# Patient Record
Sex: FEMALE | Race: WHITE | NOT HISPANIC OR LATINO | Employment: FULL TIME | URBAN - METROPOLITAN AREA
[De-identification: names, ages, dates, MRNs, and addresses within clinical notes are randomized per-mention and may not be internally consistent; named-entity substitution may affect disease eponyms.]

---

## 2018-04-13 ENCOUNTER — TRANSCRIBE ORDERS (OUTPATIENT)
Dept: ADMINISTRATIVE | Facility: HOSPITAL | Age: 43
End: 2018-04-13

## 2018-04-13 ENCOUNTER — HOSPITAL ENCOUNTER (OUTPATIENT)
Dept: GENERAL RADIOLOGY | Facility: HOSPITAL | Age: 43
Discharge: HOME | End: 2018-04-13
Attending: INTERNAL MEDICINE | Admitting: INTERNAL MEDICINE

## 2018-04-13 DIAGNOSIS — R07.89 MUSCULOSKELETAL CHEST PAIN: Primary | ICD-10-CM

## 2018-04-13 DIAGNOSIS — R07.89 MUSCULOSKELETAL CHEST PAIN: ICD-10-CM

## 2018-04-13 PROCEDURE — 71046 X-RAY EXAM CHEST 2 VIEWS: CPT

## 2018-08-23 ENCOUNTER — TRANSCRIBE ORDERS (OUTPATIENT)
Dept: ADMINISTRATIVE | Facility: HOSPITAL | Age: 43
End: 2018-08-23

## 2018-08-23 DIAGNOSIS — Z12.31 VISIT FOR SCREENING MAMMOGRAM: Primary | ICD-10-CM

## 2018-09-12 ENCOUNTER — HOSPITAL ENCOUNTER (OUTPATIENT)
Dept: MAMMOGRAPHY | Facility: HOSPITAL | Age: 43
Discharge: HOME | End: 2018-09-12
Attending: INTERNAL MEDICINE | Admitting: INTERNAL MEDICINE

## 2018-09-12 DIAGNOSIS — Z12.31 VISIT FOR SCREENING MAMMOGRAM: ICD-10-CM

## 2018-09-12 PROCEDURE — 77063 BREAST TOMOSYNTHESIS BI: CPT | Performed by: RADIOLOGY

## 2018-09-12 PROCEDURE — 77067 SCR MAMMO BI INCL CAD: CPT | Performed by: RADIOLOGY

## 2018-09-12 PROCEDURE — 77067 SCR MAMMO BI INCL CAD: CPT

## 2018-09-12 PROCEDURE — 77063 BREAST TOMOSYNTHESIS BI: CPT

## 2018-09-24 ENCOUNTER — HOSPITAL ENCOUNTER (OUTPATIENT)
Dept: MAMMOGRAPHY | Facility: HOSPITAL | Age: 43
Discharge: HOME | End: 2018-09-24
Admitting: RADIOLOGY

## 2018-09-24 ENCOUNTER — TRANSCRIBE ORDERS (OUTPATIENT)
Dept: MAMMOGRAPHY | Facility: HOSPITAL | Age: 43
End: 2018-09-24

## 2018-09-24 ENCOUNTER — HOSPITAL ENCOUNTER (OUTPATIENT)
Dept: ULTRASOUND IMAGING | Facility: HOSPITAL | Age: 43
Discharge: HOME | End: 2018-09-24

## 2018-09-24 DIAGNOSIS — R92.8 ABNORMAL MAMMOGRAM: Primary | ICD-10-CM

## 2018-09-24 DIAGNOSIS — R92.8 ABNORMAL MAMMOGRAM: ICD-10-CM

## 2018-09-24 PROCEDURE — 77066 DX MAMMO INCL CAD BI: CPT | Performed by: RADIOLOGY

## 2018-09-24 PROCEDURE — 76642 ULTRASOUND BREAST LIMITED: CPT

## 2018-09-24 PROCEDURE — 77066 DX MAMMO INCL CAD BI: CPT

## 2018-09-24 PROCEDURE — 76642 ULTRASOUND BREAST LIMITED: CPT | Performed by: RADIOLOGY

## 2018-09-24 PROCEDURE — G0279 TOMOSYNTHESIS, MAMMO: HCPCS

## 2018-09-24 PROCEDURE — 77062 BREAST TOMOSYNTHESIS BI: CPT | Performed by: RADIOLOGY

## 2019-03-25 ENCOUNTER — TRANSCRIBE ORDERS (OUTPATIENT)
Dept: MAMMOGRAPHY | Facility: HOSPITAL | Age: 44
End: 2019-03-25

## 2019-03-25 ENCOUNTER — HOSPITAL ENCOUNTER (OUTPATIENT)
Dept: MAMMOGRAPHY | Facility: HOSPITAL | Age: 44
Discharge: HOME | End: 2019-03-25
Attending: RADIOLOGY | Admitting: RADIOLOGY

## 2019-03-25 ENCOUNTER — HOSPITAL ENCOUNTER (OUTPATIENT)
Dept: ULTRASOUND IMAGING | Facility: HOSPITAL | Age: 44
Discharge: HOME | End: 2019-03-25

## 2019-03-25 DIAGNOSIS — R92.8 ABNORMAL MAMMOGRAM: ICD-10-CM

## 2019-03-25 DIAGNOSIS — R92.8 ABNORMAL MAMMOGRAM: Primary | ICD-10-CM

## 2019-03-25 PROCEDURE — 77066 DX MAMMO INCL CAD BI: CPT | Performed by: RADIOLOGY

## 2019-03-25 PROCEDURE — 76642 ULTRASOUND BREAST LIMITED: CPT | Performed by: RADIOLOGY

## 2019-03-25 PROCEDURE — 77066 DX MAMMO INCL CAD BI: CPT

## 2019-03-25 PROCEDURE — 76642 ULTRASOUND BREAST LIMITED: CPT

## 2019-03-29 ENCOUNTER — HOSPITAL ENCOUNTER (OUTPATIENT)
Dept: ULTRASOUND IMAGING | Facility: HOSPITAL | Age: 44
Discharge: HOME | End: 2019-03-29
Admitting: RADIOLOGY

## 2019-03-29 ENCOUNTER — HOSPITAL ENCOUNTER (OUTPATIENT)
Dept: MAMMOGRAPHY | Facility: HOSPITAL | Age: 44
Discharge: HOME | End: 2019-03-29

## 2019-03-29 DIAGNOSIS — R92.8 ABNORMAL MAMMOGRAM: ICD-10-CM

## 2019-03-29 PROCEDURE — 88307 TISSUE EXAM BY PATHOLOGIST: CPT | Performed by: INTERNAL MEDICINE

## 2019-03-29 PROCEDURE — 19083 BX BREAST 1ST LESION US IMAG: CPT | Performed by: RADIOLOGY

## 2019-03-29 PROCEDURE — A4648 IMPLANTABLE TISSUE MARKER: HCPCS

## 2019-03-29 PROCEDURE — 77065 DX MAMMO INCL CAD UNI: CPT | Performed by: RADIOLOGY

## 2019-03-29 RX ORDER — LIDOCAINE HYDROCHLORIDE AND EPINEPHRINE 10; 10 MG/ML; UG/ML
10 INJECTION, SOLUTION INFILTRATION; PERINEURAL ONCE
Status: COMPLETED | OUTPATIENT
Start: 2019-03-29 | End: 2019-03-29

## 2019-03-29 RX ORDER — LIDOCAINE HYDROCHLORIDE 10 MG/ML
5 INJECTION, SOLUTION INFILTRATION; PERINEURAL ONCE
Status: COMPLETED | OUTPATIENT
Start: 2019-03-29 | End: 2019-03-29

## 2019-03-29 RX ADMIN — LIDOCAINE HYDROCHLORIDE 5 ML: 10 INJECTION, SOLUTION INFILTRATION; PERINEURAL at 10:47

## 2019-03-29 RX ADMIN — LIDOCAINE HYDROCHLORIDE,EPINEPHRINE BITARTRATE 3 ML: 10; .01 INJECTION, SOLUTION INFILTRATION; PERINEURAL at 10:47

## 2019-04-01 LAB
CYTO UR: NORMAL
LAB AP CASE REPORT: NORMAL
LAB AP CLINICAL INFORMATION: NORMAL
LAB AP FLOW CYTOMETRY SUMMARY: NORMAL
PATH REPORT.FINAL DX SPEC: NORMAL
PATH REPORT.GROSS SPEC: NORMAL

## 2019-04-03 ENCOUNTER — TELEPHONE (OUTPATIENT)
Dept: MAMMOGRAPHY | Facility: HOSPITAL | Age: 44
End: 2019-04-03

## 2019-04-03 NOTE — TELEPHONE ENCOUNTER
Pt notified of pathology & flow cytometry results and recommendations. Verbalizes understanding. Denies discomfort. Denies signs and symptoms of infection. Questions answered, verbalized understanding.

## 2019-12-09 ENCOUNTER — TRANSCRIBE ORDERS (OUTPATIENT)
Dept: ADMINISTRATIVE | Facility: HOSPITAL | Age: 44
End: 2019-12-09

## 2019-12-09 DIAGNOSIS — N63.0 LUMP OR MASS IN BREAST: Primary | ICD-10-CM

## 2020-01-07 ENCOUNTER — HOSPITAL ENCOUNTER (OUTPATIENT)
Dept: ULTRASOUND IMAGING | Facility: HOSPITAL | Age: 45
Discharge: HOME | End: 2020-01-07

## 2020-01-07 ENCOUNTER — HOSPITAL ENCOUNTER (OUTPATIENT)
Dept: MAMMOGRAPHY | Facility: HOSPITAL | Age: 45
Discharge: HOME | End: 2020-01-07
Admitting: INTERNAL MEDICINE

## 2020-01-07 DIAGNOSIS — N63.0 LUMP OR MASS IN BREAST: ICD-10-CM

## 2020-01-07 PROCEDURE — 77066 DX MAMMO INCL CAD BI: CPT | Performed by: RADIOLOGY

## 2020-01-07 PROCEDURE — 76642 ULTRASOUND BREAST LIMITED: CPT

## 2020-01-07 PROCEDURE — 77062 BREAST TOMOSYNTHESIS BI: CPT | Performed by: RADIOLOGY

## 2020-01-07 PROCEDURE — 76642 ULTRASOUND BREAST LIMITED: CPT | Performed by: RADIOLOGY

## 2020-01-07 PROCEDURE — 77066 DX MAMMO INCL CAD BI: CPT

## 2020-01-07 PROCEDURE — G0279 TOMOSYNTHESIS, MAMMO: HCPCS

## 2020-01-15 ENCOUNTER — HOSPITAL ENCOUNTER (OUTPATIENT)
Dept: MAMMOGRAPHY | Facility: HOSPITAL | Age: 45
Discharge: HOME | End: 2020-01-15

## 2020-01-15 ENCOUNTER — HOSPITAL ENCOUNTER (OUTPATIENT)
Dept: ULTRASOUND IMAGING | Facility: HOSPITAL | Age: 45
Discharge: HOME | End: 2020-01-15

## 2020-01-15 ENCOUNTER — HOSPITAL ENCOUNTER (OUTPATIENT)
Dept: ULTRASOUND IMAGING | Facility: HOSPITAL | Age: 45
Discharge: HOME | End: 2020-01-15
Admitting: RADIOLOGY

## 2020-01-15 DIAGNOSIS — N63.0 LUMP OR MASS IN BREAST: ICD-10-CM

## 2020-01-15 PROCEDURE — 88342 IMHCHEM/IMCYTCHM 1ST ANTB: CPT | Performed by: INTERNAL MEDICINE

## 2020-01-15 PROCEDURE — 77065 DX MAMMO INCL CAD UNI: CPT | Performed by: RADIOLOGY

## 2020-01-15 PROCEDURE — 10005 FNA BX W/US GDN 1ST LES: CPT | Performed by: RADIOLOGY

## 2020-01-15 PROCEDURE — 25010000003 LIDOCAINE 1 % SOLUTION: Performed by: RADIOLOGY

## 2020-01-15 PROCEDURE — 88305 TISSUE EXAM BY PATHOLOGIST: CPT | Performed by: INTERNAL MEDICINE

## 2020-01-15 PROCEDURE — 19083 BX BREAST 1ST LESION US IMAG: CPT | Performed by: RADIOLOGY

## 2020-01-15 PROCEDURE — 88173 CYTOPATH EVAL FNA REPORT: CPT | Performed by: INTERNAL MEDICINE

## 2020-01-15 PROCEDURE — A4648 IMPLANTABLE TISSUE MARKER: HCPCS

## 2020-01-15 PROCEDURE — 88360 TUMOR IMMUNOHISTOCHEM/MANUAL: CPT | Performed by: INTERNAL MEDICINE

## 2020-01-15 RX ORDER — LIDOCAINE HYDROCHLORIDE AND EPINEPHRINE 10; 10 MG/ML; UG/ML
10 INJECTION, SOLUTION INFILTRATION; PERINEURAL ONCE
Status: COMPLETED | OUTPATIENT
Start: 2020-01-15 | End: 2020-01-15

## 2020-01-15 RX ORDER — LIDOCAINE HYDROCHLORIDE 10 MG/ML
5 INJECTION, SOLUTION INFILTRATION; PERINEURAL ONCE
Status: COMPLETED | OUTPATIENT
Start: 2020-01-15 | End: 2020-01-15

## 2020-01-15 RX ORDER — LIDOCAINE HYDROCHLORIDE 10 MG/ML
10 INJECTION, SOLUTION INFILTRATION; PERINEURAL ONCE
Status: COMPLETED | OUTPATIENT
Start: 2020-01-15 | End: 2020-01-15

## 2020-01-15 RX ADMIN — LIDOCAINE HYDROCHLORIDE 5 ML: 10 INJECTION, SOLUTION INFILTRATION; PERINEURAL at 14:44

## 2020-01-15 RX ADMIN — LIDOCAINE HYDROCHLORIDE 2 ML: 10 INJECTION, SOLUTION INFILTRATION; PERINEURAL at 14:29

## 2020-01-15 RX ADMIN — LIDOCAINE HYDROCHLORIDE,EPINEPHRINE BITARTRATE 4 ML: 10; .01 INJECTION, SOLUTION INFILTRATION; PERINEURAL at 14:45

## 2020-01-17 LAB
LAB AP CASE REPORT: NORMAL
PATH REPORT.FINAL DX SPEC: NORMAL

## 2020-01-18 LAB
CYTO UR: NORMAL
LAB AP CASE REPORT: NORMAL
LAB AP CLINICAL INFORMATION: NORMAL
LAB AP DIAGNOSIS COMMENT: NORMAL
PATH REPORT.FINAL DX SPEC: NORMAL
PATH REPORT.GROSS SPEC: NORMAL

## 2020-01-20 ENCOUNTER — NURSE NAVIGATOR (OUTPATIENT)
Dept: ONCOLOGY | Facility: CLINIC | Age: 45
End: 2020-01-20
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
## 2020-01-21 ENCOUNTER — TELEPHONE (OUTPATIENT)
Dept: MAMMOGRAPHY | Facility: HOSPITAL | Age: 45
End: 2020-01-21

## 2020-01-21 PROBLEM — C50.811 MALIGNANT NEOPLASM OF OVERLAPPING SITES OF RIGHT BREAST IN FEMALE, ESTROGEN RECEPTOR POSITIVE: Status: ACTIVE | Noted: 2020-01-21

## 2020-01-21 PROBLEM — Z17.0 MALIGNANT NEOPLASM OF OVERLAPPING SITES OF RIGHT BREAST IN FEMALE, ESTROGEN RECEPTOR POSITIVE: Status: ACTIVE | Noted: 2020-01-21

## 2020-01-21 NOTE — TELEPHONE ENCOUNTER
Referring provider's office notified pathology returned as cancer & patient has already been notified. Patient notified of pathology results by Dr. LONG at an appointment yesterday and recommendations by [facility] today. Verbalizes understanding. Denies discomfort. Denies signs and symptoms of infection.     Patient stated Dr. LONG informed her that she has an appointment with Dr. Connell for tomorrow, and Dr. LONG will follow up with her. Patient encouraged to call back or contact VÍCTOR Jansen RN, OCN, Breast Navigator, with any questions or concerns.  Pt verbalized understanding. Pt information sent to Samantha Jansen RN, OCN, Breast Navigator for evaluation & referral for genetic counseling. Patient was provided the breast cancer information packet from Samantha Jansen RN, OCN.

## 2020-01-22 ENCOUNTER — LAB (OUTPATIENT)
Dept: LAB | Facility: HOSPITAL | Age: 45
End: 2020-01-22

## 2020-01-22 ENCOUNTER — CONSULT (OUTPATIENT)
Dept: ONCOLOGY | Facility: CLINIC | Age: 45
End: 2020-01-22

## 2020-01-22 VITALS
SYSTOLIC BLOOD PRESSURE: 159 MMHG | TEMPERATURE: 98.2 F | HEART RATE: 89 BPM | BODY MASS INDEX: 30.99 KG/M2 | HEIGHT: 65 IN | RESPIRATION RATE: 18 BRPM | WEIGHT: 186 LBS | OXYGEN SATURATION: 98 % | DIASTOLIC BLOOD PRESSURE: 96 MMHG

## 2020-01-22 DIAGNOSIS — C50.811 MALIGNANT NEOPLASM OF OVERLAPPING SITES OF RIGHT BREAST IN FEMALE, ESTROGEN RECEPTOR POSITIVE (HCC): ICD-10-CM

## 2020-01-22 DIAGNOSIS — Z17.0 MALIGNANT NEOPLASM OF OVERLAPPING SITES OF RIGHT BREAST IN FEMALE, ESTROGEN RECEPTOR POSITIVE (HCC): Primary | ICD-10-CM

## 2020-01-22 DIAGNOSIS — C50.811 MALIGNANT NEOPLASM OF OVERLAPPING SITES OF RIGHT BREAST IN FEMALE, ESTROGEN RECEPTOR POSITIVE (HCC): Primary | ICD-10-CM

## 2020-01-22 DIAGNOSIS — Z17.0 MALIGNANT NEOPLASM OF OVERLAPPING SITES OF RIGHT BREAST IN FEMALE, ESTROGEN RECEPTOR POSITIVE (HCC): ICD-10-CM

## 2020-01-22 LAB
ALBUMIN SERPL-MCNC: 4.1 G/DL (ref 3.5–5.2)
ALBUMIN/GLOB SERPL: 1.1 G/DL
ALP SERPL-CCNC: 102 U/L (ref 39–117)
ALT SERPL W P-5'-P-CCNC: 25 U/L (ref 1–33)
ANION GAP SERPL CALCULATED.3IONS-SCNC: 9 MMOL/L (ref 5–15)
AST SERPL-CCNC: 18 U/L (ref 1–32)
BILIRUB SERPL-MCNC: 0.3 MG/DL (ref 0.2–1.2)
BUN BLD-MCNC: 11 MG/DL (ref 6–20)
BUN/CREAT SERPL: 19.3 (ref 7–25)
CALCIUM SPEC-SCNC: 9.8 MG/DL (ref 8.6–10.5)
CHLORIDE SERPL-SCNC: 99 MMOL/L (ref 98–107)
CO2 SERPL-SCNC: 29 MMOL/L (ref 22–29)
CREAT BLD-MCNC: 0.57 MG/DL (ref 0.57–1)
ERYTHROCYTE [DISTWIDTH] IN BLOOD BY AUTOMATED COUNT: 13.9 % (ref 12.3–15.4)
GFR SERPL CREATININE-BSD FRML MDRD: 116 ML/MIN/1.73
GLOBULIN UR ELPH-MCNC: 3.8 GM/DL
GLUCOSE BLD-MCNC: 168 MG/DL (ref 65–99)
HCT VFR BLD AUTO: 45.1 % (ref 34–46.6)
HGB BLD-MCNC: 15 G/DL (ref 12–15.9)
LYMPHOCYTES # BLD AUTO: 3.1 10*3/MM3 (ref 0.7–3.1)
LYMPHOCYTES NFR BLD AUTO: 20.2 % (ref 19.6–45.3)
MCH RBC QN AUTO: 31.5 PG (ref 26.6–33)
MCHC RBC AUTO-ENTMCNC: 33.2 G/DL (ref 31.5–35.7)
MCV RBC AUTO: 94.9 FL (ref 79–97)
MONOCYTES # BLD AUTO: 0.4 10*3/MM3 (ref 0.1–0.9)
MONOCYTES NFR BLD AUTO: 2.8 % (ref 5–12)
NEUTROPHILS # BLD AUTO: 11.8 10*3/MM3 (ref 1.7–7)
NEUTROPHILS NFR BLD AUTO: 77 % (ref 42.7–76)
PLATELET # BLD AUTO: 324 10*3/MM3 (ref 140–450)
PMV BLD AUTO: 7.9 FL (ref 6–12)
POTASSIUM BLD-SCNC: 4.1 MMOL/L (ref 3.5–5.2)
PROT SERPL-MCNC: 7.9 G/DL (ref 6–8.5)
RBC # BLD AUTO: 4.75 10*6/MM3 (ref 3.77–5.28)
SODIUM BLD-SCNC: 137 MMOL/L (ref 136–145)
WBC NRBC COR # BLD: 15.3 10*3/MM3 (ref 3.4–10.8)

## 2020-01-22 PROCEDURE — 85025 COMPLETE CBC W/AUTO DIFF WBC: CPT

## 2020-01-22 PROCEDURE — 36415 COLL VENOUS BLD VENIPUNCTURE: CPT

## 2020-01-22 PROCEDURE — 99205 OFFICE O/P NEW HI 60 MIN: CPT | Performed by: INTERNAL MEDICINE

## 2020-01-22 PROCEDURE — 80053 COMPREHEN METABOLIC PANEL: CPT

## 2020-01-22 RX ORDER — FLUTICASONE PROPIONATE 50 MCG
1 SPRAY, SUSPENSION (ML) NASAL EVERY 12 HOURS SCHEDULED
COMMUNITY

## 2020-01-22 RX ORDER — ONDANSETRON HYDROCHLORIDE 8 MG/1
8 TABLET, FILM COATED ORAL 3 TIMES DAILY PRN
Qty: 30 TABLET | Refills: 5 | Status: SHIPPED | OUTPATIENT
Start: 2020-01-22 | End: 2020-01-22 | Stop reason: SDUPTHER

## 2020-01-22 RX ORDER — LISINOPRIL 10 MG/1
10 TABLET ORAL
COMMUNITY
End: 2020-10-14 | Stop reason: ALTCHOICE

## 2020-01-22 RX ORDER — LORAZEPAM 1 MG/1
1 TABLET ORAL AS NEEDED
COMMUNITY

## 2020-01-22 RX ORDER — MONTELUKAST SODIUM 10 MG/1
10 TABLET ORAL NIGHTLY
COMMUNITY

## 2020-01-22 RX ORDER — DEXAMETHASONE 4 MG/1
TABLET ORAL
Qty: 6 TABLET | Refills: 5 | Status: SHIPPED | OUTPATIENT
Start: 2020-02-05 | End: 2020-06-09

## 2020-01-22 RX ORDER — DEXAMETHASONE 4 MG/1
TABLET ORAL
Qty: 6 TABLET | Refills: 3 | Status: SHIPPED | OUTPATIENT
Start: 2020-01-22 | End: 2020-01-22 | Stop reason: SDUPTHER

## 2020-01-22 RX ORDER — PANTOPRAZOLE SODIUM 40 MG/1
40 TABLET, DELAYED RELEASE ORAL DAILY
COMMUNITY
Start: 2019-10-23

## 2020-01-22 RX ORDER — LIDOCAINE AND PRILOCAINE 25; 25 MG/G; MG/G
CREAM TOPICAL AS NEEDED
Qty: 30 G | Refills: 3 | Status: SHIPPED | OUTPATIENT
Start: 2020-02-05 | End: 2020-06-10

## 2020-01-22 RX ORDER — CHLORAL HYDRATE 500 MG
CAPSULE ORAL
COMMUNITY
End: 2020-06-21

## 2020-01-22 RX ORDER — ONDANSETRON HYDROCHLORIDE 8 MG/1
8 TABLET, FILM COATED ORAL 3 TIMES DAILY PRN
Qty: 30 TABLET | Refills: 5 | Status: SHIPPED | OUTPATIENT
Start: 2020-02-05 | End: 2020-06-09

## 2020-01-23 ENCOUNTER — TELEPHONE (OUTPATIENT)
Dept: ONCOLOGY | Facility: CLINIC | Age: 45
End: 2020-01-23

## 2020-01-23 NOTE — TELEPHONE ENCOUNTER
Patient requests her visit summary from her visit 1/22 be faxed to her work for [employer]. She needs the summary of her visit diagnosis, reason and procedure.      Work Fax: [phone]  Phone: [phone]

## 2020-01-24 ENCOUNTER — OFFICE VISIT (OUTPATIENT)
Dept: ONCOLOGY | Facility: CLINIC | Age: 45
End: 2020-01-24

## 2020-01-24 ENCOUNTER — HOSPITAL ENCOUNTER (OUTPATIENT)
Dept: MRI IMAGING | Facility: HOSPITAL | Age: 45
Discharge: HOME | End: 2020-01-24
Admitting: SURGERY

## 2020-01-24 VITALS
RESPIRATION RATE: 16 BRPM | OXYGEN SATURATION: 94 % | WEIGHT: 189 LBS | DIASTOLIC BLOOD PRESSURE: 103 MMHG | TEMPERATURE: 98.6 F | BODY MASS INDEX: 31.49 KG/M2 | HEIGHT: 65 IN | SYSTOLIC BLOOD PRESSURE: 173 MMHG | HEART RATE: 94 BPM

## 2020-01-24 DIAGNOSIS — C50.811 MALIGNANT NEOPLASM OF OVERLAPPING SITES OF RIGHT BREAST IN FEMALE, ESTROGEN RECEPTOR POSITIVE (HCC): Primary | ICD-10-CM

## 2020-01-24 DIAGNOSIS — Z17.0 MALIGNANT NEOPLASM OF OVERLAPPING SITES OF RIGHT BREAST IN FEMALE, ESTROGEN RECEPTOR POSITIVE (HCC): Primary | ICD-10-CM

## 2020-01-24 DIAGNOSIS — C50.811 MALIGNANT NEOPLASM OF OVERLAPPING SITES OF RIGHT FEMALE BREAST (HCC): ICD-10-CM

## 2020-01-24 LAB — CREAT BLDA-MCNC: 0.5 MG/DL (ref 0.6–1.3)

## 2020-01-24 PROCEDURE — 99215 OFFICE O/P EST HI 40 MIN: CPT | Performed by: NURSE PRACTITIONER

## 2020-01-24 PROCEDURE — 0 GADOBENATE DIMEGLUMINE 529 MG/ML SOLUTION: Performed by: SURGERY

## 2020-01-24 PROCEDURE — A9577 INJ MULTIHANCE: HCPCS | Performed by: SURGERY

## 2020-01-24 PROCEDURE — 77049 MRI BREAST C-+ W/CAD BI: CPT

## 2020-01-24 PROCEDURE — 77049 MRI BREAST C-+ W/CAD BI: CPT | Performed by: RADIOLOGY

## 2020-01-24 PROCEDURE — 82565 ASSAY OF CREATININE: CPT

## 2020-01-24 RX ADMIN — GADOBENATE DIMEGLUMINE 15 ML: 529 INJECTION, SOLUTION INTRAVENOUS at 08:51

## 2020-01-24 NOTE — PROGRESS NOTES
CHEMOTHERAPY PREPARATION    Josefina Rodriguez  [id number]  1975    Chief Complaint: chemo education     History of present illness:  Josefina Rodriguez is a 44 y.o. year old female who is here today for chemotherapy preparation and needs assessment. The patient has been diagnosed with breast cancer    and is scheduled to begin treatment with DOCEtaxel / CARBOplatin / Trastuzumab-anns / Pertuzumab/ Lupron/ Neulasta.     Oncology History:       Malignant neoplasm of overlapping sites of right breast in female, estrogen receptor positive (CMS/HCC)    1/21/2020 Initial Diagnosis     Malignant neoplasm of overlapping sites of right breast in female, estrogen receptor positive (CMS/HCC)      1/21/2020 Cancer Staged     Staging form: Breast, AJCC 8th Edition  - Clinical: Stage IB (cT2, cN0, cM0, G2, ER+, WA+, HER2+) - Signed by Jolene Connell MD on 1/21/2020 2/5/2020 -  Chemotherapy     OP BREAST TCH-P DOCEtaxel / CARBOplatin AUC=6 / Trastuzumab-anns / Pertuzumab      2/5/2020 -  Chemotherapy     OP SUPPORTIVE LEUPROLIDE 11.25 MG Q3M         Past Medical History:   Diagnosis Date   • Anxiety    • Depression    • Diabetes mellitus (CMS/HCC)    • Hiatal hernia    • Hypertension    • Malignant neoplasm of overlapping sites of right breast in female, estrogen receptor positive (CMS/HCC) 1/21/2020   • PCOS (polycystic ovarian syndrome)        Past Surgical History:   Procedure Laterality Date   • BREAST BIOPSY Right 03/2019    axillary lymph node bx/fna   • CHOLECYSTECTOMY  03/2010   • COLONOSCOPY  2016   • US GUIDED FINE NEEDLE ASPIRATION  1/15/2020       MEDICATIONS: The current medication list was reviewed and reconciled.     Allergies:  is allergic to cashew nut; naproxen; penicillins; and pistachio nut.    Family History   Problem Relation Age of Onset   • Breast cancer Mother 64   • Heart disease Mother    • Hypertension Father    • Lung cancer Father    • Ovarian cancer Neg Hx          Review of  "Systems   Constitutional: Negative.    HENT: Negative.    Eyes: Negative.    Respiratory: Negative.    Cardiovascular: Negative.    Gastrointestinal: Negative.    Endocrine: Negative.    Genitourinary: Negative.    Musculoskeletal: Negative.    Skin: Negative.    Allergic/Immunologic: Negative.    Neurological: Negative.    Hematological: Negative.    Psychiatric/Behavioral: Positive for sleep disturbance. The patient is nervous/anxious.        Physical Exam  Vital Signs: BP (!) 173/103   Pulse 94   Temp 98.6 °F (37 °C) (Temporal)   Resp 16   Ht 163.8 cm (64.5\")   Wt 85.7 kg (189 lb)   LMP 01/14/2020   SpO2 94%   BMI 31.94 kg/m²    General Appearance:  alert, cooperative, no apparent distress and appears stated age   Neurologic/Psychiatric: A&O x 3, gait steady, appropriate affect   HEENT:  Normocephalic, without obvious abnormality, mucous membranes moist   Lungs:   Clear to auscultation bilaterally; respirations regular, even, and unlabored bilaterally   Heart:  Regular rate and rhythm, no murmurs appreciated   Extremities: Normal, atraumatic; no clubbing, cyanosis, or edema    Skin: No rashes, lesions, or abnormal coloration noted     ECOG Performance Status: (0) Fully active, able to carry on all predisease performance without restriction          NEEDS ASSESSMENTS    Genetics  The patient's new diagnosis and family history have been reviewed for genetic counseling needs. A genetic referral is recommended. Order already placed by .     Psychosocial  The patient has completed a PHQ-9 Depression Screening and the Distress Thermometer (DT) today.   PHQ-9 results show 5-9 (Mild Depression). The patient scored their distress today as 4 on a scale of 0-10 with 0 being no distress and 10 being extreme distress.   Problems marked by the patient as being an issue for them within the last week include practical problems, emotional problems and physical problems.   Results were reviewed along with " "psychosocial resources offered by our cancer center. Our oncology social worker will be flagged for a DT score of 4 or above, and a same day call will be made for a score of 9 or 10. A mental health referral is recommended at this time. The patient is not accepting of a referral to [facility]. She already has a psychiatrist that she sees routinely.   Copies of patient's questionnaires will be scanned into EMR for details and further reference.    Barriers to care  A barriers form was also completed by the patient today. We discussed services offered by our facility to help her have adequate access to care. The patient was given the name and card for our Oncology Social Worker, Patricia Dennis. Based upon barriers assessment today, the patient will require a follow-up call from the  to further discuss needs.   A copy of the barriers form will also be scanned into EMR for details and further reference.     VAD Assessment  The patient and I discussed planned intervenous chemotherapy as well as other IV treatments that are often needed throughout the course of treatment. These may include, but are not limited to blood transfusions, antibiotics, and IV hydration. The vasculature does not appear to be adequate for multiple peripheral IVs throughout their treatment course. Discussed risks and benefits of VADs. The patient would like to pursue Port-A-Cath insertion prior to initiation of treatment.  Order already placed.     Advance Care Planning   The patient and I discussed advanced care planning, \"Conversations that Matter\".   This service was offered, free of charge, for development of advance directives with a certified ACP facilitator.  The patient does not have an up-to-date advanced directive. The patient is not interested in an appointment with one of our facilitators to create or update their advanced directives.         Palliative Care  The patient and I discussed palliative care services. " Palliative care is not the same as Hospice care. This is specialized medical care for people living with serious illness with the goal of improving quality of life for the patient and their family. [facility] has partnered with [facility] to offer our patients outpatient palliative care early along with their treatment to assist in coordination of care, symptom management, pain management, and medical decision making.  Oncology criteria for palliative care referral is not met at this time. The patient is not interested in a palliative care consultation.     Additional Referral needs  none      CHEMOTHERAPY EDUCATION    Booklets Given: Chemotherapy and You [x]  Eating Hints [x]    Sexuality/Fertility Books [x]      Chemotherapy/Biotherapy Education Sheets: (list all that apply)  nausea management, acid reflux management, diarrhea management, Cancer resourse contacts information, skin and mouth care and vaccination information                                                                                                                                                                 Chemotherapy Regimen:   Treatment Plans     Name Type Plan dates Plan Provider         Active    OP SUPPORTIVE LEUPROLIDE 11.25 MG Q3M ONCOLOGY SUPPORTIVE CARE 1  2/5/2020 - Present Jolene Connell MD     OP BREAST TCH-P DOCEtaxel / CARBOplatin AUC=6 / Trastuzumab-anns / Pertuzumab ONCOLOGY TREATMENT  2/2/2020 - Present Jolene Connell MD                    TOPICS EDUCATION PROVIDED COMMENTS   ANEMIA:  role of RBC, cause, s/s, ways to manage, role of transfusion [x]    THROMBOCYTOPENIA:  role of platelet, cause, s/s, ways to prevent bleeding, things to avoid, when to seek help [x]    NEUTROPENIA:  role of WBC, cause, infection precautions, s/s of infection, when to call MD [x]    NUTRITION & APPETITE CHANGES:  importance of maintaining healthy diet & weight, ways to manage to improve intake, dietary consult, exercise regimen [x]     DIARRHEA:  causes, s/s of dehydration, ways to manage, dietary changes, when to call MD [x]    CONSTIPATION:  causes, ways to manage, dietary changes, when to call MD [x]    NAUSEA & VOMITING:  cause, use of antiemetics, dietary changes, when to call MD [x]    MOUTH SORES:  causes, oral care, ways to manage [x]    ALOPECIA:  cause, ways to manage, resources [x]    INFERTILITY & SEXUALITY:  causes, fertility preservation options, sexuality changes, ways to manage, importance of birth control [x]    NERVOUS SYSTEM CHANGES:  causes, s/s, neuropathies, cognitive changes, ways to manage [x]    PAIN:  causes, ways to manage [x] ????   SKIN & NAIL CHANGES:  cause, s/s, ways to manage [x]    ORGAN TOXICITIES:  cause, s/s, need for diagnostic tests, labs, when to notify MD [x]    SURVIVORSHIP:  distress, distress assessment, secondary malignancies, early/late effects, follow-up, social issues, social support [x]    HOME CARE:  use of spill kits, storing of PO chemo, how to manage bodily fluids [x]    MISCELLANEOUS:  drug interactions, administration, vesicant, et [x]        Assessment and Plan:    Diagnoses and all orders for this visit:    Malignant neoplasm of overlapping sites of right breast in female, estrogen receptor positive (CMS/HCC)        This was a 45 minute face-to-face visit with 40 minutes spent in  counseling and coordination of care as documented above.   The patient and I have reviewed their new cancer diagnosis and scheduled treatment plan. Needs assessment was completed including genetics, psychosocial needs, barriers to care, VAD evaluation, advanced care planning, and palliative care services. Referrals have been ordered as appropriate based upon our evaluation and patient desires.     Chemotherapy teaching was also completed today as documented above. Adequate time was given to answer all questions to her satisfaction. Patient and family are aware of their care team members and contact information if  they have questions or problems throughout the treatment course. Needs assessments and education has been completed. The patient is adequately prepared to begin treatment as scheduled.     Reviewed with patient education regarding dexamethasone, EMLA cream and Zofran prescription sent to pharmacy.      I advised the patient that she can take Tylenol or Ibuprofen as needed for aches/pains related to cancer/treatment. I also advised patient she could use Senakot or Miralax as needed for constipation or Imodium as needed for diarrhea.       I reviewed with the patient the care team members. I also reviewed the option of the urgent care clinic through our oncology office for evaluation and management of symptoms related to treatment.        Aimee Johnson, APRN   01/24/2020

## 2020-01-27 ENCOUNTER — TELEPHONE (OUTPATIENT)
Dept: MRI IMAGING | Facility: HOSPITAL | Age: 45
End: 2020-01-27

## 2020-01-27 NOTE — TELEPHONE ENCOUNTER
Pt called with MRI Breast results. Recommended surgical follow up. Pt was then transferred to the HELP line after questions were asked and answered. An email was sent to the Nurse Navigator. Pt expressed understanding and was encouraged to call with any further questions or concerns.

## 2020-01-28 ENCOUNTER — NURSE NAVIGATOR (OUTPATIENT)
Dept: ONCOLOGY | Facility: CLINIC | Age: 45
End: 2020-01-28

## 2020-01-28 NOTE — PROGRESS NOTES
Patient called to ask about her MRI - we reviewed her result and explained that it was a baseline - she verbalized understanding.

## 2020-01-30 ENCOUNTER — HOSPITAL ENCOUNTER (OUTPATIENT)
Dept: NUCLEAR MEDICINE | Facility: HOSPITAL | Age: 45
Discharge: HOME | End: 2020-01-30

## 2020-01-30 ENCOUNTER — HOSPITAL ENCOUNTER (OUTPATIENT)
Dept: CT IMAGING | Facility: HOSPITAL | Age: 45
Discharge: HOME | End: 2020-01-30
Admitting: INTERNAL MEDICINE

## 2020-01-30 ENCOUNTER — HOSPITAL ENCOUNTER (OUTPATIENT)
Dept: CARDIOLOGY | Facility: HOSPITAL | Age: 45
Discharge: HOME | End: 2020-01-30

## 2020-01-30 ENCOUNTER — TRANSCRIBE ORDERS (OUTPATIENT)
Dept: GENERAL RADIOLOGY | Facility: HOSPITAL | Age: 45
End: 2020-01-30

## 2020-01-30 VITALS
WEIGHT: 180 LBS | DIASTOLIC BLOOD PRESSURE: 90 MMHG | SYSTOLIC BLOOD PRESSURE: 132 MMHG | HEIGHT: 66 IN | BODY MASS INDEX: 28.93 KG/M2

## 2020-01-30 DIAGNOSIS — Z17.0 MALIGNANT NEOPLASM OF OVERLAPPING SITES OF RIGHT BREAST IN FEMALE, ESTROGEN RECEPTOR POSITIVE (HCC): ICD-10-CM

## 2020-01-30 DIAGNOSIS — C50.811 MALIGNANT NEOPLASM OF OVERLAPPING SITES OF RIGHT FEMALE BREAST, UNSPECIFIED ESTROGEN RECEPTOR STATUS (HCC): Primary | ICD-10-CM

## 2020-01-30 DIAGNOSIS — C50.811 MALIGNANT NEOPLASM OF OVERLAPPING SITES OF RIGHT BREAST IN FEMALE, ESTROGEN RECEPTOR POSITIVE (HCC): ICD-10-CM

## 2020-01-30 LAB
BH CV ECHO MEAS - AO MAX PG (FULL): 5.1 MMHG
BH CV ECHO MEAS - AO MAX PG: 10 MMHG
BH CV ECHO MEAS - AO MEAN PG (FULL): 2 MMHG
BH CV ECHO MEAS - AO MEAN PG: 5 MMHG
BH CV ECHO MEAS - AO ROOT AREA (BSA CORRECTED): 1.5
BH CV ECHO MEAS - AO ROOT AREA: 6.6 CM^2
BH CV ECHO MEAS - AO ROOT DIAM: 2.9 CM
BH CV ECHO MEAS - AO V2 MAX: 158 CM/SEC
BH CV ECHO MEAS - AO V2 MEAN: 94.9 CM/SEC
BH CV ECHO MEAS - AO V2 VTI: 34 CM
BH CV ECHO MEAS - ASC AORTA: 3.1 CM
BH CV ECHO MEAS - AVA(I,A): 2.6 CM^2
BH CV ECHO MEAS - AVA(I,D): 2.6 CM^2
BH CV ECHO MEAS - AVA(V,A): 2.4 CM^2
BH CV ECHO MEAS - AVA(V,D): 2.4 CM^2
BH CV ECHO MEAS - BSA(HAYCOCK): 2 M^2
BH CV ECHO MEAS - BSA: 1.9 M^2
BH CV ECHO MEAS - BZI_BMI: 29.1 KILOGRAMS/M^2
BH CV ECHO MEAS - BZI_METRIC_HEIGHT: 167.6 CM
BH CV ECHO MEAS - BZI_METRIC_WEIGHT: 81.6 KG
BH CV ECHO MEAS - EDV(CUBED): 80.6 ML
BH CV ECHO MEAS - EDV(TEICH): 84 ML
BH CV ECHO MEAS - EF(CUBED): 76.9 %
BH CV ECHO MEAS - EF(TEICH): 69.3 %
BH CV ECHO MEAS - ESV(CUBED): 18.6 ML
BH CV ECHO MEAS - ESV(TEICH): 25.8 ML
BH CV ECHO MEAS - FS: 38.7 %
BH CV ECHO MEAS - IVS/LVPW: 1.2
BH CV ECHO MEAS - IVSD: 1.1 CM
BH CV ECHO MEAS - LA DIMENSION: 3.7 CM
BH CV ECHO MEAS - LA/AO: 1.3
BH CV ECHO MEAS - LAD MAJOR: 4.8 CM
BH CV ECHO MEAS - LAT PEAK E' VEL: 8.1 CM/SEC
BH CV ECHO MEAS - LATERAL E/E' RATIO: 10.9
BH CV ECHO MEAS - LV MASS(C)D: 145.5 GRAMS
BH CV ECHO MEAS - LV MASS(C)DI: 76 GRAMS/M^2
BH CV ECHO MEAS - LV MAX PG: 4.8 MMHG
BH CV ECHO MEAS - LV MEAN PG: 3 MMHG
BH CV ECHO MEAS - LV V1 MAX: 110 CM/SEC
BH CV ECHO MEAS - LV V1 MEAN: 73.6 CM/SEC
BH CV ECHO MEAS - LV V1 VTI: 26 CM
BH CV ECHO MEAS - LVIDD: 4.3 CM
BH CV ECHO MEAS - LVIDS: 2.7 CM
BH CV ECHO MEAS - LVOT AREA (M): 3.5 CM^2
BH CV ECHO MEAS - LVOT AREA: 3.5 CM^2
BH CV ECHO MEAS - LVOT DIAM: 2.1 CM
BH CV ECHO MEAS - LVPWD: 0.91 CM
BH CV ECHO MEAS - MED PEAK E' VEL: 8.7 CM/SEC
BH CV ECHO MEAS - MEDIAL E/E' RATIO: 10.3
BH CV ECHO MEAS - MV A MAX VEL: 79 CM/SEC
BH CV ECHO MEAS - MV DEC TIME: 0.18 SEC
BH CV ECHO MEAS - MV E MAX VEL: 88.8 CM/SEC
BH CV ECHO MEAS - MV E/A: 1.1
BH CV ECHO MEAS - PA ACC SLOPE: 749.7 CM/SEC^2
BH CV ECHO MEAS - PA ACC TIME: 0.14 SEC
BH CV ECHO MEAS - PA MAX PG: 5.2 MMHG
BH CV ECHO MEAS - PA PR(ACCEL): 17.4 MMHG
BH CV ECHO MEAS - PA V2 MAX: 114.5 CM/SEC
BH CV ECHO MEAS - RVDD: 2.2 CM
BH CV ECHO MEAS - SI(AO): 117.4 ML/M^2
BH CV ECHO MEAS - SI(CUBED): 32.4 ML/M^2
BH CV ECHO MEAS - SI(LVOT): 47.1 ML/M^2
BH CV ECHO MEAS - SI(TEICH): 30.4 ML/M^2
BH CV ECHO MEAS - SV(AO): 224.6 ML
BH CV ECHO MEAS - SV(CUBED): 62 ML
BH CV ECHO MEAS - SV(LVOT): 90.1 ML
BH CV ECHO MEAS - SV(TEICH): 58.2 ML
BH CV ECHO MEAS - TAPSE (>1.6): 2.7 CM2
BH CV ECHO MEASUREMENTS AVERAGE E/E' RATIO: 10.57
BH CV VAS BP RIGHT ARM: NORMAL MMHG
BH CV XLRA - RV BASE: 3.1 CM
BH CV XLRA - RV LENGTH: 6.3 CM
BH CV XLRA - RV MID: 3.1 CM
BH CV XLRA - TDI S': 14.8 CM/SEC
MAXIMAL PREDICTED HEART RATE: 178 BPM
STRESS TARGET HR: 151 BPM

## 2020-01-30 PROCEDURE — 25010000002 IOPAMIDOL 61 % SOLUTION: Performed by: INTERNAL MEDICINE

## 2020-01-30 PROCEDURE — 78306 BONE IMAGING WHOLE BODY: CPT

## 2020-01-30 PROCEDURE — 71260 CT THORAX DX C+: CPT

## 2020-01-30 PROCEDURE — 93306 TTE W/DOPPLER COMPLETE: CPT | Performed by: INTERNAL MEDICINE

## 2020-01-30 PROCEDURE — 0 TECHNETIUM MEDRONATE KIT: Performed by: INTERNAL MEDICINE

## 2020-01-30 PROCEDURE — A9503 TC99M MEDRONATE: HCPCS | Performed by: INTERNAL MEDICINE

## 2020-01-30 PROCEDURE — 93306 TTE W/DOPPLER COMPLETE: CPT

## 2020-01-30 PROCEDURE — 74177 CT ABD & PELVIS W/CONTRAST: CPT

## 2020-01-30 RX ORDER — TC 99M MEDRONATE 20 MG/10ML
26.5 INJECTION, POWDER, LYOPHILIZED, FOR SOLUTION INTRAVENOUS
Status: COMPLETED | OUTPATIENT
Start: 2020-01-30 | End: 2020-01-30

## 2020-01-30 RX ADMIN — IOPAMIDOL 100 ML: 612 INJECTION, SOLUTION INTRAVENOUS at 09:13

## 2020-01-30 RX ADMIN — Medication 26.5 MILLICURIE: at 09:00

## 2020-01-31 ENCOUNTER — TELEPHONE (OUTPATIENT)
Dept: SOCIAL WORK | Facility: HOSPITAL | Age: 45
End: 2020-01-31

## 2020-01-31 ENCOUNTER — TELEPHONE (OUTPATIENT)
Dept: ONCOLOGY | Facility: CLINIC | Age: 45
End: 2020-01-31

## 2020-01-31 ENCOUNTER — HOSPITAL ENCOUNTER (OUTPATIENT)
Dept: GENERAL RADIOLOGY | Facility: HOSPITAL | Age: 45
Discharge: HOME | End: 2020-01-31

## 2020-01-31 DIAGNOSIS — Z17.0 MALIGNANT NEOPLASM OF OVERLAPPING SITES OF RIGHT BREAST IN FEMALE, ESTROGEN RECEPTOR POSITIVE (HCC): Primary | ICD-10-CM

## 2020-01-31 DIAGNOSIS — C50.811 MALIGNANT NEOPLASM OF OVERLAPPING SITES OF RIGHT BREAST IN FEMALE, ESTROGEN RECEPTOR POSITIVE (HCC): Primary | ICD-10-CM

## 2020-01-31 DIAGNOSIS — C50.811 MALIGNANT NEOPLASM OF OVERLAPPING SITES OF RIGHT FEMALE BREAST, UNSPECIFIED ESTROGEN RECEPTOR STATUS (HCC): ICD-10-CM

## 2020-01-31 PROCEDURE — 71045 X-RAY EXAM CHEST 1 VIEW: CPT

## 2020-01-31 NOTE — TELEPHONE ENCOUNTER
SW called pt to address her recent distress screening score of 4.  Pt states that she is doing well at this time and is anxious to start her chemotherapy treatments next week.  SW informed her of the various support services that are available for her at [facility].  Pt is scheduled to start her chemo next Wednesday.  LORRAINE will plan to meet with pt on that day to further discuss resources.  SW available for ongoing support and resource needs.

## 2020-01-31 NOTE — TELEPHONE ENCOUNTER
Per Dr. Connell, called patient to let her know that her ct scans did not show any metastatic disease but there was an abnormality in right ovary and Dr. Connell wants to get pelvic ultrasound.  This will not delay chemo.  Patient reported she had polycystic ovaries so she wasn't surprised.  I told her scheduling would call with the ultrasound appointment.

## 2020-02-04 ENCOUNTER — TELEPHONE (OUTPATIENT)
Dept: ONCOLOGY | Facility: CLINIC | Age: 45
End: 2020-02-04

## 2020-02-04 ENCOUNTER — OFFICE VISIT (OUTPATIENT)
Dept: ONCOLOGY | Facility: CLINIC | Age: 45
End: 2020-02-04

## 2020-02-04 VITALS
RESPIRATION RATE: 24 BRPM | DIASTOLIC BLOOD PRESSURE: 93 MMHG | TEMPERATURE: 98.1 F | WEIGHT: 191 LBS | SYSTOLIC BLOOD PRESSURE: 148 MMHG | OXYGEN SATURATION: 97 % | HEIGHT: 66 IN | HEART RATE: 96 BPM | BODY MASS INDEX: 30.7 KG/M2

## 2020-02-04 DIAGNOSIS — J02.9 SORE THROAT: Primary | ICD-10-CM

## 2020-02-04 PROCEDURE — 99213 OFFICE O/P EST LOW 20 MIN: CPT | Performed by: NURSE PRACTITIONER

## 2020-02-04 NOTE — TELEPHONE ENCOUNTER
Patient called triage line stating the last few morning she has woke up with sore throat and it feels as though it's going to her ears and down into her chest. Wanting to know what she should do. She called her PCP and they informed her to call Dr. Connell.

## 2020-02-04 NOTE — TELEPHONE ENCOUNTER
I spoke to Dr. Connell and she asked if it would be possible for Nisreen GONZALEZ to see patient and evaluate for chemo.

## 2020-02-05 ENCOUNTER — HOSPITAL ENCOUNTER (OUTPATIENT)
Dept: ONCOLOGY | Facility: HOSPITAL | Age: 45
Setting detail: INFUSION SERIES
Discharge: HOME | End: 2020-02-05

## 2020-02-05 ENCOUNTER — EDUCATION (OUTPATIENT)
Dept: ONCOLOGY | Facility: HOSPITAL | Age: 45
End: 2020-02-05

## 2020-02-05 ENCOUNTER — DOCUMENTATION (OUTPATIENT)
Dept: NUTRITION | Facility: HOSPITAL | Age: 45
End: 2020-02-05

## 2020-02-05 VITALS
DIASTOLIC BLOOD PRESSURE: 88 MMHG | HEIGHT: 66 IN | WEIGHT: 192 LBS | RESPIRATION RATE: 20 BRPM | HEART RATE: 89 BPM | SYSTOLIC BLOOD PRESSURE: 167 MMHG | TEMPERATURE: 98.1 F | BODY MASS INDEX: 30.86 KG/M2

## 2020-02-05 DIAGNOSIS — C50.811 MALIGNANT NEOPLASM OF OVERLAPPING SITES OF RIGHT BREAST IN FEMALE, ESTROGEN RECEPTOR POSITIVE (HCC): Primary | ICD-10-CM

## 2020-02-05 DIAGNOSIS — Z17.0 MALIGNANT NEOPLASM OF OVERLAPPING SITES OF RIGHT BREAST IN FEMALE, ESTROGEN RECEPTOR POSITIVE (HCC): ICD-10-CM

## 2020-02-05 DIAGNOSIS — Z17.0 MALIGNANT NEOPLASM OF OVERLAPPING SITES OF RIGHT BREAST IN FEMALE, ESTROGEN RECEPTOR POSITIVE (HCC): Primary | ICD-10-CM

## 2020-02-05 DIAGNOSIS — C50.811 MALIGNANT NEOPLASM OF OVERLAPPING SITES OF RIGHT BREAST IN FEMALE, ESTROGEN RECEPTOR POSITIVE (HCC): ICD-10-CM

## 2020-02-05 DIAGNOSIS — L65.9 ALOPECIA: Primary | ICD-10-CM

## 2020-02-05 LAB
ALBUMIN SERPL-MCNC: 3.8 G/DL (ref 3.5–5.2)
ALBUMIN/GLOB SERPL: 1.1 G/DL
ALP SERPL-CCNC: 90 U/L (ref 39–117)
ALT SERPL W P-5'-P-CCNC: 23 U/L (ref 1–33)
ANION GAP SERPL CALCULATED.3IONS-SCNC: 10 MMOL/L (ref 5–15)
AST SERPL-CCNC: 15 U/L (ref 1–32)
BILIRUB SERPL-MCNC: 0.4 MG/DL (ref 0.2–1.2)
BUN BLD-MCNC: 11 MG/DL (ref 6–20)
BUN/CREAT SERPL: 21.2 (ref 7–25)
CALCIUM SPEC-SCNC: 9.2 MG/DL (ref 8.6–10.5)
CHLORIDE SERPL-SCNC: 102 MMOL/L (ref 98–107)
CO2 SERPL-SCNC: 25 MMOL/L (ref 22–29)
CREAT BLD-MCNC: 0.52 MG/DL (ref 0.57–1)
CREAT BLDA-MCNC: 0.4 MG/DL
ERYTHROCYTE [DISTWIDTH] IN BLOOD BY AUTOMATED COUNT: 13.1 % (ref 12.3–15.4)
GFR SERPL CREATININE-BSD FRML MDRD: 129 ML/MIN/1.73
GLOBULIN UR ELPH-MCNC: 3.4 GM/DL
GLUCOSE BLD-MCNC: 260 MG/DL (ref 65–99)
HCT VFR BLD AUTO: 39.2 % (ref 34–46.6)
HGB BLD-MCNC: 13.5 G/DL (ref 12–15.9)
LYMPHOCYTES # BLD AUTO: 2.3 10*3/MM3 (ref 0.7–3.1)
LYMPHOCYTES NFR BLD AUTO: 19.1 % (ref 19.6–45.3)
MCH RBC QN AUTO: 31.7 PG (ref 26.6–33)
MCHC RBC AUTO-ENTMCNC: 34.4 G/DL (ref 31.5–35.7)
MCV RBC AUTO: 92.2 FL (ref 79–97)
MONOCYTES # BLD AUTO: 0.6 10*3/MM3 (ref 0.1–0.9)
MONOCYTES NFR BLD AUTO: 5.2 % (ref 5–12)
NEUTROPHILS # BLD AUTO: 9 10*3/MM3 (ref 1.7–7)
NEUTROPHILS NFR BLD AUTO: 75.7 % (ref 42.7–76)
PLATELET # BLD AUTO: 215 10*3/MM3 (ref 140–450)
PMV BLD AUTO: 7.9 FL (ref 6–12)
POTASSIUM BLD-SCNC: 3.7 MMOL/L (ref 3.5–5.2)
PROT SERPL-MCNC: 7.2 G/DL (ref 6–8.5)
RBC # BLD AUTO: 4.26 10*6/MM3 (ref 3.77–5.28)
SODIUM BLD-SCNC: 137 MMOL/L (ref 136–145)
WBC NRBC COR # BLD: 11.9 10*3/MM3 (ref 3.4–10.8)

## 2020-02-05 PROCEDURE — 25010000003 HEPARIN LOCK FLUCH PER 10 UNITS: Performed by: INTERNAL MEDICINE

## 2020-02-05 PROCEDURE — 96374 THER/PROPH/DIAG INJ IV PUSH: CPT

## 2020-02-05 PROCEDURE — 96413 CHEMO IV INFUSION 1 HR: CPT

## 2020-02-05 PROCEDURE — 96367 TX/PROPH/DG ADDL SEQ IV INF: CPT

## 2020-02-05 PROCEDURE — 96375 TX/PRO/DX INJ NEW DRUG ADDON: CPT

## 2020-02-05 PROCEDURE — 25010000002 TRASTUZUMAB-ANNS 420 MG RECONSTITUTED SOLUTION 1 EACH VIAL: Performed by: INTERNAL MEDICINE

## 2020-02-05 PROCEDURE — 80053 COMPREHEN METABOLIC PANEL: CPT | Performed by: INTERNAL MEDICINE

## 2020-02-05 PROCEDURE — 25010000002 PEGFILGRASTIM 6 MG/0.6ML PREFILLED SYRINGE KIT: Performed by: INTERNAL MEDICINE

## 2020-02-05 PROCEDURE — 82565 ASSAY OF CREATININE: CPT

## 2020-02-05 PROCEDURE — 96377 APPLICATON ON-BODY INJECTOR: CPT

## 2020-02-05 PROCEDURE — 25010000002 PALONOSETRON 0.25 MG/5ML SOLUTION PREFILLED SYRINGE: Performed by: INTERNAL MEDICINE

## 2020-02-05 PROCEDURE — 25010000002 LEUPROLIDE ACETATE (3 MONTH) PER 3.75 MG: Performed by: INTERNAL MEDICINE

## 2020-02-05 PROCEDURE — 96417 CHEMO IV INFUS EACH ADDL SEQ: CPT

## 2020-02-05 PROCEDURE — 25010000002 DOCETAXEL 20 MG/ML SOLUTION 8 ML VIAL: Performed by: INTERNAL MEDICINE

## 2020-02-05 PROCEDURE — 25010000002 CARBOPLATIN PER 50 MG: Performed by: INTERNAL MEDICINE

## 2020-02-05 PROCEDURE — 25010000002 DEXAMETHASONE PER 1 MG: Performed by: INTERNAL MEDICINE

## 2020-02-05 PROCEDURE — 25010000002 PERTUZUMAB 420 MG/14ML SOLUTION 420 MG VIAL: Performed by: INTERNAL MEDICINE

## 2020-02-05 PROCEDURE — 96402 CHEMO HORMON ANTINEOPL SQ/IM: CPT

## 2020-02-05 PROCEDURE — 85025 COMPLETE CBC W/AUTO DIFF WBC: CPT | Performed by: INTERNAL MEDICINE

## 2020-02-05 PROCEDURE — 25010000002 FOSAPREPITANT PER 1 MG: Performed by: INTERNAL MEDICINE

## 2020-02-05 RX ORDER — PALONOSETRON 0.05 MG/ML
0.25 INJECTION, SOLUTION INTRAVENOUS ONCE
Status: CANCELLED | OUTPATIENT
Start: 2020-02-05

## 2020-02-05 RX ORDER — SODIUM CHLORIDE 9 MG/ML
250 INJECTION, SOLUTION INTRAVENOUS ONCE
Status: DISCONTINUED | OUTPATIENT
Start: 2020-02-05 | End: 2020-02-06 | Stop reason: HOSPADM

## 2020-02-05 RX ORDER — HEPARIN SODIUM (PORCINE) LOCK FLUSH IV SOLN 100 UNIT/ML 100 UNIT/ML
500 SOLUTION INTRAVENOUS AS NEEDED
Status: DISCONTINUED | OUTPATIENT
Start: 2020-02-05 | End: 2020-02-06 | Stop reason: HOSPADM

## 2020-02-05 RX ORDER — DIPHENHYDRAMINE HYDROCHLORIDE 50 MG/ML
50 INJECTION INTRAMUSCULAR; INTRAVENOUS AS NEEDED
Status: CANCELLED | OUTPATIENT
Start: 2020-02-05

## 2020-02-05 RX ORDER — FAMOTIDINE 10 MG/ML
20 INJECTION, SOLUTION INTRAVENOUS AS NEEDED
Status: CANCELLED | OUTPATIENT
Start: 2020-02-05

## 2020-02-05 RX ORDER — HEPARIN SODIUM (PORCINE) LOCK FLUSH IV SOLN 100 UNIT/ML 100 UNIT/ML
500 SOLUTION INTRAVENOUS AS NEEDED
Status: CANCELLED | OUTPATIENT
Start: 2020-02-05

## 2020-02-05 RX ORDER — PALONOSETRON 0.05 MG/ML
0.25 INJECTION, SOLUTION INTRAVENOUS ONCE
Status: COMPLETED | OUTPATIENT
Start: 2020-02-05 | End: 2020-02-05

## 2020-02-05 RX ORDER — SODIUM CHLORIDE 9 MG/ML
250 INJECTION, SOLUTION INTRAVENOUS ONCE
Status: CANCELLED | OUTPATIENT
Start: 2020-02-05

## 2020-02-05 RX ADMIN — DOCETAXEL 145 MG: 20 INJECTION, SOLUTION, CONCENTRATE INTRAVENOUS at 12:51

## 2020-02-05 RX ADMIN — SODIUM CHLORIDE 150 MG: 9 INJECTION, SOLUTION INTRAVENOUS at 08:42

## 2020-02-05 RX ADMIN — PALONOSETRON 0.25 MG: 0.25 INJECTION, SOLUTION INTRAVENOUS at 08:42

## 2020-02-05 RX ADMIN — HEPARIN 500 UNITS: 100 SYRINGE at 14:40

## 2020-02-05 RX ADMIN — LEUPROLIDE ACETATE 11.25 MG: KIT at 09:09

## 2020-02-05 RX ADMIN — PEGFILGRASTIM 6 MG: KIT SUBCUTANEOUS at 14:30

## 2020-02-05 RX ADMIN — PERTUZUMAB 840 MG: 30 INJECTION, SOLUTION, CONCENTRATE INTRAVENOUS at 09:14

## 2020-02-05 RX ADMIN — CARBOPLATIN 900 MG: 10 INJECTION, SOLUTION INTRAVENOUS at 13:57

## 2020-02-05 RX ADMIN — DEXAMETHASONE SODIUM PHOSPHATE 12 MG: 4 INJECTION, SOLUTION INTRAMUSCULAR; INTRAVENOUS at 08:42

## 2020-02-05 RX ADMIN — TRASTUZUMAB-ANNS 700 MG: 420 INJECTION, POWDER, LYOPHILIZED, FOR SOLUTION INTRAVENOUS at 11:14

## 2020-02-05 NOTE — PLAN OF CARE
Outpatient Infusion • [address] • [address] • [place] [id number] • [phone]      CHEMOTHERAPY EDUCATION SHEET    NAME:  Josefina Rodriugez      : 1975           DATE: 20    Booklets Given: Chemotherapy and You []  Eating Hints []    Sexuality/Fertility Books []     Chemotherapy/Biotherapy Education Sheets: (list all that apply)  [facility] education sheets for all medications                                                                                                                                                                Chemotherapy Regimen:  Docetaxel/Carboplatin AUC=6/ Trastuzumab-anns/Pertuzumab    TOPICS EDUCATION PROVIDED EDUCATION REINFORCED COMMENTS   ANEMIA:  role of RBC, cause, s/s, ways to manage, role of transfusion [x] [] Discussed role of RBC and s/sx of low RBC as well as how we will be checking labs to monitor this.    THROMBOCYTOPENIA:  role of platelet, cause, s/s, ways to prevent bleeding, things to avoid, when to seek help [x] [] Discussed role of platelets and that she may bruise easier. Advised her to apply pressure to any cuts and seek medical attention if she can't get the bleeding to stop.   NEUTROPENIA:  role of WBC, cause, infection precautions, s/s of infection, when to call MD [x] [] Discussed the role of WBC and that patient will be more susceptible to infections. Discussed ways to prevent infection, s and sx of infection, and when to call the doctor.   NUTRITION & APPETITE CHANGES:  importance of maintaining healthy diet & weight, ways to manage to improve intake, dietary consult, exercise regimen [x] [] Discussed appetite may change and that dietician is available for patient.    DIARRHEA:  causes, s/s of dehydration, ways to manage, dietary changes, when to call MD [x] [] Discussed this potential side effect with patient and how to manage symptoms.    CONSTIPATION:  causes, ways to manage, dietary changes, when to call MD [x] [] Discussed this  potential side effect with patient and how to manage symptoms.    NAUSEA & VOMITING:  cause, use of antiemetics, dietary changes, when to call MD [x] [] Discussed this potential side effect with patient and how to manage symptoms.    MOUTH SORES:  causes, oral care, ways to manage [x] [] Discussed this potential side effect with patient and ways to prevent with a home made remedy as well as good oral hygiene.     ALOPECIA:  cause, ways to manage, resources [x] [] Discussed this potential side effect with patient as well as resources available.    INFERTILITY & SEXUALITY:  causes, fertility preservation options, sexuality changes, ways to manage, importance of birth control [x] [] Discussed this potential side effect with patient as well as importance of protection.    NERVOUS SYSTEM CHANGES:  causes, s/s, neuropathies, cognitive changes, ways to manage [x] []    PAIN:  causes, ways to manage [x] [] ????   SKIN & NAIL CHANGES:  cause, s/s, ways to manage [x] [] Discussed potential for a rash and ways patient can manage this.    ORGAN TOXICITIES:  cause, s/s, need for diagnostic tests, labs, when to notify MD [x] [] Discussed the potential for cardiotoxicity with the patient as explained this is why we will be monitoring her LVEF.    SURVIVORSHIP:  distress, distress assessment, secondary malignancies, early/late effects, follow-up, social issues, social support [] []    HOME CARE:  use of spill kits, storing of PO chemo, how to manage bodily fluids [x] [] Discussed how to manage bodily fluids at home.    MISCELLANEOUS:  drug interactions, administration, vesicant, et [x] [] Looked over drug interactions and told patient  there were no significant interactions, and to contact doctor if she starts any new medications. Discussed potential for edema and rash with her specific chemotherapy regimen as well as how to manage these.        Notes:   Discussed with patient the potential for hair loss and that the hospital can  write her a prescription for a wig. The patient was eager to learn about this and and wanted to get a prescription as well as a list of laces where she could get it filled. I discussed with the nurse so that she could coordinate with the doctor to get this done for the patient.   Patient is also receiveing Neulasta, advised her to taker Claritin for 1 week after administration to help with bone pain.

## 2020-02-05 NOTE — PROGRESS NOTES
ONCOLOGY URGENT CARE CLINIC VISIT 2/4/2020    Josefina Rodriguez  [id number]  1975    Chief Complaint:  Sore throat    History of present illness:  Josefina Rodriguez is a 44 y.o. year old female who has breast cancer and is scheduled to start chemotherapy with TCH-P tomorrow.  She called triage line with complaint of sore throat.  She was given an appointment in the oncology urgent care clinic for further evaluation and management.       Patient with complaints of sore throat that began yesterday along with postnasal drainage.  She states that she began having pressure in her ears and wanted to be evaluated because she is due to start her chemotherapy tomorrow.  She has been taking her allergy medicine, Flonase, and Mucinex.  She states that today her sore throat is a little improved.  She denies any fever, chills, cough, nasal drainage, chest pain, shortness of breath, or any other acute symptoms.  She did receive a flu shot.  She has had no known sick contacts other than her brother who had a sinus infection.      Oncology History:       Malignant neoplasm of overlapping sites of right breast in female, estrogen receptor positive (CMS/HCC)    1/21/2020 Initial Diagnosis     Malignant neoplasm of overlapping sites of right breast in female, estrogen receptor positive (CMS/HCC)      1/21/2020 Cancer Staged     Staging form: Breast, AJCC 8th Edition  - Clinical: Stage IB (cT2, cN0, cM0, G2, ER+, SC+, HER2+) - Signed by Jolene Connell MD on 1/21/2020 2/5/2020 -  Chemotherapy     OP SUPPORTIVE LEUPROLIDE 11.25 MG Q3M      2/5/2020 -  Chemotherapy     OP BREAST TCH-P DOCEtaxel / CARBOplatin AUC=6 / Trastuzumab-anns / Pertuzumab         Past Medical History:   Diagnosis Date   • Anxiety    • Depression    • Diabetes mellitus (CMS/HCC)    • Hiatal hernia    • Hypertension    • Malignant neoplasm of overlapping sites of right breast in female, estrogen receptor positive (CMS/HCC) 1/21/2020   • PCOS  "(polycystic ovarian syndrome)        Past Surgical History:   Procedure Laterality Date   • BREAST BIOPSY Right 03/2019    axillary lymph node bx/fna   • CHOLECYSTECTOMY  03/2010   • COLONOSCOPY  2016   • US GUIDED FINE NEEDLE ASPIRATION  1/15/2020   • VENOUS ACCESS DEVICE (PORT) INSERTION Left 01/31/2020       Family History   Problem Relation Age of Onset   • Breast cancer Mother 64   • Heart disease Mother    • Hypertension Father    • Lung cancer Father    • Ovarian cancer Neg Hx        Past medical history, social history and family history was reviewed.    Medications: The current medication list was reviewed and reconciled.     Allergies:  is allergic to cashew nut; naproxen; penicillins; and pistachio nut.    Review of Systems:    Review of Systems   Constitutional: Negative for appetite change, fatigue, fever and unexpected weight change.   HENT: Positive for ear pain (pressure), postnasal drip and sore throat. Negative for ear discharge, mouth sores, sinus pressure, sinus pain and trouble swallowing.    Respiratory: Negative for cough, shortness of breath and wheezing.    Cardiovascular: Negative for chest pain, palpitations and leg swelling.   Gastrointestinal: Negative for abdominal distention, abdominal pain, constipation, diarrhea, nausea and vomiting.   Genitourinary: Negative for difficulty urinating, dysuria and frequency.   Musculoskeletal: Negative for arthralgias.   Skin: Negative for pallor, rash and wound.   Neurological: Negative for dizziness and weakness.   Hematological: Does not bruise/bleed easily.   Psychiatric/Behavioral: Negative for confusion and sleep disturbance. The patient is not nervous/anxious.        PHYSICAL EXAM:    Vitals:    02/04/20 1514   BP: 148/93  Comment: LUE   Pulse: 96   Resp: 24   Temp: 98.1 °F (36.7 °C)   TempSrc: Temporal   SpO2: 97%  Comment: RA   Weight: 86.6 kg (191 lb)   Height: 167.6 cm (66\")   PainSc: 0-No pain       ECOG: (0) Fully Active - Able to Carry " On All Pre-disease Performance Without Restriction  General: well appearing female in no acute distress  HEENT: sclerae anicteric, posterior pharynx with mild erythema no exudate, tympanic membrane without erythema or bulging bilaterally  Lymphatics: no cervical, supraclavicular, inguinal, or axillary adenopathy  Neck: Supple. No thyromegaly.  Cardiovascular: regular rate and rhythm, no murmurs  Lungs: clear to auscultation bilaterally. No respiratory distress  Abdomen: soft, nontender, nondistended.  No palpable organomegaly  Extremities: no lower extremity edema, cyanosis, or clubbing  Skin: no rashes, lesions, bruising, or petechiae  Neuro: Alert and oriented x3. Moves all extremities.    Assessment and Plan:    1.  Breast cancer  2.  Sore throat    Discussion: Patient with complaints of sore throat x 1 day that is improving.  She is scheduled to start chemotherapy tomorrow.  Exam unremarkable and symptoms likely related to allergies and post nasal drainage.  No signs or symptoms of infectious process noted.  Encouraged patient to continue supportive care with rest, increased fluids, allergy medication, and mouth rinses with baking soda and salt.  She was instructed to notify us with any new or worsening symptoms.  Otherwise, she is scheduled to for her first cycle of chemotherapy tomorrow.        Nisreen Conte, APRN    2/4/2020

## 2020-02-06 ENCOUNTER — DOCUMENTATION (OUTPATIENT)
Dept: SOCIAL WORK | Facility: HOSPITAL | Age: 45
End: 2020-02-06

## 2020-02-06 NOTE — PROGRESS NOTES
LORRAINE and MSW student met with pt and her boyfriend during her first infusion to provide support and resources and also to address her recent distress screening.  Pt states that she has been through a lot over the past three years, including the death of her .  Pt is currently on short-term disability from her work.  Pt is concerned about financial stressors including her medical bills and co-pays.  A [facility] financial assistance packet was a given to pt to start filling out.  LORRAINE provided a variety of resources to pt including [facility] for financial assistance, wigs and head covering resources, as well as a gas card to help with travel expenses.  LORRAINE provided support to pt and encouraged her to call with future needs or concerns.  SW available for ongoing support and resource needs.

## 2020-02-06 NOTE — PROGRESS NOTES
Oncology Nutrition Screening    Patient Name:  Josefina Rodriguez  YOB: 1975  MRN: [id number]  Date:  02/06/20  Physician:  Dr. Connell    Type of Cancer Treatment:   Chemotherapy:  TCH-P - every 21 days x 6 cycles    Patient Active Problem List   Diagnosis   • Malignant neoplasm of overlapping sites of right breast in female, estrogen receptor positive (CMS/HCC)       Current Outpatient Medications   Medication Sig Dispense Refill   • B Complex Vitamins (VITAMIN B COMPLEX PO) Take  by mouth.     • dexamethasone (DECADRON) 4 MG tablet Take 2 tablets in the morning daily on Days 2, 3 & 4.  Take with food. 6 tablet 5   • fluticasone (FLONASE) 50 MCG/ACT nasal spray Every 12 (Twelve) Hours.     • lidocaine-prilocaine (EMLA) 2.5-2.5 % cream Apply  topically to the appropriate area as directed As Needed (45-60 minutes prior to port access.  Cover with saran/plastic wrap.). 30 g 3   • lisinopril (PRINIVIL,ZESTRIL) 10 MG tablet lisinopril 10 mg tablet   Take 1 tablet every day by oral route.     • LORazepam (ATIVAN) 1 MG tablet Take 1 mg by mouth As Needed for Anxiety.     • metFORMIN (GLUCOPHAGE) 500 MG tablet Every 12 (Twelve) Hours.     • Misc Natural Products (GINSENG-COMPLEX PO) Take  by mouth.     • montelukast (SINGULAIR) 10 MG tablet montelukast 10 mg tablet   Take 1 tablet every day by oral route for 30 days.     • Omega-3 Fatty Acids (FISH OIL) 1000 MG capsule capsule Take  by mouth Daily With Breakfast.     • ondansetron (ZOFRAN) 8 MG tablet Take 1 tablet by mouth 3 (Three) Times a Day As Needed for Nausea or Vomiting. 30 tablet 5   • pantoprazole (PROTONIX) 40 MG EC tablet      • SITagliptin (JANUVIA) 100 MG tablet Januvia 100 mg tablet   Take 1 tablet every day by oral route.       No current facility-administered medications for this visit.        Glycemic Risk:   NIDDM, Steriods    Weight:   Height: 66 inches  Weight: 192 lbs.  Usual Body Weight: ~180 lbs.   BMI: 31  Obese  Weight has  "increased ~10 pounds over last several weeks    Oral Food Intake:  Regular Diet - No Restrictions  Food Allergies:  cashew, pistachio  Compared to normal intake, current food intake is more than normal    Hydration Status:   How many 8 ounce glass of water of fluid do you drink per day?  Patient reports drinking coffee, soda, and water throughout the day.    Enteral Feeding:   n/a    Nutrition Symptoms:   No Problems with Eating    Activity:   Normal with no limitations     reports that she has been smoking cigarettes. She has a 20.00 pack-year smoking history. She has never used smokeless tobacco. She reports that she drinks alcohol. She reports that she does not use drugs.    Evaluation of Nutritional Risk:   Patient has been identified at mild nutritional risk due to diagnosis, treatment plan, glycemic risk, and nutrition education.  Met with patient and her boyfriend during her initial chemotherapy infusion appointment.  She states her appetite has been very good recently with greater than normal oral intake which has resulted in weight gain as above.      Discussed her history of diabetes.  She states she checks her blood sugars ~1 per week and reports her last HgA1c was 6.9.  Provided and reviewed written diet material \"Blood Glucose Management\"; discussed diabetes diet basics; the effects of steroids on glucose; and advised her to check her blood sugars regularly.      Also discussed the importance of good nutrition during her treatment course focusing on adequate calorie, protein, nutrient and fluid intake.  Advised her to be consuming smaller more frequent meals/snacks throughout the day to aid with potential nausea management.  Emphasized the importance of protein and its role in the diet; reviewed high protein foods; and recommended she have a protein source at each meal/snack.  Also emphasized the importance of hydration; reviewed good hydrating fluid options; and recommended she drink at least 64 ounces " "daily.  Provided and reviewed written diet material \"Tips for Control of Diarrhea\" to aid with potential nutritional impact symptom associated with her chemo regimen.  Also provided \"Nutritional Considerations in Breast Cancer\" for her reference.    Answered their questions and both voiced understanding of information discussed.  RD's contact information provided and encouraged to call with questions.  Will follow up as indicated.    Electronically signed by:  Irish Mak RD  12:48 PM  "

## 2020-02-09 DIAGNOSIS — C50.811 MALIGNANT NEOPLASM OF OVERLAPPING SITES OF RIGHT BREAST IN FEMALE, ESTROGEN RECEPTOR POSITIVE (HCC): Primary | ICD-10-CM

## 2020-02-09 DIAGNOSIS — Z17.0 MALIGNANT NEOPLASM OF OVERLAPPING SITES OF RIGHT BREAST IN FEMALE, ESTROGEN RECEPTOR POSITIVE (HCC): Primary | ICD-10-CM

## 2020-02-09 RX ORDER — DIPHENOXYLATE HYDROCHLORIDE AND ATROPINE SULFATE 2.5; .025 MG/1; MG/1
2 TABLET ORAL 4 TIMES DAILY PRN
Qty: 30 TABLET | Refills: 5 | Status: SHIPPED | OUTPATIENT
Start: 2020-02-09 | End: 2020-06-10

## 2020-02-10 ENCOUNTER — HOSPITAL ENCOUNTER (OUTPATIENT)
Dept: ULTRASOUND IMAGING | Facility: HOSPITAL | Age: 45
End: 2020-02-10

## 2020-02-10 DIAGNOSIS — Z17.0 MALIGNANT NEOPLASM OF OVERLAPPING SITES OF RIGHT BREAST IN FEMALE, ESTROGEN RECEPTOR POSITIVE (HCC): Primary | ICD-10-CM

## 2020-02-10 DIAGNOSIS — C50.811 MALIGNANT NEOPLASM OF OVERLAPPING SITES OF RIGHT BREAST IN FEMALE, ESTROGEN RECEPTOR POSITIVE (HCC): Primary | ICD-10-CM

## 2020-02-11 ENCOUNTER — HOSPITAL ENCOUNTER (OUTPATIENT)
Dept: ONCOLOGY | Facility: HOSPITAL | Age: 45
Setting detail: INFUSION SERIES
Discharge: HOME | End: 2020-02-11

## 2020-02-11 ENCOUNTER — OFFICE VISIT (OUTPATIENT)
Dept: ONCOLOGY | Facility: CLINIC | Age: 45
End: 2020-02-11

## 2020-02-11 ENCOUNTER — TELEPHONE (OUTPATIENT)
Dept: ONCOLOGY | Facility: CLINIC | Age: 45
End: 2020-02-11

## 2020-02-11 VITALS
TEMPERATURE: 97.7 F | DIASTOLIC BLOOD PRESSURE: 93 MMHG | HEART RATE: 104 BPM | HEIGHT: 66 IN | WEIGHT: 183 LBS | BODY MASS INDEX: 29.41 KG/M2 | RESPIRATION RATE: 20 BRPM | SYSTOLIC BLOOD PRESSURE: 139 MMHG

## 2020-02-11 DIAGNOSIS — Z17.0 MALIGNANT NEOPLASM OF OVERLAPPING SITES OF RIGHT BREAST IN FEMALE, ESTROGEN RECEPTOR POSITIVE (HCC): ICD-10-CM

## 2020-02-11 DIAGNOSIS — C50.811 MALIGNANT NEOPLASM OF OVERLAPPING SITES OF RIGHT BREAST IN FEMALE, ESTROGEN RECEPTOR POSITIVE (HCC): ICD-10-CM

## 2020-02-11 DIAGNOSIS — C50.811 MALIGNANT NEOPLASM OF OVERLAPPING SITES OF RIGHT BREAST IN FEMALE, ESTROGEN RECEPTOR POSITIVE (HCC): Primary | ICD-10-CM

## 2020-02-11 DIAGNOSIS — K52.1 CHEMOTHERAPY INDUCED DIARRHEA: ICD-10-CM

## 2020-02-11 DIAGNOSIS — T45.1X5A CHEMOTHERAPY INDUCED DIARRHEA: ICD-10-CM

## 2020-02-11 DIAGNOSIS — Z17.0 MALIGNANT NEOPLASM OF OVERLAPPING SITES OF RIGHT BREAST IN FEMALE, ESTROGEN RECEPTOR POSITIVE (HCC): Primary | ICD-10-CM

## 2020-02-11 LAB
ERYTHROCYTE [DISTWIDTH] IN BLOOD BY AUTOMATED COUNT: 12.8 % (ref 12.3–15.4)
HCT VFR BLD AUTO: 41.7 % (ref 34–46.6)
HGB BLD-MCNC: 13.6 G/DL (ref 12–15.9)
LYMPHOCYTES # BLD AUTO: 2.8 10*3/MM3 (ref 0.7–3.1)
LYMPHOCYTES NFR BLD AUTO: 22.4 % (ref 19.6–45.3)
MCH RBC QN AUTO: 29.9 PG (ref 26.6–33)
MCHC RBC AUTO-ENTMCNC: 32.5 G/DL (ref 31.5–35.7)
MCV RBC AUTO: 91.8 FL (ref 79–97)
MONOCYTES # BLD AUTO: 0.4 10*3/MM3 (ref 0.1–0.9)
MONOCYTES NFR BLD AUTO: 3.3 % (ref 5–12)
NEUTROPHILS # BLD AUTO: 9.1 10*3/MM3 (ref 1.7–7)
NEUTROPHILS NFR BLD AUTO: 74.3 % (ref 42.7–76)
PLATELET # BLD AUTO: 205 10*3/MM3 (ref 140–450)
PMV BLD AUTO: 8 FL (ref 6–12)
RBC # BLD AUTO: 4.54 10*6/MM3 (ref 3.77–5.28)
WBC NRBC COR # BLD: 12.3 10*3/MM3 (ref 3.4–10.8)

## 2020-02-11 PROCEDURE — 99214 OFFICE O/P EST MOD 30 MIN: CPT | Performed by: NURSE PRACTITIONER

## 2020-02-11 PROCEDURE — 85025 COMPLETE CBC W/AUTO DIFF WBC: CPT | Performed by: NURSE PRACTITIONER

## 2020-02-11 PROCEDURE — 25010000003 HEPARIN LOCK FLUCH PER 10 UNITS: Performed by: INTERNAL MEDICINE

## 2020-02-11 PROCEDURE — 96361 HYDRATE IV INFUSION ADD-ON: CPT

## 2020-02-11 PROCEDURE — 96360 HYDRATION IV INFUSION INIT: CPT

## 2020-02-11 RX ORDER — SODIUM CHLORIDE 9 MG/ML
1000 INJECTION, SOLUTION INTRAVENOUS ONCE
Status: CANCELLED
Start: 2020-02-11

## 2020-02-11 RX ORDER — HEPARIN SODIUM (PORCINE) LOCK FLUSH IV SOLN 100 UNIT/ML 100 UNIT/ML
500 SOLUTION INTRAVENOUS AS NEEDED
Status: CANCELLED | OUTPATIENT
Start: 2020-02-11

## 2020-02-11 RX ORDER — SODIUM CHLORIDE 9 MG/ML
1000 INJECTION, SOLUTION INTRAVENOUS ONCE
Status: COMPLETED | OUTPATIENT
Start: 2020-02-11 | End: 2020-02-11

## 2020-02-11 RX ORDER — HEPARIN SODIUM (PORCINE) LOCK FLUSH IV SOLN 100 UNIT/ML 100 UNIT/ML
500 SOLUTION INTRAVENOUS AS NEEDED
Status: DISCONTINUED | OUTPATIENT
Start: 2020-02-11 | End: 2020-02-12 | Stop reason: HOSPADM

## 2020-02-11 RX ADMIN — HEPARIN 500 UNITS: 100 SYRINGE at 16:38

## 2020-02-11 RX ADMIN — SODIUM CHLORIDE 1000 ML: 9 INJECTION, SOLUTION INTRAVENOUS at 15:07

## 2020-02-11 NOTE — PROGRESS NOTES
ONCOLOGY URGENT CARE CLINIC VISIT    Josefina Rodriguez  [id number]  1975    Chief Complaint:  Weakness and diarrhea    History of present illness:  Josefina Rodriguez is a 44 y.o. year old female who is currently undergoing treatment for breast cancer with TCH-P.  She received her first dose on 2/5/202.  She called triage line today with complaint of weakness, diarrhea and low grade fever.  She was concerned that she was dehydrated.  She was given an appointment in oncology urgent care clinic for further evaluation along with IV fluids.      Patient reports that she has had intermittent nausea since her chemotherapy but relieved with as needed Zofran.  She states she has dizziness when standing.  She is eating and drinking but not sure if enough.  She is having diarrhea and reports about 10 episodes a day.  She describes it as watery and small to moderate amount.  She is taking Imodium.  She reports a fever of 100.3 today.  She denies dysuria, shortness of breath, chest pain, abdominal pain, or any other acute symptoms.       Oncology History:       Malignant neoplasm of overlapping sites of right breast in female, estrogen receptor positive (CMS/HCC)    1/21/2020 Initial Diagnosis     Malignant neoplasm of overlapping sites of right breast in female, estrogen receptor positive (CMS/HCC)      1/21/2020 Cancer Staged     Staging form: Breast, AJCC 8th Edition  - Clinical: Stage IB (cT2, cN0, cM0, G2, ER+, RI+, HER2+) - Signed by Jolene Connell MD on 1/21/2020 2/5/2020 -  Chemotherapy     OP SUPPORTIVE LEUPROLIDE 11.25 MG Q3M      2/5/2020 -  Chemotherapy     OP BREAST TCH-P DOCEtaxel / CARBOplatin AUC=6 / Trastuzumab-anns / Pertuzumab      2/5/2020 -  Chemotherapy     OP CENTRAL VENOUS ACCESS DEVICE ACCESS, CARE, AND MAINTENANCE (CVAD)      2/11/2020 -  Chemotherapy     OP SUPPORTIVE HYDRATION + ANTIEMETICS         Past Medical History:   Diagnosis Date   • Anxiety    • Depression    •  "Diabetes mellitus (CMS/HCC)    • Hiatal hernia    • Hypertension    • Malignant neoplasm of overlapping sites of right breast in female, estrogen receptor positive (CMS/HCC) 1/21/2020   • PCOS (polycystic ovarian syndrome)        Past Surgical History:   Procedure Laterality Date   • BREAST BIOPSY Right 03/2019    axillary lymph node bx/fna   • CHOLECYSTECTOMY  03/2010   • COLONOSCOPY  2016   • US GUIDED FINE NEEDLE ASPIRATION  1/15/2020   • VENOUS ACCESS DEVICE (PORT) INSERTION Left 01/31/2020       Family History   Problem Relation Age of Onset   • Breast cancer Mother 64   • Heart disease Mother    • Hypertension Father    • Lung cancer Father    • Ovarian cancer Neg Hx        Past medical history, social history and family history was reviewed.    Medications: The current medication list was reviewed and reconciled.     Allergies:  is allergic to cashew nut; naproxen; penicillins; and pistachio nut.    Review of Systems:    Review of Systems   Constitutional: Positive for fatigue and fever (low grade). Negative for appetite change and unexpected weight change.   HENT: Negative for mouth sores, sore throat and trouble swallowing.    Respiratory: Negative for cough, shortness of breath and wheezing.    Cardiovascular: Negative for chest pain, palpitations and leg swelling.   Gastrointestinal: Positive for diarrhea and nausea. Negative for abdominal distention, abdominal pain, constipation and vomiting.   Genitourinary: Negative for difficulty urinating, dysuria and frequency.   Musculoskeletal: Negative for arthralgias.   Skin: Negative for pallor, rash and wound.   Neurological: Positive for dizziness and weakness.   Hematological: Does not bruise/bleed easily.   Psychiatric/Behavioral: Negative for confusion and sleep disturbance. The patient is not nervous/anxious.        PHYSICAL EXAM:      2/11/2020   Temp 97.7 °F (36.5 °C)   Pulse 104   Resp 20   /93   Height 167.6 cm (65.98\")   Weight 83 kg (183 lb) "   BSA (Calculated - sq m) 1.93 sq meters   BMI (Calculated) 29.6       ECOG: (1) Restricted in Physically Strenuous Activity, Ambulatory & Able to Do Work of Light Nature  General: well appearing female in no acute distress  HEENT: sclerae anicteric, oral mucosa dry, no lesions  Lymphatics: no cervical, supraclavicular, inguinal, or axillary adenopathy  Neck: Supple. No thyromegaly.  Cardiovascular: regular rate and rhythm, no murmurs  Lungs: clear to auscultation bilaterally. No respiratory distress  Abdomen: soft, nontender, nondistended.  No palpable organomegaly  Extremities: no lower extremity edema, cyanosis, or clubbing  Skin: no rashes, lesions, bruising, or petechiae  Neuro: Alert and oriented x3. Moves all extremities.    Lab Results   Component Value Date    HGB 13.6 02/11/2020    HCT 41.7 02/11/2020    MCV 91.8 02/11/2020     02/11/2020    WBC 12.30 (H) 02/11/2020    NEUTROABS 9.10 (H) 02/11/2020    LYMPHSABS 2.80 02/11/2020    MONOSABS 0.40 02/11/2020       Assessment and Plan:    1.  Breast cancer  2.  Chemotherapy induced nausea  3.  Chemotherapy induced diarrhea    Discussion: Patient received first cycle of TCH-P on 2/5/2020.  She called today with concerns of dehydration due to nausea and diarrhea.  She is receiving 1 liter of NS.  Labs obtained and reviewed.  CBC showed WBC 12.30, hemoglobin 13.6, hematocrit 41.7, platelets 205, and ANC 9100.  She did receive Neulasta Onpro with her treatment.  Encouraged to increase po intake as tolerated.  She will continue with Zofran as needed.  She will alternate Imodium and Lomotil for her diarrhea.  She will notify us with temperature of 100.4 or higher.  Otherwise she will contact us with any new or worsening symptoms.  She is scheduled for cycle #2 on 2/26/2020.        Nisreen Conte, LISA    2/11/2020

## 2020-02-11 NOTE — TELEPHONE ENCOUNTER
Patient's  called triage line stating that patient is running a temp of 100.3 and wanted to know if he should treat now or what to do.    Returned call to patient and at this time she reports that she had diarrhea with frequency up to 8 times a day. Patient reports she has been doing her Lomotil and Imudiom and it's not helping discussing with patient if she is taking two of the Lomotil patient reports she has only been doing one. Patient stating it's not helping. Asking her if she can eat and drink. Patient stating yes but she feels as though it goes straight through her. Asking patient if she feels like she needs to be seen in triage clinic. Patient stating she does.

## 2020-02-12 PROBLEM — K52.1 CHEMOTHERAPY INDUCED DIARRHEA: Status: ACTIVE | Noted: 2020-02-12

## 2020-02-12 PROBLEM — R11.0 CHEMOTHERAPY-INDUCED NAUSEA: Status: ACTIVE | Noted: 2020-02-12

## 2020-02-12 PROBLEM — T45.1X5A CHEMOTHERAPY-INDUCED NAUSEA: Status: ACTIVE | Noted: 2020-02-12

## 2020-02-12 PROBLEM — T45.1X5A CHEMOTHERAPY INDUCED DIARRHEA: Status: ACTIVE | Noted: 2020-02-12

## 2020-02-12 LAB — CREAT BLDA-MCNC: 0.4 MG/DL (ref 0.6–1.3)

## 2020-02-17 ENCOUNTER — HOSPITAL ENCOUNTER (OUTPATIENT)
Dept: ULTRASOUND IMAGING | Facility: HOSPITAL | Age: 45
Discharge: HOME | End: 2020-02-17
Admitting: NURSE PRACTITIONER

## 2020-02-17 ENCOUNTER — OFFICE VISIT (OUTPATIENT)
Dept: ONCOLOGY | Facility: CLINIC | Age: 45
End: 2020-02-17

## 2020-02-17 ENCOUNTER — TELEPHONE (OUTPATIENT)
Dept: ONCOLOGY | Facility: CLINIC | Age: 45
End: 2020-02-17

## 2020-02-17 ENCOUNTER — LAB (OUTPATIENT)
Dept: LAB | Facility: HOSPITAL | Age: 45
End: 2020-02-17

## 2020-02-17 VITALS
OXYGEN SATURATION: 97 % | BODY MASS INDEX: 30.05 KG/M2 | DIASTOLIC BLOOD PRESSURE: 96 MMHG | TEMPERATURE: 98.7 F | SYSTOLIC BLOOD PRESSURE: 149 MMHG | HEIGHT: 66 IN | WEIGHT: 187 LBS | HEART RATE: 103 BPM | RESPIRATION RATE: 20 BRPM

## 2020-02-17 DIAGNOSIS — Z17.0 MALIGNANT NEOPLASM OF OVERLAPPING SITES OF RIGHT BREAST IN FEMALE, ESTROGEN RECEPTOR POSITIVE (HCC): ICD-10-CM

## 2020-02-17 DIAGNOSIS — C50.811 MALIGNANT NEOPLASM OF OVERLAPPING SITES OF RIGHT BREAST IN FEMALE, ESTROGEN RECEPTOR POSITIVE (HCC): ICD-10-CM

## 2020-02-17 DIAGNOSIS — Z17.0 MALIGNANT NEOPLASM OF OVERLAPPING SITES OF RIGHT BREAST IN FEMALE, ESTROGEN RECEPTOR POSITIVE (HCC): Primary | ICD-10-CM

## 2020-02-17 DIAGNOSIS — C50.811 MALIGNANT NEOPLASM OF OVERLAPPING SITES OF RIGHT BREAST IN FEMALE, ESTROGEN RECEPTOR POSITIVE (HCC): Primary | ICD-10-CM

## 2020-02-17 PROBLEM — N93.9 VAGINAL BLEEDING: Status: ACTIVE | Noted: 2020-02-17

## 2020-02-17 LAB
ERYTHROCYTE [DISTWIDTH] IN BLOOD BY AUTOMATED COUNT: 13.5 % (ref 12.3–15.4)
HCT VFR BLD AUTO: 44.2 % (ref 34–46.6)
HGB BLD-MCNC: 14.2 G/DL (ref 12–15.9)
LYMPHOCYTES # BLD AUTO: 2.7 10*3/MM3 (ref 0.7–3.1)
LYMPHOCYTES NFR BLD AUTO: 15.8 % (ref 19.6–45.3)
MCH RBC QN AUTO: 29.7 PG (ref 26.6–33)
MCHC RBC AUTO-ENTMCNC: 32.2 G/DL (ref 31.5–35.7)
MCV RBC AUTO: 92.3 FL (ref 79–97)
MONOCYTES # BLD AUTO: 0.5 10*3/MM3 (ref 0.1–0.9)
MONOCYTES NFR BLD AUTO: 2.8 % (ref 5–12)
NEUTROPHILS # BLD AUTO: 13.8 10*3/MM3 (ref 1.7–7)
NEUTROPHILS NFR BLD AUTO: 81.4 % (ref 42.7–76)
PLATELET # BLD AUTO: 216 10*3/MM3 (ref 140–450)
PMV BLD AUTO: 7.5 FL (ref 6–12)
RBC # BLD AUTO: 4.79 10*6/MM3 (ref 3.77–5.28)
WBC NRBC COR # BLD: 16.9 10*3/MM3 (ref 3.4–10.8)

## 2020-02-17 PROCEDURE — 76830 TRANSVAGINAL US NON-OB: CPT

## 2020-02-17 PROCEDURE — 36415 COLL VENOUS BLD VENIPUNCTURE: CPT

## 2020-02-17 PROCEDURE — 99214 OFFICE O/P EST MOD 30 MIN: CPT | Performed by: NURSE PRACTITIONER

## 2020-02-17 PROCEDURE — 85025 COMPLETE CBC W/AUTO DIFF WBC: CPT

## 2020-02-17 NOTE — PROGRESS NOTES
ONCOLOGY URGENT CARE CLINIC VISIT    Josefina Rodriguez  [id number]  1975    Chief Complaint:  Vaginal bleeding    History of present illness:  Josefina Rodriguez is a 44 y.o. year old female who is currently undergoing treatment for breast cancer.  She received first cycle of TCH-P on 2/5/2020.  She called triage line today with complaints of vaginal bleeding that is unusual for her.    Patient complains of having a period that has lasted 11 days.  She states that normally her periods last 4 days with a heavy flow on day 2 and then spotting on days 3 and 4.  She states since she received her chemotherapy on 2/5/2020 she started her period 11 days ago with some days of spotting but over the past several days she has had increased blood flow with intermittent clots the size of a nickel.  She states she is using about 3 pads a day.  She does have a history of PCOS.  She states that she felt like a cyst ruptured about a week ago.  Patient had a pelvic ultrasound scheduled to follow up on CT scan results:  The right is abnormal. It is enlarged. It appears masslike with a small cystic area present. A right ovarian mass either primary or metastatic cannot be excluded. Suggest further characterization with pelvic ultrasound.  She had to cancel appointment and has not rescheduled.  She complains of associated weakness and some dizziness/lightheadedness over the weekend.  She is eating and drinking well without nausea or vomiting.  Patient received a Lupron shot on 2/5/2020.  Her last gynecology exam was January 2019 with Dr. Nia De Oliveira.      Oncology History:       Malignant neoplasm of overlapping sites of right breast in female, estrogen receptor positive (CMS/HCC)    1/21/2020 Initial Diagnosis     Malignant neoplasm of overlapping sites of right breast in female, estrogen receptor positive (CMS/HCC)      1/21/2020 Cancer Staged     Staging form: Breast, AJCC 8th Edition  - Clinical: Stage IB (cT2, cN0,  cM0, G2, ER+, [place]+, HER2+) - Signed by Jolene Connell MD on 1/21/2020 2/5/2020 -  Chemotherapy     OP SUPPORTIVE LEUPROLIDE 11.25 MG Q3M      2/5/2020 -  Chemotherapy     OP BREAST TCH-P DOCEtaxel / CARBOplatin AUC=6 / Trastuzumab-anns / Pertuzumab      2/5/2020 -  Chemotherapy     OP CENTRAL VENOUS ACCESS DEVICE ACCESS, CARE, AND MAINTENANCE (CVAD)      2/11/2020 -  Chemotherapy     OP SUPPORTIVE HYDRATION + ANTIEMETICS         Past Medical History:   Diagnosis Date   • Anxiety    • Depression    • Diabetes mellitus (CMS/HCC)    • Hiatal hernia    • Hypertension    • Malignant neoplasm of overlapping sites of right breast in female, estrogen receptor positive (CMS/HCC) 1/21/2020   • PCOS (polycystic ovarian syndrome)        Past Surgical History:   Procedure Laterality Date   • BREAST BIOPSY Right 03/2019    axillary lymph node bx/fna   • CHOLECYSTECTOMY  03/2010   • COLONOSCOPY  2016   • US GUIDED FINE NEEDLE ASPIRATION  1/15/2020   • VENOUS ACCESS DEVICE (PORT) INSERTION Left 01/31/2020       Family History   Problem Relation Age of Onset   • Breast cancer Mother 64   • Heart disease Mother    • Hypertension Father    • Lung cancer Father    • Ovarian cancer Neg Hx        Past medical history, social history and family history was reviewed.    Medications: The current medication list was reviewed and reconciled.     Allergies:  is allergic to cashew nut; naproxen; penicillins; and pistachio nut.    Review of Systems:    Review of Systems   Constitutional: Negative for appetite change, fatigue, fever and unexpected weight change.   HENT: Negative for mouth sores, sore throat and trouble swallowing.    Respiratory: Negative for cough, shortness of breath and wheezing.    Cardiovascular: Negative for chest pain, palpitations and leg swelling.   Gastrointestinal: Negative for abdominal distention, abdominal pain, constipation, diarrhea, nausea and vomiting.   Genitourinary: Positive for menstrual problem and  "vaginal bleeding. Negative for difficulty urinating, dysuria and frequency.   Musculoskeletal: Negative for arthralgias.   Skin: Negative for pallor, rash and wound.   Neurological: Positive for dizziness, weakness and light-headedness.   Hematological: Does not bruise/bleed easily.   Psychiatric/Behavioral: Negative for confusion and sleep disturbance. The patient is not nervous/anxious.        PHYSICAL EXAM:    Vitals:    02/17/20 1258   BP: 149/96  Comment: LUE   Pulse: 103   Resp: 20   Temp: 98.7 °F (37.1 °C)   TempSrc: Temporal   SpO2: 97%  Comment: RA   Weight: 84.8 kg (187 lb)   Height: 167.6 cm (66\")   PainSc: 0-No pain       ECOG: (1) Restricted in Physically Strenuous Activity, Ambulatory & Able to Do Work of Light Nature  General: well appearing female in no acute distress  HEENT: sclerae anicteric, oropharynx clear  Lymphatics: no cervical, supraclavicular, inguinal, or axillary adenopathy  Neck: Supple. No thyromegaly.  Cardiovascular: regular rate and rhythm, no murmurs  Lungs: clear to auscultation bilaterally. No respiratory distress  Abdomen: soft, nontender, nondistended.  No palpable organomegaly  Extremities: no lower extremity edema, cyanosis, or clubbing  Skin: no rashes, lesions, bruising, or petechiae  Neuro: Alert and oriented x3. Moves all extremities.    Assessment and Plan:    1.  Breast cancer  2.  Vaginal bleeding    Discussion: Patient with complaints of prolonged menstrual cycle.  She did receive Lupron shot on 2/5/2020.  She had a pelvic ultrasound scheduled that she canceled and has not rescheduled.  I will check a CBC today to make sure she is not anemic.  I will have them schedule her ultrasound today and follow up with results.  I reviewed CT scan results from 1/30/2020.  She is scheduled for next cycle of chemotherapy on 2/26/2020.  She will notify us with any new or worsening symptoms.        Nisreen Conte, APRN    2/17/2020            "

## 2020-02-17 NOTE — TELEPHONE ENCOUNTER
Patient called the triage line stating that she was having a period that would not stop and that had gotten heavier with clots evident.  She started bleeding the day after her chemo treatment on Feb. 6 but the bleeding has not stopped and has actually gotten heavier.  She is feeling a bit weaker from all this and is concerned.  I talked with LISA Dotson and she will see patient in [facility] today.  I called patient back and asked her to come at 1 and that we will get labs done as well.  Pt will be here a few minutes before 1pm.

## 2020-02-18 ENCOUNTER — TELEPHONE (OUTPATIENT)
Dept: ONCOLOGY | Facility: CLINIC | Age: 45
End: 2020-02-18

## 2020-02-18 ENCOUNTER — HOSPITAL ENCOUNTER (OUTPATIENT)
Dept: ONCOLOGY | Facility: HOSPITAL | Age: 45
Setting detail: INFUSION SERIES
Discharge: HOME | End: 2020-02-18

## 2020-02-18 VITALS
SYSTOLIC BLOOD PRESSURE: 122 MMHG | RESPIRATION RATE: 16 BRPM | WEIGHT: 187 LBS | DIASTOLIC BLOOD PRESSURE: 78 MMHG | BODY MASS INDEX: 30.05 KG/M2 | HEART RATE: 99 BPM | TEMPERATURE: 97.8 F | HEIGHT: 66 IN

## 2020-02-18 DIAGNOSIS — C50.811 MALIGNANT NEOPLASM OF OVERLAPPING SITES OF RIGHT BREAST IN FEMALE, ESTROGEN RECEPTOR POSITIVE (HCC): Primary | ICD-10-CM

## 2020-02-18 DIAGNOSIS — Z17.0 MALIGNANT NEOPLASM OF OVERLAPPING SITES OF RIGHT BREAST IN FEMALE, ESTROGEN RECEPTOR POSITIVE (HCC): Primary | ICD-10-CM

## 2020-02-18 PROCEDURE — 96361 HYDRATE IV INFUSION ADD-ON: CPT

## 2020-02-18 PROCEDURE — 96360 HYDRATION IV INFUSION INIT: CPT

## 2020-02-18 PROCEDURE — 96374 THER/PROPH/DIAG INJ IV PUSH: CPT

## 2020-02-18 PROCEDURE — 25010000003 HEPARIN LOCK FLUCH PER 10 UNITS: Performed by: INTERNAL MEDICINE

## 2020-02-18 PROCEDURE — 25010000002 ONDANSETRON PER 1 MG: Performed by: NURSE PRACTITIONER

## 2020-02-18 RX ORDER — ONDANSETRON 2 MG/ML
8 INJECTION INTRAMUSCULAR; INTRAVENOUS ONCE AS NEEDED
Status: CANCELLED
Start: 2020-02-18

## 2020-02-18 RX ORDER — SODIUM CHLORIDE 9 MG/ML
1000 INJECTION, SOLUTION INTRAVENOUS ONCE
Status: CANCELLED
Start: 2020-02-18

## 2020-02-18 RX ORDER — HEPARIN SODIUM (PORCINE) LOCK FLUSH IV SOLN 100 UNIT/ML 100 UNIT/ML
500 SOLUTION INTRAVENOUS AS NEEDED
Status: CANCELLED | OUTPATIENT
Start: 2020-02-18

## 2020-02-18 RX ORDER — SODIUM CHLORIDE 9 MG/ML
1000 INJECTION, SOLUTION INTRAVENOUS ONCE
Status: COMPLETED | OUTPATIENT
Start: 2020-02-18 | End: 2020-02-18

## 2020-02-18 RX ORDER — HEPARIN SODIUM (PORCINE) LOCK FLUSH IV SOLN 100 UNIT/ML 100 UNIT/ML
500 SOLUTION INTRAVENOUS AS NEEDED
Status: DISCONTINUED | OUTPATIENT
Start: 2020-02-18 | End: 2020-02-19 | Stop reason: HOSPADM

## 2020-02-18 RX ADMIN — ONDANSETRON 8 MG: 2 INJECTION INTRAMUSCULAR; INTRAVENOUS at 15:14

## 2020-02-18 RX ADMIN — SODIUM CHLORIDE 1000 ML: 9 INJECTION, SOLUTION INTRAVENOUS at 15:11

## 2020-02-18 RX ADMIN — HEPARIN 500 UNITS: 100 SYRINGE at 16:14

## 2020-02-18 NOTE — TELEPHONE ENCOUNTER
Received call from pt through triage line. She reports that around midnight last night she was awoken by abdominal cramping, vomiting (x3), diarrhea (x8), and headache. She has taken 6 lomotil and 4 zofran with no relief. Denies fever. Pt was evaluated yesterday in [facility]. Rates abd pain 4/10. Discussed with LISA Nielson. Offered pt another apt in [facility]. She wants to wait and see how she feels around 3pm. Let pt know that was too late to come in and be evaluated, but we could schedule her in the OP infusion center around 3. Pt prefers to do that.

## 2020-02-18 NOTE — TELEPHONE ENCOUNTER
Rec'd Disability Claim Form- Physician's Statement.  Signed JOSÉ MIGUEL attached.  Pt will pay fee @ pick-up.  Completed, attached MR, & placed in MD box.

## 2020-02-20 ENCOUNTER — DOCUMENTATION (OUTPATIENT)
Dept: GYNECOLOGIC ONCOLOGY | Facility: CLINIC | Age: 45
End: 2020-02-20

## 2020-02-20 DIAGNOSIS — N83.201 BILATERAL OVARIAN CYSTS: ICD-10-CM

## 2020-02-20 DIAGNOSIS — N83.202 BILATERAL OVARIAN CYSTS: ICD-10-CM

## 2020-02-20 DIAGNOSIS — N93.9 VAGINAL BLEEDING: Primary | ICD-10-CM

## 2020-02-20 NOTE — PROGRESS NOTES
Dr. Jolene De Jesus called me to discuss the patient's ultrasound findings.  I think it interval short-term follow-up of 1 month and consideration of seeing me after that repeat ultrasound for a consult would be appropriate.  Plan is for patient to be on chemotherapy another ~3 months

## 2020-02-20 NOTE — PROGRESS NOTES
Called patient regarding her ultrasound.  This shows a cystic structure in the right ovary and a smaller cyst on the left.  She reports that she is still having some vaginal bleeding but it has slowed down some.  She received a Lupron shot at the beginning of this month.  She reports a prior diagnosis of PCO S.  Discussed with Dr. Cortes.  We will do a repeat ultrasound in 1 month and see Dr. Cortes following that.    She is continued to have diarrhea but is starting to have some form to her stool and had 4 bowel movements today versus more than 10 previously.    Follow-up as scheduled next week.

## 2020-02-21 NOTE — TELEPHONE ENCOUNTER
Rec'd pw with MD signature.  Called and notified pt that completed and ready for pick-up @ front check-in.  Pt verbalized understanding.

## 2020-02-26 ENCOUNTER — HOSPITAL ENCOUNTER (OUTPATIENT)
Dept: ONCOLOGY | Facility: HOSPITAL | Age: 45
Setting detail: INFUSION SERIES
Discharge: HOME | End: 2020-02-26

## 2020-02-26 ENCOUNTER — OFFICE VISIT (OUTPATIENT)
Dept: ONCOLOGY | Facility: CLINIC | Age: 45
End: 2020-02-26

## 2020-02-26 ENCOUNTER — CLINICAL SUPPORT (OUTPATIENT)
Dept: GENETICS | Facility: HOSPITAL | Age: 45
End: 2020-02-26

## 2020-02-26 VITALS
RESPIRATION RATE: 20 BRPM | BODY MASS INDEX: 30.7 KG/M2 | WEIGHT: 191 LBS | OXYGEN SATURATION: 99 % | TEMPERATURE: 97.6 F | HEIGHT: 66 IN | SYSTOLIC BLOOD PRESSURE: 148 MMHG | HEART RATE: 80 BPM | DIASTOLIC BLOOD PRESSURE: 87 MMHG

## 2020-02-26 DIAGNOSIS — K52.1 CHEMOTHERAPY INDUCED DIARRHEA: ICD-10-CM

## 2020-02-26 DIAGNOSIS — Z17.0 MALIGNANT NEOPLASM OF OVERLAPPING SITES OF RIGHT BREAST IN FEMALE, ESTROGEN RECEPTOR POSITIVE (HCC): Primary | ICD-10-CM

## 2020-02-26 DIAGNOSIS — C50.811 MALIGNANT NEOPLASM OF OVERLAPPING SITES OF RIGHT BREAST IN FEMALE, ESTROGEN RECEPTOR POSITIVE (HCC): Primary | ICD-10-CM

## 2020-02-26 DIAGNOSIS — C50.911 MALIGNANT NEOPLASM OF RIGHT BREAST IN FEMALE, ESTROGEN RECEPTOR POSITIVE, UNSPECIFIED SITE OF BREAST (HCC): Primary | ICD-10-CM

## 2020-02-26 DIAGNOSIS — Z13.79 GENETIC TESTING: ICD-10-CM

## 2020-02-26 DIAGNOSIS — Z80.3 FAMILY HISTORY OF BREAST CANCER: ICD-10-CM

## 2020-02-26 DIAGNOSIS — T45.1X5A CHEMOTHERAPY INDUCED DIARRHEA: ICD-10-CM

## 2020-02-26 DIAGNOSIS — Z17.0 MALIGNANT NEOPLASM OF RIGHT BREAST IN FEMALE, ESTROGEN RECEPTOR POSITIVE, UNSPECIFIED SITE OF BREAST (HCC): Primary | ICD-10-CM

## 2020-02-26 LAB
ALBUMIN SERPL-MCNC: 3.7 G/DL (ref 3.5–5.2)
ALBUMIN/GLOB SERPL: 1.1 G/DL
ALP SERPL-CCNC: 110 U/L (ref 39–117)
ALT SERPL W P-5'-P-CCNC: 41 U/L (ref 1–33)
ANION GAP SERPL CALCULATED.3IONS-SCNC: 9 MMOL/L (ref 5–15)
AST SERPL-CCNC: 29 U/L (ref 1–32)
BILIRUB SERPL-MCNC: 0.2 MG/DL (ref 0.2–1.2)
BUN BLD-MCNC: 8 MG/DL (ref 6–20)
BUN/CREAT SERPL: 18.2 (ref 7–25)
CALCIUM SPEC-SCNC: 9 MG/DL (ref 8.6–10.5)
CHLORIDE SERPL-SCNC: 104 MMOL/L (ref 98–107)
CO2 SERPL-SCNC: 28 MMOL/L (ref 22–29)
CREAT BLD-MCNC: 0.44 MG/DL (ref 0.57–1)
CREAT BLDA-MCNC: 0.4 MG/DL
CREAT BLDA-MCNC: 0.4 MG/DL (ref 0.6–1.3)
ERYTHROCYTE [DISTWIDTH] IN BLOOD BY AUTOMATED COUNT: 13.9 % (ref 12.3–15.4)
GFR SERPL CREATININE-BSD FRML MDRD: >150 ML/MIN/1.73
GLOBULIN UR ELPH-MCNC: 3.4 GM/DL
GLUCOSE BLD-MCNC: 175 MG/DL (ref 65–99)
HCT VFR BLD AUTO: 38.2 % (ref 34–46.6)
HGB BLD-MCNC: 12.8 G/DL (ref 12–15.9)
LYMPHOCYTES # BLD AUTO: 2.7 10*3/MM3 (ref 0.7–3.1)
LYMPHOCYTES NFR BLD AUTO: 21.5 % (ref 19.6–45.3)
MCH RBC QN AUTO: 30.8 PG (ref 26.6–33)
MCHC RBC AUTO-ENTMCNC: 33.6 G/DL (ref 31.5–35.7)
MCV RBC AUTO: 91.8 FL (ref 79–97)
MONOCYTES # BLD AUTO: 0.5 10*3/MM3 (ref 0.1–0.9)
MONOCYTES NFR BLD AUTO: 3.7 % (ref 5–12)
NEUTROPHILS # BLD AUTO: 9.5 10*3/MM3 (ref 1.7–7)
NEUTROPHILS NFR BLD AUTO: 74.8 % (ref 42.7–76)
PLATELET # BLD AUTO: 305 10*3/MM3 (ref 140–450)
PMV BLD AUTO: 7.1 FL (ref 6–12)
POTASSIUM BLD-SCNC: 4.4 MMOL/L (ref 3.5–5.2)
PROT SERPL-MCNC: 7.1 G/DL (ref 6–8.5)
RBC # BLD AUTO: 4.16 10*6/MM3 (ref 3.77–5.28)
SODIUM BLD-SCNC: 141 MMOL/L (ref 136–145)
WBC NRBC COR # BLD: 12.7 10*3/MM3 (ref 3.4–10.8)

## 2020-02-26 PROCEDURE — 25010000002 ALTEPLASE 2 MG RECONSTITUTED SOLUTION: Performed by: INTERNAL MEDICINE

## 2020-02-26 PROCEDURE — 82565 ASSAY OF CREATININE: CPT

## 2020-02-26 PROCEDURE — 36415 COLL VENOUS BLD VENIPUNCTURE: CPT

## 2020-02-26 PROCEDURE — 25010000002 TRASTUZUMAB-ANNS 420 MG RECONSTITUTED SOLUTION 1 EACH VIAL: Performed by: INTERNAL MEDICINE

## 2020-02-26 PROCEDURE — 85025 COMPLETE CBC W/AUTO DIFF WBC: CPT | Performed by: INTERNAL MEDICINE

## 2020-02-26 PROCEDURE — 25010000002 DOCETAXEL 20 MG/ML SOLUTION 8 ML VIAL: Performed by: INTERNAL MEDICINE

## 2020-02-26 PROCEDURE — 96040: CPT | Performed by: GENETIC COUNSELOR, MS

## 2020-02-26 PROCEDURE — 25010000002 DEXAMETHASONE PER 1 MG: Performed by: INTERNAL MEDICINE

## 2020-02-26 PROCEDURE — 25010000003 HEPARIN LOCK FLUCH PER 10 UNITS: Performed by: INTERNAL MEDICINE

## 2020-02-26 PROCEDURE — 99214 OFFICE O/P EST MOD 30 MIN: CPT | Performed by: INTERNAL MEDICINE

## 2020-02-26 PROCEDURE — 25010000002 CARBOPLATIN PER 50 MG: Performed by: INTERNAL MEDICINE

## 2020-02-26 PROCEDURE — 36593 DECLOT VASCULAR DEVICE: CPT

## 2020-02-26 PROCEDURE — 25010000002 PALONOSETRON PER 25 MCG: Performed by: INTERNAL MEDICINE

## 2020-02-26 PROCEDURE — 96377 APPLICATON ON-BODY INJECTOR: CPT

## 2020-02-26 PROCEDURE — 96413 CHEMO IV INFUSION 1 HR: CPT

## 2020-02-26 PROCEDURE — 96417 CHEMO IV INFUS EACH ADDL SEQ: CPT

## 2020-02-26 PROCEDURE — 25010000002 FOSAPREPITANT PER 1 MG: Performed by: INTERNAL MEDICINE

## 2020-02-26 PROCEDURE — 96367 TX/PROPH/DG ADDL SEQ IV INF: CPT

## 2020-02-26 PROCEDURE — 25010000002 PEGFILGRASTIM 6 MG/0.6ML PREFILLED SYRINGE KIT: Performed by: INTERNAL MEDICINE

## 2020-02-26 PROCEDURE — 96376 TX/PRO/DX INJ SAME DRUG ADON: CPT

## 2020-02-26 PROCEDURE — 96375 TX/PRO/DX INJ NEW DRUG ADDON: CPT

## 2020-02-26 PROCEDURE — 80053 COMPREHEN METABOLIC PANEL: CPT | Performed by: INTERNAL MEDICINE

## 2020-02-26 PROCEDURE — 25010000002 PERTUZUMAB 420 MG/14ML SOLUTION 420 MG VIAL: Performed by: INTERNAL MEDICINE

## 2020-02-26 RX ORDER — PALONOSETRON 0.05 MG/ML
0.25 INJECTION, SOLUTION INTRAVENOUS ONCE
Status: CANCELLED | OUTPATIENT
Start: 2020-02-26

## 2020-02-26 RX ORDER — DIPHENOXYLATE HYDROCHLORIDE AND ATROPINE SULFATE 2.5; .025 MG/1; MG/1
1 TABLET ORAL ONCE
Status: COMPLETED | OUTPATIENT
Start: 2020-02-26 | End: 2020-02-26

## 2020-02-26 RX ORDER — DIPHENHYDRAMINE HYDROCHLORIDE 50 MG/ML
50 INJECTION INTRAMUSCULAR; INTRAVENOUS AS NEEDED
Status: CANCELLED | OUTPATIENT
Start: 2020-02-26

## 2020-02-26 RX ORDER — HEPARIN SODIUM (PORCINE) LOCK FLUSH IV SOLN 100 UNIT/ML 100 UNIT/ML
500 SOLUTION INTRAVENOUS AS NEEDED
Status: CANCELLED | OUTPATIENT
Start: 2020-02-26

## 2020-02-26 RX ORDER — SODIUM CHLORIDE 9 MG/ML
250 INJECTION, SOLUTION INTRAVENOUS ONCE
Status: COMPLETED | OUTPATIENT
Start: 2020-02-26 | End: 2020-02-26

## 2020-02-26 RX ORDER — PALONOSETRON 0.05 MG/ML
0.25 INJECTION, SOLUTION INTRAVENOUS ONCE
Status: COMPLETED | OUTPATIENT
Start: 2020-02-26 | End: 2020-02-26

## 2020-02-26 RX ORDER — DIPHENOXYLATE HYDROCHLORIDE AND ATROPINE SULFATE 2.5; .025 MG/1; MG/1
1 TABLET ORAL ONCE
Status: CANCELLED
Start: 2020-02-26

## 2020-02-26 RX ORDER — FAMOTIDINE 10 MG/ML
20 INJECTION, SOLUTION INTRAVENOUS AS NEEDED
Status: CANCELLED | OUTPATIENT
Start: 2020-02-26

## 2020-02-26 RX ORDER — HEPARIN SODIUM (PORCINE) LOCK FLUSH IV SOLN 100 UNIT/ML 100 UNIT/ML
500 SOLUTION INTRAVENOUS AS NEEDED
Status: DISCONTINUED | OUTPATIENT
Start: 2020-02-26 | End: 2020-02-27 | Stop reason: HOSPADM

## 2020-02-26 RX ORDER — SODIUM CHLORIDE 9 MG/ML
250 INJECTION, SOLUTION INTRAVENOUS ONCE
Status: CANCELLED | OUTPATIENT
Start: 2020-02-26

## 2020-02-26 RX ADMIN — PALONOSETRON HYDROCHLORIDE 0.25 MG: 0.25 INJECTION INTRAVENOUS at 09:36

## 2020-02-26 RX ADMIN — DIPHENOXYLATE HYDROCHLORIDE AND ATROPINE SULFATE 1 TABLET: 2.5; .025 TABLET ORAL at 14:06

## 2020-02-26 RX ADMIN — ALTEPLASE: 2.2 INJECTION, POWDER, LYOPHILIZED, FOR SOLUTION INTRAVENOUS at 08:57

## 2020-02-26 RX ADMIN — PEGFILGRASTIM 6 MG: KIT SUBCUTANEOUS at 14:08

## 2020-02-26 RX ADMIN — HEPARIN 500 UNITS: 100 SYRINGE at 14:08

## 2020-02-26 RX ADMIN — DOCETAXEL 130 MG: 20 INJECTION, SOLUTION, CONCENTRATE INTRAVENOUS at 12:10

## 2020-02-26 RX ADMIN — SODIUM CHLORIDE 150 MG: 9 INJECTION, SOLUTION INTRAVENOUS at 09:37

## 2020-02-26 RX ADMIN — CARBOPLATIN 900 MG: 10 INJECTION, SOLUTION INTRAVENOUS at 13:18

## 2020-02-26 RX ADMIN — DEXAMETHASONE SODIUM PHOSPHATE 12 MG: 4 INJECTION, SOLUTION INTRAMUSCULAR; INTRAVENOUS at 09:37

## 2020-02-26 RX ADMIN — SODIUM CHLORIDE 250 ML: 9 INJECTION, SOLUTION INTRAVENOUS at 09:35

## 2020-02-26 RX ADMIN — PERTUZUMAB 378 MG: 30 INJECTION, SOLUTION, CONCENTRATE INTRAVENOUS at 10:11

## 2020-02-26 RX ADMIN — TRASTUZUMAB-ANNS 510 MG: 420 INJECTION, POWDER, LYOPHILIZED, FOR SOLUTION INTRAVENOUS at 11:30

## 2020-02-26 NOTE — PROGRESS NOTES
"      PROBLEM LIST:  1. T2N0M0 Er+ IA+ Her2+ invasive ductal carcinoma of the right breast  A) presented with a mass on self-exam.  Mammogram 1/7/20 showed a 4.1 cm mass in the right breast, with 2 adjacent smaller masses.  1.9 cm right axillary lymph node.   FNA of lymph node negative.  Biopsy of right breast mass showed grade 2 IDC, Er 95%, IA 2%, Her2 3+.  B) neoadjuvant chemotherapy with TCHP started 2/5/20  2. PCOS  3. Anxiety/depression  4. DM2  5. GERD  6. Hyperlipidemia  7. Hypertension  8.  Hidradenitis    Subjective     CHIEF COMPLAINT: breast cancer    HISTORY OF PRESENT ILLNESS:   Josefina Rodriguez returns for follow-up.   She has had her first cycle of chemotherapy.  She had significant diarrhea with her treatment.  She really did not have much nausea, although about 2 weeks after her treatment she did have nausea and vomiting for a day or 2 which she thinks was related to a stomach bug that her sister-in-law also had.  At the worst the diarrhea was up to 12 bowel movements a day.  She did come in for IV fluids and felt a lot better after that.  She noticed that shortly after receiving the Neulasta injection her heart rate and blood sugar both increased.    Past Medical History, Past Surgical History, Social History, Family History have been reviewed and are without significant changes except as mentioned.    Review of Systems   A comprehensive 14 point review of systems was performed and was negative except as mentioned.    Medications:  The current medication list was reviewed in the EMR    ALLERGIES:    Allergies   Allergen Reactions   • Cashew Nut Hives and Itching   • Naproxen Hives   • Penicillins Hives   • Pistachio Nut Hives and Itching       Objective      /87 Comment: LUE  Pulse 80   Temp 97.6 °F (36.4 °C) (Temporal)   Resp 20   Ht 167.6 cm (66\")   Wt 86.6 kg (191 lb)   SpO2 99% Comment: RA  BMI 30.83 kg/m²      Performance Status: 0    General: well appearing female in no " acute distress  Neuro: alert and oriented  HEENT: sclera anicteric, oropharynx clear  Lymphatics: no cervical, supraclavicular, or axillary adenopathy  Cardiovascular: regular rate and rhythm, no murmurs  Breast: In the right breast at 9:00 there is a palpable mass approximately 3 cm, movable, reduced compared to prior exam.  Lungs: clear to auscultation bilaterally  Abdomen: soft, nontender, nondistended.  No palpable organomegaly  Extremeties: no lower extremity edema  Skin: no rashes, lesions, bruising, or petechiae  Psych: mood and affect appropriate      RECENT LABS:  Lab Results   Component Value Date    WBC 16.90 (H) 02/17/2020    HGB 14.2 02/17/2020    HCT 44.2 02/17/2020    MCV 92.3 02/17/2020     02/17/2020     Lab Results   Component Value Date    GLUCOSE 260 (H) 02/05/2020    BUN 11 02/05/2020    CREATININE 0.40 02/05/2020    EGFRIFNONA 129 02/05/2020    BCR 21.2 02/05/2020    K 3.7 02/05/2020    CO2 25.0 02/05/2020    CALCIUM 9.2 02/05/2020    ALBUMIN 3.80 02/05/2020    AST 15 02/05/2020    ALT 23 02/05/2020             Assessment/Plan   Josefina Rodriguez is a 44 y.o. year old female with stage IB ER+ Her2+ IDC of the right breast, here for follow-up.    Breast cancer: She has had improvement in her palpable mass after 1 cycle of chemotherapy.  She did have fairly significant side effects with treatment.  I will reduce her Taxotere and Perjeta by 10% for this and future cycles.  She can continue to use Lomotil and Imodium as needed.  We will arrange for her to come in for IV fluids 1 week after each treatment.    Vaginal bleeding: Likely related to recent Lupron.  I do not anticipate that this will continue.    Ovarian cyst: She has a history of PCOS.  She is scheduled for follow-up ultrasound and to see Dr. Cortes in 1 month.    Diabetes: Some increase in blood sugars, likely related to chemotherapy and steroids.  We will continue to monitor.    Follow-up in 3 weeks.              I  spent 25 minutes with the patient. I spent > 50% percent of this time counseling and discussing prognosis, diagnostic testing, evaluation, current status and management.        Jolene Connell MD  [facility]    2/26/2020          CC:

## 2020-02-27 NOTE — PROGRESS NOTES
Josefina Rodriguez is a 44-year-old female who was seen for genetic counseling due to a personal history of breast cancer.  Ms. Rodriguez was recently diagnosed with an invasive breast cancer at age 44.   Ms. Rodriguez retains her uterus and ovaries.   Ms. Rodriguez was interested in discussing her risk for a hereditary cancer syndrome, and decided to pursue genetic testing.   Ms. Rodriguez opted to pursue comprehensive testing via the CancerNext panel ordered through [employer] [facility] which includes BRCA1/2 and 32 additional genes associated with an increased risk of breast cancer or other cancers (APC, HAMLET, BARD1, BMPR1A, BRCA1, BRCA2, BRIP1, CDH1, CDK4, CDKN2A, CHEK2, EPCAM, GREM1, MLH1, MRE11A, MSH2, MSH6, MUTYH, NBN, NF1, PALB2, PMS2, POLD1, POLE, PTEN, RAD50, RAD51C, RAD51D, SMAD4, SMARCA4, STK11, and TP53). Results are expected in 2-3 weeks.     PERTINENT FAMILY HISTORY:  Mother: Breast cancer, 64    Metastatic recurrence, 76    RISK ASSESSMENT:  Ms. Rodriguez’s personal history of early onset breast cancer raises the question of a hereditary cancer syndrome.   NCCN guidelines recommend BRCA1/2 testing for individuals diagnosed with breast cancer under age 50.  Therefore, testing is appropriate for Ms. Rodriguez.  We discussed that standard approach to BRCA1/2 testing is via a multigene panel that evaluates BRCA1/2 and multiple other genes known to impact cancer risk.  This risk assessment is based on the family history information provided at the time of the appointment.  The assessment could change in the future should new information be obtained.     GENETIC COUNSELING (30 minutes): We reviewed the family history information in detail.  Cases of breast cancer follow three general patterns: sporadic, familial, and hereditary.  While most cancer is sporadic, some cases appear to occur in family clusters.  These cases are said to be familial and account for 10-20% of breast cancer cases.  Familial cases may be due  to a combination of shared genes and environmental factors among family members.  In even fewer families, the cancer is said to be inherited, and the genes responsible for the cancer are known.       Family histories typical of hereditary cancer syndromes usually include multiple first- and second-degree relatives diagnosed with cancer types that define a syndrome.  These cases tend to be diagnosed at younger-than-expected ages and can be bilateral or multifocal.  The cancer in these families follows an autosomal dominant inheritance pattern, which indicates the likely presence of a mutation in a cancer susceptibility gene.  Children and siblings of an individual believed to carry this mutation have a 50% chance of inheriting that mutation, thereby inheriting the increased risk to develop cancer.  These mutations can be passed down from the maternal or the paternal lineage.     Hereditary breast cancer accounts for 5-10% of all cases of breast cancer.  A significant proportion of hereditary breast cancer can be attributed to mutations in the BRCA1 and BRCA2 genes.  Mutations in these genes confer an increased risk for breast cancer, ovarian cancer, male breast cancer, prostate cancer and pancreatic cancer.  There are other genes that are known to be associated with an increased risk for breast cancer and other cancers. Based on Ms. Rodriguez’s desire to get as much information as possible regarding her personal risks and potential risks for her family, she opted to pursue testing through a panel that would look at several other genes known to increase the risk for breast cancer.     GENETIC TESTING:  The risks, benefits and limitations of genetic testing and implications for clinical management following testing were reviewed.  DNA test results can influence decisions regarding screening, prevention and surgical management.  Genetic testing can have significant psychological implications for both individuals and  families.  Also discussed was the possibility of insurance discrimination based on genetic test results and the laws (AN[place]Y) in place to prevent this.     We discussed panel testing, which would involve testing for BRCA1/2 as well as several other cancer susceptibility genes at the same time.  The benefits and limitations of genetic testing were discussed and Ms. Rodriguez decided to pursue testing. The implications of a positive or negative test result were discussed. We discussed the possibility that, in some cases, genetic test results may be uninformative due to the identification of a genetic variant. These variants may or may not be associated with an increased cancer risk.  Given her personal history, a negative test result would not eliminate all breast cancer risk to her relatives, although the risk would not be as high as it would with positive genetic testing.          PLAN: Results are expected in 2-3 weeks, and the patient will be contacted by telephone at that time.  Genetic counseling remains available to Ms. Rodriguez and her family in the future, and she is welcome to contact us at [phone] with any questions she may have.        Lacey Childers MS, [facility], [facility]  Licensed Certified Genetic Counselor

## 2020-03-02 RX ORDER — SODIUM CHLORIDE 9 MG/ML
1000 INJECTION, SOLUTION INTRAVENOUS ONCE
Status: CANCELLED | OUTPATIENT
Start: 2020-03-24

## 2020-03-02 RX ORDER — SODIUM CHLORIDE 9 MG/ML
1000 INJECTION, SOLUTION INTRAVENOUS ONCE
Status: CANCELLED | OUTPATIENT
Start: 2020-03-03

## 2020-03-03 ENCOUNTER — HOSPITAL ENCOUNTER (OUTPATIENT)
Dept: ONCOLOGY | Facility: HOSPITAL | Age: 45
Setting detail: INFUSION SERIES
Discharge: HOME | End: 2020-03-03

## 2020-03-03 VITALS
DIASTOLIC BLOOD PRESSURE: 87 MMHG | WEIGHT: 183 LBS | HEART RATE: 114 BPM | HEIGHT: 66 IN | SYSTOLIC BLOOD PRESSURE: 134 MMHG | RESPIRATION RATE: 16 BRPM | BODY MASS INDEX: 29.41 KG/M2 | TEMPERATURE: 97.9 F

## 2020-03-03 DIAGNOSIS — Z17.0 MALIGNANT NEOPLASM OF OVERLAPPING SITES OF RIGHT BREAST IN FEMALE, ESTROGEN RECEPTOR POSITIVE (HCC): Primary | ICD-10-CM

## 2020-03-03 DIAGNOSIS — C50.811 MALIGNANT NEOPLASM OF OVERLAPPING SITES OF RIGHT BREAST IN FEMALE, ESTROGEN RECEPTOR POSITIVE (HCC): Primary | ICD-10-CM

## 2020-03-03 PROCEDURE — 96360 HYDRATION IV INFUSION INIT: CPT

## 2020-03-03 PROCEDURE — 25010000003 HEPARIN LOCK FLUCH PER 10 UNITS: Performed by: INTERNAL MEDICINE

## 2020-03-03 RX ORDER — HEPARIN SODIUM (PORCINE) LOCK FLUSH IV SOLN 100 UNIT/ML 100 UNIT/ML
500 SOLUTION INTRAVENOUS AS NEEDED
Status: CANCELLED | OUTPATIENT
Start: 2020-03-03

## 2020-03-03 RX ORDER — HEPARIN SODIUM (PORCINE) LOCK FLUSH IV SOLN 100 UNIT/ML 100 UNIT/ML
500 SOLUTION INTRAVENOUS AS NEEDED
Status: DISCONTINUED | OUTPATIENT
Start: 2020-03-03 | End: 2020-03-04 | Stop reason: HOSPADM

## 2020-03-03 RX ORDER — SODIUM CHLORIDE 9 MG/ML
1000 INJECTION, SOLUTION INTRAVENOUS ONCE
Status: COMPLETED | OUTPATIENT
Start: 2020-03-03 | End: 2020-03-03

## 2020-03-03 RX ADMIN — HEPARIN 500 UNITS: 100 SYRINGE at 14:53

## 2020-03-03 RX ADMIN — SODIUM CHLORIDE 1000 ML: 9 INJECTION, SOLUTION INTRAVENOUS at 13:47

## 2020-03-03 NOTE — ADDENDUM NOTE
Encounter addended by: Wendy Blake RN on: 3/3/2020 3:55 PM   Actions taken: Allergies reviewed, Order Reconciliation Section accessed

## 2020-03-12 ENCOUNTER — TELEPHONE (OUTPATIENT)
Dept: ONCOLOGY | Facility: CLINIC | Age: 45
End: 2020-03-12

## 2020-03-12 NOTE — TELEPHONE ENCOUNTER
PT CALLED TO REQUEST A WORK EXCUSE STATING SHE CAN NOT RETURN TO WORK UNTIL 5/29/20.   SHE WOULD LIKE TO PICK THIS UP THIS MORNING.    amber - [phone]

## 2020-03-12 NOTE — TELEPHONE ENCOUNTER
Letter typed and signed.    Called pt to notify her that letter is ready.  Does she want it mailed, faxed, or to be picked up?  Pt verbalized understanding and will pick letter up at front check in because she will already be here today.

## 2020-03-13 ENCOUNTER — TELEPHONE (OUTPATIENT)
Dept: ONCOLOGY | Facility: CLINIC | Age: 45
End: 2020-03-13

## 2020-03-13 NOTE — TELEPHONE ENCOUNTER
Patient needs a note for work but said needs to have specific wording and dates if possible. Wants callback to discuss further at [phone]

## 2020-03-16 ENCOUNTER — TELEPHONE (OUTPATIENT)
Dept: ONCOLOGY | Facility: CLINIC | Age: 45
End: 2020-03-16

## 2020-03-16 NOTE — TELEPHONE ENCOUNTER
Called and spoke with pt.  Letter needs to state that she was last seen on 2/26/2020, she will need to off work until at least 5/29/2020 to receive chemo, and leave out RTW with no restrictions because she will be getting scheduled for surgery around that time.  Will email letter to pt's email [email or web address] per request.

## 2020-03-16 NOTE — TELEPHONE ENCOUNTER
Patient called again the note emailed to her from [employer] has the incorrect dates on it, it must have off from Feb. 17th - Feb. 26th 2020 for Chemo therapy, please re email to her.

## 2020-03-18 ENCOUNTER — OFFICE VISIT (OUTPATIENT)
Dept: ONCOLOGY | Facility: CLINIC | Age: 45
End: 2020-03-18

## 2020-03-18 ENCOUNTER — OFFICE VISIT (OUTPATIENT)
Dept: GYNECOLOGIC ONCOLOGY | Facility: CLINIC | Age: 45
End: 2020-03-18

## 2020-03-18 ENCOUNTER — HOSPITAL ENCOUNTER (OUTPATIENT)
Dept: ONCOLOGY | Facility: HOSPITAL | Age: 45
Setting detail: INFUSION SERIES
Discharge: HOME | End: 2020-03-18

## 2020-03-18 VITALS
HEART RATE: 99 BPM | BODY MASS INDEX: 29.78 KG/M2 | RESPIRATION RATE: 16 BRPM | WEIGHT: 185.3 LBS | OXYGEN SATURATION: 98 % | HEIGHT: 66 IN | TEMPERATURE: 97.5 F | SYSTOLIC BLOOD PRESSURE: 152 MMHG | DIASTOLIC BLOOD PRESSURE: 98 MMHG

## 2020-03-18 VITALS
SYSTOLIC BLOOD PRESSURE: 152 MMHG | OXYGEN SATURATION: 98 % | RESPIRATION RATE: 16 BRPM | DIASTOLIC BLOOD PRESSURE: 98 MMHG | BODY MASS INDEX: 29.89 KG/M2 | HEART RATE: 99 BPM | TEMPERATURE: 97.5 F | HEIGHT: 66 IN | WEIGHT: 186 LBS

## 2020-03-18 DIAGNOSIS — Z17.0 MALIGNANT NEOPLASM OF OVERLAPPING SITES OF RIGHT BREAST IN FEMALE, ESTROGEN RECEPTOR POSITIVE (HCC): Primary | ICD-10-CM

## 2020-03-18 DIAGNOSIS — Z17.0 MALIGNANT NEOPLASM OF OVERLAPPING SITES OF RIGHT BREAST IN FEMALE, ESTROGEN RECEPTOR POSITIVE (HCC): ICD-10-CM

## 2020-03-18 DIAGNOSIS — C50.811 MALIGNANT NEOPLASM OF OVERLAPPING SITES OF RIGHT BREAST IN FEMALE, ESTROGEN RECEPTOR POSITIVE (HCC): ICD-10-CM

## 2020-03-18 DIAGNOSIS — Z80.3 FAMILY HISTORY OF BREAST CANCER: ICD-10-CM

## 2020-03-18 DIAGNOSIS — E11.65 UNCONTROLLED TYPE 2 DIABETES MELLITUS WITH HYPERGLYCEMIA (HCC): Primary | ICD-10-CM

## 2020-03-18 DIAGNOSIS — N83.201 CYSTS OF BOTH OVARIES: Primary | ICD-10-CM

## 2020-03-18 DIAGNOSIS — N83.202 CYSTS OF BOTH OVARIES: Primary | ICD-10-CM

## 2020-03-18 DIAGNOSIS — E11.65 UNCONTROLLED TYPE 2 DIABETES MELLITUS WITH HYPERGLYCEMIA (HCC): ICD-10-CM

## 2020-03-18 DIAGNOSIS — C50.811 MALIGNANT NEOPLASM OF OVERLAPPING SITES OF RIGHT BREAST IN FEMALE, ESTROGEN RECEPTOR POSITIVE (HCC): Primary | ICD-10-CM

## 2020-03-18 LAB
ALBUMIN SERPL-MCNC: 3.8 G/DL (ref 3.5–5.2)
ALBUMIN SERPL-MCNC: 3.9 G/DL (ref 3.5–5.2)
ALBUMIN/GLOB SERPL: 1.1 G/DL
ALBUMIN/GLOB SERPL: 1.3 G/DL
ALP SERPL-CCNC: 127 U/L (ref 39–117)
ALP SERPL-CCNC: 132 U/L (ref 39–117)
ALT SERPL W P-5'-P-CCNC: 35 U/L (ref 1–33)
ALT SERPL W P-5'-P-CCNC: 37 U/L (ref 1–33)
ANION GAP SERPL CALCULATED.3IONS-SCNC: 12.9 MMOL/L (ref 5–15)
ANION GAP SERPL CALCULATED.3IONS-SCNC: 14 MMOL/L (ref 5–15)
AST SERPL-CCNC: 18 U/L (ref 1–32)
AST SERPL-CCNC: 22 U/L (ref 1–32)
BILIRUB SERPL-MCNC: 0.2 MG/DL (ref 0.2–1.2)
BILIRUB SERPL-MCNC: 0.2 MG/DL (ref 0.2–1.2)
BUN BLD-MCNC: 10 MG/DL (ref 6–20)
BUN BLD-MCNC: 9 MG/DL (ref 6–20)
BUN/CREAT SERPL: 18.8 (ref 7–25)
BUN/CREAT SERPL: 22.2 (ref 7–25)
CALCIUM SPEC-SCNC: 8.8 MG/DL (ref 8.6–10.5)
CALCIUM SPEC-SCNC: 9 MG/DL (ref 8.6–10.5)
CANCER AG125 SERPL QL: 29.8 U/ML (ref 0–38.1)
CHLORIDE SERPL-SCNC: 100 MMOL/L (ref 98–107)
CHLORIDE SERPL-SCNC: 102 MMOL/L (ref 98–107)
CO2 SERPL-SCNC: 22.1 MMOL/L (ref 22–29)
CO2 SERPL-SCNC: 23 MMOL/L (ref 22–29)
CREAT BLD-MCNC: 0.45 MG/DL (ref 0.57–1)
CREAT BLD-MCNC: 0.48 MG/DL (ref 0.57–1)
CREAT BLDA-MCNC: 0.3 MG/DL
ERYTHROCYTE [DISTWIDTH] IN BLOOD BY AUTOMATED COUNT: 14.8 % (ref 12.3–15.4)
GFR SERPL CREATININE-BSD FRML MDRD: 141 ML/MIN/1.73
GFR SERPL CREATININE-BSD FRML MDRD: >150 ML/MIN/1.73
GLOBULIN UR ELPH-MCNC: 2.9 GM/DL
GLOBULIN UR ELPH-MCNC: 3.4 GM/DL
GLUCOSE BLD-MCNC: 254 MG/DL (ref 65–99)
GLUCOSE BLD-MCNC: 261 MG/DL (ref 65–99)
HBA1C MFR BLD: 7.3 % (ref 4.8–5.6)
HCT VFR BLD AUTO: 36.2 % (ref 34–46.6)
HGB BLD-MCNC: 12.6 G/DL (ref 12–15.9)
LYMPHOCYTES # BLD AUTO: 2.3 10*3/MM3 (ref 0.7–3.1)
LYMPHOCYTES NFR BLD AUTO: 17.8 % (ref 19.6–45.3)
MCH RBC QN AUTO: 32.3 PG (ref 26.6–33)
MCHC RBC AUTO-ENTMCNC: 34.8 G/DL (ref 31.5–35.7)
MCV RBC AUTO: 92.9 FL (ref 79–97)
MONOCYTES # BLD AUTO: 0.5 10*3/MM3 (ref 0.1–0.9)
MONOCYTES NFR BLD AUTO: 3.6 % (ref 5–12)
NEUTROPHILS # BLD AUTO: 10.3 10*3/MM3 (ref 1.7–7)
NEUTROPHILS NFR BLD AUTO: 78.6 % (ref 42.7–76)
PLATELET # BLD AUTO: 294 10*3/MM3 (ref 140–450)
PMV BLD AUTO: 6.7 FL (ref 6–12)
POTASSIUM BLD-SCNC: 3.4 MMOL/L (ref 3.5–5.2)
POTASSIUM BLD-SCNC: 3.4 MMOL/L (ref 3.5–5.2)
PROT SERPL-MCNC: 6.7 G/DL (ref 6–8.5)
PROT SERPL-MCNC: 7.3 G/DL (ref 6–8.5)
RBC # BLD AUTO: 3.89 10*6/MM3 (ref 3.77–5.28)
SODIUM BLD-SCNC: 137 MMOL/L (ref 136–145)
SODIUM BLD-SCNC: 137 MMOL/L (ref 136–145)
WBC NRBC COR # BLD: 13.1 10*3/MM3 (ref 3.4–10.8)

## 2020-03-18 PROCEDURE — 76830 TRANSVAGINAL US NON-OB: CPT | Performed by: OBSTETRICS & GYNECOLOGY

## 2020-03-18 PROCEDURE — 25010000003 HEPARIN LOCK FLUCH PER 10 UNITS: Performed by: INTERNAL MEDICINE

## 2020-03-18 PROCEDURE — 99214 OFFICE O/P EST MOD 30 MIN: CPT | Performed by: NURSE PRACTITIONER

## 2020-03-18 PROCEDURE — 25010000002 DEXAMETHASONE PER 1 MG: Performed by: NURSE PRACTITIONER

## 2020-03-18 PROCEDURE — 96413 CHEMO IV INFUSION 1 HR: CPT

## 2020-03-18 PROCEDURE — 25010000002 ALTEPLASE 2 MG RECONSTITUTED SOLUTION: Performed by: INTERNAL MEDICINE

## 2020-03-18 PROCEDURE — 99204 OFFICE O/P NEW MOD 45 MIN: CPT | Performed by: OBSTETRICS & GYNECOLOGY

## 2020-03-18 PROCEDURE — 25010000002 FOSAPREPITANT PER 1 MG: Performed by: NURSE PRACTITIONER

## 2020-03-18 PROCEDURE — 96375 TX/PRO/DX INJ NEW DRUG ADDON: CPT

## 2020-03-18 PROCEDURE — 83036 HEMOGLOBIN GLYCOSYLATED A1C: CPT | Performed by: INTERNAL MEDICINE

## 2020-03-18 PROCEDURE — 25010000002 DOCETAXEL 20 MG/ML SOLUTION 8 ML VIAL: Performed by: NURSE PRACTITIONER

## 2020-03-18 PROCEDURE — 82565 ASSAY OF CREATININE: CPT

## 2020-03-18 PROCEDURE — 96374 THER/PROPH/DIAG INJ IV PUSH: CPT

## 2020-03-18 PROCEDURE — 80053 COMPREHEN METABOLIC PANEL: CPT | Performed by: OBSTETRICS & GYNECOLOGY

## 2020-03-18 PROCEDURE — 86304 IMMUNOASSAY TUMOR CA 125: CPT | Performed by: OBSTETRICS & GYNECOLOGY

## 2020-03-18 PROCEDURE — 80053 COMPREHEN METABOLIC PANEL: CPT | Performed by: NURSE PRACTITIONER

## 2020-03-18 PROCEDURE — 96377 APPLICATON ON-BODY INJECTOR: CPT

## 2020-03-18 PROCEDURE — 85025 COMPLETE CBC W/AUTO DIFF WBC: CPT | Performed by: NURSE PRACTITIONER

## 2020-03-18 PROCEDURE — 25010000002 TRASTUZUMAB-ANNS 420 MG RECONSTITUTED SOLUTION 1 EACH VIAL: Performed by: NURSE PRACTITIONER

## 2020-03-18 PROCEDURE — 25010000002 CARBOPLATIN PER 50 MG: Performed by: NURSE PRACTITIONER

## 2020-03-18 PROCEDURE — 96367 TX/PROPH/DG ADDL SEQ IV INF: CPT

## 2020-03-18 PROCEDURE — 25010000002 PERTUZUMAB 420 MG/14ML SOLUTION 420 MG VIAL: Performed by: NURSE PRACTITIONER

## 2020-03-18 PROCEDURE — 96417 CHEMO IV INFUS EACH ADDL SEQ: CPT

## 2020-03-18 PROCEDURE — 25010000002 PEGFILGRASTIM 6 MG/0.6ML PREFILLED SYRINGE KIT: Performed by: NURSE PRACTITIONER

## 2020-03-18 PROCEDURE — 36593 DECLOT VASCULAR DEVICE: CPT

## 2020-03-18 PROCEDURE — 25010000002 PALONOSETRON 0.25 MG/5ML SOLUTION PREFILLED SYRINGE: Performed by: NURSE PRACTITIONER

## 2020-03-18 RX ORDER — PALONOSETRON 0.05 MG/ML
0.25 INJECTION, SOLUTION INTRAVENOUS ONCE
Status: COMPLETED | OUTPATIENT
Start: 2020-03-18 | End: 2020-03-18

## 2020-03-18 RX ORDER — INSULIN GLARGINE 100 [IU]/ML
26 INJECTION, SOLUTION SUBCUTANEOUS
COMMUNITY
Start: 2020-03-04

## 2020-03-18 RX ORDER — FAMOTIDINE 10 MG/ML
20 INJECTION, SOLUTION INTRAVENOUS AS NEEDED
Status: CANCELLED | OUTPATIENT
Start: 2020-03-18

## 2020-03-18 RX ORDER — HEPARIN SODIUM (PORCINE) LOCK FLUSH IV SOLN 100 UNIT/ML 100 UNIT/ML
500 SOLUTION INTRAVENOUS AS NEEDED
Status: CANCELLED | OUTPATIENT
Start: 2020-03-18

## 2020-03-18 RX ORDER — PALONOSETRON 0.05 MG/ML
0.25 INJECTION, SOLUTION INTRAVENOUS ONCE
Status: CANCELLED | OUTPATIENT
Start: 2020-03-18

## 2020-03-18 RX ORDER — DIPHENHYDRAMINE HYDROCHLORIDE 50 MG/ML
50 INJECTION INTRAMUSCULAR; INTRAVENOUS AS NEEDED
Status: CANCELLED | OUTPATIENT
Start: 2020-03-18

## 2020-03-18 RX ORDER — HEPARIN SODIUM (PORCINE) LOCK FLUSH IV SOLN 100 UNIT/ML 100 UNIT/ML
500 SOLUTION INTRAVENOUS AS NEEDED
Status: DISCONTINUED | OUTPATIENT
Start: 2020-03-18 | End: 2020-03-19 | Stop reason: HOSPADM

## 2020-03-18 RX ORDER — SODIUM CHLORIDE 9 MG/ML
250 INJECTION, SOLUTION INTRAVENOUS ONCE
Status: CANCELLED | OUTPATIENT
Start: 2020-03-18

## 2020-03-18 RX ORDER — SODIUM CHLORIDE 9 MG/ML
1000 INJECTION, SOLUTION INTRAVENOUS ONCE
Status: CANCELLED | OUTPATIENT
Start: 2020-04-14

## 2020-03-18 RX ADMIN — PERTUZUMAB 378 MG: 30 INJECTION, SOLUTION, CONCENTRATE INTRAVENOUS at 12:00

## 2020-03-18 RX ADMIN — DOCETAXEL 130 MG: 20 INJECTION, SOLUTION, CONCENTRATE INTRAVENOUS at 13:38

## 2020-03-18 RX ADMIN — CARBOPLATIN 900 MG: 10 INJECTION, SOLUTION INTRAVENOUS at 14:43

## 2020-03-18 RX ADMIN — ALTEPLASE: 2.2 INJECTION, POWDER, LYOPHILIZED, FOR SOLUTION INTRAVENOUS at 10:48

## 2020-03-18 RX ADMIN — PEGFILGRASTIM 6 MG: KIT SUBCUTANEOUS at 15:15

## 2020-03-18 RX ADMIN — TRASTUZUMAB-ANNS 510 MG: 420 INJECTION, POWDER, LYOPHILIZED, FOR SOLUTION INTRAVENOUS at 13:03

## 2020-03-18 RX ADMIN — HEPARIN 500 UNITS: 100 SYRINGE at 14:45

## 2020-03-18 RX ADMIN — SODIUM CHLORIDE 150 MG: 9 INJECTION, SOLUTION INTRAVENOUS at 11:32

## 2020-03-18 RX ADMIN — PALONOSETRON 0.25 MG: 0.25 INJECTION, SOLUTION INTRAVENOUS at 11:35

## 2020-03-18 RX ADMIN — DEXAMETHASONE SODIUM PHOSPHATE 12 MG: 4 INJECTION, SOLUTION INTRAMUSCULAR; INTRAVENOUS at 11:32

## 2020-03-18 NOTE — PROGRESS NOTES
Josefina Rodriguez  [id number]  1975      Reason for visit: Ovarian cyst, personal history of ovarian cancer, family history of ovarian cancer    Consultation:  Patient is being seen at the request of Dr. Jolene Connell    History of present illness:  The patient is a 45 y.o. year old female who presents today for treatment and evaluation of the above issues. Patient has invasive ductal carcinoma of the right breast, currently on neoadjuvant chemotherapy with Taxotere and Perjeta.  She is currently on Lupron.  Patient has a history of PCOS.  Ultrasound on 2/17/2020 showed a 6 x 5 x 6 cm right ovary and a 2 x 3 cm left ovarian cyst.    Patient reports history of cysts for many decades.  She had several ultrasounds demonstrating cysts.  She only has occasional discomfort related to them.  Her most recent ultrasound was in 2010 with Dr. Macias.  Otherwise no complaints.  She does have nausea, occasional vomiting, and diarrhea associated with chemo treatment for breast cancer.  She otherwise feels well.    OBGYN History:  She is a G 2 P 1.  She does not use HRT. She does have a history of abnormal pap smears.  She had abnormal Pap smears prior to having children.  She had one cervical biopsy that was normal.  She has not had any other cervical procedures.  Most recent Pap was in 2019 with Dr. Higuera and was normal.    Oncologic History:     Malignant neoplasm of overlapping sites of right breast in female, estrogen receptor positive (CMS/HCC)    1/21/2020 Initial Diagnosis     Malignant neoplasm of overlapping sites of right breast in female, estrogen receptor positive (CMS/HCC)      1/21/2020 Cancer Staged     Staging form: Breast, AJCC 8th Edition  - Clinical: Stage IB (cT2, cN0, cM0, G2, ER+, MT+, HER2+) - Signed by Jolene Connell MD on 1/21/2020 2/5/2020 -  Chemotherapy     OP SUPPORTIVE LEUPROLIDE 11.25 MG Q3M      2/5/2020 -  Chemotherapy     OP CENTRAL VENOUS ACCESS DEVICE ACCESS, CARE, AND MAINTENANCE  (CVAD)      2/5/2020 -  Chemotherapy     OP BREAST TCH-P DOCEtaxel / CARBOplatin AUC=6 / Trastuzumab-anns / Pertuzumab      2/11/2020 -  Chemotherapy     OP SUPPORTIVE HYDRATION + ANTIEMETICS           Past Medical History:   Diagnosis Date   • Anxiety    • Depression    • Diabetes mellitus (CMS/HCC)    • Hiatal hernia    • Hypertension    • Malignant neoplasm of overlapping sites of right breast in female, estrogen receptor positive (CMS/HCC) 1/21/2020   • PCOS (polycystic ovarian syndrome)        Past Surgical History:   Procedure Laterality Date   • BREAST BIOPSY Right 03/2019    axillary lymph node bx/fna   • CHOLECYSTECTOMY  03/2010   • COLONOSCOPY  2016   • US GUIDED FINE NEEDLE ASPIRATION  1/15/2020   • VENOUS ACCESS DEVICE (PORT) INSERTION Left 01/31/2020       MEDICATIONS: The current medication list was reviewed with the patient and updated in the EMR this date per the Medical Assistant. Medication dosages and frequencies were confirmed to be accurate.      Allergies:  is allergic to cashew nut; naproxen; penicillins; and pistachio nut.    Social History:   Social History     Socioeconomic History   • Marital status:      Spouse name: Not on file   • Number of children: Not on file   • Years of education: Not on file   • Highest education level: Not on file   Tobacco Use   • Smoking status: Current Every Day Smoker     Packs/day: 1.00     Years: 20.00     Pack years: 20.00     Types: Cigarettes   • Smokeless tobacco: Never Used   Substance and Sexual Activity   • Alcohol use: Yes     Comment: occasionally a couple times a month   • Drug use: Never   • Sexual activity: Defer       Family History:    Family History   Problem Relation Age of Onset   • Breast cancer Mother 64   • Heart disease Mother    • Hypertension Father    • Lung cancer Father    • Ovarian cancer Neg Hx        Health Maintenance:    Health Maintenance   Topic Date Due   • URINE MICROALBUMIN  1975   • ANNUAL PHYSICAL   03/04/1980   • TDAP/TD VACCINES (1 - Tdap) 03/04/1988   • PNEUMOCOCCAL VACCINE (19-64 MEDIUM RISK) (1 of 1 - PPSV23) 03/04/1996   • INFLUENZA VACCINE  08/01/2019   • HEPATITIS C SCREENING  01/20/2020   • DIABETIC FOOT EXAM  01/20/2020   • PAP SMEAR  01/20/2020   • HEMOGLOBIN A1C  01/20/2020   • DIABETIC EYE EXAM  01/20/2020       Review of Systems   Constitutional: Positive for fatigue. Negative for appetite change, chills, fever and unexpected weight change.   HENT: Negative for congestion, ear discharge, ear pain, hearing loss, mouth sores, nosebleeds, sinus pressure, sinus pain, sore throat, tinnitus, trouble swallowing and voice change.    Eyes: Negative for redness, itching and visual disturbance.   Respiratory: Negative for cough, shortness of breath and wheezing.    Cardiovascular: Negative for chest pain, palpitations and leg swelling.   Gastrointestinal: Positive for diarrhea and nausea. Negative for abdominal distention, abdominal pain, blood in stool, constipation and vomiting.   Endocrine: Negative.  Negative for cold intolerance and heat intolerance.   Genitourinary: Negative for difficulty urinating, dyspareunia, dysuria, enuresis, flank pain, frequency, genital sores, hematuria, pelvic pain, urgency, vaginal bleeding, vaginal discharge and vaginal pain.   Musculoskeletal: Negative for arthralgias, back pain, gait problem, joint swelling and myalgias.   Skin: Positive for rash (Hidradenitis). Negative for color change and wound.   Allergic/Immunologic: Positive for food allergies. Negative for immunocompromised state.   Neurological: Negative for dizziness, seizures, syncope, weakness, light-headedness, numbness and headaches.   Hematological: Negative for adenopathy. Does not bruise/bleed easily.   Psychiatric/Behavioral: Positive for dysphoric mood. Negative for confusion and sleep disturbance. The patient is nervous/anxious.        Physical Exam    Vitals:    03/18/20 0921   BP: 152/98   Pulse: 99  "  Resp: 16   Temp: 97.5 °F (36.4 °C)   TempSrc: Temporal   SpO2: 98%   Weight: 84.1 kg (185 lb 4.8 oz)   Height: 167.6 cm (65.98\")   PainSc: 0-No pain     PHQ-9 Total Score:    Body mass index is 29.92 kg/m².    Wt Readings from Last 3 Encounters:   03/18/20 84.1 kg (185 lb 4.8 oz)   03/18/20 84.4 kg (186 lb)   03/03/20 83 kg (183 lb)         GENERAL: Alert, well-appearing female appearing her stated age who is in no apparent distress.   HEENT: Sclera anicteric. Head normocephalic, atraumatic. Mucus membranes moist.   NECK: Trachea midline, supple, without masses.  No thyromegaly.   BREASTS: Deferred  CARDIOVASCULAR: Normal rate, regular rhythm, no murmurs, rubs, or gallops.  No peripheral edema.  RESPIRATORY: Clear to auscultation bilaterally, normal respiratory effort  BACK:  No CVA tenderness, no vertebral tenderness on palpation  GASTROINTESTINAL:  Abdomen is soft, non-tender, non-distended, no rebound or guarding, no masses, or hernias. No HSM.  Hidradenitis.  SKIN:  Warm, dry, well-perfused.  All visible areas intact.  No rashes, lesions, ulcers.  PSYCHIATRIC: AO x3, with appropriate affect, normal thought processes.  NEUROLOGIC: No focal deficits.  Moves extremities well.  MUSCULOSKELETAL: Normal gait and station.   EXTREMITIES:   No cyanosis, clubbing, symmetric.  LYMPHATICS:  No cervical or inguinal adenopathy noted.     PELVIC exam:    External genitalia are remarkable for ready lesions consistent with hidradenitis supra T5.  On speculum examination, the cervix was free from lesion. On bimanual examination no mass was appreciated.  Uterus was normal in size and shape. There is no cervical motion or uterine tenderness. No cervical mass was palpated. Parametria were smooth. Rectovaginal exam was deferred.     ECOG PS 0    PROCEDURES: Ultrasound: Stable from prior.  See report    Diagnostic Data:    Dr. Sands personally reviewed the ultrasound images.  Simple cyst at right ovary.  Left ovary has more " normal appearing ovarian tissue in comparison.  No free fluid.  Normal endometrial stripe.  No significant uterine abnormalities.    Us Non-ob Transvaginal    Result Date: 2/19/2020  1. The right ovary measures 6 x 5 x 6 cm. There is a hypoechoic mass occupying nearly the entirety of that ovary. The mass appears somewhat more cystic than suggested on the CT scan of 01/30/2020. 2. There is a smaller left ovarian cystic mass measuring 2.4 x 2.7 cm. 3. The uterus is of normal size.  There are small uterine leiomyomas the largest which measures 1.5 cm.   D:  02/19/2020 E:  02/19/2020  This report was finalized on 2/19/2020 11:14 AM by Dr. Rajesh Carcamo MD.      Ct Abdomen Pelvis With Contrast    Result Date: 1/31/2020  1. Right breast mass with marker clip present within it. There are small right axillary nodes. One of these nodes measures 1.2 cm and demonstrates peripheral ringlike enhancement. This is viewed with some suspicion but could simply be related to recent lymph node biopsy. Attention to this node on follow-up is recommended. 2. No pulmonary metastasis. 3. No evidence of metastatic disease in the abdomen 4. The right is abnormal. It is enlarged. It appears masslike with a small cystic area present. A right ovarian mass either primary or metastatic cannot be excluded. Suggest further characterization with pelvic ultrasound. 5. There is a single small 1.3 cm slightly sclerotic lesion in the medial left ilium which is indeterminate. No definite abnormal bone scan activity is seen on the bone scan of 1/30/2020. Continued attention on follow-up is recommended. Signer Name: Jayla Churchill MD  Signed: 1/31/2020 12:37 PM  Workstation Name: [email or web address]  [facility]    Lab Results   Component Value Date    WBC 12.70 (H) 02/26/2020    HGB 12.8 02/26/2020    HCT 38.2 02/26/2020    MCV 91.8 02/26/2020     02/26/2020    NEUTROABS 9.50 (H) 02/26/2020    GLUCOSE 175 (H) 02/26/2020    BUN 8  02/26/2020    CREATININE 0.40 02/26/2020    EGFRIFNONA >150 02/26/2020     02/26/2020    K 4.4 02/26/2020     02/26/2020    CO2 28.0 02/26/2020    CALCIUM 9.0 02/26/2020    ALBUMIN 3.70 02/26/2020    AST 29 02/26/2020    ALT 41 (H) 02/26/2020    BILITOT 0.2 02/26/2020     No results found for:         Assessment/Plan   This is a 45 y.o. woman invasive ductal carcinoma of the right breast, currently on neoadjuvant chemotherapy with Taxotere and Perjeta, with ovarian cysts.    Encounter Diagnoses   Name Primary?   • Cysts of both ovaries Yes   • Malignant neoplasm of overlapping sites of right breast in female, estrogen receptor positive (CMS/HCC)    • Family history of breast cancer      Ovarian cyst, PCOS  Ultrasound 2/2020 showed 5 x 6 x 5 cm right ovarian cyst and 2.3 cm left ovarian cyst.  Repeat ultrasound today stable, cysts appear benign, not complex.  Plan for repeat ultrasound in 6 months without office visit.  Get records from Dr. Guan.  Check  today in order to obtain baseline.    Breast cancer  Followed by medical oncology  Currently undergoing neoadjuvant chemotherapy with Taxotere and Perjeta    Family history of breast cancer  Status post genetic counseling/testing.  Results pending.    Pain assessment was performed today as a part of patient’s care.  For patients with pain related to surgery, gynecologic malignancy or cancer treatment, the plan is as noted in the assessment/plan.  For patients with pain not related to these issues, they are to seek any further needed care from a more appropriate provider, such as PCP.  Depression screening was performed today as a part of patient's care.  For depression scores greater than 5 appropriate intervention was performed.    No orders of the defined types were placed in this encounter.    FOLLOW UP: Repeat ultrasound in 6 months.  Will call with results.    Patient was seen and examined with Dr. Sheets,  resident, who performed  portions of the examination and documentation for this patient's care under my direct supervision.  I agree with the above documentation and plan.    Cuca Sands MD  03/18/20  10:22 AM

## 2020-03-18 NOTE — PROGRESS NOTES
"      PROBLEM LIST:  1. T2N0M0 Er+ ND+ Her2+ invasive ductal carcinoma of the right breast  A) presented with a mass on self-exam.  Mammogram 1/7/20 showed a 4.1 cm mass in the right breast, with 2 adjacent smaller masses.  1.9 cm right axillary lymph node.   FNA of lymph node negative.  Biopsy of right breast mass showed grade 2 IDC, Er 95%, ND 2%, Her2 3+.  B) neoadjuvant chemotherapy with TCHP started 2/5/2020  2. PCOS  3. Anxiety/depression  4. DM2  5. GERD  6. Hyperlipidemia  7. Hypertension  8.  Hidradenitis    Subjective     CHIEF COMPLAINT: breast cancer    HISTORY OF PRESENT ILLNESS:   Josefina Rodriguez returns for follow-up.   She has had her second cycle of chemotherapy.  Diarrhea following her last treatment was much improved.  It was controlled with alternating Lomotil and Imodium for 4 days and then she only required it occasionally over the last 2 weeks.  Her nausea is well controlled with Zofran.  She had no vomiting.  The IV fluids following her last treatment did help her feel better.  She reports a decrease in the size of the palpable right breast mass.  She has mild fatigue.  She is practicing social distancing and good handwashing techniques.  She denies any fevers, chills or cough.      Past Medical History, Past Surgical History, Social History, Family History have been reviewed and are without significant changes except as mentioned.    Review of Systems   A comprehensive 14 point review of systems was performed and was negative except as mentioned.    Medications:  The current medication list was reviewed in the EMR    ALLERGIES:    Allergies   Allergen Reactions   • Cashew Nut Hives and Itching   • Naproxen Hives   • Penicillins Hives   • Pistachio Nut Hives and Itching       Objective      /98   Pulse 99   Temp 97.5 °F (36.4 °C) (Temporal)   Resp 16   Ht 167.6 cm (65.98\")   Wt 84.4 kg (186 lb)   SpO2 98%   BMI 30.04 kg/m²    Vitals:    03/18/20 0847   PainSc: 0-No pain "               Performance Status: 0    General: well appearing female in no acute distress  Neuro: alert and oriented  HEENT: sclera anicteric, oropharynx clear  Lymphatics: no cervical, supraclavicular, or axillary adenopathy  Cardiovascular: regular rate and rhythm, no murmurs  Breast: In the right breast at 9:00 there is a palpable mass approximately 2 cm, movable, reduced compared to prior exam.  Lungs: clear to auscultation bilaterally  Abdomen: soft, nontender, nondistended.  No palpable organomegaly  Extremeties: no lower extremity edema  Skin: no rashes, lesions, bruising, or petechiae  Psych: mood and affect appropriate      RECENT LABS:  Lab Results   Component Value Date    WBC 12.70 (H) 02/26/2020    HGB 12.8 02/26/2020    HCT 38.2 02/26/2020    MCV 91.8 02/26/2020     02/26/2020     Lab Results   Component Value Date    GLUCOSE 175 (H) 02/26/2020    BUN 8 02/26/2020    CREATININE 0.40 02/26/2020    EGFRIFNONA >150 02/26/2020    BCR 18.2 02/26/2020    K 4.4 02/26/2020    CO2 28.0 02/26/2020    CALCIUM 9.0 02/26/2020    ALBUMIN 3.70 02/26/2020    AST 29 02/26/2020    ALT 41 (H) 02/26/2020             Assessment/Plan   Josefina Rodriguez is a 45 y.o. year old female with stage IB ER+ Her2+ IDC of the right breast, here for follow-up.    Breast cancer: She has had improvement in her palpable mass after 2 cycles of chemotherapy.  Her side effects were much better managed with the reduction of Taxotere and Perjeta by 10% with last cycle.  We will continue this for today's treatment and future cycles.  She can continue to use Lomotil and Imodium as needed.  She will receive IV fluids in 1 week after each treatment.      Vaginal bleeding: no further vaginal bleeding last 3 weeks. Likely related to Lupron.     Ovarian cyst: She has a history of PCOS.  She is scheduled for follow-up ultrasound and to see Dr. Cortes today.    Diabetes: Some increase in blood sugars, likely related to chemotherapy  and steroids.  We will continue to monitor.    Follow-up in 3 weeks.              I spent 25 minutes with the patient. I spent > 50% percent of this time counseling and discussing prognosis, diagnostic testing, evaluation, current status and management.        Aimee Johnson APRN  [facility]    3/18/2020          CC:

## 2020-03-19 ENCOUNTER — DOCUMENTATION (OUTPATIENT)
Dept: GENETICS | Facility: HOSPITAL | Age: 45
End: 2020-03-19

## 2020-03-19 LAB — CREAT BLDA-MCNC: 0.3 MG/DL (ref 0.6–1.3)

## 2020-03-19 NOTE — PROGRESS NOTES
Josefina Rodriguez is a 44-year-old female who was seen for genetic counseling due to a personal history of breast cancer.  Ms. Rodriguez was recently diagnosed with an invasive breast cancer at age 44.   Ms. Rodriguez retains her uterus and ovaries.   Ms. Rodriguez was interested in discussing her risk for a hereditary cancer syndrome, and decided to pursue genetic testing.   Ms. Rodriguez opted to pursue comprehensive testing via the CancerNext panel ordered through [employer] which includes BRCA1/2 and 32 additional genes associated with an increased risk of breast cancer or other cancers (APC, HAMLET, BARD1, BMPR1A, BRCA1, BRCA2, BRIP1, CDH1, CDK4, CDKN2A, CHEK2, EPCAM, GREM1, MLH1, MRE11A, MSH2, MSH6, MUTYH, NBN, NF1, PALB2, PMS2, POLD1, POLE, PTEN, RAD50, RAD51C, RAD51D, SMAD4, SMARCA4, STK11, and TP53). Genetic testing was negative by DNA sequencing and rearrangement testing for deleterious mutations in the BRCA1/2 genes and 32 additional genes included on the CancerNext Panel (see attached results). These normal results were discussed with Ms. Rodriguez by telephone on 3/19/20.       PERTINENT FAMILY HISTORY:  Mother: Breast cancer, 64    Metastatic recurrence, 76    RISK ASSESSMENT:  Ms. Rodriguez’s personal history of early onset breast cancer raises the question of a hereditary cancer syndrome.   NCCN guidelines recommend BRCA1/2 testing for individuals diagnosed with breast cancer under age 50.  Therefore, testing is appropriate for Ms. Rodriguez.  We discussed that standard approach to BRCA1/2 testing is via a multigene panel that evaluates BRCA1/2 and multiple other genes known to impact cancer risk.  This risk assessment is based on the family history information provided at the time of the appointment.  The assessment could change in the future should new information be obtained.     GENETIC COUNSELING: We reviewed the family history information in detail.  Cases of breast cancer follow three general  patterns: sporadic, familial, and hereditary.  While most cancer is sporadic, some cases appear to occur in family clusters.  These cases are said to be familial and account for 10-20% of breast cancer cases.  Familial cases may be due to a combination of shared genes and environmental factors among family members.  In even fewer families, the cancer is said to be inherited, and the genes responsible for the cancer are known.       Family histories typical of hereditary cancer syndromes usually include multiple first- and second-degree relatives diagnosed with cancer types that define a syndrome.  These cases tend to be diagnosed at younger-than-expected ages and can be bilateral or multifocal.  The cancer in these families follows an autosomal dominant inheritance pattern, which indicates the likely presence of a mutation in a cancer susceptibility gene.  Children and siblings of an individual believed to carry this mutation have a 50% chance of inheriting that mutation, thereby inheriting the increased risk to develop cancer.  These mutations can be passed down from the maternal or the paternal lineage.     Hereditary breast cancer accounts for 5-10% of all cases of breast cancer.  A significant proportion of hereditary breast cancer can be attributed to mutations in the BRCA1 and BRCA2 genes.  Mutations in these genes confer an increased risk for breast cancer, ovarian cancer, male breast cancer, prostate cancer and pancreatic cancer.  There are other genes that are known to be associated with an increased risk for breast cancer and other cancers. Based on Ms. Rodriguez’s desire to get as much information as possible regarding her personal risks and potential risks for her family, she opted to pursue testing through a panel that would look at several other genes known to increase the risk for breast cancer.     GENETIC TESTING:  The risks, benefits and limitations of genetic testing and implications for clinical  management following testing were reviewed.  DNA test results can influence decisions regarding screening, prevention and surgical management.  Genetic testing can have significant psychological implications for both individuals and families.  Also discussed was the possibility of insurance discrimination based on genetic test results and the laws (ANDRY) in place to prevent this.     We discussed panel testing, which would involve testing for BRCA1/2 as well as several other cancer susceptibility genes at the same time.  The benefits and limitations of genetic testing were discussed and Ms. Rodriguez decided to pursue testing. The implications of a positive or negative test result were discussed. We discussed the possibility that, in some cases, genetic test results may be uninformative due to the identification of a genetic variant. These variants may or may not be associated with an increased cancer risk.  Given her personal history, a negative test result would not eliminate all breast cancer risk to her relatives, although the risk would not be as high as it would with positive genetic testing.       TEST RESULTS:  Genetic testing was negative by sequencing and deletion/duplication testing for mutations in BRCA1/2 and the additional 32 genes on the CancerNext panel.   RNA analysis was not completed due to sample quality, but this does not impact the DNA result. The negative result greatly lowers the risk of a hereditary cancer syndrome.  Ms. Rodriguez’s female relatives may still be considered to have a somewhat increased risk for breast cancer based on the family history.  This assessment is based on the information provided at the time of the consultation.    Cancer Prevention:  Despite the negative genetic test results, Ms. Rodriguez’s female relatives may have a somewhat increased lifetime risk for breast cancer based on family history.  Female relatives could have a risk assessment performed using a family  history-based model, such as the Tyrer-Cuzick model, to determine their individual risks.   Given the increased risk, options available to individuals with an elevated lifetime risk for breast cancer were briefly discussed.   Surveillance for individuals with an elevated lifetime risk of breast cancer (>20%, versus the average risk of 12%), based on NCCN guidelines, would consist of semi-annual clinical breast exams and monthly self-breast exams starting by age 18 and annual mammography starting 10 years younger than the earliest diagnosis in a close relative, or by age 40.  According to an American Cancer Society expert panel and NCCN guidelines, annual breast MRI should be offered to women whose lifetime risk of breast cancer is 20-25 percent or more.      PLAN:  Genetic counseling remains available for Ms. Rodriguez.  If Ms. Rodriguez has any questions or concerns she is welcome to call us at [phone].       Lacey Childers MS, [facility] [place] [facility]  Licensed Certified Genetic Counselor    Cc: MD Rachel Randle MD Amanda Foxx, MD Amber Baldridge

## 2020-03-24 ENCOUNTER — HOSPITAL ENCOUNTER (OUTPATIENT)
Dept: ONCOLOGY | Facility: HOSPITAL | Age: 45
Setting detail: INFUSION SERIES
Discharge: HOME | End: 2020-03-24

## 2020-03-24 ENCOUNTER — TELEPHONE (OUTPATIENT)
Dept: GYNECOLOGIC ONCOLOGY | Facility: CLINIC | Age: 45
End: 2020-03-24

## 2020-03-24 VITALS
WEIGHT: 184 LBS | HEIGHT: 66 IN | BODY MASS INDEX: 29.57 KG/M2 | TEMPERATURE: 98 F | SYSTOLIC BLOOD PRESSURE: 132 MMHG | DIASTOLIC BLOOD PRESSURE: 90 MMHG | HEART RATE: 132 BPM | RESPIRATION RATE: 18 BRPM

## 2020-03-24 DIAGNOSIS — C50.811 MALIGNANT NEOPLASM OF OVERLAPPING SITES OF RIGHT BREAST IN FEMALE, ESTROGEN RECEPTOR POSITIVE (HCC): Primary | ICD-10-CM

## 2020-03-24 DIAGNOSIS — Z17.0 MALIGNANT NEOPLASM OF OVERLAPPING SITES OF RIGHT BREAST IN FEMALE, ESTROGEN RECEPTOR POSITIVE (HCC): Primary | ICD-10-CM

## 2020-03-24 PROCEDURE — 96360 HYDRATION IV INFUSION INIT: CPT

## 2020-03-24 PROCEDURE — 25010000003 HEPARIN LOCK FLUCH PER 10 UNITS: Performed by: INTERNAL MEDICINE

## 2020-03-24 RX ORDER — HEPARIN SODIUM (PORCINE) LOCK FLUSH IV SOLN 100 UNIT/ML 100 UNIT/ML
500 SOLUTION INTRAVENOUS AS NEEDED
Status: CANCELLED | OUTPATIENT
Start: 2020-03-24

## 2020-03-24 RX ORDER — HEPARIN SODIUM (PORCINE) LOCK FLUSH IV SOLN 100 UNIT/ML 100 UNIT/ML
500 SOLUTION INTRAVENOUS AS NEEDED
Status: DISCONTINUED | OUTPATIENT
Start: 2020-03-24 | End: 2020-03-25 | Stop reason: HOSPADM

## 2020-03-24 RX ORDER — SODIUM CHLORIDE 9 MG/ML
1000 INJECTION, SOLUTION INTRAVENOUS ONCE
Status: COMPLETED | OUTPATIENT
Start: 2020-03-24 | End: 2020-03-24

## 2020-03-24 RX ADMIN — SODIUM CHLORIDE 1000 ML: 9 INJECTION, SOLUTION INTRAVENOUS at 13:54

## 2020-03-24 RX ADMIN — HEPARIN 500 UNITS: 100 SYRINGE at 15:00

## 2020-03-24 NOTE — TELEPHONE ENCOUNTER
----- Message from Cuca Sands MD sent at 3/20/2020  1:17 PM EDT -----  Please notify patient that I received fax from Dr. Guan office regarding her ultrasound from 2010.  The cysts are different now than they were in the past.  We will continue with plan of repeat ultrasound plus minus Ca125 testing in 6 months time.  Please inform her that Ca125 was in normal range if not already done.  Thank you

## 2020-03-24 NOTE — TELEPHONE ENCOUNTER
I called patient and let her know what Dr. Sands recommended and that her lab was normal. She v/u.

## 2020-04-08 ENCOUNTER — HOSPITAL ENCOUNTER (OUTPATIENT)
Dept: ONCOLOGY | Facility: HOSPITAL | Age: 45
Setting detail: INFUSION SERIES
Discharge: HOME | End: 2020-04-08

## 2020-04-08 ENCOUNTER — OFFICE VISIT (OUTPATIENT)
Dept: ONCOLOGY | Facility: CLINIC | Age: 45
End: 2020-04-08

## 2020-04-08 VITALS
DIASTOLIC BLOOD PRESSURE: 97 MMHG | RESPIRATION RATE: 18 BRPM | HEART RATE: 93 BPM | OXYGEN SATURATION: 98 % | BODY MASS INDEX: 30.53 KG/M2 | HEIGHT: 66 IN | SYSTOLIC BLOOD PRESSURE: 164 MMHG | TEMPERATURE: 97.6 F | WEIGHT: 190 LBS

## 2020-04-08 DIAGNOSIS — C50.811 MALIGNANT NEOPLASM OF OVERLAPPING SITES OF RIGHT BREAST IN FEMALE, ESTROGEN RECEPTOR POSITIVE (HCC): ICD-10-CM

## 2020-04-08 DIAGNOSIS — C50.811 MALIGNANT NEOPLASM OF OVERLAPPING SITES OF RIGHT BREAST IN FEMALE, ESTROGEN RECEPTOR POSITIVE (HCC): Primary | ICD-10-CM

## 2020-04-08 DIAGNOSIS — Z17.0 MALIGNANT NEOPLASM OF OVERLAPPING SITES OF RIGHT BREAST IN FEMALE, ESTROGEN RECEPTOR POSITIVE (HCC): ICD-10-CM

## 2020-04-08 DIAGNOSIS — Z17.0 MALIGNANT NEOPLASM OF OVERLAPPING SITES OF RIGHT BREAST IN FEMALE, ESTROGEN RECEPTOR POSITIVE (HCC): Primary | ICD-10-CM

## 2020-04-08 LAB
ALBUMIN SERPL-MCNC: 3.6 G/DL (ref 3.5–5.2)
ALBUMIN/GLOB SERPL: 1.1 G/DL
ALP SERPL-CCNC: 119 U/L (ref 39–117)
ALT SERPL W P-5'-P-CCNC: 32 U/L (ref 1–33)
ANION GAP SERPL CALCULATED.3IONS-SCNC: 12 MMOL/L (ref 5–15)
AST SERPL-CCNC: 29 U/L (ref 1–32)
BILIRUB SERPL-MCNC: 0.2 MG/DL (ref 0.2–1.2)
BUN BLD-MCNC: 12 MG/DL (ref 6–20)
BUN/CREAT SERPL: 24 (ref 7–25)
CALCIUM SPEC-SCNC: 9.3 MG/DL (ref 8.6–10.5)
CHLORIDE SERPL-SCNC: 100 MMOL/L (ref 98–107)
CO2 SERPL-SCNC: 26 MMOL/L (ref 22–29)
CREAT BLD-MCNC: 0.5 MG/DL (ref 0.57–1)
CREAT BLDA-MCNC: 0.4 MG/DL
CREAT BLDA-MCNC: 0.4 MG/DL (ref 0.6–1.3)
ERYTHROCYTE [DISTWIDTH] IN BLOOD BY AUTOMATED COUNT: 16 % (ref 12.3–15.4)
GFR SERPL CREATININE-BSD FRML MDRD: 135 ML/MIN/1.73
GLOBULIN UR ELPH-MCNC: 3.3 GM/DL
GLUCOSE BLD-MCNC: 327 MG/DL (ref 65–99)
HCT VFR BLD AUTO: 38.6 % (ref 34–46.6)
HGB BLD-MCNC: 13 G/DL (ref 12–15.9)
LYMPHOCYTES # BLD AUTO: 2.3 10*3/MM3 (ref 0.7–3.1)
LYMPHOCYTES NFR BLD AUTO: 21.9 % (ref 19.6–45.3)
MCH RBC QN AUTO: 31.8 PG (ref 26.6–33)
MCHC RBC AUTO-ENTMCNC: 33.8 G/DL (ref 31.5–35.7)
MCV RBC AUTO: 94.1 FL (ref 79–97)
MONOCYTES # BLD AUTO: 0.6 10*3/MM3 (ref 0.1–0.9)
MONOCYTES NFR BLD AUTO: 5.8 % (ref 5–12)
NEUTROPHILS # BLD AUTO: 7.5 10*3/MM3 (ref 1.7–7)
NEUTROPHILS NFR BLD AUTO: 72.3 % (ref 42.7–76)
PLATELET # BLD AUTO: 210 10*3/MM3 (ref 140–450)
PMV BLD AUTO: 7.4 FL (ref 6–12)
POTASSIUM BLD-SCNC: 3.9 MMOL/L (ref 3.5–5.2)
PROT SERPL-MCNC: 6.9 G/DL (ref 6–8.5)
RBC # BLD AUTO: 4.1 10*6/MM3 (ref 3.77–5.28)
SODIUM BLD-SCNC: 138 MMOL/L (ref 136–145)
WBC NRBC COR # BLD: 10.4 10*3/MM3 (ref 3.4–10.8)

## 2020-04-08 PROCEDURE — 85025 COMPLETE CBC W/AUTO DIFF WBC: CPT | Performed by: INTERNAL MEDICINE

## 2020-04-08 PROCEDURE — 96417 CHEMO IV INFUS EACH ADDL SEQ: CPT

## 2020-04-08 PROCEDURE — 25010000002 CARBOPLATIN PER 50 MG: Performed by: INTERNAL MEDICINE

## 2020-04-08 PROCEDURE — 96367 TX/PROPH/DG ADDL SEQ IV INF: CPT

## 2020-04-08 PROCEDURE — 80053 COMPREHEN METABOLIC PANEL: CPT | Performed by: INTERNAL MEDICINE

## 2020-04-08 PROCEDURE — 25010000002 FOSAPREPITANT PER 1 MG: Performed by: INTERNAL MEDICINE

## 2020-04-08 PROCEDURE — 96413 CHEMO IV INFUSION 1 HR: CPT

## 2020-04-08 PROCEDURE — 36593 DECLOT VASCULAR DEVICE: CPT

## 2020-04-08 PROCEDURE — 25010000002 DOCETAXEL 20 MG/ML SOLUTION 8 ML VIAL: Performed by: INTERNAL MEDICINE

## 2020-04-08 PROCEDURE — 96377 APPLICATON ON-BODY INJECTOR: CPT

## 2020-04-08 PROCEDURE — 25010000003 HEPARIN LOCK FLUCH PER 10 UNITS: Performed by: INTERNAL MEDICINE

## 2020-04-08 PROCEDURE — 25010000002 TRASTUZUMAB-ANNS 420 MG RECONSTITUTED SOLUTION 1 EACH VIAL: Performed by: INTERNAL MEDICINE

## 2020-04-08 PROCEDURE — 82565 ASSAY OF CREATININE: CPT

## 2020-04-08 PROCEDURE — 25010000002 PERTUZUMAB 420 MG/14ML SOLUTION 420 MG VIAL: Performed by: INTERNAL MEDICINE

## 2020-04-08 PROCEDURE — 25010000002 DEXAMETHASONE PER 1 MG: Performed by: INTERNAL MEDICINE

## 2020-04-08 PROCEDURE — 25010000002 ALTEPLASE 2 MG RECONSTITUTED SOLUTION: Performed by: INTERNAL MEDICINE

## 2020-04-08 PROCEDURE — 96375 TX/PRO/DX INJ NEW DRUG ADDON: CPT

## 2020-04-08 PROCEDURE — 25010000002 PEGFILGRASTIM 6 MG/0.6ML PREFILLED SYRINGE KIT: Performed by: INTERNAL MEDICINE

## 2020-04-08 PROCEDURE — 99214 OFFICE O/P EST MOD 30 MIN: CPT | Performed by: INTERNAL MEDICINE

## 2020-04-08 PROCEDURE — 25010000002 PALONOSETRON 0.25 MG/5ML SOLUTION PREFILLED SYRINGE: Performed by: INTERNAL MEDICINE

## 2020-04-08 RX ORDER — PALONOSETRON 0.05 MG/ML
0.25 INJECTION, SOLUTION INTRAVENOUS ONCE
Status: COMPLETED | OUTPATIENT
Start: 2020-04-08 | End: 2020-04-08

## 2020-04-08 RX ORDER — FAMOTIDINE 10 MG/ML
20 INJECTION, SOLUTION INTRAVENOUS AS NEEDED
Status: DISCONTINUED | OUTPATIENT
Start: 2020-04-08 | End: 2020-04-09 | Stop reason: HOSPADM

## 2020-04-08 RX ORDER — HEPARIN SODIUM (PORCINE) LOCK FLUSH IV SOLN 100 UNIT/ML 100 UNIT/ML
500 SOLUTION INTRAVENOUS AS NEEDED
Status: DISCONTINUED | OUTPATIENT
Start: 2020-04-08 | End: 2020-04-09 | Stop reason: HOSPADM

## 2020-04-08 RX ORDER — FAMOTIDINE 10 MG/ML
20 INJECTION, SOLUTION INTRAVENOUS AS NEEDED
Status: CANCELLED | OUTPATIENT
Start: 2020-04-08

## 2020-04-08 RX ORDER — SODIUM CHLORIDE 9 MG/ML
250 INJECTION, SOLUTION INTRAVENOUS ONCE
Status: CANCELLED | OUTPATIENT
Start: 2020-04-08

## 2020-04-08 RX ORDER — SODIUM CHLORIDE 9 MG/ML
250 INJECTION, SOLUTION INTRAVENOUS ONCE
Status: COMPLETED | OUTPATIENT
Start: 2020-04-08 | End: 2020-04-08

## 2020-04-08 RX ORDER — HEPARIN SODIUM (PORCINE) LOCK FLUSH IV SOLN 100 UNIT/ML 100 UNIT/ML
500 SOLUTION INTRAVENOUS AS NEEDED
Status: CANCELLED | OUTPATIENT
Start: 2020-04-08

## 2020-04-08 RX ORDER — POTASSIUM CHLORIDE 750 MG/1
10 TABLET, EXTENDED RELEASE ORAL DAILY
COMMUNITY
Start: 2020-03-24 | End: 2020-11-06

## 2020-04-08 RX ORDER — DIPHENHYDRAMINE HYDROCHLORIDE 50 MG/ML
50 INJECTION INTRAMUSCULAR; INTRAVENOUS AS NEEDED
Status: CANCELLED | OUTPATIENT
Start: 2020-04-08

## 2020-04-08 RX ORDER — DIPHENHYDRAMINE HYDROCHLORIDE 50 MG/ML
50 INJECTION INTRAMUSCULAR; INTRAVENOUS AS NEEDED
Status: DISCONTINUED | OUTPATIENT
Start: 2020-04-08 | End: 2020-04-09 | Stop reason: HOSPADM

## 2020-04-08 RX ORDER — PALONOSETRON 0.05 MG/ML
0.25 INJECTION, SOLUTION INTRAVENOUS ONCE
Status: CANCELLED | OUTPATIENT
Start: 2020-04-08

## 2020-04-08 RX ADMIN — ALTEPLASE: 2.2 INJECTION, POWDER, LYOPHILIZED, FOR SOLUTION INTRAVENOUS at 09:39

## 2020-04-08 RX ADMIN — SODIUM CHLORIDE 250 ML: 9 INJECTION, SOLUTION INTRAVENOUS at 09:54

## 2020-04-08 RX ADMIN — TRASTUZUMAB-ANNS 510 MG: 420 INJECTION, POWDER, LYOPHILIZED, FOR SOLUTION INTRAVENOUS at 11:50

## 2020-04-08 RX ADMIN — DOCETAXEL 105 MG: 20 INJECTION, SOLUTION, CONCENTRATE INTRAVENOUS at 12:33

## 2020-04-08 RX ADMIN — PALONOSETRON 0.25 MG: 0.25 INJECTION, SOLUTION INTRAVENOUS at 09:56

## 2020-04-08 RX ADMIN — PERTUZUMAB 378 MG: 30 INJECTION, SOLUTION, CONCENTRATE INTRAVENOUS at 10:29

## 2020-04-08 RX ADMIN — CARBOPLATIN 900 MG: 10 INJECTION, SOLUTION INTRAVENOUS at 13:45

## 2020-04-08 RX ADMIN — PEGFILGRASTIM 6 MG: KIT SUBCUTANEOUS at 14:20

## 2020-04-08 RX ADMIN — DEXAMETHASONE SODIUM PHOSPHATE 12 MG: 4 INJECTION, SOLUTION INTRAMUSCULAR; INTRAVENOUS at 09:59

## 2020-04-08 RX ADMIN — HEPARIN 500 UNITS: 100 SYRINGE at 14:24

## 2020-04-08 RX ADMIN — SODIUM CHLORIDE 150 MG: 9 INJECTION, SOLUTION INTRAVENOUS at 09:58

## 2020-04-08 NOTE — PROGRESS NOTES
"      PROBLEM LIST:  1. T2N0M0 ER+ CA+ Her2+ invasive ductal carcinoma of the right breast  A) presented with a mass on self-exam.  Mammogram 1/7/20 showed a 4.1 cm mass in the right breast, with 2 adjacent smaller masses.  1.9 cm right axillary lymph node.   FNA of lymph node negative.  Biopsy of right breast mass showed grade 2 IDC, Er 95%, CA 2%, Her2 3+.  B) neoadjuvant chemotherapy with TCHP started 2/5/2020  2. PCOS  3. Anxiety/depression  4. DM2  5. GERD  6. Hyperlipidemia  7. Hypertension  8.  Hidradenitis    Subjective     CHIEF COMPLAINT: breast cancer    HISTORY OF PRESENT ILLNESS:   Josefina Rodriguez returns for follow-up.   She has started to have some discomfort and tightness in both her hands and feet.  It lasted for about 2 weeks after the treatment but then it improved.  Currently it is not bothering her but at its worst it was keeping her from sleeping at night.  Otherwise she says the diarrhea was much better controlled with this last cycle.  The fluids a week afterwards really helps.  She is no longer able to easily feel the mass in the breast.    Past Medical History, Past Surgical History, Social History, Family History have been reviewed and are without significant changes except as mentioned.    Review of Systems   A comprehensive 14 point review of systems was performed and was negative except as mentioned.    Medications:  The current medication list was reviewed in the EMR    ALLERGIES:    Allergies   Allergen Reactions   • Cashew Nut Hives and Itching   • Naproxen Hives   • Penicillins Hives   • Pistachio Nut Hives and Itching       Objective      /97 Comment: LUE  Pulse 93   Temp 97.6 °F (36.4 °C) (Temporal)   Resp 18   Ht 167.6 cm (66\")   Wt 86.2 kg (190 lb)   SpO2 98% Comment: RA  BMI 30.67 kg/m²         Performance Status: 0    General: well appearing female in no acute distress  Neuro: alert and oriented  HEENT: sclera anicteric, oropharynx clear  Lymphatics: no " cervical, supraclavicular, or axillary adenopathy  Cardiovascular: regular rate and rhythm, no murmurs  Breast: In the right breast at 9:00 there is an indistinct mass, approximately 1 to 1-1/2 cm.  Lungs: clear to auscultation bilaterally  Abdomen: soft, nontender, nondistended.  No palpable organomegaly  Extremeties: no lower extremity edema  Skin: no rashes, lesions, bruising, or petechiae  Psych: mood and affect appropriate      RECENT LABS:  Lab Results   Component Value Date    WBC 13.10 (H) 03/18/2020    HGB 12.6 03/18/2020    HCT 36.2 03/18/2020    MCV 92.9 03/18/2020     03/18/2020     Lab Results   Component Value Date    GLUCOSE 254 (H) 03/18/2020    GLUCOSE 261 (H) 03/18/2020    BUN 10 03/18/2020    BUN 9 03/18/2020    CREATININE 0.30 03/18/2020    EGFRIFNONA >150 03/18/2020    EGFRIFNONA 141 03/18/2020    BCR 22.2 03/18/2020    BCR 18.8 03/18/2020    K 3.4 (L) 03/18/2020    K 3.4 (L) 03/18/2020    CO2 23.0 03/18/2020    CO2 22.1 03/18/2020    CALCIUM 9.0 03/18/2020    CALCIUM 8.8 03/18/2020    ALBUMIN 3.90 03/18/2020    ALBUMIN 3.80 03/18/2020    AST 22 03/18/2020    AST 18 03/18/2020    ALT 35 (H) 03/18/2020    ALT 37 (H) 03/18/2020               Assessment/Plan   Josefina Rodriguez is a 45 y.o. year old female with stage IB ER+ Her2+ IDC of the right breast, here for follow-up.    Breast cancer: She continues to have improvement in the breast mass.  We will continue with cycle 3 of TCH P today.    Diarrhea: Continue Lomotil.  We will again give IV fluids the week after treatment.    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  This has become fairly symptomatic after 3 cycles with 3 more planned.  I will reduce the Taxotere to 75% for this and remaining cycles.    Diabetes: Some increase in blood sugars, likely related to chemotherapy and steroids.  We will continue to monitor.    Follow-up in 3 weeks.              I spent 25 minutes with the patient. I spent > 50% percent of this time  counseling and discussing prognosis, diagnostic testing, evaluation, current status and management.        Jolene Connell MD  [facility]    4/8/2020          CC:

## 2020-04-14 ENCOUNTER — HOSPITAL ENCOUNTER (OUTPATIENT)
Dept: ONCOLOGY | Facility: HOSPITAL | Age: 45
Setting detail: INFUSION SERIES
Discharge: HOME | End: 2020-04-14

## 2020-04-14 VITALS
HEART RATE: 119 BPM | BODY MASS INDEX: 30.22 KG/M2 | SYSTOLIC BLOOD PRESSURE: 138 MMHG | DIASTOLIC BLOOD PRESSURE: 96 MMHG | RESPIRATION RATE: 16 BRPM | HEIGHT: 66 IN | WEIGHT: 188 LBS | TEMPERATURE: 99 F

## 2020-04-14 DIAGNOSIS — C50.811 MALIGNANT NEOPLASM OF OVERLAPPING SITES OF RIGHT BREAST IN FEMALE, ESTROGEN RECEPTOR POSITIVE (HCC): Primary | ICD-10-CM

## 2020-04-14 DIAGNOSIS — Z17.0 MALIGNANT NEOPLASM OF OVERLAPPING SITES OF RIGHT BREAST IN FEMALE, ESTROGEN RECEPTOR POSITIVE (HCC): Primary | ICD-10-CM

## 2020-04-14 PROCEDURE — 96360 HYDRATION IV INFUSION INIT: CPT

## 2020-04-14 PROCEDURE — 25010000002 ALTEPLASE 2 MG RECONSTITUTED SOLUTION: Performed by: INTERNAL MEDICINE

## 2020-04-14 PROCEDURE — 36593 DECLOT VASCULAR DEVICE: CPT

## 2020-04-14 PROCEDURE — 25010000003 HEPARIN LOCK FLUCH PER 10 UNITS: Performed by: INTERNAL MEDICINE

## 2020-04-14 RX ORDER — SODIUM CHLORIDE 9 MG/ML
1000 INJECTION, SOLUTION INTRAVENOUS ONCE
Status: COMPLETED | OUTPATIENT
Start: 2020-04-14 | End: 2020-04-14

## 2020-04-14 RX ORDER — HEPARIN SODIUM (PORCINE) LOCK FLUSH IV SOLN 100 UNIT/ML 100 UNIT/ML
500 SOLUTION INTRAVENOUS AS NEEDED
Status: DISCONTINUED | OUTPATIENT
Start: 2020-04-14 | End: 2020-04-15 | Stop reason: HOSPADM

## 2020-04-14 RX ORDER — HEPARIN SODIUM (PORCINE) LOCK FLUSH IV SOLN 100 UNIT/ML 100 UNIT/ML
500 SOLUTION INTRAVENOUS AS NEEDED
Status: CANCELLED | OUTPATIENT
Start: 2020-04-14

## 2020-04-14 RX ADMIN — ALTEPLASE: 2.2 INJECTION, POWDER, LYOPHILIZED, FOR SOLUTION INTRAVENOUS at 14:04

## 2020-04-14 RX ADMIN — SODIUM CHLORIDE 1000 ML: 9 INJECTION, SOLUTION INTRAVENOUS at 14:46

## 2020-04-14 RX ADMIN — HEPARIN 500 UNITS: 100 SYRINGE at 15:54

## 2020-04-29 ENCOUNTER — OFFICE VISIT (OUTPATIENT)
Dept: ONCOLOGY | Facility: CLINIC | Age: 45
End: 2020-04-29

## 2020-04-29 ENCOUNTER — HOSPITAL ENCOUNTER (OUTPATIENT)
Dept: ONCOLOGY | Facility: HOSPITAL | Age: 45
Setting detail: INFUSION SERIES
Discharge: HOME | End: 2020-04-29

## 2020-04-29 VITALS
TEMPERATURE: 97.3 F | WEIGHT: 192 LBS | OXYGEN SATURATION: 97 % | HEART RATE: 101 BPM | SYSTOLIC BLOOD PRESSURE: 146 MMHG | DIASTOLIC BLOOD PRESSURE: 94 MMHG | RESPIRATION RATE: 18 BRPM | BODY MASS INDEX: 30.86 KG/M2 | HEIGHT: 66 IN

## 2020-04-29 VITALS
HEART RATE: 91 BPM | SYSTOLIC BLOOD PRESSURE: 128 MMHG | DIASTOLIC BLOOD PRESSURE: 72 MMHG | TEMPERATURE: 97.2 F | RESPIRATION RATE: 18 BRPM

## 2020-04-29 DIAGNOSIS — Z17.0 MALIGNANT NEOPLASM OF OVERLAPPING SITES OF RIGHT BREAST IN FEMALE, ESTROGEN RECEPTOR POSITIVE (HCC): Primary | ICD-10-CM

## 2020-04-29 DIAGNOSIS — C50.811 MALIGNANT NEOPLASM OF OVERLAPPING SITES OF RIGHT BREAST IN FEMALE, ESTROGEN RECEPTOR POSITIVE (HCC): Primary | ICD-10-CM

## 2020-04-29 LAB
ALBUMIN SERPL-MCNC: 3.7 G/DL (ref 3.5–5.2)
ALBUMIN/GLOB SERPL: 1 G/DL
ALP SERPL-CCNC: 135 U/L (ref 39–117)
ALT SERPL W P-5'-P-CCNC: 25 U/L (ref 1–33)
ANION GAP SERPL CALCULATED.3IONS-SCNC: 15 MMOL/L (ref 5–15)
AST SERPL-CCNC: 21 U/L (ref 1–32)
BILIRUB SERPL-MCNC: 0.4 MG/DL (ref 0.2–1.2)
BUN BLD-MCNC: 12 MG/DL (ref 6–20)
BUN/CREAT SERPL: 23.1 (ref 7–25)
CALCIUM SPEC-SCNC: 9.6 MG/DL (ref 8.6–10.5)
CHLORIDE SERPL-SCNC: 99 MMOL/L (ref 98–107)
CO2 SERPL-SCNC: 22 MMOL/L (ref 22–29)
CREAT BLD-MCNC: 0.52 MG/DL (ref 0.57–1)
ERYTHROCYTE [DISTWIDTH] IN BLOOD BY AUTOMATED COUNT: 16.2 % (ref 12.3–15.4)
GFR SERPL CREATININE-BSD FRML MDRD: 129 ML/MIN/1.73
GLOBULIN UR ELPH-MCNC: 3.8 GM/DL
GLUCOSE BLD-MCNC: 338 MG/DL (ref 65–99)
GLUCOSE BLDC GLUCOMTR-MCNC: 313 MG/DL (ref 70–130)
HCT VFR BLD AUTO: 41.7 % (ref 34–46.6)
HGB BLD-MCNC: 13.7 G/DL (ref 12–15.9)
LYMPHOCYTES # BLD AUTO: 2.1 10*3/MM3 (ref 0.7–3.1)
LYMPHOCYTES NFR BLD AUTO: 17.2 % (ref 19.6–45.3)
MCH RBC QN AUTO: 31.9 PG (ref 26.6–33)
MCHC RBC AUTO-ENTMCNC: 32.9 G/DL (ref 31.5–35.7)
MCV RBC AUTO: 96.8 FL (ref 79–97)
MONOCYTES # BLD AUTO: 0.4 10*3/MM3 (ref 0.1–0.9)
MONOCYTES NFR BLD AUTO: 3 % (ref 5–12)
NEUTROPHILS # BLD AUTO: 9.7 10*3/MM3 (ref 1.7–7)
NEUTROPHILS NFR BLD AUTO: 79.8 % (ref 42.7–76)
PLATELET # BLD AUTO: 288 10*3/MM3 (ref 140–450)
PMV BLD AUTO: 6.5 FL (ref 6–12)
POTASSIUM BLD-SCNC: 3.8 MMOL/L (ref 3.5–5.2)
PROT SERPL-MCNC: 7.5 G/DL (ref 6–8.5)
RBC # BLD AUTO: 4.31 10*6/MM3 (ref 3.77–5.28)
SODIUM BLD-SCNC: 136 MMOL/L (ref 136–145)
WBC NRBC COR # BLD: 12.1 10*3/MM3 (ref 3.4–10.8)

## 2020-04-29 PROCEDURE — 99213 OFFICE O/P EST LOW 20 MIN: CPT | Performed by: NURSE PRACTITIONER

## 2020-04-29 PROCEDURE — 25010000002 DEXAMETHASONE PER 1 MG: Performed by: NURSE PRACTITIONER

## 2020-04-29 PROCEDURE — 96377 APPLICATON ON-BODY INJECTOR: CPT

## 2020-04-29 PROCEDURE — 96365 THER/PROPH/DIAG IV INF INIT: CPT

## 2020-04-29 PROCEDURE — 25010000002 FOSAPREPITANT PER 1 MG: Performed by: NURSE PRACTITIONER

## 2020-04-29 PROCEDURE — 80053 COMPREHEN METABOLIC PANEL: CPT | Performed by: NURSE PRACTITIONER

## 2020-04-29 PROCEDURE — 25010000002 DOCETAXEL 20 MG/ML SOLUTION 8 ML VIAL: Performed by: NURSE PRACTITIONER

## 2020-04-29 PROCEDURE — 85025 COMPLETE CBC W/AUTO DIFF WBC: CPT | Performed by: NURSE PRACTITIONER

## 2020-04-29 PROCEDURE — 96375 TX/PRO/DX INJ NEW DRUG ADDON: CPT

## 2020-04-29 PROCEDURE — 25010000002 CARBOPLATIN PER 50 MG: Performed by: NURSE PRACTITIONER

## 2020-04-29 PROCEDURE — 25010000002 HYDROCORTISONE SODIUM SUCCINATE 100 MG RECONSTITUTED SOLUTION: Performed by: NURSE PRACTITIONER

## 2020-04-29 PROCEDURE — 96413 CHEMO IV INFUSION 1 HR: CPT

## 2020-04-29 PROCEDURE — 96367 TX/PROPH/DG ADDL SEQ IV INF: CPT

## 2020-04-29 PROCEDURE — 25010000002 PALONOSETRON 0.25 MG/5ML SOLUTION PREFILLED SYRINGE: Performed by: NURSE PRACTITIONER

## 2020-04-29 PROCEDURE — 25010000002 TRASTUZUMAB-ANNS 420 MG RECONSTITUTED SOLUTION 1 EACH VIAL: Performed by: NURSE PRACTITIONER

## 2020-04-29 PROCEDURE — 25010000003 HEPARIN LOCK FLUSH PER 10 UNITS: Performed by: INTERNAL MEDICINE

## 2020-04-29 PROCEDURE — 82962 GLUCOSE BLOOD TEST: CPT

## 2020-04-29 PROCEDURE — 25010000002 LEUPROLIDE ACETATE (3 MONTH) PER 3.75 MG: Performed by: NURSE PRACTITIONER

## 2020-04-29 PROCEDURE — 25010000002 PERTUZUMAB 420 MG/14ML SOLUTION 420 MG VIAL: Performed by: NURSE PRACTITIONER

## 2020-04-29 PROCEDURE — 25010000002 PEGFILGRASTIM 6 MG/0.6ML PREFILLED SYRINGE KIT: Performed by: NURSE PRACTITIONER

## 2020-04-29 PROCEDURE — 96402 CHEMO HORMON ANTINEOPL SQ/IM: CPT

## 2020-04-29 PROCEDURE — 25010000002 DIPHENHYDRAMINE PER 50 MG: Performed by: NURSE PRACTITIONER

## 2020-04-29 PROCEDURE — 82565 ASSAY OF CREATININE: CPT

## 2020-04-29 PROCEDURE — 96417 CHEMO IV INFUS EACH ADDL SEQ: CPT

## 2020-04-29 PROCEDURE — 25010000002 ONDANSETRON PER 1 MG: Performed by: NURSE PRACTITIONER

## 2020-04-29 RX ORDER — PROCHLORPERAZINE MALEATE 10 MG
10 TABLET ORAL EVERY 6 HOURS PRN
Qty: 60 TABLET | Refills: 1 | Status: SHIPPED | OUTPATIENT
Start: 2020-04-29 | End: 2020-07-23 | Stop reason: SDUPTHER

## 2020-04-29 RX ORDER — FAMOTIDINE 10 MG/ML
20 INJECTION, SOLUTION INTRAVENOUS AS NEEDED
Status: COMPLETED | OUTPATIENT
Start: 2020-04-29 | End: 2020-04-29

## 2020-04-29 RX ORDER — DIPHENHYDRAMINE HYDROCHLORIDE 50 MG/ML
50 INJECTION INTRAMUSCULAR; INTRAVENOUS AS NEEDED
Status: CANCELLED | OUTPATIENT
Start: 2020-04-29

## 2020-04-29 RX ORDER — PALONOSETRON 0.05 MG/ML
0.25 INJECTION, SOLUTION INTRAVENOUS ONCE
Status: COMPLETED | OUTPATIENT
Start: 2020-04-29 | End: 2020-04-29

## 2020-04-29 RX ORDER — SODIUM CHLORIDE 9 MG/ML
250 INJECTION, SOLUTION INTRAVENOUS ONCE
Status: COMPLETED | OUTPATIENT
Start: 2020-04-29 | End: 2020-04-29

## 2020-04-29 RX ORDER — PALONOSETRON 0.05 MG/ML
0.25 INJECTION, SOLUTION INTRAVENOUS ONCE
Status: CANCELLED | OUTPATIENT
Start: 2020-04-29

## 2020-04-29 RX ORDER — FAMOTIDINE 10 MG/ML
20 INJECTION, SOLUTION INTRAVENOUS AS NEEDED
Status: CANCELLED | OUTPATIENT
Start: 2020-04-29

## 2020-04-29 RX ORDER — HEPARIN SODIUM (PORCINE) LOCK FLUSH IV SOLN 100 UNIT/ML 100 UNIT/ML
500 SOLUTION INTRAVENOUS AS NEEDED
Status: CANCELLED | OUTPATIENT
Start: 2020-04-29

## 2020-04-29 RX ORDER — SODIUM CHLORIDE 9 MG/ML
1000 INJECTION, SOLUTION INTRAVENOUS ONCE
Status: CANCELLED | OUTPATIENT
Start: 2020-05-27

## 2020-04-29 RX ORDER — HEPARIN SODIUM (PORCINE) LOCK FLUSH IV SOLN 100 UNIT/ML 100 UNIT/ML
500 SOLUTION INTRAVENOUS AS NEEDED
Status: DISCONTINUED | OUTPATIENT
Start: 2020-04-29 | End: 2020-04-30 | Stop reason: HOSPADM

## 2020-04-29 RX ORDER — ONDANSETRON 2 MG/ML
8 INJECTION INTRAMUSCULAR; INTRAVENOUS EVERY 6 HOURS PRN
Status: CANCELLED
Start: 2020-04-29

## 2020-04-29 RX ORDER — DIPHENHYDRAMINE HYDROCHLORIDE 50 MG/ML
50 INJECTION INTRAMUSCULAR; INTRAVENOUS AS NEEDED
Status: COMPLETED | OUTPATIENT
Start: 2020-04-29 | End: 2020-04-29

## 2020-04-29 RX ORDER — SODIUM CHLORIDE 9 MG/ML
1000 INJECTION, SOLUTION INTRAVENOUS ONCE
Status: CANCELLED | OUTPATIENT
Start: 2020-05-05

## 2020-04-29 RX ORDER — SODIUM CHLORIDE 9 MG/ML
250 INJECTION, SOLUTION INTRAVENOUS ONCE
Status: CANCELLED | OUTPATIENT
Start: 2020-04-29

## 2020-04-29 RX ADMIN — DOCETAXEL 105 MG: 20 INJECTION, SOLUTION, CONCENTRATE INTRAVENOUS at 12:46

## 2020-04-29 RX ADMIN — CARBOPLATIN 900 MG: 10 INJECTION, SOLUTION INTRAVENOUS at 13:58

## 2020-04-29 RX ADMIN — PERTUZUMAB 378 MG: 30 INJECTION, SOLUTION, CONCENTRATE INTRAVENOUS at 10:34

## 2020-04-29 RX ADMIN — PEGFILGRASTIM 6 MG: KIT SUBCUTANEOUS at 15:09

## 2020-04-29 RX ADMIN — DIPHENHYDRAMINE HYDROCHLORIDE 50 MG: 50 INJECTION INTRAMUSCULAR; INTRAVENOUS at 14:48

## 2020-04-29 RX ADMIN — SODIUM CHLORIDE 250 ML: 9 INJECTION, SOLUTION INTRAVENOUS at 10:28

## 2020-04-29 RX ADMIN — HEPARIN 500 UNITS: 100 SYRINGE at 16:10

## 2020-04-29 RX ADMIN — LEUPROLIDE ACETATE 11.25 MG: KIT at 10:21

## 2020-04-29 RX ADMIN — PALONOSETRON 0.25 MG: 0.25 INJECTION, SOLUTION INTRAVENOUS at 12:05

## 2020-04-29 RX ADMIN — SODIUM CHLORIDE 150 MG: 9 INJECTION, SOLUTION INTRAVENOUS at 12:11

## 2020-04-29 RX ADMIN — HYDROCORTISONE SODIUM SUCCINATE 100 MG: 100 INJECTION, POWDER, FOR SOLUTION INTRAMUSCULAR; INTRAVENOUS at 14:45

## 2020-04-29 RX ADMIN — TRASTUZUMAB-ANNS 510 MG: 420 INJECTION, POWDER, LYOPHILIZED, FOR SOLUTION INTRAVENOUS at 11:35

## 2020-04-29 RX ADMIN — ONDANSETRON 8 MG: 2 INJECTION INTRAMUSCULAR; INTRAVENOUS at 14:38

## 2020-04-29 RX ADMIN — FAMOTIDINE 20 MG: 10 INJECTION INTRAVENOUS at 14:38

## 2020-04-29 RX ADMIN — DEXAMETHASONE SODIUM PHOSPHATE 12 MG: 4 INJECTION, SOLUTION INTRAMUSCULAR; INTRAVENOUS at 12:11

## 2020-04-29 NOTE — PROGRESS NOTES
"      PROBLEM LIST:  1. T2N0M0 ER+ NV+ Her2+ invasive ductal carcinoma of the right breast  A) presented with a mass on self-exam.  Mammogram 1/7/20 showed a 4.1 cm mass in the right breast, with 2 adjacent smaller masses.  1.9 cm right axillary lymph node.   FNA of lymph node negative.  Biopsy of right breast mass showed grade 2 IDC, Er 95%, NV 2%, Her2 3+.  B) neoadjuvant chemotherapy with TCHP started 2/5/2020  2. PCOS  3. Anxiety/depression  4. DM2  5. GERD  6. Hyperlipidemia  7. Hypertension  8.  Hidradenitis    Subjective     CHIEF COMPLAINT: breast cancer    HISTORY OF PRESENT ILLNESS:   Josefina Rodriguez returns for follow-up.  She tolerated her last dose of TCH P with a reduced dose of Taxotere much better.  She states the numbness and tingling in her hands and feet are much improved and intermittent.  She does complain of fatigue and weakness.  She is trying to walk a few minutes a day.  She complains of frequent nausea not completely relieved with Zofran.  She denies any emesis.  The 1 day of IV fluids did help her feel some better.  She continues to have diarrhea that improves usually after the first week.  She is taking Imodium and Lomotil as needed.  She denies any fevers or chills.  She does have sinus drainage that causes an occasional dry cough.    Past Medical History, Past Surgical History, Social History, Family History have been reviewed and are without significant changes except as mentioned.    Review of Systems   A comprehensive 14 point review of systems was performed and was negative except as mentioned.    Medications:  The current medication list was reviewed in the EMR    ALLERGIES:    Allergies   Allergen Reactions   • Cashew Nut Hives and Itching   • Naproxen Hives   • Penicillins Hives   • Pistachio Nut Hives and Itching       Objective      /94   Pulse 101   Temp 97.3 °F (36.3 °C)   Resp 18   Ht 167.6 cm (66\")   Wt 87.1 kg (192 lb)   SpO2 97%   BMI 30.99 kg/m²  "   Vitals:    04/29/20 0903   PainSc: 0-No pain               Performance Status: 0    General: well appearing female in no acute distress  Neuro: alert and oriented  HEENT: sclera anicteric, oropharynx clear  Lymphatics: no cervical, supraclavicular, or axillary adenopathy  Cardiovascular: regular rate and rhythm, no murmurs  Breast: In the right breast at 9:00 there is an indistinct mass, approximately 1/2 cm.  Lungs: clear to auscultation bilaterally  Abdomen: soft, nontender, nondistended.  No palpable organomegaly  Extremeties: no lower extremity edema  Skin: no rashes, lesions, bruising, or petechiae  Psych: mood and affect appropriate      RECENT LABS:  Lab Results   Component Value Date    WBC 10.40 04/08/2020    HGB 13.0 04/08/2020    HCT 38.6 04/08/2020    MCV 94.1 04/08/2020     04/08/2020     Lab Results   Component Value Date    GLUCOSE 327 (H) 04/08/2020    BUN 12 04/08/2020    CREATININE 0.40 04/08/2020    EGFRIFNONA 135 04/08/2020    BCR 24.0 04/08/2020    K 3.9 04/08/2020    CO2 26.0 04/08/2020    CALCIUM 9.3 04/08/2020    ALBUMIN 3.60 04/08/2020    AST 29 04/08/2020    ALT 32 04/08/2020               Assessment/Plan   Josefina Rodriguez is a 45 y.o. year old female with stage IB ER+ Her2+ IDC of the right breast, here for follow-up.    Breast cancer: She continues to have improvement in the breast mass.  We will continue with cycle 5 of TCH P today with a reduced dose of Taxotere 75% for this and remaining cycles. She is due for Lupron today.     Diarrhea: Continue Lomotil and Imodium.  We will again give IV fluids the week after treatment.    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 Improved with reduced dose of Taxotere.      Diabetes: Some increase in blood sugars, likely related to chemotherapy and steroids.  We will continue to monitor.    Chemotherapy-induced nausea: She will continue Zofran every 8 hours as needed.  I will add Compazine 10 mg every 6 hours as needed nausea or  vomiting.    Follow-up in 3 weeks.              I spent 15 minutes with the patient. I spent > 50% percent of this time counseling and discussing prognosis, diagnostic testing, evaluation, current status and management.        Aimee Johnson APRN  [facility]    4/29/2020          CC:

## 2020-04-29 NOTE — PROGRESS NOTES
4/29/22020 1425  Pt finished up Carbo developed became red/diaphoretic and nauseous.  Initiated Emergency meds with added Zofran. Vital signs stable.  Pt felt better and was discharged at Discharged at 1615. Pt understands that she needs to call Physicians office for any further questions or to call 911 if symptoms worsen.

## 2020-04-30 ENCOUNTER — TELEPHONE (OUTPATIENT)
Dept: ONCOLOGY | Facility: CLINIC | Age: 45
End: 2020-04-30

## 2020-04-30 NOTE — TELEPHONE ENCOUNTER
I called patient to let her know that we have changed some of the premeds for her next infusion and have increased the steroids.  She was feeling okay today but was nervous about yesterday and a bit anxious.  I told her that the increased meds should work as a preventative and she was satisfied with our intervention.

## 2020-04-30 NOTE — TELEPHONE ENCOUNTER
----- Message from Diamond Caceres RN sent at 4/30/2020 11:30 AM EDT -----  Regarding: treatment reaction  Contact: [phone]  Patient left a voicemail requesting to speak to Dr. Connell regarding the reaction she had during chemotherapy yesterday.  She wants her opinion on what happened and what she should do if it happens again.  [phone].

## 2020-05-05 ENCOUNTER — HOSPITAL ENCOUNTER (OUTPATIENT)
Dept: ONCOLOGY | Facility: HOSPITAL | Age: 45
Setting detail: INFUSION SERIES
Discharge: HOME | End: 2020-05-05

## 2020-05-05 VITALS
HEART RATE: 110 BPM | DIASTOLIC BLOOD PRESSURE: 92 MMHG | RESPIRATION RATE: 20 BRPM | BODY MASS INDEX: 30.37 KG/M2 | WEIGHT: 189 LBS | SYSTOLIC BLOOD PRESSURE: 148 MMHG | TEMPERATURE: 98.9 F | HEIGHT: 66 IN

## 2020-05-05 DIAGNOSIS — C50.811 MALIGNANT NEOPLASM OF OVERLAPPING SITES OF RIGHT BREAST IN FEMALE, ESTROGEN RECEPTOR POSITIVE (HCC): Primary | ICD-10-CM

## 2020-05-05 DIAGNOSIS — Z17.0 MALIGNANT NEOPLASM OF OVERLAPPING SITES OF RIGHT BREAST IN FEMALE, ESTROGEN RECEPTOR POSITIVE (HCC): Primary | ICD-10-CM

## 2020-05-05 PROCEDURE — 96360 HYDRATION IV INFUSION INIT: CPT

## 2020-05-05 PROCEDURE — 25010000002 ONDANSETRON PER 1 MG: Performed by: INTERNAL MEDICINE

## 2020-05-05 PROCEDURE — 25010000003 HEPARIN LOCK FLUSH PER 10 UNITS: Performed by: INTERNAL MEDICINE

## 2020-05-05 PROCEDURE — 96374 THER/PROPH/DIAG INJ IV PUSH: CPT

## 2020-05-05 RX ORDER — HEPARIN SODIUM (PORCINE) LOCK FLUSH IV SOLN 100 UNIT/ML 100 UNIT/ML
500 SOLUTION INTRAVENOUS AS NEEDED
Status: CANCELLED | OUTPATIENT
Start: 2020-05-05

## 2020-05-05 RX ORDER — ONDANSETRON 2 MG/ML
8 INJECTION INTRAMUSCULAR; INTRAVENOUS ONCE
Status: DISCONTINUED | OUTPATIENT
Start: 2020-05-05 | End: 2020-05-05 | Stop reason: HOSPADM

## 2020-05-05 RX ORDER — HEPARIN SODIUM (PORCINE) LOCK FLUSH IV SOLN 100 UNIT/ML 100 UNIT/ML
500 SOLUTION INTRAVENOUS AS NEEDED
Status: DISCONTINUED | OUTPATIENT
Start: 2020-05-05 | End: 2020-05-06 | Stop reason: HOSPADM

## 2020-05-05 RX ORDER — ONDANSETRON 2 MG/ML
8 INJECTION INTRAMUSCULAR; INTRAVENOUS ONCE
Status: COMPLETED | OUTPATIENT
Start: 2020-05-05 | End: 2020-05-05

## 2020-05-05 RX ORDER — SODIUM CHLORIDE 9 MG/ML
1000 INJECTION, SOLUTION INTRAVENOUS ONCE
Status: COMPLETED | OUTPATIENT
Start: 2020-05-05 | End: 2020-05-05

## 2020-05-05 RX ADMIN — ONDANSETRON 8 MG: 2 INJECTION INTRAMUSCULAR; INTRAVENOUS at 13:56

## 2020-05-05 RX ADMIN — HEPARIN 500 UNITS: 100 SYRINGE at 14:20

## 2020-05-05 RX ADMIN — SODIUM CHLORIDE 1000 ML: 9 INJECTION, SOLUTION INTRAVENOUS at 13:15

## 2020-05-11 LAB — CREAT BLDA-MCNC: 0.4 MG/DL (ref 0.6–1.3)

## 2020-05-18 ENCOUNTER — TELEPHONE (OUTPATIENT)
Dept: ONCOLOGY | Facility: CLINIC | Age: 45
End: 2020-05-18

## 2020-05-18 NOTE — TELEPHONE ENCOUNTER
Patient calling into triage line with body aches, chills and low grade fever 99.3. Patient states this started around 2am today. Patient has taken Claritin and ibuprofen with not much relief. Patient took BP while on the phone it is 153/104 pulse 101. This is high, however patient states and its noted on her synopsis this is about average for her. Discussed with Nisreen GONZALEZ. Patient is to alternate Tylenol and Ibuprofen, stay hydrated, and is to let us know if symptoms worsen or change. Patient verbalized understanding.

## 2020-05-20 ENCOUNTER — OFFICE VISIT (OUTPATIENT)
Dept: ONCOLOGY | Facility: CLINIC | Age: 45
End: 2020-05-20

## 2020-05-20 ENCOUNTER — HOSPITAL ENCOUNTER (OUTPATIENT)
Dept: ONCOLOGY | Facility: HOSPITAL | Age: 45
Setting detail: INFUSION SERIES
Discharge: HOME | End: 2020-05-20

## 2020-05-20 VITALS
DIASTOLIC BLOOD PRESSURE: 90 MMHG | SYSTOLIC BLOOD PRESSURE: 157 MMHG | RESPIRATION RATE: 18 BRPM | HEIGHT: 66 IN | HEART RATE: 100 BPM | OXYGEN SATURATION: 96 % | TEMPERATURE: 97.5 F | BODY MASS INDEX: 31.18 KG/M2 | WEIGHT: 194 LBS

## 2020-05-20 VITALS — OXYGEN SATURATION: 94 % | SYSTOLIC BLOOD PRESSURE: 136 MMHG | DIASTOLIC BLOOD PRESSURE: 78 MMHG | HEART RATE: 101 BPM

## 2020-05-20 DIAGNOSIS — I42.7 CHEMOTHERAPY INDUCED CARDIOMYOPATHY (HCC): ICD-10-CM

## 2020-05-20 DIAGNOSIS — Z17.0 MALIGNANT NEOPLASM OF OVERLAPPING SITES OF RIGHT BREAST IN FEMALE, ESTROGEN RECEPTOR POSITIVE (HCC): Primary | ICD-10-CM

## 2020-05-20 DIAGNOSIS — C50.811 MALIGNANT NEOPLASM OF OVERLAPPING SITES OF RIGHT BREAST IN FEMALE, ESTROGEN RECEPTOR POSITIVE (HCC): Primary | ICD-10-CM

## 2020-05-20 DIAGNOSIS — T45.1X5A CHEMOTHERAPY INDUCED CARDIOMYOPATHY (HCC): ICD-10-CM

## 2020-05-20 LAB
ALBUMIN SERPL-MCNC: 3.8 G/DL (ref 3.5–5.2)
ALBUMIN/GLOB SERPL: 1 G/DL
ALP SERPL-CCNC: 146 U/L (ref 39–117)
ALT SERPL W P-5'-P-CCNC: 27 U/L (ref 1–33)
ANION GAP SERPL CALCULATED.3IONS-SCNC: 16 MMOL/L (ref 5–15)
AST SERPL-CCNC: 23 U/L (ref 1–32)
BILIRUB SERPL-MCNC: 0.2 MG/DL (ref 0.2–1.2)
BUN BLD-MCNC: 11 MG/DL (ref 6–20)
BUN/CREAT SERPL: 22 (ref 7–25)
CALCIUM SPEC-SCNC: 9.2 MG/DL (ref 8.6–10.5)
CHLORIDE SERPL-SCNC: 99 MMOL/L (ref 98–107)
CO2 SERPL-SCNC: 22 MMOL/L (ref 22–29)
CREAT BLD-MCNC: 0.5 MG/DL (ref 0.57–1)
CREAT BLDA-MCNC: 0.3 MG/DL
ERYTHROCYTE [DISTWIDTH] IN BLOOD BY AUTOMATED COUNT: 15.6 % (ref 12.3–15.4)
GFR SERPL CREATININE-BSD FRML MDRD: 135 ML/MIN/1.73
GLOBULIN UR ELPH-MCNC: 3.7 GM/DL
GLUCOSE BLD-MCNC: 325 MG/DL (ref 65–99)
HCT VFR BLD AUTO: 42.9 % (ref 34–46.6)
HGB BLD-MCNC: 13.9 G/DL (ref 12–15.9)
LYMPHOCYTES # BLD AUTO: 2.2 10*3/MM3 (ref 0.7–3.1)
LYMPHOCYTES NFR BLD AUTO: 15.5 % (ref 19.6–45.3)
MCH RBC QN AUTO: 32 PG (ref 26.6–33)
MCHC RBC AUTO-ENTMCNC: 32.4 G/DL (ref 31.5–35.7)
MCV RBC AUTO: 98.9 FL (ref 79–97)
MONOCYTES # BLD AUTO: 0.3 10*3/MM3 (ref 0.1–0.9)
MONOCYTES NFR BLD AUTO: 2.4 % (ref 5–12)
NEUTROPHILS # BLD AUTO: 11.7 10*3/MM3 (ref 1.7–7)
NEUTROPHILS NFR BLD AUTO: 82.1 % (ref 42.7–76)
PLATELET # BLD AUTO: 179 10*3/MM3 (ref 140–450)
PMV BLD AUTO: 7.1 FL (ref 6–12)
POTASSIUM BLD-SCNC: 3.9 MMOL/L (ref 3.5–5.2)
PROT SERPL-MCNC: 7.5 G/DL (ref 6–8.5)
RBC # BLD AUTO: 4.33 10*6/MM3 (ref 3.77–5.28)
SODIUM BLD-SCNC: 137 MMOL/L (ref 136–145)
WBC NRBC COR # BLD: 14.2 10*3/MM3 (ref 3.4–10.8)

## 2020-05-20 PROCEDURE — 25010000002 PEGFILGRASTIM 6 MG/0.6ML PREFILLED SYRINGE KIT: Performed by: INTERNAL MEDICINE

## 2020-05-20 PROCEDURE — 25010000002 DOCETAXEL 20 MG/ML SOLUTION 8 ML VIAL: Performed by: INTERNAL MEDICINE

## 2020-05-20 PROCEDURE — 96413 CHEMO IV INFUSION 1 HR: CPT

## 2020-05-20 PROCEDURE — 25010000002 FOSAPREPITANT PER 1 MG: Performed by: INTERNAL MEDICINE

## 2020-05-20 PROCEDURE — 96377 APPLICATON ON-BODY INJECTOR: CPT

## 2020-05-20 PROCEDURE — 25010000003 HEPARIN LOCK FLUSH PER 10 UNITS: Performed by: INTERNAL MEDICINE

## 2020-05-20 PROCEDURE — 25010000002 PERTUZUMAB 420 MG/14ML SOLUTION 420 MG VIAL: Performed by: INTERNAL MEDICINE

## 2020-05-20 PROCEDURE — 25010000002 DIPHENHYDRAMINE PER 50 MG: Performed by: INTERNAL MEDICINE

## 2020-05-20 PROCEDURE — 96367 TX/PROPH/DG ADDL SEQ IV INF: CPT

## 2020-05-20 PROCEDURE — 99214 OFFICE O/P EST MOD 30 MIN: CPT | Performed by: INTERNAL MEDICINE

## 2020-05-20 PROCEDURE — 25010000002 CARBOPLATIN PER 50 MG: Performed by: INTERNAL MEDICINE

## 2020-05-20 PROCEDURE — 96374 THER/PROPH/DIAG INJ IV PUSH: CPT

## 2020-05-20 PROCEDURE — 25010000002 ALTEPLASE 2 MG RECONSTITUTED SOLUTION: Performed by: INTERNAL MEDICINE

## 2020-05-20 PROCEDURE — 25010000002 DEXAMETHASONE PER 1 MG: Performed by: INTERNAL MEDICINE

## 2020-05-20 PROCEDURE — 85025 COMPLETE CBC W/AUTO DIFF WBC: CPT | Performed by: INTERNAL MEDICINE

## 2020-05-20 PROCEDURE — 96375 TX/PRO/DX INJ NEW DRUG ADDON: CPT

## 2020-05-20 PROCEDURE — 25010000002 PALONOSETRON 0.25 MG/5ML SOLUTION PREFILLED SYRINGE: Performed by: INTERNAL MEDICINE

## 2020-05-20 PROCEDURE — 96417 CHEMO IV INFUS EACH ADDL SEQ: CPT

## 2020-05-20 PROCEDURE — 80053 COMPREHEN METABOLIC PANEL: CPT | Performed by: INTERNAL MEDICINE

## 2020-05-20 PROCEDURE — 25010000002 HYDROCORTISONE SODIUM SUCCINATE 100 MG RECONSTITUTED SOLUTION: Performed by: INTERNAL MEDICINE

## 2020-05-20 PROCEDURE — 25010000002 TRASTUZUMAB-ANNS 420 MG RECONSTITUTED SOLUTION 1 EACH VIAL: Performed by: INTERNAL MEDICINE

## 2020-05-20 PROCEDURE — 96411 CHEMO IV PUSH ADDL DRUG: CPT

## 2020-05-20 PROCEDURE — 36593 DECLOT VASCULAR DEVICE: CPT

## 2020-05-20 PROCEDURE — 82565 ASSAY OF CREATININE: CPT

## 2020-05-20 RX ORDER — HEPARIN SODIUM (PORCINE) LOCK FLUSH IV SOLN 100 UNIT/ML 100 UNIT/ML
500 SOLUTION INTRAVENOUS AS NEEDED
Status: DISCONTINUED | OUTPATIENT
Start: 2020-05-20 | End: 2020-05-21 | Stop reason: HOSPADM

## 2020-05-20 RX ORDER — SODIUM CHLORIDE 9 MG/ML
250 INJECTION, SOLUTION INTRAVENOUS ONCE
Status: CANCELLED | OUTPATIENT
Start: 2020-05-20

## 2020-05-20 RX ORDER — FAMOTIDINE 10 MG/ML
20 INJECTION, SOLUTION INTRAVENOUS AS NEEDED
Status: CANCELLED | OUTPATIENT
Start: 2020-05-20

## 2020-05-20 RX ORDER — PALONOSETRON 0.05 MG/ML
0.25 INJECTION, SOLUTION INTRAVENOUS ONCE
Status: CANCELLED | OUTPATIENT
Start: 2020-05-20

## 2020-05-20 RX ORDER — PROMETHAZINE HYDROCHLORIDE 25 MG/ML
12.5 INJECTION, SOLUTION INTRAMUSCULAR; INTRAVENOUS EVERY 4 HOURS PRN
Status: DISCONTINUED | OUTPATIENT
Start: 2020-05-20 | End: 2020-05-21 | Stop reason: HOSPADM

## 2020-05-20 RX ORDER — FAMOTIDINE 10 MG/ML
20 INJECTION, SOLUTION INTRAVENOUS AS NEEDED
Status: COMPLETED | OUTPATIENT
Start: 2020-05-20 | End: 2020-05-20

## 2020-05-20 RX ORDER — FAMOTIDINE 10 MG/ML
20 INJECTION, SOLUTION INTRAVENOUS ONCE
Status: COMPLETED | OUTPATIENT
Start: 2020-05-20 | End: 2020-05-20

## 2020-05-20 RX ORDER — DIPHENHYDRAMINE HYDROCHLORIDE 50 MG/ML
50 INJECTION INTRAMUSCULAR; INTRAVENOUS AS NEEDED
Status: COMPLETED | OUTPATIENT
Start: 2020-05-20 | End: 2020-05-20

## 2020-05-20 RX ORDER — PALONOSETRON 0.05 MG/ML
0.25 INJECTION, SOLUTION INTRAVENOUS ONCE
Status: COMPLETED | OUTPATIENT
Start: 2020-05-20 | End: 2020-05-20

## 2020-05-20 RX ORDER — DIPHENHYDRAMINE HYDROCHLORIDE 50 MG/ML
50 INJECTION INTRAMUSCULAR; INTRAVENOUS AS NEEDED
Status: CANCELLED | OUTPATIENT
Start: 2020-05-20

## 2020-05-20 RX ORDER — HEPARIN SODIUM (PORCINE) LOCK FLUSH IV SOLN 100 UNIT/ML 100 UNIT/ML
500 SOLUTION INTRAVENOUS AS NEEDED
Status: CANCELLED | OUTPATIENT
Start: 2020-05-20

## 2020-05-20 RX ORDER — FAMOTIDINE 10 MG/ML
20 INJECTION, SOLUTION INTRAVENOUS ONCE
Status: CANCELLED | OUTPATIENT
Start: 2020-05-20

## 2020-05-20 RX ADMIN — PALONOSETRON 0.25 MG: 0.25 INJECTION, SOLUTION INTRAVENOUS at 10:02

## 2020-05-20 RX ADMIN — HYDROCORTISONE SODIUM SUCCINATE 100 MG: 100 INJECTION, POWDER, FOR SOLUTION INTRAMUSCULAR; INTRAVENOUS at 14:06

## 2020-05-20 RX ADMIN — SODIUM CHLORIDE 150 MG: 9 INJECTION, SOLUTION INTRAVENOUS at 10:02

## 2020-05-20 RX ADMIN — PERTUZUMAB 378 MG: 30 INJECTION, SOLUTION, CONCENTRATE INTRAVENOUS at 10:54

## 2020-05-20 RX ADMIN — CARBOPLATIN 900 MG: 10 INJECTION, SOLUTION INTRAVENOUS at 13:48

## 2020-05-20 RX ADMIN — DOCETAXEL 105 MG: 20 INJECTION, SOLUTION, CONCENTRATE INTRAVENOUS at 12:32

## 2020-05-20 RX ADMIN — FAMOTIDINE 20 MG: 10 INJECTION INTRAVENOUS at 14:08

## 2020-05-20 RX ADMIN — DIPHENHYDRAMINE HYDROCHLORIDE 50 MG: 50 INJECTION INTRAMUSCULAR; INTRAVENOUS at 14:07

## 2020-05-20 RX ADMIN — HEPARIN 500 UNITS: 100 SYRINGE at 15:10

## 2020-05-20 RX ADMIN — FAMOTIDINE 20 MG: 10 INJECTION INTRAVENOUS at 10:04

## 2020-05-20 RX ADMIN — PEGFILGRASTIM 6 MG: KIT SUBCUTANEOUS at 14:39

## 2020-05-20 RX ADMIN — DEXAMETHASONE SODIUM PHOSPHATE 20 MG: 10 INJECTION INTRAMUSCULAR; INTRAVENOUS at 10:02

## 2020-05-20 RX ADMIN — TRASTUZUMAB-ANNS 510 MG: 420 INJECTION, POWDER, LYOPHILIZED, FOR SOLUTION INTRAVENOUS at 11:58

## 2020-05-20 RX ADMIN — ALTEPLASE: 2.2 INJECTION, POWDER, LYOPHILIZED, FOR SOLUTION INTRAVENOUS at 10:05

## 2020-05-20 RX ADMIN — DIPHENHYDRAMINE HYDROCHLORIDE 50 MG: 50 INJECTION INTRAMUSCULAR; INTRAVENOUS at 10:25

## 2020-05-20 NOTE — PROGRESS NOTES
After Visit Summary   2017    Latanya Dennis    MRN: [id number]           Patient Information     Date Of Birth          1995        Visit Information        Provider Department      2017 10:10 AM Trevon Peoples MD [facility]        Today's Diagnoses     Attention deficit hyperactivity disorder (ADHD), combined type    -  1    Anxiety state        Alexia and dyslexia          Care Instructions    Schedule a return appointment as needed    Return scheduling phone number:  [phone]    Shayna Lopez RN:  [phone]  Clinic Care Coordinator    After hours emergency phone number:  [phone] Ask to have Dr. Peoples called                Follow-ups after your visit        Who to contact     Please call your clinic at [phone] to:    Ask questions about your health    Make or cancel appointments    Discuss your medicines    Learn about your test results    Speak to your doctor   If you have compliments or concerns about an experience at your clinic, or if you wish to file a complaint, please contact [facility] Patient Relations at [phone] or email us at [email or web address]         Additional Information About Your Visit        MyChart Information     [employer] is an electronic gateway that provides easy, online access to your medical records. With [employer], you can request a clinic appointment, read your test results, renew a prescription or communicate with your care team.     To sign up for [employer] visit the website at [email or web address]   You will be asked to enter the access code listed below, as well as some personal information. Please follow the directions to create your username and password.     Your access code is: [email or web address]  Expires: 2017 11:14 AM     Your access code will  in 90 days. If you need help or a new code, please contact your [facility] or call  1235 IV in left hand infiltrated about 2 inches above the insertion site. Perjetta and Herceptin infused fine with no pain.  As soon as docetaxel was started she immediately screamed in pain. Chemo stopped, IV positive for blood return but when flushed was swelling up around the left wrist. IV removed. Activase removed from port with positive blood return, docetaxel connected to port. Per pharmacy apply heat or cold to left wrist whichever is more comfortable.     1400 Carbo stopped. Patient vomiting, sweating, crying, red and flushed. NS open at 999ml/hr. Jessi Caceres, charge RN called Dr. Connell. Emergency meds were given (see MAR). Patient felt the urge to have a bowel movement and reports this happened last time as well. Dr. Connell did not want to re-challenge carbo.      1500 Symptoms have resolved other than her reddened hands and feet.  No nausea.     1508 Per Dr. Connell she is ok to go home and call if any new symptoms develop.    "[phone] for assistance.        Care EveryWhere ID     This is your Care EveryWhere ID. This could be used by other organizations to access your [place] medical records  [id number]        Your Vitals Were     Pulse Height BMI (Body Mass Index)             92 6' 3.91\" (192.8 cm) 20.53 kg/m2          Blood Pressure from Last 3 Encounters:   09/19/17 127/75   06/06/17 131/84   10/17/16 122/77    Weight from Last 3 Encounters:   09/19/17 168 lb 3.4 oz (76.3 kg)   06/06/17 181 lb (82.1 kg) (81 %)*   10/17/16 188 lb 7.9 oz (85.5 kg) (88 %)*     * Growth percentiles are based on [facility] 2-20 Years data.              Today, you had the following     No orders found for display       Primary Care Provider Office Phone # Fax #    Laith Das [phone]       [facility] [address] [place] [address]        Equal Access to Services     [facility]: Hadii augustin cardenaso Sodonna, waaxda luqadaha, qaybta kaalmayonis vidales, gerry mcmanus . So [facility] [phone].    ATENCIÓN: Si habla español, tiene a neumann disposición servicios gratuitos de asistencia lingüística. Llame al [phone].    We comply with applicable federal civil rights laws and [place] laws. We do not discriminate on the basis of race, color, national origin, age, disability sex, sexual orientation or gender identity.            Thank you!     Thank you for choosing [facility]  for your care. Our goal is always to provide you with excellent care. Hearing back from our patients is one way we can continue to improve our services. Please take a few minutes to complete the written survey that you may receive in the mail after your visit with us. Thank you!             Your Updated Medication List - Protect others around you: Learn how to safely use, store and throw away your medicines at www.disposemymeds.org.          This list is accurate as of: 9/19/17 11:14 AM.  " Always use your most recent med list.                   Brand Name Dispense Instructions for use Diagnosis    FLUoxetine 20 MG capsule    PROzac    30 capsule    Take 1 capsule (20 mg) by mouth daily    Attention deficit hyperactivity disorder (ADHD), combined type, Anxiety state       * lisdexamfetamine 50 MG capsule    VYVANSE    30 capsule    Take 1 in AM    Attention deficit hyperactivity disorder (ADHD), combined type       * lisdexamfetamine 50 MG capsule    VYVANSE    30 capsule    Take 1 capsule (50 mg) by mouth every morning    Attention deficit hyperactivity disorder (ADHD), combined type       * Notice:  This list has 2 medication(s) that are the same as other medications prescribed for you. Read the directions carefully, and ask your doctor or other care provider to review them with you.

## 2020-05-20 NOTE — PROGRESS NOTES
"      PROBLEM LIST:  1. T2N0M0 ER+ OK+ Her2+ invasive ductal carcinoma of the right breast  A) presented with a mass on self-exam.  Mammogram 1/7/20 showed a 4.1 cm mass in the right breast, with 2 adjacent smaller masses.  1.9 cm right axillary lymph node.   FNA of lymph node negative.  Biopsy of right breast mass showed grade 2 IDC, Er 95%, OK 2%, Her2 3+.  B) neoadjuvant chemotherapy with TCHP started 2/5/2020  2. PCOS  3. Anxiety/depression  4. DM2  5. GERD  6. Hyperlipidemia  7. Hypertension  8.  Hidradenitis    Subjective     CHIEF COMPLAINT: breast cancer    HISTORY OF PRESENT ILLNESS:   Josefina Rodriguez returns for follow-up.  She had a reaction shortly after completing carboplatin with her last treatment.  She called a few days ago with a temperature of 99 and some myalgias.  The symptoms resolved after about 36 hours.  Today she feels pretty good.  She overall feels weak and like she has lost a lot of her strength during treatment.  She is not sure she would be able to go back to work full-time.  She is also concerned about infection risk.  She has not really left her house or been in close contact with anybody since starting treatment.    Past Medical History, Past Surgical History, Social History, Family History have been reviewed and are without significant changes except as mentioned.    Review of Systems   A comprehensive 14 point review of systems was performed and was negative except as mentioned.    Medications:  The current medication list was reviewed in the EMR    ALLERGIES:    Allergies   Allergen Reactions   • Cashew Nut Hives and Itching   • Naproxen Hives   • Penicillins Hives   • Pistachio Nut Hives and Itching       Objective      /90   Pulse 100   Temp 97.5 °F (36.4 °C) (Temporal)   Resp 18   Ht 167.6 cm (65.98\")   Wt 88 kg (194 lb)   SpO2 96%   BMI 31.33 kg/m²    Vitals:    05/20/20 0829   PainSc:   2               Performance Status: 0    General: well appearing " female in no acute distress  Neuro: alert and oriented  HEENT: sclera anicteric, oropharynx clear  Lymphatics: no cervical, supraclavicular, or axillary adenopathy  Cardiovascular: regular rate and rhythm, no murmurs  Breast: In the right breast at 9:00 there is an indistinct mass, approximately 1 cm.  Lungs: clear to auscultation bilaterally  Abdomen: soft, nontender, nondistended.  No palpable organomegaly  Extremeties: no lower extremity edema  Skin: no rashes, lesions, bruising, or petechiae  Psych: mood and affect appropriate      RECENT LABS:  Lab Results   Component Value Date    WBC 12.10 (H) 04/29/2020    HGB 13.7 04/29/2020    HCT 41.7 04/29/2020    MCV 96.8 04/29/2020     04/29/2020     Lab Results   Component Value Date    GLUCOSE 338 (H) 04/29/2020    BUN 12 04/29/2020    CREATININE 0.40 (L) 04/29/2020    EGFRIFNONA 129 04/29/2020    BCR 23.1 04/29/2020    K 3.8 04/29/2020    CO2 22.0 04/29/2020    CALCIUM 9.6 04/29/2020    ALBUMIN 3.70 04/29/2020    AST 21 04/29/2020    ALT 25 04/29/2020               Assessment/Plan   Josefina Rodriguez is a 45 y.o. year old female with stage IB ER+ Her2+ IDC of the right breast, here for follow-up.    Breast cancer: She continues to have improvement in the breast mass.  We will continue with cycle 6 of TCH P today with a reduced dose of Taxotere 75% for this and remaining cycles.  She is also receiving Lupron every 3 months for ovarian suppression which we will continue.  I will get her scheduled to see Dr. Baker to discuss surgery.  She is leaning towards a mastectomy.  She understands that she will need to have delayed reconstruction.  We discussed that if she has a pathologic complete response we will plan to continue Herceptin and Perjeta for a total of 1 year.  If however she still has residual disease then we would switch to Kadcyla.  I will order an echocardiogram for follow-up today.  We discussed that if she has lymph node involvement she  would need radiation treatment in addition to everything else we have discussed.  Following that she is a candidate for endocrine therapy, likely with an aromatase inhibitor if we continue the Lupron.    Diarrhea: Continue Lomotil and Imodium.  We will again give IV fluids the week after treatment.    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 Stable on 75% taxotere dose.      Follow-up in 3 weeks.              I spent 25 minutes with the patient. I spent > 50% percent of this time counseling and discussing prognosis, diagnostic testing, evaluation, current status and management.        Jolene Connell MD  [facility]    5/20/2020          CC:

## 2020-05-21 LAB — CREAT BLDA-MCNC: 0.3 MG/DL (ref 0.6–1.3)

## 2020-05-26 ENCOUNTER — HOSPITAL ENCOUNTER (OUTPATIENT)
Dept: CARDIOLOGY | Facility: HOSPITAL | Age: 45
Discharge: HOME | End: 2020-05-26
Admitting: INTERNAL MEDICINE

## 2020-05-26 ENCOUNTER — HOSPITAL ENCOUNTER (OUTPATIENT)
Dept: ONCOLOGY | Facility: HOSPITAL | Age: 45
Setting detail: INFUSION SERIES
Discharge: HOME | End: 2020-05-26

## 2020-05-26 VITALS
WEIGHT: 190.4 LBS | HEART RATE: 119 BPM | BODY MASS INDEX: 30.6 KG/M2 | SYSTOLIC BLOOD PRESSURE: 135 MMHG | DIASTOLIC BLOOD PRESSURE: 97 MMHG | RESPIRATION RATE: 18 BRPM | HEIGHT: 66 IN | TEMPERATURE: 97.8 F

## 2020-05-26 DIAGNOSIS — I42.7 CHEMOTHERAPY INDUCED CARDIOMYOPATHY (HCC): ICD-10-CM

## 2020-05-26 DIAGNOSIS — Z17.0 MALIGNANT NEOPLASM OF OVERLAPPING SITES OF RIGHT BREAST IN FEMALE, ESTROGEN RECEPTOR POSITIVE (HCC): Primary | ICD-10-CM

## 2020-05-26 DIAGNOSIS — T45.1X5A CHEMOTHERAPY INDUCED CARDIOMYOPATHY (HCC): ICD-10-CM

## 2020-05-26 DIAGNOSIS — C50.811 MALIGNANT NEOPLASM OF OVERLAPPING SITES OF RIGHT BREAST IN FEMALE, ESTROGEN RECEPTOR POSITIVE (HCC): Primary | ICD-10-CM

## 2020-05-26 DIAGNOSIS — L03.114 CELLULITIS OF LEFT UPPER EXTREMITY: Primary | ICD-10-CM

## 2020-05-26 LAB
BH CV ECHO MEAS - AO ROOT AREA (BSA CORRECTED): 1.3
BH CV ECHO MEAS - AO ROOT AREA: 4.9 CM^2
BH CV ECHO MEAS - AO ROOT DIAM: 2.5 CM
BH CV ECHO MEAS - BSA(HAYCOCK): 2 M^2
BH CV ECHO MEAS - BSA: 2 M^2
BH CV ECHO MEAS - BZI_BMI: 32.3 KILOGRAMS/M^2
BH CV ECHO MEAS - BZI_METRIC_HEIGHT: 165.1 CM
BH CV ECHO MEAS - BZI_METRIC_WEIGHT: 88 KG
BH CV ECHO MEAS - EDV(CUBED): 55.3 ML
BH CV ECHO MEAS - EDV(TEICH): 62.3 ML
BH CV ECHO MEAS - EF(CUBED): 73.3 %
BH CV ECHO MEAS - EF(TEICH): 65.8 %
BH CV ECHO MEAS - ESV(CUBED): 14.8 ML
BH CV ECHO MEAS - ESV(TEICH): 21.3 ML
BH CV ECHO MEAS - FS: 35.6 %
BH CV ECHO MEAS - IVS/LVPW: 0.94
BH CV ECHO MEAS - IVSD: 1 CM
BH CV ECHO MEAS - LA DIMENSION: 3.2 CM
BH CV ECHO MEAS - LA/AO: 1.3
BH CV ECHO MEAS - LV MASS(C)D: 123.3 GRAMS
BH CV ECHO MEAS - LV MASS(C)DI: 63.1 GRAMS/M^2
BH CV ECHO MEAS - LVIDD: 3.8 CM
BH CV ECHO MEAS - LVIDS: 2.5 CM
BH CV ECHO MEAS - LVPWD: 1.1 CM
BH CV ECHO MEAS - SI(CUBED): 20.7 ML/M^2
BH CV ECHO MEAS - SI(TEICH): 21 ML/M^2
BH CV ECHO MEAS - SV(CUBED): 40.5 ML
BH CV ECHO MEAS - SV(TEICH): 41 ML
MAXIMAL PREDICTED HEART RATE: 177 BPM
STRESS TARGET HR: 150 BPM

## 2020-05-26 PROCEDURE — 93308 TTE F-UP OR LMTD: CPT

## 2020-05-26 PROCEDURE — 93308 TTE F-UP OR LMTD: CPT | Performed by: INTERNAL MEDICINE

## 2020-05-26 PROCEDURE — 93356 MYOCRD STRAIN IMG SPCKL TRCK: CPT | Performed by: INTERNAL MEDICINE

## 2020-05-26 PROCEDURE — 36593 DECLOT VASCULAR DEVICE: CPT

## 2020-05-26 PROCEDURE — 25010000002 ALTEPLASE 2 MG RECONSTITUTED SOLUTION: Performed by: INTERNAL MEDICINE

## 2020-05-26 PROCEDURE — 25010000003 HEPARIN LOCK FLUSH PER 10 UNITS: Performed by: INTERNAL MEDICINE

## 2020-05-26 PROCEDURE — 93356 MYOCRD STRAIN IMG SPCKL TRCK: CPT

## 2020-05-26 PROCEDURE — 96360 HYDRATION IV INFUSION INIT: CPT

## 2020-05-26 RX ORDER — HEPARIN SODIUM (PORCINE) LOCK FLUSH IV SOLN 100 UNIT/ML 100 UNIT/ML
500 SOLUTION INTRAVENOUS AS NEEDED
Status: CANCELLED | OUTPATIENT
Start: 2020-05-26

## 2020-05-26 RX ORDER — CEPHALEXIN 500 MG/1
500 CAPSULE ORAL 4 TIMES DAILY
Qty: 20 CAPSULE | Refills: 0 | Status: SHIPPED | OUTPATIENT
Start: 2020-05-26 | End: 2020-05-31

## 2020-05-26 RX ORDER — HEPARIN SODIUM (PORCINE) LOCK FLUSH IV SOLN 100 UNIT/ML 100 UNIT/ML
500 SOLUTION INTRAVENOUS AS NEEDED
Status: DISCONTINUED | OUTPATIENT
Start: 2020-05-26 | End: 2020-05-27 | Stop reason: HOSPADM

## 2020-05-26 RX ORDER — SODIUM CHLORIDE 9 MG/ML
1000 INJECTION, SOLUTION INTRAVENOUS ONCE
Status: COMPLETED | OUTPATIENT
Start: 2020-05-26 | End: 2020-05-26

## 2020-05-26 RX ADMIN — HEPARIN 500 UNITS: 100 SYRINGE at 14:56

## 2020-05-26 RX ADMIN — ALTEPLASE: 2.2 INJECTION, POWDER, LYOPHILIZED, FOR SOLUTION INTRAVENOUS at 13:01

## 2020-05-26 RX ADMIN — SODIUM CHLORIDE 1000 ML: 9 INJECTION, SOLUTION INTRAVENOUS at 13:52

## 2020-05-26 NOTE — PROGRESS NOTES
Saw patient in infusion for left arm redness and pain.  Patient received Herceptin and Perjeta last Wednesday through peripheral IV without any issues.  Patient then started Taxol infusion and complained of pain immediately.  IV was stopped and Taxol was given through Port-A-Cath.  Patient reports developing redness and tenderness since that day and has gotten progressively worse.  Patient has been alternating heat and cold compresses with ibuprofen.  Patient with erythema, warmth and tenderness along vein on left forearm.  No swelling noted.  Measured bilateral forearms at 11 inches.  Patient denies chest pain, shortness of breath, fever, chills, or any other acute symptoms.  Patient with superficial phlebitis with some mild cellulitis from recent infusion.  I will give her an antibiotic given the symptoms are worsening with supportive measures.  Patient is allergic to penicillin but states she has tolerated Keflex in the past.  I will give her Keflex 500 mg 4 times a day for 5 days.  She will let us know if symptoms worsen or do not improve.  Of note, patient will see Dr. LONG tomorrow for scheduled appointment to discuss surgical options for breast cancer.

## 2020-05-26 NOTE — PROGRESS NOTES
Subjective     HISTORY OF PRESENT ILLNESS:     History of Present Illness  ***    Past Medical History, Past Surgical History, Social History, Family History have been reviewed and are without significant changes except as mentioned.    Review of Systems   A comprehensive 14 point review of systems was performed and was negative except as mentioned.    Medications:  The current medication list was reviewed in the EMR    ALLERGIES:    Allergies   Allergen Reactions   • Cashew Nut Hives and Itching   • Naproxen Hives   • Penicillins Hives   • Pistachio Nut Hives and Itching       Objective      There were no vitals filed for this visit.  No flowsheet data found.    Physical Exam  ***    RECENT LABS:  Hematology WBC   Date Value Ref Range Status   05/20/2020 14.20 (H) 3.40 - 10.80 10*3/mm3 Final     RBC   Date Value Ref Range Status   05/20/2020 4.33 3.77 - 5.28 10*6/mm3 Final     Hemoglobin   Date Value Ref Range Status   05/20/2020 13.9 12.0 - 15.9 g/dL Final     Hematocrit   Date Value Ref Range Status   05/20/2020 42.9 34.0 - 46.6 % Final     Platelets   Date Value Ref Range Status   05/20/2020 179 140 - 450 10*3/mm3 Final              Assessment/Plan   ***                  5/26/2020      CC:

## 2020-05-29 ENCOUNTER — TRANSCRIBE ORDERS (OUTPATIENT)
Dept: ADMINISTRATIVE | Facility: HOSPITAL | Age: 45
End: 2020-05-29

## 2020-05-29 ENCOUNTER — HOSPITAL ENCOUNTER (OUTPATIENT)
Dept: GENERAL RADIOLOGY | Facility: HOSPITAL | Age: 45
Discharge: HOME | End: 2020-05-29
Admitting: SURGERY

## 2020-05-29 DIAGNOSIS — C50.811 MALIGNANT NEOPLASM OF OVERLAPPING SITES OF RIGHT FEMALE BREAST, UNSPECIFIED ESTROGEN RECEPTOR STATUS (HCC): ICD-10-CM

## 2020-05-29 DIAGNOSIS — C50.811 MALIGNANT NEOPLASM OF OVERLAPPING SITES OF RIGHT FEMALE BREAST, UNSPECIFIED ESTROGEN RECEPTOR STATUS (HCC): Primary | ICD-10-CM

## 2020-05-29 PROCEDURE — 25010000002 IOPAMIDOL 61 % SOLUTION: Performed by: SURGERY

## 2020-05-29 PROCEDURE — 36598 INJ W/FLUOR EVAL CV DEVICE: CPT

## 2020-05-29 RX ORDER — SODIUM CHLORIDE 0.9 % (FLUSH) 0.9 %
20 SYRINGE (ML) INJECTION AS NEEDED
Status: DISCONTINUED | OUTPATIENT
Start: 2020-05-29 | End: 2020-05-30 | Stop reason: HOSPADM

## 2020-05-29 RX ORDER — HEPARIN SODIUM (PORCINE) LOCK FLUSH IV SOLN 100 UNIT/ML 100 UNIT/ML
5 SOLUTION INTRAVENOUS AS NEEDED
Status: DISCONTINUED | OUTPATIENT
Start: 2020-05-29 | End: 2020-05-30 | Stop reason: HOSPADM

## 2020-05-29 RX ADMIN — IOPAMIDOL 50 ML: 612 INJECTION, SOLUTION INTRAVENOUS at 14:00

## 2020-06-01 ENCOUNTER — TELEPHONE (OUTPATIENT)
Dept: ONCOLOGY | Facility: CLINIC | Age: 45
End: 2020-06-01

## 2020-06-01 NOTE — TELEPHONE ENCOUNTER
----- Message from Selena Umanzor sent at 6/1/2020  1:42 PM EDT -----  Regarding: RETURN TO WORK FORM   Contact: [phone]  PT HAS BROUGHT IN LETTER SHE WOULD LIKE DR. NORMAN TO SIGN TO RETURN BACK TO WORK     PLACED IN MA BOX

## 2020-06-05 ENCOUNTER — TELEPHONE (OUTPATIENT)
Dept: ONCOLOGY | Facility: CLINIC | Age: 45
End: 2020-06-05

## 2020-06-05 NOTE — TELEPHONE ENCOUNTER
Pt just wanted to f/u on the process of her RTW letter.  Informed her that Dr. Connell signed and it was mailed to her address on file 6/1/220.

## 2020-06-05 NOTE — TELEPHONE ENCOUNTER
Patient would like to speak to Dr. Connell's assistant about return to work letter.    [phone]

## 2020-06-09 DIAGNOSIS — Z17.0 MALIGNANT NEOPLASM OF OVERLAPPING SITES OF RIGHT BREAST IN FEMALE, ESTROGEN RECEPTOR POSITIVE (HCC): ICD-10-CM

## 2020-06-09 DIAGNOSIS — C50.811 MALIGNANT NEOPLASM OF OVERLAPPING SITES OF RIGHT BREAST IN FEMALE, ESTROGEN RECEPTOR POSITIVE (HCC): ICD-10-CM

## 2020-06-09 RX ORDER — SODIUM CHLORIDE 9 MG/ML
250 INJECTION, SOLUTION INTRAVENOUS ONCE
Status: CANCELLED | OUTPATIENT
Start: 2020-06-10

## 2020-06-10 ENCOUNTER — HOSPITAL ENCOUNTER (OUTPATIENT)
Dept: ONCOLOGY | Facility: HOSPITAL | Age: 45
Setting detail: INFUSION SERIES
Discharge: HOME | End: 2020-06-10

## 2020-06-10 VITALS
RESPIRATION RATE: 18 BRPM | SYSTOLIC BLOOD PRESSURE: 131 MMHG | DIASTOLIC BLOOD PRESSURE: 96 MMHG | BODY MASS INDEX: 31.18 KG/M2 | HEIGHT: 66 IN | HEART RATE: 98 BPM | TEMPERATURE: 98.4 F | WEIGHT: 194 LBS

## 2020-06-10 DIAGNOSIS — C50.811 MALIGNANT NEOPLASM OF OVERLAPPING SITES OF RIGHT BREAST IN FEMALE, ESTROGEN RECEPTOR POSITIVE (HCC): Primary | ICD-10-CM

## 2020-06-10 DIAGNOSIS — Z17.0 MALIGNANT NEOPLASM OF OVERLAPPING SITES OF RIGHT BREAST IN FEMALE, ESTROGEN RECEPTOR POSITIVE (HCC): Primary | ICD-10-CM

## 2020-06-10 LAB
ALBUMIN SERPL-MCNC: 3.6 G/DL (ref 3.5–5.2)
ALBUMIN/GLOB SERPL: 0.9 G/DL
ALP SERPL-CCNC: 130 U/L (ref 39–117)
ALT SERPL W P-5'-P-CCNC: 18 U/L (ref 1–33)
ANION GAP SERPL CALCULATED.3IONS-SCNC: 15 MMOL/L (ref 5–15)
AST SERPL-CCNC: 16 U/L (ref 1–32)
BILIRUB SERPL-MCNC: 0.3 MG/DL (ref 0.2–1.2)
BUN BLD-MCNC: 10 MG/DL (ref 6–20)
BUN/CREAT SERPL: 18.2 (ref 7–25)
CALCIUM SPEC-SCNC: 9.5 MG/DL (ref 8.6–10.5)
CHLORIDE SERPL-SCNC: 100 MMOL/L (ref 98–107)
CO2 SERPL-SCNC: 23 MMOL/L (ref 22–29)
CREAT BLD-MCNC: 0.55 MG/DL (ref 0.57–1)
ERYTHROCYTE [DISTWIDTH] IN BLOOD BY AUTOMATED COUNT: 14.4 % (ref 12.3–15.4)
GFR SERPL CREATININE-BSD FRML MDRD: 121 ML/MIN/1.73
GLOBULIN UR ELPH-MCNC: 3.8 GM/DL
GLUCOSE BLD-MCNC: 339 MG/DL (ref 65–99)
HCT VFR BLD AUTO: 41.3 % (ref 34–46.6)
HGB BLD-MCNC: 13.3 G/DL (ref 12–15.9)
LYMPHOCYTES # BLD AUTO: 2.3 10*3/MM3 (ref 0.7–3.1)
LYMPHOCYTES NFR BLD AUTO: 21.8 % (ref 19.6–45.3)
MCH RBC QN AUTO: 31.8 PG (ref 26.6–33)
MCHC RBC AUTO-ENTMCNC: 32.2 G/DL (ref 31.5–35.7)
MCV RBC AUTO: 98.9 FL (ref 79–97)
MONOCYTES # BLD AUTO: 0.3 10*3/MM3 (ref 0.1–0.9)
MONOCYTES NFR BLD AUTO: 2.5 % (ref 5–12)
NEUTROPHILS # BLD AUTO: 8 10*3/MM3 (ref 1.7–7)
NEUTROPHILS NFR BLD AUTO: 75.7 % (ref 42.7–76)
PLATELET # BLD AUTO: 256 10*3/MM3 (ref 140–450)
PMV BLD AUTO: 6.8 FL (ref 6–12)
POTASSIUM BLD-SCNC: 3.6 MMOL/L (ref 3.5–5.2)
PROT SERPL-MCNC: 7.4 G/DL (ref 6–8.5)
RBC # BLD AUTO: 4.18 10*6/MM3 (ref 3.77–5.28)
SODIUM BLD-SCNC: 138 MMOL/L (ref 136–145)
WBC NRBC COR # BLD: 10.6 10*3/MM3 (ref 3.4–10.8)

## 2020-06-10 PROCEDURE — 96413 CHEMO IV INFUSION 1 HR: CPT

## 2020-06-10 PROCEDURE — 80053 COMPREHEN METABOLIC PANEL: CPT | Performed by: INTERNAL MEDICINE

## 2020-06-10 PROCEDURE — 96417 CHEMO IV INFUS EACH ADDL SEQ: CPT

## 2020-06-10 PROCEDURE — 85025 COMPLETE CBC W/AUTO DIFF WBC: CPT | Performed by: INTERNAL MEDICINE

## 2020-06-10 PROCEDURE — 25010000002 TRASTUZUMAB-ANNS 420 MG RECONSTITUTED SOLUTION 1 EACH VIAL: Performed by: INTERNAL MEDICINE

## 2020-06-10 PROCEDURE — 25010000002 PERTUZUMAB 420 MG/14ML SOLUTION 420 MG VIAL: Performed by: INTERNAL MEDICINE

## 2020-06-10 RX ORDER — SODIUM CHLORIDE 9 MG/ML
250 INJECTION, SOLUTION INTRAVENOUS ONCE
Status: COMPLETED | OUTPATIENT
Start: 2020-06-10 | End: 2020-06-10

## 2020-06-10 RX ORDER — LIDOCAINE AND PRILOCAINE 25; 25 MG/G; MG/G
CREAM TOPICAL
Qty: 30 EACH | Refills: 2 | Status: SHIPPED | OUTPATIENT
Start: 2020-06-10 | End: 2020-11-30 | Stop reason: SDUPTHER

## 2020-06-10 RX ORDER — ONDANSETRON HYDROCHLORIDE 8 MG/1
TABLET, FILM COATED ORAL
Qty: 30 TABLET | Refills: 4 | Status: SHIPPED | OUTPATIENT
Start: 2020-06-10 | End: 2021-01-05

## 2020-06-10 RX ORDER — DEXAMETHASONE 4 MG/1
TABLET ORAL
Qty: 6 TABLET | Refills: 4 | OUTPATIENT
Start: 2020-06-10

## 2020-06-10 RX ORDER — DIPHENOXYLATE HYDROCHLORIDE AND ATROPINE SULFATE 2.5; .025 MG/1; MG/1
TABLET ORAL
Qty: 30 TABLET | Refills: 4 | Status: SHIPPED | OUTPATIENT
Start: 2020-06-10 | End: 2021-01-05

## 2020-06-10 RX ADMIN — TRASTUZUMAB-ANNS 530 MG: 420 INJECTION, POWDER, LYOPHILIZED, FOR SOLUTION INTRAVENOUS at 11:21

## 2020-06-10 RX ADMIN — PERTUZUMAB 420 MG: 30 INJECTION, SOLUTION, CONCENTRATE INTRAVENOUS at 10:13

## 2020-06-10 RX ADMIN — SODIUM CHLORIDE 250 ML: 9 INJECTION, SOLUTION INTRAVENOUS at 10:13

## 2020-06-21 ENCOUNTER — APPOINTMENT (OUTPATIENT)
Dept: PREADMISSION TESTING | Facility: HOSPITAL | Age: 45
End: 2020-06-21

## 2020-06-21 VITALS — HEIGHT: 65 IN | WEIGHT: 190 LBS | BODY MASS INDEX: 31.65 KG/M2

## 2020-06-21 LAB
ALBUMIN SERPL-MCNC: 4 G/DL (ref 3.5–5.2)
ALBUMIN/GLOB SERPL: 1.3 G/DL
ALP SERPL-CCNC: 138 U/L (ref 39–117)
ALT SERPL W P-5'-P-CCNC: 20 U/L (ref 1–33)
ANION GAP SERPL CALCULATED.3IONS-SCNC: 13 MMOL/L (ref 5–15)
AST SERPL-CCNC: 20 U/L (ref 1–32)
BILIRUB SERPL-MCNC: 0.4 MG/DL (ref 0.2–1.2)
BUN BLD-MCNC: 12 MG/DL (ref 6–20)
BUN/CREAT SERPL: 22.6 (ref 7–25)
CALCIUM SPEC-SCNC: 9.6 MG/DL (ref 8.6–10.5)
CHLORIDE SERPL-SCNC: 100 MMOL/L (ref 98–107)
CO2 SERPL-SCNC: 25 MMOL/L (ref 22–29)
CREAT BLD-MCNC: 0.53 MG/DL (ref 0.57–1)
DEPRECATED RDW RBC AUTO: 46 FL (ref 37–54)
ERYTHROCYTE [DISTWIDTH] IN BLOOD BY AUTOMATED COUNT: 12.7 % (ref 12.3–15.4)
GFR SERPL CREATININE-BSD FRML MDRD: 126 ML/MIN/1.73
GLOBULIN UR ELPH-MCNC: 3.2 GM/DL
GLUCOSE BLD-MCNC: 303 MG/DL (ref 65–99)
HBA1C MFR BLD: 9.2 % (ref 4.8–5.6)
HCT VFR BLD AUTO: 43.7 % (ref 34–46.6)
HGB BLD-MCNC: 14.7 G/DL (ref 12–15.9)
MCH RBC QN AUTO: 32.7 PG (ref 26.6–33)
MCHC RBC AUTO-ENTMCNC: 33.6 G/DL (ref 31.5–35.7)
MCV RBC AUTO: 97.1 FL (ref 79–97)
PLATELET # BLD AUTO: 254 10*3/MM3 (ref 140–450)
PMV BLD AUTO: 9.6 FL (ref 6–12)
POTASSIUM BLD-SCNC: 4.1 MMOL/L (ref 3.5–5.2)
PROT SERPL-MCNC: 7.2 G/DL (ref 6–8.5)
RBC # BLD AUTO: 4.5 10*6/MM3 (ref 3.77–5.28)
SODIUM BLD-SCNC: 138 MMOL/L (ref 136–145)
WBC NRBC COR # BLD: 12.26 10*3/MM3 (ref 3.4–10.8)

## 2020-06-21 PROCEDURE — 83036 HEMOGLOBIN GLYCOSYLATED A1C: CPT | Performed by: ANESTHESIOLOGY

## 2020-06-21 PROCEDURE — 36415 COLL VENOUS BLD VENIPUNCTURE: CPT

## 2020-06-21 PROCEDURE — U0002 COVID-19 LAB TEST NON-CDC: HCPCS

## 2020-06-21 PROCEDURE — 93010 ELECTROCARDIOGRAM REPORT: CPT | Performed by: INTERNAL MEDICINE

## 2020-06-21 PROCEDURE — 80053 COMPREHEN METABOLIC PANEL: CPT | Performed by: SURGERY

## 2020-06-21 PROCEDURE — 85027 COMPLETE CBC AUTOMATED: CPT | Performed by: SURGERY

## 2020-06-21 PROCEDURE — C9803 HOPD COVID-19 SPEC COLLECT: HCPCS

## 2020-06-21 PROCEDURE — U0004 COV-19 TEST NON-CDC HGH THRU: HCPCS

## 2020-06-21 PROCEDURE — 93005 ELECTROCARDIOGRAM TRACING: CPT

## 2020-06-21 RX ORDER — LORATADINE 10 MG/1
10 TABLET ORAL DAILY
COMMUNITY

## 2020-06-21 NOTE — PAT
Patient to apply Chlorhexadine wipes  to surgical area (as instructed) the night before procedure and the AM of procedure. Wipes provided.    Patient instructed to drink 20 ounces (or until full) of Gatorade and it needs to be completed 1 hour before given arrival time for procedure (NO RED Gatorade)    Patient verbalized understanding.    Per Anesthesia Request, patient instructed not to take their ACE/ARB medications on the AM of surgery.

## 2020-06-22 ENCOUNTER — TRANSCRIBE ORDERS (OUTPATIENT)
Dept: ADMINISTRATIVE | Facility: HOSPITAL | Age: 45
End: 2020-06-22

## 2020-06-22 DIAGNOSIS — C50.811 MALIGNANT NEOPLASM OF OVERLAPPING SITES OF RIGHT FEMALE BREAST, UNSPECIFIED ESTROGEN RECEPTOR STATUS (HCC): Primary | ICD-10-CM

## 2020-06-22 LAB
REF LAB TEST METHOD: NORMAL
SARS-COV-2 RNA RESP QL NAA+PROBE: NOT DETECTED

## 2020-06-23 ENCOUNTER — HOSPITAL ENCOUNTER (OUTPATIENT)
Facility: HOSPITAL | Age: 45
Discharge: HOME | End: 2020-06-24
Attending: SURGERY | Admitting: SURGERY

## 2020-06-23 ENCOUNTER — APPOINTMENT (OUTPATIENT)
Dept: GENERAL RADIOLOGY | Facility: HOSPITAL | Age: 45
End: 2020-06-23

## 2020-06-23 ENCOUNTER — ANESTHESIA EVENT (OUTPATIENT)
Dept: PERIOP | Facility: HOSPITAL | Age: 45
End: 2020-06-23

## 2020-06-23 ENCOUNTER — HOSPITAL ENCOUNTER (OUTPATIENT)
Dept: NUCLEAR MEDICINE | Facility: HOSPITAL | Age: 45
Discharge: HOME | End: 2020-06-23

## 2020-06-23 ENCOUNTER — ANESTHESIA (OUTPATIENT)
Dept: PERIOP | Facility: HOSPITAL | Age: 45
End: 2020-06-23

## 2020-06-23 DIAGNOSIS — C50.811 MALIGNANT NEOPLASM OF OVERLAPPING SITES OF RIGHT FEMALE BREAST (HCC): ICD-10-CM

## 2020-06-23 DIAGNOSIS — C50.811 MALIGNANT NEOPLASM OF OVERLAPPING SITES OF RIGHT FEMALE BREAST, UNSPECIFIED ESTROGEN RECEPTOR STATUS (HCC): ICD-10-CM

## 2020-06-23 LAB
B-HCG UR QL: NEGATIVE
GLUCOSE BLDC GLUCOMTR-MCNC: 231 MG/DL (ref 70–130)
GLUCOSE BLDC GLUCOMTR-MCNC: 240 MG/DL (ref 70–130)
GLUCOSE BLDC GLUCOMTR-MCNC: 285 MG/DL (ref 70–130)
GLUCOSE BLDC GLUCOMTR-MCNC: 301 MG/DL (ref 70–130)
INTERNAL NEGATIVE CONTROL: NEGATIVE
INTERNAL POSITIVE CONTROL: POSITIVE
Lab: NORMAL

## 2020-06-23 PROCEDURE — 25010000003 CEFAZOLIN IN DEXTROSE 2-4 GM/100ML-% SOLUTION: Performed by: SURGERY

## 2020-06-23 PROCEDURE — C1788 PORT, INDWELLING, IMP: HCPCS | Performed by: SURGERY

## 2020-06-23 PROCEDURE — 88307 TISSUE EXAM BY PATHOLOGIST: CPT | Performed by: SURGERY

## 2020-06-23 PROCEDURE — 88331 PATH CONSLTJ SURG 1 BLK 1SPC: CPT | Performed by: PATHOLOGY

## 2020-06-23 PROCEDURE — 88341 IMHCHEM/IMCYTCHM EA ADD ANTB: CPT | Performed by: SURGERY

## 2020-06-23 PROCEDURE — 25010000002 HEPARIN (PORCINE) PER 1000 UNITS: Performed by: SURGERY

## 2020-06-23 PROCEDURE — 25010000002 DEXAMETHASONE SODIUM PHOSPHATE 10 MG/ML SOLUTION: Performed by: ANESTHESIOLOGY

## 2020-06-23 PROCEDURE — A9541 TC99M SULFUR COLLOID: HCPCS | Performed by: SURGERY

## 2020-06-23 PROCEDURE — 0 TECHNETIUM FILTERED SULFUR COLLOID: Performed by: SURGERY

## 2020-06-23 PROCEDURE — 63710000001 INSULIN LISPRO (HUMAN) PER 5 UNITS: Performed by: SURGERY

## 2020-06-23 PROCEDURE — 25010000002 DEXAMETHASONE PER 1 MG: Performed by: NURSE ANESTHETIST, CERTIFIED REGISTERED

## 2020-06-23 PROCEDURE — 25010000002 FENTANYL CITRATE (PF) 100 MCG/2ML SOLUTION: Performed by: NURSE ANESTHETIST, CERTIFIED REGISTERED

## 2020-06-23 PROCEDURE — 25010000002 NEOSTIGMINE 10 MG/10ML SOLUTION: Performed by: NURSE ANESTHETIST, CERTIFIED REGISTERED

## 2020-06-23 PROCEDURE — 82962 GLUCOSE BLOOD TEST: CPT

## 2020-06-23 PROCEDURE — 71045 X-RAY EXAM CHEST 1 VIEW: CPT

## 2020-06-23 PROCEDURE — 25010000002 ONDANSETRON PER 1 MG: Performed by: NURSE ANESTHETIST, CERTIFIED REGISTERED

## 2020-06-23 PROCEDURE — 38792 RA TRACER ID OF SENTINL NODE: CPT

## 2020-06-23 PROCEDURE — 81025 URINE PREGNANCY TEST: CPT | Performed by: ANESTHESIOLOGY

## 2020-06-23 PROCEDURE — 25010000002 BUPRENORPHINE PER 0.1 MG: Performed by: ANESTHESIOLOGY

## 2020-06-23 PROCEDURE — 88360 TUMOR IMMUNOHISTOCHEM/MANUAL: CPT

## 2020-06-23 PROCEDURE — 88360 TUMOR IMMUNOHISTOCHEM/MANUAL: CPT | Performed by: SURGERY

## 2020-06-23 PROCEDURE — 76000 FLUOROSCOPY <1 HR PHYS/QHP: CPT

## 2020-06-23 PROCEDURE — 88342 IMHCHEM/IMCYTCHM 1ST ANTB: CPT | Performed by: SURGERY

## 2020-06-23 PROCEDURE — 25010000002 PROPOFOL 10 MG/ML EMULSION: Performed by: NURSE ANESTHETIST, CERTIFIED REGISTERED

## 2020-06-23 DEVICE — POWERPORT CLEARVUE ISP IMPLANTABLE PORT WITH ATTACHABLE 8F POLYURETHANE OPEN-ENDED SINGLE-LUMEN VENOUS CATHETER PROCEDURAL KIT
Type: IMPLANTABLE DEVICE | Site: CHEST | Status: FUNCTIONAL
Brand: POWERPORT CLEARVUE

## 2020-06-23 RX ORDER — SCOLOPAMINE TRANSDERMAL SYSTEM 1 MG/1
1 PATCH, EXTENDED RELEASE TRANSDERMAL CONTINUOUS
Status: DISCONTINUED | OUTPATIENT
Start: 2020-06-23 | End: 2020-06-24 | Stop reason: HOSPADM

## 2020-06-23 RX ORDER — OXYCODONE HYDROCHLORIDE 5 MG/1
5 TABLET ORAL EVERY 4 HOURS PRN
Status: DISCONTINUED | OUTPATIENT
Start: 2020-06-23 | End: 2020-06-24 | Stop reason: HOSPADM

## 2020-06-23 RX ORDER — NALOXONE HCL 0.4 MG/ML
0.1 VIAL (ML) INJECTION
Status: DISCONTINUED | OUTPATIENT
Start: 2020-06-23 | End: 2020-06-24 | Stop reason: HOSPADM

## 2020-06-23 RX ORDER — LISINOPRIL 10 MG/1
10 TABLET ORAL
Status: DISCONTINUED | OUTPATIENT
Start: 2020-06-23 | End: 2020-06-24 | Stop reason: HOSPADM

## 2020-06-23 RX ORDER — NEOSTIGMINE METHYLSULFATE 1 MG/ML
INJECTION, SOLUTION INTRAVENOUS AS NEEDED
Status: DISCONTINUED | OUTPATIENT
Start: 2020-06-23 | End: 2020-06-23 | Stop reason: SURG

## 2020-06-23 RX ORDER — PROMETHAZINE HYDROCHLORIDE 25 MG/ML
6.25 INJECTION, SOLUTION INTRAMUSCULAR; INTRAVENOUS EVERY 6 HOURS PRN
Status: DISCONTINUED | OUTPATIENT
Start: 2020-06-23 | End: 2020-06-24 | Stop reason: HOSPADM

## 2020-06-23 RX ORDER — PROPOFOL 10 MG/ML
VIAL (ML) INTRAVENOUS AS NEEDED
Status: DISCONTINUED | OUTPATIENT
Start: 2020-06-23 | End: 2020-06-23 | Stop reason: SURG

## 2020-06-23 RX ORDER — FAMOTIDINE 20 MG/1
20 TABLET, FILM COATED ORAL ONCE
Status: COMPLETED | OUTPATIENT
Start: 2020-06-23 | End: 2020-06-23

## 2020-06-23 RX ORDER — DEXTROSE MONOHYDRATE 25 G/50ML
25 INJECTION, SOLUTION INTRAVENOUS
Status: DISCONTINUED | OUTPATIENT
Start: 2020-06-23 | End: 2020-06-24 | Stop reason: HOSPADM

## 2020-06-23 RX ORDER — ROCURONIUM BROMIDE 10 MG/ML
INJECTION, SOLUTION INTRAVENOUS AS NEEDED
Status: DISCONTINUED | OUTPATIENT
Start: 2020-06-23 | End: 2020-06-23 | Stop reason: SURG

## 2020-06-23 RX ORDER — SODIUM CHLORIDE, SODIUM LACTATE, POTASSIUM CHLORIDE, CALCIUM CHLORIDE 600; 310; 30; 20 MG/100ML; MG/100ML; MG/100ML; MG/100ML
100 INJECTION, SOLUTION INTRAVENOUS CONTINUOUS
Status: DISCONTINUED | OUTPATIENT
Start: 2020-06-23 | End: 2020-06-24 | Stop reason: HOSPADM

## 2020-06-23 RX ORDER — CETIRIZINE HYDROCHLORIDE 10 MG/1
10 TABLET ORAL DAILY
Status: DISCONTINUED | OUTPATIENT
Start: 2020-06-23 | End: 2020-06-24 | Stop reason: HOSPADM

## 2020-06-23 RX ORDER — GLYCOPYRROLATE 0.2 MG/ML
INJECTION INTRAMUSCULAR; INTRAVENOUS AS NEEDED
Status: DISCONTINUED | OUTPATIENT
Start: 2020-06-23 | End: 2020-06-23 | Stop reason: SURG

## 2020-06-23 RX ORDER — SODIUM CHLORIDE, SODIUM LACTATE, POTASSIUM CHLORIDE, CALCIUM CHLORIDE 600; 310; 30; 20 MG/100ML; MG/100ML; MG/100ML; MG/100ML
9 INJECTION, SOLUTION INTRAVENOUS CONTINUOUS
Status: DISCONTINUED | OUTPATIENT
Start: 2020-06-23 | End: 2020-06-24 | Stop reason: HOSPADM

## 2020-06-23 RX ORDER — BUPRENORPHINE HYDROCHLORIDE 0.32 MG/ML
INJECTION INTRAMUSCULAR; INTRAVENOUS
Status: COMPLETED | OUTPATIENT
Start: 2020-06-23 | End: 2020-06-23

## 2020-06-23 RX ORDER — FLUTICASONE PROPIONATE 50 MCG
1 SPRAY, SUSPENSION (ML) NASAL EVERY 12 HOURS SCHEDULED
Status: DISCONTINUED | OUTPATIENT
Start: 2020-06-23 | End: 2020-06-24 | Stop reason: HOSPADM

## 2020-06-23 RX ORDER — EPHEDRINE SULFATE 50 MG/ML
5 INJECTION, SOLUTION INTRAVENOUS ONCE AS NEEDED
Status: DISCONTINUED | OUTPATIENT
Start: 2020-06-23 | End: 2020-06-23 | Stop reason: HOSPADM

## 2020-06-23 RX ORDER — CEFAZOLIN SODIUM 2 G/100ML
2 INJECTION, SOLUTION INTRAVENOUS EVERY 8 HOURS
Status: COMPLETED | OUTPATIENT
Start: 2020-06-23 | End: 2020-06-24

## 2020-06-23 RX ORDER — DEXAMETHASONE SODIUM PHOSPHATE 10 MG/ML
INJECTION, SOLUTION INTRAMUSCULAR; INTRAVENOUS
Status: COMPLETED | OUTPATIENT
Start: 2020-06-23 | End: 2020-06-23

## 2020-06-23 RX ORDER — MAGNESIUM HYDROXIDE 1200 MG/15ML
LIQUID ORAL AS NEEDED
Status: DISCONTINUED | OUTPATIENT
Start: 2020-06-23 | End: 2020-06-23 | Stop reason: HOSPADM

## 2020-06-23 RX ORDER — DEXAMETHASONE SODIUM PHOSPHATE 4 MG/ML
INJECTION, SOLUTION INTRA-ARTICULAR; INTRALESIONAL; INTRAMUSCULAR; INTRAVENOUS; SOFT TISSUE AS NEEDED
Status: DISCONTINUED | OUTPATIENT
Start: 2020-06-23 | End: 2020-06-23 | Stop reason: SURG

## 2020-06-23 RX ORDER — MEPERIDINE HYDROCHLORIDE 25 MG/ML
12.5 INJECTION INTRAMUSCULAR; INTRAVENOUS; SUBCUTANEOUS
Status: DISCONTINUED | OUTPATIENT
Start: 2020-06-23 | End: 2020-06-23 | Stop reason: HOSPADM

## 2020-06-23 RX ORDER — ACETAMINOPHEN 500 MG
1000 TABLET ORAL EVERY 6 HOURS
Status: DISCONTINUED | OUTPATIENT
Start: 2020-06-23 | End: 2020-06-24 | Stop reason: HOSPADM

## 2020-06-23 RX ORDER — ONDANSETRON 2 MG/ML
INJECTION INTRAMUSCULAR; INTRAVENOUS AS NEEDED
Status: DISCONTINUED | OUTPATIENT
Start: 2020-06-23 | End: 2020-06-23 | Stop reason: SURG

## 2020-06-23 RX ORDER — MONTELUKAST SODIUM 10 MG/1
10 TABLET ORAL NIGHTLY
Status: DISCONTINUED | OUTPATIENT
Start: 2020-06-23 | End: 2020-06-24 | Stop reason: HOSPADM

## 2020-06-23 RX ORDER — LORAZEPAM 1 MG/1
1 TABLET ORAL EVERY 8 HOURS PRN
Status: DISCONTINUED | OUTPATIENT
Start: 2020-06-23 | End: 2020-06-24 | Stop reason: HOSPADM

## 2020-06-23 RX ORDER — CEFAZOLIN SODIUM 2 G/100ML
2 INJECTION, SOLUTION INTRAVENOUS EVERY 8 HOURS
Status: DISCONTINUED | OUTPATIENT
Start: 2020-06-23 | End: 2020-06-23

## 2020-06-23 RX ORDER — SODIUM CHLORIDE 9 MG/ML
50 INJECTION, SOLUTION INTRAVENOUS CONTINUOUS
Status: DISCONTINUED | OUTPATIENT
Start: 2020-06-23 | End: 2020-06-24 | Stop reason: HOSPADM

## 2020-06-23 RX ORDER — ONDANSETRON 2 MG/ML
4 INJECTION INTRAMUSCULAR; INTRAVENOUS EVERY 6 HOURS PRN
Status: DISCONTINUED | OUTPATIENT
Start: 2020-06-23 | End: 2020-06-24 | Stop reason: HOSPADM

## 2020-06-23 RX ORDER — ONDANSETRON 2 MG/ML
4 INJECTION INTRAMUSCULAR; INTRAVENOUS ONCE AS NEEDED
Status: COMPLETED | OUTPATIENT
Start: 2020-06-23 | End: 2020-06-23

## 2020-06-23 RX ORDER — FENTANYL CITRATE 50 UG/ML
INJECTION, SOLUTION INTRAMUSCULAR; INTRAVENOUS AS NEEDED
Status: DISCONTINUED | OUTPATIENT
Start: 2020-06-23 | End: 2020-06-23 | Stop reason: SURG

## 2020-06-23 RX ORDER — ENALAPRILAT 2.5 MG/2ML
1.25 INJECTION INTRAVENOUS EVERY 6 HOURS PRN
Status: DISCONTINUED | OUTPATIENT
Start: 2020-06-23 | End: 2020-06-24 | Stop reason: HOSPADM

## 2020-06-23 RX ORDER — FAMOTIDINE 10 MG/ML
20 INJECTION, SOLUTION INTRAVENOUS ONCE
Status: CANCELLED | OUTPATIENT
Start: 2020-06-23 | End: 2020-06-23

## 2020-06-23 RX ORDER — FENTANYL CITRATE 50 UG/ML
50 INJECTION, SOLUTION INTRAMUSCULAR; INTRAVENOUS
Status: COMPLETED | OUTPATIENT
Start: 2020-06-23 | End: 2020-06-23

## 2020-06-23 RX ORDER — PANTOPRAZOLE SODIUM 40 MG/1
40 TABLET, DELAYED RELEASE ORAL DAILY
Status: DISCONTINUED | OUTPATIENT
Start: 2020-06-24 | End: 2020-06-24 | Stop reason: HOSPADM

## 2020-06-23 RX ORDER — LIDOCAINE HYDROCHLORIDE 10 MG/ML
INJECTION, SOLUTION EPIDURAL; INFILTRATION; INTRACAUDAL; PERINEURAL AS NEEDED
Status: DISCONTINUED | OUTPATIENT
Start: 2020-06-23 | End: 2020-06-23 | Stop reason: SURG

## 2020-06-23 RX ORDER — LABETALOL HYDROCHLORIDE 5 MG/ML
5 INJECTION, SOLUTION INTRAVENOUS
Status: DISCONTINUED | OUTPATIENT
Start: 2020-06-23 | End: 2020-06-23 | Stop reason: HOSPADM

## 2020-06-23 RX ORDER — LIDOCAINE HYDROCHLORIDE 10 MG/ML
0.5 INJECTION, SOLUTION EPIDURAL; INFILTRATION; INTRACAUDAL; PERINEURAL ONCE AS NEEDED
Status: COMPLETED | OUTPATIENT
Start: 2020-06-23 | End: 2020-06-23

## 2020-06-23 RX ORDER — SODIUM CHLORIDE 0.9 % (FLUSH) 0.9 %
10 SYRINGE (ML) INJECTION AS NEEDED
Status: CANCELLED | OUTPATIENT
Start: 2020-06-23

## 2020-06-23 RX ORDER — PREGABALIN 75 MG/1
75 CAPSULE ORAL ONCE
Status: COMPLETED | OUTPATIENT
Start: 2020-06-23 | End: 2020-06-23

## 2020-06-23 RX ORDER — ACETAMINOPHEN 500 MG
1000 TABLET ORAL ONCE
Status: COMPLETED | OUTPATIENT
Start: 2020-06-23 | End: 2020-06-23

## 2020-06-23 RX ORDER — DIPHENHYDRAMINE HYDROCHLORIDE 50 MG/ML
12.5 INJECTION INTRAMUSCULAR; INTRAVENOUS EVERY 6 HOURS PRN
Status: DISCONTINUED | OUTPATIENT
Start: 2020-06-23 | End: 2020-06-24 | Stop reason: HOSPADM

## 2020-06-23 RX ORDER — SODIUM CHLORIDE 0.9 % (FLUSH) 0.9 %
10 SYRINGE (ML) INJECTION EVERY 12 HOURS SCHEDULED
Status: CANCELLED | OUTPATIENT
Start: 2020-06-23

## 2020-06-23 RX ORDER — CEFAZOLIN SODIUM 2 G/100ML
2 INJECTION, SOLUTION INTRAVENOUS ONCE
Status: COMPLETED | OUTPATIENT
Start: 2020-06-23 | End: 2020-06-23

## 2020-06-23 RX ORDER — NALOXONE HCL 0.4 MG/ML
0.4 VIAL (ML) INJECTION AS NEEDED
Status: DISCONTINUED | OUTPATIENT
Start: 2020-06-23 | End: 2020-06-23 | Stop reason: HOSPADM

## 2020-06-23 RX ORDER — HYDROMORPHONE HYDROCHLORIDE 1 MG/ML
0.5 INJECTION, SOLUTION INTRAMUSCULAR; INTRAVENOUS; SUBCUTANEOUS
Status: DISCONTINUED | OUTPATIENT
Start: 2020-06-23 | End: 2020-06-23 | Stop reason: HOSPADM

## 2020-06-23 RX ORDER — BUPIVACAINE HYDROCHLORIDE 2.5 MG/ML
INJECTION, SOLUTION EPIDURAL; INFILTRATION; INTRACAUDAL
Status: COMPLETED | OUTPATIENT
Start: 2020-06-23 | End: 2020-06-23

## 2020-06-23 RX ORDER — NICOTINE POLACRILEX 4 MG
15 LOZENGE BUCCAL
Status: DISCONTINUED | OUTPATIENT
Start: 2020-06-23 | End: 2020-06-24 | Stop reason: HOSPADM

## 2020-06-23 RX ADMIN — NEOSTIGMINE 3 MG: 1 INJECTION INTRAVENOUS at 09:32

## 2020-06-23 RX ADMIN — MONTELUKAST SODIUM 10 MG: 10 TABLET, COATED ORAL at 21:13

## 2020-06-23 RX ADMIN — FAMOTIDINE 20 MG: 20 TABLET, FILM COATED ORAL at 07:15

## 2020-06-23 RX ADMIN — FENTANYL CITRATE 50 MCG: 50 INJECTION INTRAMUSCULAR; INTRAVENOUS at 13:52

## 2020-06-23 RX ADMIN — ONDANSETRON 4 MG: 2 INJECTION INTRAMUSCULAR; INTRAVENOUS at 09:25

## 2020-06-23 RX ADMIN — SCOPALAMINE 1 PATCH: 1 PATCH, EXTENDED RELEASE TRANSDERMAL at 07:16

## 2020-06-23 RX ADMIN — BUPRENORPHINE HYDROCHLORIDE 0.3 MG: 0.32 INJECTION INTRAMUSCULAR; INTRAVENOUS at 07:28

## 2020-06-23 RX ADMIN — PROPOFOL 150 MG: 10 INJECTION, EMULSION INTRAVENOUS at 07:27

## 2020-06-23 RX ADMIN — FLUTICASONE PROPIONATE 1 SPRAY: 50 SPRAY, METERED NASAL at 21:41

## 2020-06-23 RX ADMIN — DEXAMETHASONE SODIUM PHOSPHATE 2 MG: 10 INJECTION INTRAMUSCULAR; INTRAVENOUS at 07:28

## 2020-06-23 RX ADMIN — ACETAMINOPHEN 1000 MG: 500 TABLET, FILM COATED ORAL at 23:15

## 2020-06-23 RX ADMIN — FENTANYL CITRATE 50 MCG: 50 INJECTION INTRAMUSCULAR; INTRAVENOUS at 15:05

## 2020-06-23 RX ADMIN — FENTANYL CITRATE 50 MCG: 50 INJECTION INTRAMUSCULAR; INTRAVENOUS at 11:02

## 2020-06-23 RX ADMIN — FENTANYL CITRATE 50 MCG: 50 INJECTION INTRAMUSCULAR; INTRAVENOUS at 14:50

## 2020-06-23 RX ADMIN — TECHNETIUM TC 99M SULFUR COLLOID 1 DOSE: KIT at 07:30

## 2020-06-23 RX ADMIN — SODIUM CHLORIDE, POTASSIUM CHLORIDE, SODIUM LACTATE AND CALCIUM CHLORIDE: 600; 310; 30; 20 INJECTION, SOLUTION INTRAVENOUS at 08:42

## 2020-06-23 RX ADMIN — CETIRIZINE HYDROCHLORIDE 10 MG: 10 TABLET, FILM COATED ORAL at 18:30

## 2020-06-23 RX ADMIN — FENTANYL CITRATE 100 MCG: 50 INJECTION, SOLUTION INTRAMUSCULAR; INTRAVENOUS at 08:46

## 2020-06-23 RX ADMIN — DEXAMETHASONE SODIUM PHOSPHATE 6 MG: 4 INJECTION, SOLUTION INTRAMUSCULAR; INTRAVENOUS at 07:27

## 2020-06-23 RX ADMIN — INSULIN LISPRO 8 UNITS: 100 INJECTION, SOLUTION INTRAVENOUS; SUBCUTANEOUS at 19:28

## 2020-06-23 RX ADMIN — BUPIVACAINE HYDROCHLORIDE 30 ML: 2.5 INJECTION, SOLUTION EPIDURAL; INFILTRATION; INTRACAUDAL; PERINEURAL at 07:28

## 2020-06-23 RX ADMIN — LIDOCAINE HYDROCHLORIDE 50 MG: 10 INJECTION, SOLUTION EPIDURAL; INFILTRATION; INTRACAUDAL; PERINEURAL at 07:27

## 2020-06-23 RX ADMIN — ACETAMINOPHEN 1000 MG: 500 TABLET, FILM COATED ORAL at 18:30

## 2020-06-23 RX ADMIN — PROPOFOL 25 MCG/KG/MIN: 10 INJECTION, EMULSION INTRAVENOUS at 07:33

## 2020-06-23 RX ADMIN — CEFAZOLIN SODIUM 2 G: 2 INJECTION, SOLUTION INTRAVENOUS at 07:25

## 2020-06-23 RX ADMIN — LISINOPRIL 10 MG: 10 TABLET ORAL at 21:13

## 2020-06-23 RX ADMIN — SODIUM CHLORIDE, POTASSIUM CHLORIDE, SODIUM LACTATE AND CALCIUM CHLORIDE 9 ML/HR: 600; 310; 30; 20 INJECTION, SOLUTION INTRAVENOUS at 07:00

## 2020-06-23 RX ADMIN — OXYCODONE 5 MG: 5 TABLET ORAL at 19:29

## 2020-06-23 RX ADMIN — ONDANSETRON 4 MG: 2 INJECTION INTRAMUSCULAR; INTRAVENOUS at 10:16

## 2020-06-23 RX ADMIN — ROCURONIUM BROMIDE 40 MG: 10 INJECTION INTRAVENOUS at 07:27

## 2020-06-23 RX ADMIN — FENTANYL CITRATE 50 MCG: 50 INJECTION INTRAMUSCULAR; INTRAVENOUS at 10:38

## 2020-06-23 RX ADMIN — INSULIN LISPRO 5 UNITS: 100 INJECTION, SOLUTION INTRAVENOUS; SUBCUTANEOUS at 10:35

## 2020-06-23 RX ADMIN — CEFAZOLIN SODIUM 2 G: 2 INJECTION, SOLUTION INTRAVENOUS at 21:53

## 2020-06-23 RX ADMIN — ACETAMINOPHEN 1000 MG: 500 TABLET ORAL at 07:15

## 2020-06-23 RX ADMIN — FENTANYL CITRATE 100 MCG: 50 INJECTION, SOLUTION INTRAMUSCULAR; INTRAVENOUS at 07:27

## 2020-06-23 RX ADMIN — PREGABALIN 75 MG: 75 CAPSULE ORAL at 07:16

## 2020-06-23 RX ADMIN — LIDOCAINE HYDROCHLORIDE 0.5 ML: 10 INJECTION, SOLUTION EPIDURAL; INFILTRATION; INTRACAUDAL; PERINEURAL at 07:00

## 2020-06-23 RX ADMIN — GLYCOPYRROLATE 0.4 MG: 0.2 INJECTION INTRAMUSCULAR; INTRAVENOUS at 09:34

## 2020-06-23 NOTE — H&P
Pre-Op H&P  Josefina Rodriguez  [id number]  1975    Chief complaint: right breast cancer    HPI:    Patient is a 45 y.o.female who presents today for a right mastectomy with right sentinel node biopsy and a port removal and placement for right breast cancer and a clot in her existing port. She noted a mass on self exam and underwent imaging and biopsy which revealed invasive ductal carcinoma (ER+, WA+, HER2/michael +). She has a family history of breast cancer in her mother, who was diagnosed in her 60s. The patient reports undergoing genetic testing which was negative. She has undergone neoadjuvant chemotherapy.     She denies chest pain or shortness of breath.     Of note, she has a history of hidradenitis and currently has multiple erythematous lesions on her back. She denies fevers or chills. 3    Review of Systems:  General ROS: negative for chills, fever or skin lesions;  No changes since last office visit.  Neg for recent sick exposure  Cardiovascular ROS: no chest pain or dyspnea on exertion  Respiratory ROS: no cough, shortness of breath, or wheezing    Allergies:   Allergies   Allergen Reactions   • Cashew Nut Hives and Itching   • Naproxen Hives   • Penicillins Hives   • Pistachio Nut Hives and Itching       Home Meds:    No current facility-administered medications on file prior to encounter.      Current Outpatient Medications on File Prior to Encounter   Medication Sig Dispense Refill   • B Complex Vitamins (VITAMIN B COMPLEX PO) Take 1 tablet by mouth Daily.     • fluticasone (FLONASE) 50 MCG/ACT nasal spray 1 spray into the nostril(s) as directed by provider Every 12 (Twelve) Hours.     • LANTUS SOLOSTAR 100 UNIT/ML injection pen Per sliding scale     • lisinopril (PRINIVIL,ZESTRIL) 10 MG tablet Take 10 mg by mouth.     • LORazepam (ATIVAN) 1 MG tablet Take 1 mg by mouth As Needed for Anxiety.     • Misc Natural Products (GINSENG-COMPLEX PO) Take 1 tablet by mouth Daily.     • montelukast  (SINGULAIR) 10 MG tablet Take 10 mg by mouth Every Night.     • pantoprazole (PROTONIX) 40 MG EC tablet Take 40 mg by mouth Daily.     • potassium chloride (K-DUR,KLOR-CON) 10 MEQ CR tablet Take 10 mEq by mouth Daily.     • prochlorperazine (COMPAZINE) 10 MG tablet Take 1 tablet by mouth Every 6 (Six) Hours As Needed for Nausea or Vomiting. 60 tablet 1       PMH:   Past Medical History:   Diagnosis Date   • Anxiety    • Depression    • Diabetes mellitus (CMS/HCC)    • Hiatal hernia    • Hypertension    • Malignant neoplasm of overlapping sites of right breast in female, estrogen receptor positive (CMS/HCC) 1/21/2020   • PCOS (polycystic ovarian syndrome)    • Wears glasses      PSH:    Past Surgical History:   Procedure Laterality Date   • BREAST BIOPSY Right 03/2019    axillary lymph node bx/fna   • CHOLECYSTECTOMY  03/2010   • COLONOSCOPY  2016   • US GUIDED FINE NEEDLE ASPIRATION  1/15/2020   • VENOUS ACCESS DEVICE (PORT) INSERTION Left 01/31/2020       Immunization History:  Influenza: yes  Pneumococcal: no  Tetanus: yes    Social History:   Tobacco:   Social History     Tobacco Use   Smoking Status Current Every Day Smoker   • Packs/day: 1.00   • Years: 20.00   • Pack years: 20.00   • Types: Cigarettes   Smokeless Tobacco Never Used      Alcohol:     Social History     Substance and Sexual Activity   Alcohol Use Yes    Comment: occasionally a couple times a month       Vitals:           There were no vitals taken for this visit.    Physical Exam:  General Appearance:    Alert, cooperative, no distress, appears stated age   Head:    Normocephalic, without obvious abnormality, atraumatic   Lungs:     Clear to auscultation bilaterally, respirations unlabored    Heart:   Regular rate and rhythm, S1 and S2 normal, no murmur, rub    or gallop    Abdomen:    Soft, nontender.  +bowel sounds   Breast Exam:    deferred   Genitalia:    deferred   Extremities:   Extremities normal, atraumatic, no cyanosis or edema   Skin:    Multiple erythematous lesions to her back with obvious punctum, the largest measures about 2-3 cm. No cellulitis.    Neurologic:   Grossly intact   Results Review  LABS:  Lab Results   Component Value Date    WBC 12.26 (H) 06/21/2020    HGB 14.7 06/21/2020    HCT 43.7 06/21/2020    MCV 97.1 (H) 06/21/2020     06/21/2020    NEUTROABS 8.00 (H) 06/10/2020    GLUCOSE 303 (H) 06/21/2020    BUN 12 06/21/2020    CREATININE 0.53 (L) 06/21/2020    EGFRIFNONA 126 06/21/2020     06/21/2020    K 4.1 06/21/2020     06/21/2020    CO2 25.0 06/21/2020    CALCIUM 9.6 06/21/2020    ALBUMIN 4.00 06/21/2020    AST 20 06/21/2020    ALT 20 06/21/2020    BILITOT 0.4 06/21/2020       RADIOLOGY:  Imaging Results (Last 72 Hours)     ** No results found for the last 72 hours. **          I reviewed the patient's new clinical results.    Cancer Staging (if applicable)  Cancer Patient: _x_ yes; T2N0M0    Impression:   1. Right breast cancer  2. Clot in infusaport  3. Hidradenitis with lesions on back     Plan:   1. Right mastectomy with right sentinel node biopsy  2. Port removal and placement of new port   Patient was seen in the office and risks and benefits were discussed at that time. Please see office note for details.       ROBBIN Vang   6/23/2020   07:01

## 2020-06-23 NOTE — PLAN OF CARE
Problem: Patient Care Overview  Goal: Plan of Care Review  Outcome: Ongoing (interventions implemented as appropriate)  Flowsheets  Taken 6/23/2020 1938  Progress: no change  Taken 6/23/2020 1800  Plan of Care Reviewed With: patient;spouse  Goal: Individualization and Mutuality  Outcome: Ongoing (interventions implemented as appropriate)  Goal: Discharge Needs Assessment  Outcome: Ongoing (interventions implemented as appropriate)  Goal: Interprofessional Rounds/Family Conf  Outcome: Ongoing (interventions implemented as appropriate)     Problem: Breast Surgery/Reconstruction (Adult)  Goal: Signs and Symptoms of Listed Potential Problems Will be Absent, Minimized or Managed (Breast Surgery/Reconstruction)  Outcome: Ongoing (interventions implemented as appropriate)  Goal: Anesthesia/Sedation Recovery  Outcome: Ongoing (interventions implemented as appropriate)

## 2020-06-23 NOTE — ANESTHESIA PREPROCEDURE EVALUATION
Anesthesia Evaluation     Patient summary reviewed and Nursing notes reviewed   NPO Solid Status: > 8 hours  NPO Liquid Status: > 8 hours           Airway   Mallampati: II  TM distance: >3 FB  Neck ROM: full  No difficulty expected  Dental      Pulmonary - normal exam   Cardiovascular - normal exam  Exercise tolerance: good (4-7 METS)    Rhythm: regular        Neuro/Psych  GI/Hepatic/Renal/Endo      Musculoskeletal     Abdominal    Substance History      OB/GYN          Other                      Anesthesia Plan    ASA 3     general     intravenous induction     Anesthetic plan, all risks, benefits, and alternatives have been provided, discussed and informed consent has been obtained with: patient.    pecs block post induction

## 2020-06-23 NOTE — ANESTHESIA PROCEDURE NOTES
Airway  Urgency: elective    Date/Time: 6/23/2020 7:30 AM  Airway not difficult    General Information and Staff    Patient location during procedure: OR  CRNA: Sergei Santos CRNA    Indications and Patient Condition  Indications for airway management: airway protection    Preoxygenated: yes  MILS not maintained throughout  Mask difficulty assessment: 1 - vent by mask    Final Airway Details  Final airway type: endotracheal airway      Successful airway: ETT  Cuffed: yes   Successful intubation technique: direct laryngoscopy  Endotracheal tube insertion site: oral  Blade: Cabrera  Blade size: 2  ETT size (mm): 7.0  Cormack-Lehane Classification: grade I - full view of glottis  Placement verified by: chest auscultation and capnometry   Cuff volume (mL): 5  Measured from: lips  ETT/EBT  to lips (cm): 21  Number of attempts at approach: 1    Additional Comments  Negative epigastric sounds, Breath sound equal bilaterally with symmetric chest rise and fall

## 2020-06-23 NOTE — OP NOTE
Operative Note    Date of Surgery:  6/23/2020    Pre-Operative Diagnosis: Right breast malignancy at 9 o'clock position                                                Clotted Lisa port    Post-Operative Diagnosis: Same    Procedure: Replacement of left subclavian view port                       Right sentinel lymph node injection                       Right skin sparing mastectomy                       Right deep subfascial sentinel lymph node biopsy    Anesthesia: General    Surgeon:  Rachel Baker MD    Assistant: ROBBIN Jorge    Estimated Blood Loss: Very minimal    Findings: 2 right sentinel lymph nodes negative for metastases    Complications: None      Indication for Procedure: Mrs. Rodriguez is a 42-year-old pleasant lady who presented with a palpable right breast mass at 9 o'clock position with work-up remarkable for T2 N0 M0 triple positive malignancy.  She received neoadjuvant chemotherapy however she did not tolerated well.  She also had difficulty with the port.  It was easy to flush it however difficult to draw blood back.  She presents today for the above procedure.    Procedure: Patient was taken operative anesthesia placed supine on the table.  Following induction of general trach anesthesia, SCDs were placed.  She received 2 g of Kefzol IV.  Anesthesia placed ultrasound-guided right pectoralis nerve block.  540 mCi of radioactive technetium was injected in the right subareolar region.  The chest and neck were then prepped and draped in a sterile fashion.  Timeout was observed.  Patient was placed in Trendelenburg position.  A small incision was made at the previous left subclavian port site and dissection was carried down to expose the port and the catheter.  The port was detached from the catheter.  The wire was advanced through the catheter with no difficulty.  The tip was noted to be in the SVC.  The catheter was removed.  The dilator and the sheath were then advanced over the wire with  fluoroscopy.  The dilator and the wire were removed and the catheter which was attached to the port and measured to appropriate length was threaded through the sheath with no difficulty.  The tip was noted to be in the SVC.  It flushed well with heparinized saline with good blood return noted.  The overlying subcutaneous tissue was approximated with running 2 Vicryl suture and the skin incisions approximated with 4-0 Monocryl subcuticular suture.  The right breast, chest, axilla and upper arm were then prepped and draped in a sterile fashion.  A transverse optical incision was made encompassing the areole and nipple complex.  A superior then inferior flaps were raised dissecting the breast tissue away from the flaps down to where the muscle was exposed, maintaining adequate thickness of the flaps.  Once in the axilla we identified 2 deep subfascial sentinel lymph nodes that had very high radioactive count.  These were excised and sent to pathology for frozen section and noted to be negative for metastases.  The breast was removed of the underlying pectoralis major muscle taking the fascia along with the specimen.  Larger vessels of the breast were ligated with 3-0 silk suture.  The breast was removed as a whole, marked with a silk suture laterally, weight and sent fresh to pathology.  The wound was then irrigated with water with clear return fluid noted.  15 Prydeinig round Cricket-Roberts drain was placed percutaneously and anchored to the skin with 2-0 silk suture.  The subcutaneous tissue was then approximated with interrupted 3 Vicryl sutures and the skin incision was approximated with staples.  13 cm Prevena appliance was placed.  The patient tolerated the procedure well with no complications.  She was extubated and taken to the recovery room in a stable condition.  Sponge count and needle count were correct at the end of the procedure.  Chest x-ray is pending.    Rachel Baker MD  06/23/20  16:39

## 2020-06-23 NOTE — PROGRESS NOTES
"Patient Name:  Josefina Rodriguez  YOB: 1975  [id number]    Surgery Post - Operative Note    Date of visit: 6/23/2020    Subjective   Subjective: Feels Ok, pain controlled.       Objective     Objective:    /90   Pulse 80   Temp 97.5 °F (36.4 °C) (Temporal)   Resp 16   Ht 165.1 cm (65\")   Wt 86.2 kg (190 lb)   SpO2 95%   BMI 31.62 kg/m²     CV:  Rate  regular and rhythm  regular  L:  Clear  to auscultation bilaterally. Vac in place, Port site c/d/i  ABD:  Soft, nontender  EXT:  No cyanosis, clubbing or edema         Assessment/Plan     Assessment/ Plan: Doing well after R mastectomy and portacath . Continue Pulmonary toilet    Hospital Problem List     Malignant neoplasm of overlapping sites of right female breast (CMS/HCC)              Tommy Scott MD  6/23/2020  17:21    "

## 2020-06-23 NOTE — BRIEF OP NOTE
BREAST MASTECTOMY WITH SENTINEL NODE BIOPSY  Progress Note    Josefina Rodriguez  6/23/2020    Pre-op Diagnosis:   Right breast cancer  Clotted port       Post-Op Diagnosis Codes:     * Malignant neoplasm of overlapping sites of right female breast [C50.811]    Procedure/CPT® Codes:      Procedure(s):  RIGHT  MASTECTOMY WITH SENTINEL NODE BIOPSY, PORT REPLACEMENT    Surgeon(s):  Rachel Baker MD    Anesthesia: General    Staff:   Circulator: Sapphire Ratliff RN  Scrub Person: Trish Krishna Casey E  Assistant: Sergei Olivares PA-C    Estimated Blood Loss: 100 mL    Urine Voided: * No values recorded between 6/23/2020  7:25 AM and 6/23/2020  9:40 AM *    Specimens:                Specimens     ID Source Type Tests Collected By Collected At Frozen?      A Tigrett Lymph Node Tissue · TISSUE PATHOLOGY EXAM   Rachel Baker MD 6/23/20 0848 Yes     Description: right sentinel node    B Breast, Right Tissue · TISSUE PATHOLOGY EXAM   Rachel Baker MD 6/23/20 0916 No     Description: suture is lateral                Drains:   Closed/Suction Drain 1 Right Breast Bulb 15 Fr. (Active)       Findings: 2 right SLN negative    Complications: none      Rachel Baker MD     Date: 6/23/2020  Time: 09:44

## 2020-06-23 NOTE — ANESTHESIA PROCEDURE NOTES
Peripheral Block      Patient reassessed immediately prior to procedure    Patient location during procedure: OR  Reason for block: at surgeon's request and post-op pain management  Performed by  CRNA: Sergei Santos CRNA  Preanesthetic Checklist  Completed: patient identified, site marked, surgical consent, pre-op evaluation, timeout performed, IV checked, risks and benefits discussed and monitors and equipment checked  Prep:  Pt Position: supine  Sterile barriers:cap, gloves, gown and mask  Prep: ChloraPrep  Patient monitoring: blood pressure monitoring, continuous pulse oximetry and EKG  Procedure  Performed under: general  Guidance:ultrasound guided and landmark technique  Images:still images obtained, printed/placed on chart    Laterality:right  Block Type:PECS I and PECS II  Injection Technique:single-shot  Needle Type:short-bevel  Needle Gauge:20 G  Resistance on Injection: none    Medications Used: buprenorphine (BUPRENEX) injection, 0.3 mg  dexamethasone sodium phosphate injection, 2 mg  bupivacaine PF (MARCAINE) 0.25 % injection, 30 mL  Med admintered at 6/23/2020 7:28 AM      Medications  Preservative Free Saline:10ml  Comment:Block Injection:  Total volume of LA divided between Right and Left sided blocks         Post Assessment  Injection Assessment: negative aspiration for heme and incremental injection  Patient Tolerance:comfortable throughout block  Complications:no  Additional Notes  The pt. Was placed in the Supine Position and GA was induced     The insertion site was prepped with CHG and Ultrasound guidance with In-Plane techniquewas  a 4inch BBraun 360 degree echogenic needle was visualized.  Normal Saline PSF was  utilized for hydrodissection of tissue. PECS 1 Block- Pectoralis Major and Minor where identified and LA was injected between PMM and PmM at the level of the 3rd Rib(10ml),  PECS 2-  Pectoralis Minor and Serratus muscle where identified and the needle was advanced laterally in-plane  with the 4th rib as a backstop, pleura was monitored.  LA was injected between SA and PmM at the level of 4th rib( 20ml).  LA injection spread was visualized, injection was incremental 1-5ml, normal or low injection pressure, no intravascular injection, no pneumothorax appreciated.  Thank You.

## 2020-06-23 NOTE — ANESTHESIA POSTPROCEDURE EVALUATION
Patient: Josefina Rodriguez    Procedure Summary     Date:  06/23/20 Room / Location:   [facility]    Anesthesia Start:  0725 Anesthesia Stop:      Procedure:  RIGHT  MASTECTOMY WITH SENTINEL NODE BIOPSY, PORT PLACEMENT (Right Breast) Diagnosis:  Malignant neoplasm of overlapping sites of right female breast    Surgeon:  Rachel Baker MD Provider:  Rajesh Alva MD    Anesthesia Type:  general ASA Status:  3          Anesthesia Type: general    Vitals  Vitals Value Taken Time   BP     Temp     Pulse     Resp     SpO2 91 % 6/23/2020  9:52 AM   Vitals shown include unvalidated device data.        Post Anesthesia Care and Evaluation    Patient location during evaluation: PACU  Patient participation: complete - patient participated  Level of consciousness: awake and alert  Pain score: 0  Pain management: adequate  Airway patency: patent  Anesthetic complications: No anesthetic complications  PONV Status: none  Cardiovascular status: hemodynamically stable and acceptable  Respiratory status: nonlabored ventilation, acceptable and nasal cannula  Hydration status: acceptable

## 2020-06-24 VITALS
BODY MASS INDEX: 31.65 KG/M2 | WEIGHT: 190 LBS | SYSTOLIC BLOOD PRESSURE: 130 MMHG | DIASTOLIC BLOOD PRESSURE: 71 MMHG | TEMPERATURE: 97.6 F | HEART RATE: 81 BPM | RESPIRATION RATE: 18 BRPM | HEIGHT: 65 IN | OXYGEN SATURATION: 94 %

## 2020-06-24 LAB — GLUCOSE BLDC GLUCOMTR-MCNC: 298 MG/DL (ref 70–130)

## 2020-06-24 PROCEDURE — 25010000003 CEFAZOLIN IN DEXTROSE 2-4 GM/100ML-% SOLUTION: Performed by: SURGERY

## 2020-06-24 PROCEDURE — 63710000001 INSULIN LISPRO (HUMAN) PER 5 UNITS: Performed by: SURGERY

## 2020-06-24 PROCEDURE — 82962 GLUCOSE BLOOD TEST: CPT

## 2020-06-24 RX ORDER — HEPARIN SODIUM (PORCINE) LOCK FLUSH IV SOLN 100 UNIT/ML 100 UNIT/ML
5 SOLUTION INTRAVENOUS AS NEEDED
Status: DISCONTINUED | OUTPATIENT
Start: 2020-06-24 | End: 2020-06-24 | Stop reason: HOSPADM

## 2020-06-24 RX ADMIN — OXYCODONE 5 MG: 5 TABLET ORAL at 10:19

## 2020-06-24 RX ADMIN — CEFAZOLIN SODIUM 2 G: 2 INJECTION, SOLUTION INTRAVENOUS at 05:31

## 2020-06-24 RX ADMIN — PANTOPRAZOLE SODIUM 40 MG: 40 TABLET, DELAYED RELEASE ORAL at 05:31

## 2020-06-24 RX ADMIN — INSULIN LISPRO 8 UNITS: 100 INJECTION, SOLUTION INTRAVENOUS; SUBCUTANEOUS at 09:39

## 2020-06-24 RX ADMIN — ACETAMINOPHEN 1000 MG: 500 TABLET, FILM COATED ORAL at 05:31

## 2020-06-24 NOTE — PROGRESS NOTES
"Josefina Samira Jondridge  1975  [id number]    Surgery Progress Note    Date of visit: 6/24/2020    Subjective: Right mastectomy incisional pain is well controlled    Objective:    /63 (BP Location: Left leg, Patient Position: Lying)   Pulse 79   Temp 98 °F (36.7 °C) (Oral)   Resp 18   Ht 165.1 cm (65\")   Wt 86.2 kg (190 lb)   SpO2 94%   BMI 31.62 kg/m²     Intake/Output Summary (Last 24 hours) at 6/24/2020 0806  Last data filed at 6/24/2020 0535  Gross per 24 hour   Intake 1780 ml   Output 200 ml   Net 1580 ml       CV: Regular rate and rhythm  L: normal air entry  BREAST: Right mastectomy dressing is intact  Flaps are viable  Cricket-Roberts drainage is adequate        LABS:    Results from last 7 days   Lab Units 06/21/20  1015   WBC 10*3/mm3 12.26*   HEMOGLOBIN g/dL 14.7   HEMATOCRIT % 43.7   PLATELETS 10*3/mm3 254     Results from last 7 days   Lab Units 06/21/20  1015   SODIUM mmol/L 138   POTASSIUM mmol/L 4.1   CHLORIDE mmol/L 100   CO2 mmol/L 25.0   BUN mg/dL 12   CREATININE mg/dL 0.53*   CALCIUM mg/dL 9.6   BILIRUBIN mg/dL 0.4   ALK PHOS U/L 138*   ALT (SGPT) U/L 20   AST (SGOT) U/L 20   GLUCOSE mg/dL 303*     Results from last 7 days   Lab Units 06/21/20  1015   SODIUM mmol/L 138   POTASSIUM mmol/L 4.1   CHLORIDE mmol/L 100   CO2 mmol/L 25.0   BUN mg/dL 12   CREATININE mg/dL 0.53*   GLUCOSE mg/dL 303*   CALCIUM mg/dL 9.6     No results found for: LIPASE      Assessment/ Plan: Stable course status post right skin sparing mastectomy and sentinel lymph node biopsy with replacement of left subclavian port  Patient is progressing well  Instructions were given to her  Tylenol 1000 mg p.o. 3 times daily for 4 days  Oxycodone as needed  Home today  Follow-up in 1 week at the cancer clinic    Problem List Items Addressed This Visit        Other    Malignant neoplasm of overlapping sites of right female breast (CMS/HCC)    Relevant Orders    Tissue Pathology Exam (Completed)            Rachel HUNG" MD Samuel  6/24/2020  08:06

## 2020-06-24 NOTE — PLAN OF CARE
Problem: Patient Care Overview  Goal: Plan of Care Review  Outcome: Ongoing (interventions implemented as appropriate)  Flowsheets (Taken 6/24/2020 0312)  Progress: improving  Plan of Care Reviewed With: patient  Outcome Summary: VSS. 2L NC. Pt has ambulated in room. Complains of pain, prn meds given. Pt surgical site looks good, woubnd vac in place. Will continue to monitor.

## 2020-07-01 ENCOUNTER — HOSPITAL ENCOUNTER (OUTPATIENT)
Dept: ONCOLOGY | Facility: HOSPITAL | Age: 45
Setting detail: INFUSION SERIES
Discharge: HOME | End: 2020-07-01

## 2020-07-01 ENCOUNTER — OFFICE VISIT (OUTPATIENT)
Dept: ONCOLOGY | Facility: CLINIC | Age: 45
End: 2020-07-01

## 2020-07-01 VITALS
SYSTOLIC BLOOD PRESSURE: 204 MMHG | TEMPERATURE: 97.4 F | HEIGHT: 65 IN | RESPIRATION RATE: 24 BRPM | DIASTOLIC BLOOD PRESSURE: 100 MMHG | BODY MASS INDEX: 31.99 KG/M2 | OXYGEN SATURATION: 97 % | WEIGHT: 192 LBS | HEART RATE: 92 BPM

## 2020-07-01 DIAGNOSIS — Z17.0 MALIGNANT NEOPLASM OF OVERLAPPING SITES OF RIGHT BREAST IN FEMALE, ESTROGEN RECEPTOR POSITIVE (HCC): Primary | ICD-10-CM

## 2020-07-01 DIAGNOSIS — C50.811 MALIGNANT NEOPLASM OF OVERLAPPING SITES OF RIGHT BREAST IN FEMALE, ESTROGEN RECEPTOR POSITIVE (HCC): Primary | ICD-10-CM

## 2020-07-01 DIAGNOSIS — Z95.828 PORT-A-CATH IN PLACE: Primary | ICD-10-CM

## 2020-07-01 DIAGNOSIS — Z17.0 MALIGNANT NEOPLASM OF OVERLAPPING SITES OF RIGHT BREAST IN FEMALE, ESTROGEN RECEPTOR POSITIVE (HCC): ICD-10-CM

## 2020-07-01 DIAGNOSIS — C50.811 MALIGNANT NEOPLASM OF OVERLAPPING SITES OF RIGHT BREAST IN FEMALE, ESTROGEN RECEPTOR POSITIVE (HCC): ICD-10-CM

## 2020-07-01 LAB
ALBUMIN SERPL-MCNC: 3.6 G/DL (ref 3.5–5.2)
ALBUMIN/GLOB SERPL: 0.9 G/DL
ALP SERPL-CCNC: 137 U/L (ref 39–117)
ALT SERPL W P-5'-P-CCNC: 21 U/L (ref 1–33)
ANION GAP SERPL CALCULATED.3IONS-SCNC: 11 MMOL/L (ref 5–15)
AST SERPL-CCNC: 15 U/L (ref 1–32)
BILIRUB SERPL-MCNC: 0.2 MG/DL (ref 0.2–1.2)
BUN SERPL-MCNC: 11 MG/DL (ref 6–20)
BUN/CREAT SERPL: 17.7 (ref 7–25)
CALCIUM SPEC-SCNC: 9.4 MG/DL (ref 8.6–10.5)
CHLORIDE SERPL-SCNC: 98 MMOL/L (ref 98–107)
CO2 SERPL-SCNC: 28 MMOL/L (ref 22–29)
CREAT SERPL-MCNC: 0.62 MG/DL (ref 0.57–1)
CYTO UR: NORMAL
ERYTHROCYTE [DISTWIDTH] IN BLOOD BY AUTOMATED COUNT: 13.4 % (ref 12.3–15.4)
GFR SERPL CREATININE-BSD FRML MDRD: 105 ML/MIN/1.73
GLOBULIN UR ELPH-MCNC: 4.1 GM/DL
GLUCOSE SERPL-MCNC: 309 MG/DL (ref 65–99)
HCT VFR BLD AUTO: 41.8 % (ref 34–46.6)
HGB BLD-MCNC: 13 G/DL (ref 12–15.9)
LAB AP CASE REPORT: NORMAL
LAB AP CLINICAL INFORMATION: NORMAL
LAB AP SPECIAL STAINS: NORMAL
LYMPHOCYTES # BLD AUTO: 2.6 10*3/MM3 (ref 0.7–3.1)
LYMPHOCYTES NFR BLD AUTO: 22 % (ref 19.6–45.3)
Lab: NORMAL
MCH RBC QN AUTO: 30.5 PG (ref 26.6–33)
MCHC RBC AUTO-ENTMCNC: 31.2 G/DL (ref 31.5–35.7)
MCV RBC AUTO: 97.9 FL (ref 79–97)
MONOCYTES # BLD AUTO: 0.7 10*3/MM3 (ref 0.1–0.9)
MONOCYTES NFR BLD AUTO: 5.7 % (ref 5–12)
NEUTROPHILS NFR BLD AUTO: 72.3 % (ref 42.7–76)
NEUTROPHILS NFR BLD AUTO: 8.7 10*3/MM3 (ref 1.7–7)
PATH REPORT.ADDENDUM SPEC: NORMAL
PATH REPORT.FINAL DX SPEC: NORMAL
PATH REPORT.GROSS SPEC: NORMAL
PLATELET # BLD AUTO: 361 10*3/MM3 (ref 140–450)
PMV BLD AUTO: 6.5 FL (ref 6–12)
POTASSIUM SERPL-SCNC: 3.6 MMOL/L (ref 3.5–5.2)
PROT SERPL-MCNC: 7.7 G/DL (ref 6–8.5)
RBC # BLD AUTO: 4.27 10*6/MM3 (ref 3.77–5.28)
SODIUM SERPL-SCNC: 137 MMOL/L (ref 136–145)
WBC # BLD AUTO: 12 10*3/MM3 (ref 3.4–10.8)

## 2020-07-01 PROCEDURE — 36593 DECLOT VASCULAR DEVICE: CPT

## 2020-07-01 PROCEDURE — 80053 COMPREHEN METABOLIC PANEL: CPT | Performed by: INTERNAL MEDICINE

## 2020-07-01 PROCEDURE — 25010000002 ALTEPLASE 2 MG RECONSTITUTED SOLUTION: Performed by: INTERNAL MEDICINE

## 2020-07-01 PROCEDURE — 99214 OFFICE O/P EST MOD 30 MIN: CPT | Performed by: INTERNAL MEDICINE

## 2020-07-01 PROCEDURE — 25010000002 PERTUZUMAB 420 MG/14ML SOLUTION 420 MG VIAL: Performed by: INTERNAL MEDICINE

## 2020-07-01 PROCEDURE — 25010000003 HEPARIN LOCK FLUSH PER 10 UNITS: Performed by: INTERNAL MEDICINE

## 2020-07-01 PROCEDURE — 96413 CHEMO IV INFUSION 1 HR: CPT

## 2020-07-01 PROCEDURE — 85025 COMPLETE CBC W/AUTO DIFF WBC: CPT | Performed by: INTERNAL MEDICINE

## 2020-07-01 PROCEDURE — 96417 CHEMO IV INFUS EACH ADDL SEQ: CPT

## 2020-07-01 PROCEDURE — 25010000002 TRASTUZUMAB-ANNS 420 MG RECONSTITUTED SOLUTION 1 EACH VIAL: Performed by: INTERNAL MEDICINE

## 2020-07-01 RX ORDER — HEPARIN SODIUM (PORCINE) LOCK FLUSH IV SOLN 100 UNIT/ML 100 UNIT/ML
500 SOLUTION INTRAVENOUS AS NEEDED
Status: DISCONTINUED | OUTPATIENT
Start: 2020-07-01 | End: 2020-07-02 | Stop reason: HOSPADM

## 2020-07-01 RX ORDER — HEPARIN SODIUM (PORCINE) LOCK FLUSH IV SOLN 100 UNIT/ML 100 UNIT/ML
500 SOLUTION INTRAVENOUS AS NEEDED
Status: CANCELLED | OUTPATIENT
Start: 2020-07-01

## 2020-07-01 RX ORDER — SODIUM CHLORIDE 9 MG/ML
250 INJECTION, SOLUTION INTRAVENOUS ONCE
Status: CANCELLED | OUTPATIENT
Start: 2020-07-01

## 2020-07-01 RX ORDER — GABAPENTIN 300 MG/1
300 CAPSULE ORAL
Qty: 30 CAPSULE | Refills: 0 | Status: SHIPPED | OUTPATIENT
Start: 2020-07-01 | End: 2020-08-05

## 2020-07-01 RX ORDER — OXYCODONE HYDROCHLORIDE 5 MG/1
TABLET ORAL
COMMUNITY
Start: 2020-06-24 | End: 2020-09-14

## 2020-07-01 RX ADMIN — TRASTUZUMAB-ANNS 530 MG: 420 INJECTION, POWDER, LYOPHILIZED, FOR SOLUTION INTRAVENOUS at 12:48

## 2020-07-01 RX ADMIN — ALTEPLASE: 2.2 INJECTION, POWDER, LYOPHILIZED, FOR SOLUTION INTRAVENOUS at 11:50

## 2020-07-01 RX ADMIN — PERTUZUMAB 420 MG: 30 INJECTION, SOLUTION, CONCENTRATE INTRAVENOUS at 11:43

## 2020-07-01 RX ADMIN — ALTEPLASE: 2.2 INJECTION, POWDER, LYOPHILIZED, FOR SOLUTION INTRAVENOUS at 09:50

## 2020-07-01 RX ADMIN — HEPARIN 500 UNITS: 100 SYRINGE at 13:20

## 2020-07-21 DIAGNOSIS — Z17.0 MALIGNANT NEOPLASM OF OVERLAPPING SITES OF RIGHT BREAST IN FEMALE, ESTROGEN RECEPTOR POSITIVE (HCC): ICD-10-CM

## 2020-07-21 DIAGNOSIS — C50.811 MALIGNANT NEOPLASM OF OVERLAPPING SITES OF RIGHT BREAST IN FEMALE, ESTROGEN RECEPTOR POSITIVE (HCC): ICD-10-CM

## 2020-07-21 RX ORDER — SODIUM CHLORIDE 9 MG/ML
250 INJECTION, SOLUTION INTRAVENOUS ONCE
Status: CANCELLED | OUTPATIENT
Start: 2020-07-22

## 2020-07-22 ENCOUNTER — HOSPITAL ENCOUNTER (OUTPATIENT)
Dept: ONCOLOGY | Facility: HOSPITAL | Age: 45
Setting detail: INFUSION SERIES
Discharge: HOME | End: 2020-07-22

## 2020-07-22 ENCOUNTER — EDUCATION (OUTPATIENT)
Dept: ONCOLOGY | Facility: HOSPITAL | Age: 45
End: 2020-07-22

## 2020-07-22 ENCOUNTER — TELEPHONE (OUTPATIENT)
Dept: ONCOLOGY | Facility: CLINIC | Age: 45
End: 2020-07-22

## 2020-07-22 ENCOUNTER — HOSPITAL ENCOUNTER (OUTPATIENT)
Dept: ONCOLOGY | Facility: HOSPITAL | Age: 45
Discharge: HOME | End: 2020-07-22

## 2020-07-22 ENCOUNTER — DOCUMENTATION (OUTPATIENT)
Dept: NUTRITION | Facility: HOSPITAL | Age: 45
End: 2020-07-22

## 2020-07-22 VITALS
RESPIRATION RATE: 20 BRPM | HEIGHT: 65 IN | DIASTOLIC BLOOD PRESSURE: 89 MMHG | WEIGHT: 194 LBS | HEART RATE: 93 BPM | BODY MASS INDEX: 32.32 KG/M2 | TEMPERATURE: 98 F | SYSTOLIC BLOOD PRESSURE: 161 MMHG

## 2020-07-22 DIAGNOSIS — C50.811 MALIGNANT NEOPLASM OF OVERLAPPING SITES OF RIGHT BREAST IN FEMALE, ESTROGEN RECEPTOR POSITIVE (HCC): Primary | ICD-10-CM

## 2020-07-22 DIAGNOSIS — Z17.0 MALIGNANT NEOPLASM OF OVERLAPPING SITES OF RIGHT BREAST IN FEMALE, ESTROGEN RECEPTOR POSITIVE (HCC): Primary | ICD-10-CM

## 2020-07-22 LAB
ALBUMIN SERPL-MCNC: 3.9 G/DL (ref 3.5–5.2)
ALBUMIN/GLOB SERPL: 1.1 G/DL
ALP SERPL-CCNC: 152 U/L (ref 39–117)
ALT SERPL W P-5'-P-CCNC: 24 U/L (ref 1–33)
ANION GAP SERPL CALCULATED.3IONS-SCNC: 13 MMOL/L (ref 5–15)
AST SERPL-CCNC: 20 U/L (ref 1–32)
BILIRUB SERPL-MCNC: 0.3 MG/DL (ref 0–1.2)
BUN SERPL-MCNC: 9 MG/DL (ref 6–20)
BUN/CREAT SERPL: 17.3 (ref 7–25)
CALCIUM SPEC-SCNC: 9.5 MG/DL (ref 8.6–10.5)
CHLORIDE SERPL-SCNC: 101 MMOL/L (ref 98–107)
CO2 SERPL-SCNC: 23 MMOL/L (ref 22–29)
CREAT SERPL-MCNC: 0.52 MG/DL (ref 0.57–1)
ERYTHROCYTE [DISTWIDTH] IN BLOOD BY AUTOMATED COUNT: 13.3 % (ref 12.3–15.4)
GFR SERPL CREATININE-BSD FRML MDRD: 129 ML/MIN/1.73
GLOBULIN UR ELPH-MCNC: 3.4 GM/DL
GLUCOSE SERPL-MCNC: 314 MG/DL (ref 65–99)
HCT VFR BLD AUTO: 47.3 % (ref 34–46.6)
HGB BLD-MCNC: 14.9 G/DL (ref 12–15.9)
LYMPHOCYTES # BLD AUTO: 2 10*3/MM3 (ref 0.7–3.1)
LYMPHOCYTES NFR BLD AUTO: 16.9 % (ref 19.6–45.3)
MCH RBC QN AUTO: 30.1 PG (ref 26.6–33)
MCHC RBC AUTO-ENTMCNC: 31.5 G/DL (ref 31.5–35.7)
MCV RBC AUTO: 95.8 FL (ref 79–97)
MONOCYTES # BLD AUTO: 0.2 10*3/MM3 (ref 0.1–0.9)
MONOCYTES NFR BLD AUTO: 1.5 % (ref 5–12)
NEUTROPHILS NFR BLD AUTO: 81.6 % (ref 42.7–76)
NEUTROPHILS NFR BLD AUTO: 9.7 10*3/MM3 (ref 1.7–7)
PLATELET # BLD AUTO: 211 10*3/MM3 (ref 140–450)
PMV BLD AUTO: 7.5 FL (ref 6–12)
POTASSIUM SERPL-SCNC: 3.8 MMOL/L (ref 3.5–5.2)
PROT SERPL-MCNC: 7.3 G/DL (ref 6–8.5)
RBC # BLD AUTO: 4.94 10*6/MM3 (ref 3.77–5.28)
SODIUM SERPL-SCNC: 137 MMOL/L (ref 136–145)
WBC # BLD AUTO: 11.9 10*3/MM3 (ref 3.4–10.8)

## 2020-07-22 PROCEDURE — 25010000003 HEPARIN LOCK FLUSH PER 10 UNITS: Performed by: INTERNAL MEDICINE

## 2020-07-22 PROCEDURE — 25010000002 LEUPROLIDE ACETATE (3 MONTH) PER 3.75 MG: Performed by: NURSE PRACTITIONER

## 2020-07-22 PROCEDURE — 96402 CHEMO HORMON ANTINEOPL SQ/IM: CPT

## 2020-07-22 PROCEDURE — 25010000002 ALTEPLASE 2 MG RECONSTITUTED SOLUTION: Performed by: INTERNAL MEDICINE

## 2020-07-22 PROCEDURE — 96375 TX/PRO/DX INJ NEW DRUG ADDON: CPT

## 2020-07-22 PROCEDURE — 85025 COMPLETE CBC W/AUTO DIFF WBC: CPT | Performed by: NURSE PRACTITIONER

## 2020-07-22 PROCEDURE — 80053 COMPREHEN METABOLIC PANEL: CPT | Performed by: NURSE PRACTITIONER

## 2020-07-22 PROCEDURE — 25010000002 DEXAMETHASONE SODIUM PHOSPHATE 20 MG/5ML SOLUTION: Performed by: NURSE PRACTITIONER

## 2020-07-22 PROCEDURE — 96413 CHEMO IV INFUSION 1 HR: CPT

## 2020-07-22 PROCEDURE — 96372 THER/PROPH/DIAG INJ SC/IM: CPT

## 2020-07-22 PROCEDURE — 25010000002 ADO-TRASTUZUMAB 100 MG RECONSTITUTED SOLUTION 1 EACH VIAL: Performed by: NURSE PRACTITIONER

## 2020-07-22 PROCEDURE — 36593 DECLOT VASCULAR DEVICE: CPT

## 2020-07-22 PROCEDURE — 96415 CHEMO IV INFUSION ADDL HR: CPT

## 2020-07-22 RX ORDER — HEPARIN SODIUM (PORCINE) LOCK FLUSH IV SOLN 100 UNIT/ML 100 UNIT/ML
500 SOLUTION INTRAVENOUS AS NEEDED
Status: DISCONTINUED | OUTPATIENT
Start: 2020-07-22 | End: 2020-07-23 | Stop reason: HOSPADM

## 2020-07-22 RX ORDER — SODIUM CHLORIDE 9 MG/ML
250 INJECTION, SOLUTION INTRAVENOUS ONCE
Status: COMPLETED | OUTPATIENT
Start: 2020-07-22 | End: 2020-07-22

## 2020-07-22 RX ORDER — HEPARIN SODIUM (PORCINE) LOCK FLUSH IV SOLN 100 UNIT/ML 100 UNIT/ML
500 SOLUTION INTRAVENOUS AS NEEDED
Status: CANCELLED | OUTPATIENT
Start: 2020-07-22

## 2020-07-22 RX ADMIN — HEPARIN 500 UNITS: 100 SYRINGE at 14:15

## 2020-07-22 RX ADMIN — ALTEPLASE: 2.2 INJECTION, POWDER, LYOPHILIZED, FOR SOLUTION INTRAVENOUS at 13:15

## 2020-07-22 RX ADMIN — DEXAMETHASONE SODIUM PHOSPHATE 12 MG: 4 INJECTION, SOLUTION INTRAMUSCULAR; INTRAVENOUS at 10:45

## 2020-07-22 RX ADMIN — ADO-TRASTUZUMAB EMTANSINE 300 MG: 20 INJECTION, POWDER, LYOPHILIZED, FOR SOLUTION INTRAVENOUS at 11:07

## 2020-07-22 RX ADMIN — SODIUM CHLORIDE 250 ML: 9 INJECTION, SOLUTION INTRAVENOUS at 10:45

## 2020-07-22 RX ADMIN — LEUPROLIDE ACETATE 11.25 MG: KIT at 12:58

## 2020-07-22 NOTE — TELEPHONE ENCOUNTER
Patient called and stated that her daughter has came in contact with pinworms. She is wanting to know if or how this can effect her in anyway. The child has an appointment with peds on Tuesday. She is worried because she is on active chemo treatment. Could you give her a call and let her know.

## 2020-07-22 NOTE — PROGRESS NOTES
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  Note patient has started on adjuvant treatment plan due to residual disease.  Patient reports her nausea, taste changes, and diarrhea have all resolved.  She also reports her appetite has been pretty good.      Briefly discussed her history of diabetes.  She states her primary care doctor started her on Lantus to aid with blood glucose management.  Encouraged her to continue monitor her blood sugars closely and to call her primary care physician with any changes.    Reviewed the importance of good nutrition during her treatment course.  Advised her to be eating smaller more frequent meals/snacks throughout the day with an emphasis on high protein foods and adequate hydration.  Provided and reviewed written diet material \"Managing Nausea and Vomiting\" to aid with potential nutritional impact symptoms and \"Tips for Food Safety\" to answer her questions.    Answered her questions and she voiced understanding of information discussed.  Encouraged her to call RD with questions.  Will monitor as needed during her treatment course.    Irish Mak RD  07/22/20    "

## 2020-07-22 NOTE — TELEPHONE ENCOUNTER
I talked to Dr. Fields about this and he said since the patient daughter does not have pinworms, the patient herself should be okay.  I instructed her on the signs and symptoms of pinworms including itching in anal area, seeing worms in stool or rash around the anal area.

## 2020-07-22 NOTE — PLAN OF CARE
Outpatient Infusion • [address] [facility] • [address] • [place] [id number] • [phone]      CHEMOTHERAPY EDUCATION SHEET    NAME:  Josefina Rodriguez      : 1975           DATE: 20    Booklets Given: Chemotherapy and You []  Eating Hints []    Sexuality/Fertility Books []     Chemotherapy/Biotherapy Education Sheets: (list all that apply)  Ado-trastuzumab                                                                                                                                                                Chemotherapy Regimen:  Ado-trastuzumab 310 mg IV D1 Q21D    TOPICS EDUCATION PROVIDED EDUCATION REINFORCED COMMENTS   ANEMIA:  role of RBC, cause, s/s, ways to manage, role of transfusion [x]  []  Discussed role of RBC and risk for anemia. Discussed S&S of of low RBC and fatigue.    THROMBOCYTOPENIA:  role of platelet, cause, s/s, ways to prevent bleeding, things to avoid, when to seek help [x]  []  Discussed the role of PLT and increased risk of bleeding/ bruising. Emphasized to the patient when to contact MD; will be checking labs every 21 days at the start of each cycle   NEUTROPENIA:  role of WBC, cause, infection precautions, s/s of infection, when to call MD [x]  []  Discussed the role of WBC and infection risk. Reviewed neutropenic precautions, importance of good hand hygiene, and avoiding sick contacts. Instructed the patient to contact MD if temperature > 100.4 F   NUTRITION & APPETITE CHANGES:  importance of maintaining healthy diet & weight, ways to manage to improve intake, dietary consult, exercise regimen [x]  []  Discussed the risk of appetite changes and utilization of smaller, more frequent meals. Informed patient about the services of dieticians as well.   DIARRHEA:  causes, s/s of dehydration, ways to manage, dietary changes, when to call MD [x]  []  Discussed the risk of diarrha. Informed the patient they may use OTC loperamide, but should call MD if diarrhea persists  with 4-6 episodes within 24 hours despite OTC use.      CONSTIPATION:  causes, ways to manage, dietary changes, when to call MD [x]  []  Discussed the risk of constipation from antiemetic regimen. Reviewed OTC use of stool softeners and stimulants if the problem were to present.   NAUSEA & VOMITING:  cause, use of antiemetics, dietary changes, when to call MD [x]  []  Reviewed risk of N/V, counseled on PRN ondansetron. Instructed patient to contact MD if unrelieved by max dose.    MOUTH SORES:  causes, oral care, ways to manage [x]  []  Discussed preventative measures such as salt/ soda rinse, avoidance of acidic foods and alcohol-based mouthwashes.    ALOPECIA:  cause, ways to manage, resources [x]  []  Discussed risk of changes in hair color or texture.    INFERTILITY & SEXUALITY:  causes, fertility preservation options, sexuality changes, ways to manage, importance of birth control [x]  []  Discussed safe sex practices.    NERVOUS SYSTEM CHANGES:  causes, s/s, neuropathies, cognitive changes, ways to manage [x]  []  Discussed the risk of peripheral neuropathy and S&S associated.    PAIN:  causes, ways to manage []  []  ????   SKIN & NAIL CHANGES:  cause, s/s, ways to manage [x]  []  Discussed the potential risk of an acne appearing rash. Informed patient that they may experience muscle pain.    ORGAN TOXICITIES:  cause, s/s, need for diagnostic tests, labs, when to notify MD [x]  []  Discussed with the patient we will be monitoring LFTs and bilirubin level. Patient was educated on S&S of liver dysfunction such as yellowing of skin or eyes and dark urine.    SURVIVORSHIP:  distress, distress assessment, secondary malignancies, early/late effects, follow-up, social issues, social support []  []      HOME CARE:  use of spill kits, storing of PO chemo, how to manage bodily fluids []  []  Counseled on management of soiled linen and proper flush technique. Discussed appropriate handling of hazardous bodily fluids.  Reiterated to the patient how to manage side effects at home and when to contact MD office.   MISCELLANEOUS:  drug interactions, administration, vesicant, et [x]  []  Informed the patient no DDI of note. Patient has ondansetron 4 mg tablets at home from previous therapy that she did not use that she said are available should she need it.       Referrals:        Notes:  Discussed aforementioned material with the patient in clinic. All questions and concerns addressed. Provided patient with appropriate contact information with instructions to call should additional questions arise. Provided patient with a personalized treatment calendar and printed education materials. Consent form signed.

## 2020-07-23 RX ORDER — PROCHLORPERAZINE MALEATE 10 MG
TABLET ORAL
Qty: 60 TABLET | Refills: 0 | OUTPATIENT
Start: 2020-07-23

## 2020-07-23 RX ORDER — PROCHLORPERAZINE MALEATE 10 MG
10 TABLET ORAL EVERY 6 HOURS PRN
Qty: 60 TABLET | Refills: 1 | Status: SHIPPED | OUTPATIENT
Start: 2020-07-23 | End: 2020-11-16

## 2020-07-29 ENCOUNTER — TELEPHONE (OUTPATIENT)
Dept: ONCOLOGY | Facility: CLINIC | Age: 45
End: 2020-07-29

## 2020-08-03 ENCOUNTER — DOCUMENTATION (OUTPATIENT)
Dept: ONCOLOGY | Facility: CLINIC | Age: 45
End: 2020-08-03

## 2020-08-04 ENCOUNTER — TELEPHONE (OUTPATIENT)
Dept: ONCOLOGY | Facility: CLINIC | Age: 45
End: 2020-08-04

## 2020-08-04 NOTE — TELEPHONE ENCOUNTER
I called patient and she is taking bactrim.  She ended up getting 15 stitches in her little finger.  Cut herself using a  knife at home.  I talked with dr. Connell and she said bactrim was fine for patient to take.

## 2020-08-04 NOTE — TELEPHONE ENCOUNTER
Patient called she cut her finger with a knife they have prescribed bacterium is this okay to take?

## 2020-08-05 DIAGNOSIS — C50.811 MALIGNANT NEOPLASM OF OVERLAPPING SITES OF RIGHT BREAST IN FEMALE, ESTROGEN RECEPTOR POSITIVE (HCC): ICD-10-CM

## 2020-08-05 DIAGNOSIS — Z17.0 MALIGNANT NEOPLASM OF OVERLAPPING SITES OF RIGHT BREAST IN FEMALE, ESTROGEN RECEPTOR POSITIVE (HCC): ICD-10-CM

## 2020-08-05 RX ORDER — GABAPENTIN 300 MG/1
CAPSULE ORAL
Qty: 30 CAPSULE | Refills: 0 | Status: SHIPPED | OUTPATIENT
Start: 2020-08-05 | End: 2020-09-02 | Stop reason: ALTCHOICE

## 2020-08-12 ENCOUNTER — HOSPITAL ENCOUNTER (OUTPATIENT)
Dept: ONCOLOGY | Facility: HOSPITAL | Age: 45
Setting detail: INFUSION SERIES
Discharge: HOME | End: 2020-08-12

## 2020-08-12 ENCOUNTER — OFFICE VISIT (OUTPATIENT)
Dept: ONCOLOGY | Facility: CLINIC | Age: 45
End: 2020-08-12

## 2020-08-12 VITALS
SYSTOLIC BLOOD PRESSURE: 162 MMHG | DIASTOLIC BLOOD PRESSURE: 98 MMHG | RESPIRATION RATE: 20 BRPM | WEIGHT: 196 LBS | OXYGEN SATURATION: 94 % | HEART RATE: 88 BPM | HEIGHT: 65 IN | TEMPERATURE: 98 F | BODY MASS INDEX: 32.65 KG/M2

## 2020-08-12 DIAGNOSIS — Z17.0 MALIGNANT NEOPLASM OF OVERLAPPING SITES OF RIGHT BREAST IN FEMALE, ESTROGEN RECEPTOR POSITIVE (HCC): Primary | ICD-10-CM

## 2020-08-12 DIAGNOSIS — Z79.811 AROMATASE INHIBITOR USE: ICD-10-CM

## 2020-08-12 DIAGNOSIS — C50.811 MALIGNANT NEOPLASM OF OVERLAPPING SITES OF RIGHT FEMALE BREAST, UNSPECIFIED ESTROGEN RECEPTOR STATUS (HCC): ICD-10-CM

## 2020-08-12 DIAGNOSIS — C50.811 MALIGNANT NEOPLASM OF OVERLAPPING SITES OF RIGHT BREAST IN FEMALE, ESTROGEN RECEPTOR POSITIVE (HCC): Primary | ICD-10-CM

## 2020-08-12 LAB
ALBUMIN SERPL-MCNC: 3.8 G/DL (ref 3.5–5.2)
ALBUMIN/GLOB SERPL: 1 G/DL
ALP SERPL-CCNC: 144 U/L (ref 39–117)
ALT SERPL W P-5'-P-CCNC: 24 U/L (ref 1–33)
ANION GAP SERPL CALCULATED.3IONS-SCNC: 10 MMOL/L (ref 5–15)
AST SERPL-CCNC: 20 U/L (ref 1–32)
BILIRUB SERPL-MCNC: 0.2 MG/DL (ref 0–1.2)
BUN SERPL-MCNC: 12 MG/DL (ref 6–20)
BUN/CREAT SERPL: 21.1 (ref 7–25)
CALCIUM SPEC-SCNC: 9.8 MG/DL (ref 8.6–10.5)
CHLORIDE SERPL-SCNC: 98 MMOL/L (ref 98–107)
CO2 SERPL-SCNC: 25 MMOL/L (ref 22–29)
CREAT SERPL-MCNC: 0.57 MG/DL (ref 0.57–1)
ERYTHROCYTE [DISTWIDTH] IN BLOOD BY AUTOMATED COUNT: 13.2 % (ref 12.3–15.4)
GFR SERPL CREATININE-BSD FRML MDRD: 116 ML/MIN/1.73
GLOBULIN UR ELPH-MCNC: 3.9 GM/DL
GLUCOSE SERPL-MCNC: 335 MG/DL (ref 65–99)
HCT VFR BLD AUTO: 40.4 % (ref 34–46.6)
HGB BLD-MCNC: 14.2 G/DL (ref 12–15.9)
LYMPHOCYTES # BLD AUTO: 2.9 10*3/MM3 (ref 0.7–3.1)
LYMPHOCYTES NFR BLD AUTO: 28 % (ref 19.6–45.3)
MCH RBC QN AUTO: 32 PG (ref 26.6–33)
MCHC RBC AUTO-ENTMCNC: 35.1 G/DL (ref 31.5–35.7)
MCV RBC AUTO: 91.1 FL (ref 79–97)
MONOCYTES # BLD AUTO: 0.4 10*3/MM3 (ref 0.1–0.9)
MONOCYTES NFR BLD AUTO: 3.6 % (ref 5–12)
NEUTROPHILS NFR BLD AUTO: 68.4 % (ref 42.7–76)
NEUTROPHILS NFR BLD AUTO: 7 10*3/MM3 (ref 1.7–7)
PLATELET # BLD AUTO: 277 10*3/MM3 (ref 140–450)
PMV BLD AUTO: 7.2 FL (ref 6–12)
POTASSIUM SERPL-SCNC: 3.9 MMOL/L (ref 3.5–5.2)
PROT SERPL-MCNC: 7.7 G/DL (ref 6–8.5)
RBC # BLD AUTO: 4.43 10*6/MM3 (ref 3.77–5.28)
SODIUM SERPL-SCNC: 133 MMOL/L (ref 136–145)
WBC # BLD AUTO: 10.2 10*3/MM3 (ref 3.4–10.8)

## 2020-08-12 PROCEDURE — 96375 TX/PRO/DX INJ NEW DRUG ADDON: CPT

## 2020-08-12 PROCEDURE — 96413 CHEMO IV INFUSION 1 HR: CPT

## 2020-08-12 PROCEDURE — 36591 DRAW BLOOD OFF VENOUS DEVICE: CPT

## 2020-08-12 PROCEDURE — 25010000002 ADO-TRASTUZUMAB 100 MG RECONSTITUTED SOLUTION 1 EACH VIAL: Performed by: NURSE PRACTITIONER

## 2020-08-12 PROCEDURE — 96374 THER/PROPH/DIAG INJ IV PUSH: CPT

## 2020-08-12 PROCEDURE — 85025 COMPLETE CBC W/AUTO DIFF WBC: CPT | Performed by: NURSE PRACTITIONER

## 2020-08-12 PROCEDURE — 99214 OFFICE O/P EST MOD 30 MIN: CPT | Performed by: NURSE PRACTITIONER

## 2020-08-12 PROCEDURE — 25010000002 DEXAMETHASONE SODIUM PHOSPHATE 120 MG/30ML SOLUTION: Performed by: NURSE PRACTITIONER

## 2020-08-12 PROCEDURE — 80053 COMPREHEN METABOLIC PANEL: CPT | Performed by: NURSE PRACTITIONER

## 2020-08-12 PROCEDURE — 25010000003 HEPARIN LOCK FLUSH PER 10 UNITS: Performed by: INTERNAL MEDICINE

## 2020-08-12 RX ORDER — SODIUM CHLORIDE 9 MG/ML
250 INJECTION, SOLUTION INTRAVENOUS ONCE
Status: CANCELLED | OUTPATIENT
Start: 2020-08-12

## 2020-08-12 RX ORDER — HEPARIN SODIUM (PORCINE) LOCK FLUSH IV SOLN 100 UNIT/ML 100 UNIT/ML
500 SOLUTION INTRAVENOUS AS NEEDED
Status: CANCELLED | OUTPATIENT
Start: 2020-08-12

## 2020-08-12 RX ORDER — ANASTROZOLE 1 MG/1
1 TABLET ORAL DAILY
Qty: 30 TABLET | Refills: 11 | Status: SHIPPED | OUTPATIENT
Start: 2020-08-12 | End: 2021-09-21 | Stop reason: SDUPTHER

## 2020-08-12 RX ORDER — HEPARIN SODIUM (PORCINE) LOCK FLUSH IV SOLN 100 UNIT/ML 100 UNIT/ML
500 SOLUTION INTRAVENOUS AS NEEDED
Status: DISCONTINUED | OUTPATIENT
Start: 2020-08-12 | End: 2020-08-13 | Stop reason: HOSPADM

## 2020-08-12 RX ORDER — SODIUM CHLORIDE 9 MG/ML
250 INJECTION, SOLUTION INTRAVENOUS ONCE
Status: COMPLETED | OUTPATIENT
Start: 2020-08-12 | End: 2020-08-12

## 2020-08-12 RX ADMIN — SODIUM CHLORIDE 250 ML: 9 INJECTION, SOLUTION INTRAVENOUS at 12:07

## 2020-08-12 RX ADMIN — HEPARIN 500 UNITS: 100 SYRINGE at 13:03

## 2020-08-12 RX ADMIN — ADO-TRASTUZUMAB EMTANSINE 300 MG: 20 INJECTION, POWDER, LYOPHILIZED, FOR SOLUTION INTRAVENOUS at 12:27

## 2020-08-12 RX ADMIN — DEXAMETHASONE SODIUM PHOSPHATE 12 MG: 4 INJECTION, SOLUTION INTRA-ARTICULAR; INTRALESIONAL; INTRAMUSCULAR; INTRAVENOUS; SOFT TISSUE at 12:07

## 2020-08-12 NOTE — PROGRESS NOTES
PROBLEM LIST:  1. [id number] ER+ AZ+ Her2+ invasive ductal carcinoma of the right breast  A) presented with a mass on self-exam.  Mammogram 1/7/20 showed a 4.1 cm mass in the right breast, with 2 adjacent smaller masses.  1.9 cm right axillary lymph node.   FNA of lymph node negative.  Biopsy of right breast mass showed grade 2 IDC, Er 95%, AZ 2%, Her2 3+.  B) neoadjuvant chemotherapy with TCHP started 2/5/2020  C) right mastectomy on 6/23/20.  Pathology showed a 2 x 1 x 0.6 cm intermediate grade IDC.  0/2 SLN involved.  [id number]  2. PCOS  3. Anxiety/depression  4. DM2  5. GERD  6. Hyperlipidemia  7. Hypertension  8.  Hidradenitis    Subjective     CHIEF COMPLAINT: breast cancer    HISTORY OF PRESENT ILLNESS:   Josefina Rodriguez returns for follow-up.   She is status post mastectomy in June.  She saw Dr. Baker today for incision issues.  She cut finger and has 15 stitches that are due to be removed today.  She states she may need surgery on finger but will find out more today at appointment.  Neuropathy stable and states the gabapentin has helped.  She complains of lightheadedness and dizziness since her last dose of Kadcyla and states it is happening daily.  Last for about an hour and followed by nausea and fatigue.  Mostly happens in the morning.  She does state that she has not been sleeping well.  She states it is not severe but mentioned it because it has been consistently occurring.  She denies headache, vision problems or any other acute symptoms.  Her blood pressure and blood sugar have been stable.      Past Medical History, Past Surgical History, Social History, Family History have been reviewed and are without significant changes except as mentioned.    Review of Systems   Constitutional: Positive for fatigue. Negative for appetite change, fever and unexpected weight change.   HENT: Negative for mouth sores, sore throat and trouble swallowing.    Respiratory: Negative for cough,  "shortness of breath and wheezing.    Cardiovascular: Negative for chest pain, palpitations and leg swelling.   Gastrointestinal: Positive for nausea. Negative for abdominal distention, abdominal pain, constipation, diarrhea and vomiting.   Genitourinary: Negative for difficulty urinating, dysuria and frequency.   Musculoskeletal: Negative for arthralgias.   Skin: Positive for wound. Negative for pallor and rash.   Neurological: Positive for dizziness, light-headedness and numbness. Negative for weakness and headaches.   Hematological: Does not bruise/bleed easily.   Psychiatric/Behavioral: Positive for sleep disturbance. Negative for confusion. The patient is not nervous/anxious.       A comprehensive 14 point review of systems was performed and was negative except as mentioned.    Medications:  The current medication list was reviewed in the EMR    ALLERGIES:    Allergies   Allergen Reactions   • Cashew Nut Hives and Itching   • Naproxen Hives   • Penicillins Hives     PT STATES CAN TOLERATE KELFEX   • Pistachio Nut Hives and Itching       Objective      /98 Comment: LUE  Pulse 88   Temp 98 °F (36.7 °C) (Temporal)   Resp 20   Ht 165.1 cm (65\")   Wt 88.9 kg (196 lb)   SpO2 94% Comment: RA  BMI 32.62 kg/m²    Vitals:    08/12/20 0947   PainSc: 0-No pain        Performance Status: 0    General: well appearing female in no acute distress  Neuro: alert and oriented  HEENT: sclera anicteric, oropharynx clear  Lymphatics: no cervical, supraclavicular, or axillary adenopathy  Cardiovascular: regular rate and rhythm, no murmurs  Lungs: clear to auscultation bilaterally  Abdomen: soft, nontender, nondistended.  No palpable organomegaly  Extremeties: no lower extremity edema, left pinky finger with dressing  Skin: no rashes, lesions, bruising, or petechiae, right mastectomy incision with dressing  Psych: mood and affect appropriate      RECENT LABS:  Lab Results   Component Value Date    WBC 11.90 (H) 07/22/2020 "    HGB 14.9 07/22/2020    HCT 47.3 (H) 07/22/2020    MCV 95.8 07/22/2020     07/22/2020     Lab Results   Component Value Date    GLUCOSE 314 (H) 07/22/2020    BUN 9 07/22/2020    CREATININE 0.52 (L) 07/22/2020    EGFRIFNONA 129 07/22/2020    BCR 17.3 07/22/2020    K 3.8 07/22/2020    CO2 23.0 07/22/2020    CALCIUM 9.5 07/22/2020    ALBUMIN 3.90 07/22/2020    AST 20 07/22/2020    ALT 24 07/22/2020               Assessment/Plan   Josefina Rodriguez is a 45 y.o. year old female with stage IB ER+ Her2+ IDC of the right breast, here for follow-up.    Breast cancer: Status post mastectomy in June with partial response pathologically.  She has been switched from herceptin and perjeta to kadcyla and is here today for cycle #2.  She is to complete a total of 1 year.  She is a candidate for endocrine therapy and we will send in a prescription for anastrozole today.  We reviewed potential side effects of anastrozole including hot flashes, vaginal dryness, joint pain, decrease in bone density, and mood or sleep disturbance.  I will order DEXA scan for baseline bone density.  She is due for echocardiogram which I have ordered.         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  Discussed that this may improve with time.  Continue gabapentin 300 mg nightly which has helped    Dizziness / lightheadedness:  Will monitor and notify us with new or worsening symptoms.  Discussed with Dr. Connell and would order MRI brain in that case    Follow-up in 3 weeks to reevaluate symptoms and make sure she is tolerating anastrozole.    I spent 25 minutes with the patient. I spent > 50% percent of this time counseling and discussing prognosis, diagnostic testing, evaluation, current status and management.        Nisreen Conte, APRN  [facility]    8/12/2020

## 2020-08-16 ENCOUNTER — APPOINTMENT (OUTPATIENT)
Dept: PREADMISSION TESTING | Facility: HOSPITAL | Age: 45
End: 2020-08-16

## 2020-08-16 PROCEDURE — U0004 COV-19 TEST NON-CDC HGH THRU: HCPCS

## 2020-08-16 PROCEDURE — C9803 HOPD COVID-19 SPEC COLLECT: HCPCS

## 2020-08-16 PROCEDURE — U0002 COVID-19 LAB TEST NON-CDC: HCPCS

## 2020-08-17 LAB
REF LAB TEST METHOD: NORMAL
SARS-COV-2 RNA RESP QL NAA+PROBE: NOT DETECTED

## 2020-08-19 ENCOUNTER — MDT ASSESSMENT (OUTPATIENT)
Dept: ONCOLOGY | Facility: CLINIC | Age: 45
End: 2020-08-19

## 2020-08-19 NOTE — PROGRESS NOTES
Cancer [facility]  Date: 2020    Specialty:  Breast  Presenter: Rachel Baker M.D.  Site:  Breast    Genetics:  2020:  Negative     HPI:  45 YOWF who presented with a breast mass on self-examination.       REFERRING PROVIDER:   Nia Higuera M.D. ([facility])         PLACE OF RESIDENCE:  [place], [place]       SOCIAL HISTORY:  Current every day smoker.  She is  and employed full-time.       FAMILY HISTORY:  Breast cancer (mother 64 with metastatic recurrence 76) and lung cancer (father)       PAST MEDICAL HISTORY: Bilateral axillary adenopathy s/p benign biopsy of 2.2 cm right axillary node (19), polycystic ovary syndrome, irregular menstrual periods, bilateral axillary adenopathy s/p biopsy, Type 2 diabetes mellitus, hypertension, GERD, hidradenitis.       PAST SURGICAL HISTORY: Screening colonoscopy (), right axillary lymph node biopsy (), cholecystectomy.    Imagin2020 ([facility]):  Bilateral diagnostic mammogram and focused right breast ultrasound - RIGHT BREAST: The triangular skin marker placed prior to imaging corresponds to an oval isodense mass with irregular borders located in the approximately 9:00 position measuring 2.7 x 4.1 cm in size. The mass does have associated faint punctate and amorphous calcifications. There is a stable oval mass in the right medial breast measuring 1.0 cm size. The visualized axillary lymph nodes appear stable. One of the lymph node has an associated core biopsy marking clip and this lymph node appears unchanged.  To further evaluate the clinical and mammographic findings, ultrasound imaging of the right UOQ and right axillary region was performed. In the 9:00 position, 6 cm from the nipple there is an irregular hypoechoic mass with ill-defined borders measuring 3.1 x 2.7 cm. Located approximately 1.0 cm medial to this mass is an additional smaller mass measuring 0.7 cm. This is located along the mid  aspect of the mass. Located along the superior aspect of the mass is an additional hypoechoic mass-like area measuring 1.2 cm. This area is located approximately 1.5 cm from the dominant mass. Ultrasound imaging of the right axillary region was also performed. The axillary lymph node that was previously biopsied is identified and appears not significantly changed. There is, however, a deeper lymph node measuring 1.9 cm, which is not definitely seen on the previous axillary ultrasound. This demonstrate cortical thickening. This lymph node is deeper and appears to be located posterior to the pectoralis muscle.        01/24/2020 ([facility]):  MRI bilateral breast - There is a 2.7 x 2.2 x 3.1 cm enhancing lobular mass with a few irregular margins. Additionally, along the anterior lateral margin, there are several satellite lesions which extend to within 2.7 cm from the anterior margin of the biopsy proven malignancy. Taken together the anterior posterior extent is 5.7 cm. Please note a satellite lesion was also identified on the targeted diagnostic ultrasound which would correlate with the findings on the patient's MRI.       01/30/2020 ([facility]):  CT abdomen/pelvis - The right ovary is enlarged with masslike appearance. It measures 6.8 x 6.1 by its 0.7 cm. There is a cystic area within the right ovary measuring up to 2.7 cm. The left ovary is within normal limits for size measuring 4.4 x 3.7 x 5.0 cm.  There is a single small 1.3 cm slightly sclerotic lesion in the medial left ilium which is indeterminate.       01/30/2020 ([facility]):  Whole body bone scan - No definite uptake of tracer identified of bony skeleton to suggest evidence of bony metastatic disease.    Breast: Right    Pathology: IDC with lobular features grade 2 ER/[place] + Her 2/michael + by IHC    Clinical Stage: T2N0M0 Stage 1    Surgery: Right skin sparing mastectomy with SLN     Pathologic Stage: 0/4 nodes surgical margins uninvolved pT1cN0    Treatment Plan: Ivan  adjuvant TCHP, patient switched from Herceptin and Perjeta to Kadcyla to complete a total of one year. Lupron and Aromatase inhibitor added also       Disposition:      Recommendation:          Please discuss clinical/working stage, TNM, Stage Group, National Treatment Guidelines, and Prognostic Indicators.      Privileged and Confidential Patient Safety Work Product:  The information contained herein has been compiled as part of the [facility] Patient Safety Evaluation System and is deemed to be a Patient Safety Work Product and is privileged and confidential.  Disclaimer:  Multidisciplinary cancer conferences provide consultative services to formulate an effective treatment plan for patients and include physician representation from medical oncology, radiation oncology, radiology, surgery, and pathology.  AJCC or other appropriate staging is discussed, along with site-specific prognostic indicators and treatment planning used by evidence-based treatment guidelines.  Treatment plans which are discussed during conference may/may not necessarily be followed by the patient's managing physician(s), as there may be other factors taken into consideration which impact the treatment plan.  The specific treatment plan is ultimately determined on an individual basis by the patient and the physician(s) involved in his/her care.

## 2020-08-28 DIAGNOSIS — C50.811 MALIGNANT NEOPLASM OF OVERLAPPING SITES OF RIGHT BREAST IN FEMALE, ESTROGEN RECEPTOR POSITIVE (HCC): ICD-10-CM

## 2020-08-28 DIAGNOSIS — Z17.0 MALIGNANT NEOPLASM OF OVERLAPPING SITES OF RIGHT BREAST IN FEMALE, ESTROGEN RECEPTOR POSITIVE (HCC): ICD-10-CM

## 2020-08-28 RX ORDER — SODIUM CHLORIDE 9 MG/ML
250 INJECTION, SOLUTION INTRAVENOUS ONCE
Status: CANCELLED | OUTPATIENT
Start: 2020-09-02

## 2020-08-28 RX ORDER — SODIUM CHLORIDE 9 MG/ML
250 INJECTION, SOLUTION INTRAVENOUS ONCE
Status: CANCELLED | OUTPATIENT
Start: 2020-09-23

## 2020-08-31 ENCOUNTER — HOSPITAL ENCOUNTER (OUTPATIENT)
Dept: CARDIOLOGY | Facility: HOSPITAL | Age: 45
Discharge: HOME | End: 2020-08-31
Admitting: NURSE PRACTITIONER

## 2020-08-31 VITALS — HEIGHT: 65 IN | BODY MASS INDEX: 32.65 KG/M2 | WEIGHT: 196 LBS

## 2020-08-31 DIAGNOSIS — Z17.0 MALIGNANT NEOPLASM OF OVERLAPPING SITES OF RIGHT BREAST IN FEMALE, ESTROGEN RECEPTOR POSITIVE (HCC): ICD-10-CM

## 2020-08-31 DIAGNOSIS — C50.811 MALIGNANT NEOPLASM OF OVERLAPPING SITES OF RIGHT BREAST IN FEMALE, ESTROGEN RECEPTOR POSITIVE (HCC): ICD-10-CM

## 2020-08-31 LAB
BH CV ECHO MEAS - AO ROOT AREA (BSA CORRECTED): 1.8
BH CV ECHO MEAS - AO ROOT AREA: 9.6 CM^2
BH CV ECHO MEAS - AO ROOT DIAM: 3.5 CM
BH CV ECHO MEAS - BSA(HAYCOCK): 2.1 M^2
BH CV ECHO MEAS - BSA: 2 M^2
BH CV ECHO MEAS - BZI_BMI: 32.6 KILOGRAMS/M^2
BH CV ECHO MEAS - BZI_METRIC_HEIGHT: 165.1 CM
BH CV ECHO MEAS - BZI_METRIC_WEIGHT: 88.9 KG
BH CV ECHO MEAS - EDV(CUBED): 116.8 ML
BH CV ECHO MEAS - EDV(MOD-SP2): 124 ML
BH CV ECHO MEAS - EDV(MOD-SP4): 118 ML
BH CV ECHO MEAS - EDV(TEICH): 112.2 ML
BH CV ECHO MEAS - EF(CUBED): 86 %
BH CV ECHO MEAS - EF(MOD-BP): 64 %
BH CV ECHO MEAS - EF(MOD-SP2): 58.1 %
BH CV ECHO MEAS - EF(MOD-SP4): 64.4 %
BH CV ECHO MEAS - EF(TEICH): 79.3 %
BH CV ECHO MEAS - ESV(CUBED): 16.4 ML
BH CV ECHO MEAS - ESV(MOD-SP2): 52 ML
BH CV ECHO MEAS - ESV(MOD-SP4): 42 ML
BH CV ECHO MEAS - ESV(TEICH): 23.2 ML
BH CV ECHO MEAS - FS: 48 %
BH CV ECHO MEAS - IVS/LVPW: 0.66
BH CV ECHO MEAS - IVSD: 0.9 CM
BH CV ECHO MEAS - LA DIMENSION: 3.9 CM
BH CV ECHO MEAS - LA/AO: 1.1
BH CV ECHO MEAS - LV DIASTOLIC VOL/BSA (35-75): 60.2 ML/M^2
BH CV ECHO MEAS - LV MASS(C)D: 122.1 GRAMS
BH CV ECHO MEAS - LV MASS(C)DI: 62.2 GRAMS/M^2
BH CV ECHO MEAS - LV SYSTOLIC VOL/BSA (12-30): 21.4 ML/M^2
BH CV ECHO MEAS - LVIDD: 4.9 CM
BH CV ECHO MEAS - LVIDS: 3.1 CM
BH CV ECHO MEAS - LVLD AP2: 8.5 CM
BH CV ECHO MEAS - LVLD AP4: 8.9 CM
BH CV ECHO MEAS - LVLS AP2: 6.4 CM
BH CV ECHO MEAS - LVLS AP4: 6.3 CM
BH CV ECHO MEAS - LVOT AREA (M): 3.1 CM^2
BH CV ECHO MEAS - LVOT AREA: 3.3 CM^2
BH CV ECHO MEAS - LVOT DIAM: 2 CM
BH CV ECHO MEAS - LVPWD: 0.9 CM
BH CV ECHO MEAS - SI(CUBED): 51.2 ML/M^2
BH CV ECHO MEAS - SI(MOD-SP2): 36.7 ML/M^2
BH CV ECHO MEAS - SI(MOD-SP4): 38.8 ML/M^2
BH CV ECHO MEAS - SI(TEICH): 45.3 ML/M^2
BH CV ECHO MEAS - SV(CUBED): 100.4 ML
BH CV ECHO MEAS - SV(MOD-SP2): 72 ML
BH CV ECHO MEAS - SV(MOD-SP4): 76 ML
BH CV ECHO MEAS - SV(TEICH): 88.9 ML
BH CV VAS BP LEFT ARM: NORMAL MMHG
LV EF 2D ECHO EST: 70 %

## 2020-08-31 PROCEDURE — 93308 TTE F-UP OR LMTD: CPT | Performed by: INTERNAL MEDICINE

## 2020-08-31 PROCEDURE — 93308 TTE F-UP OR LMTD: CPT

## 2020-08-31 PROCEDURE — 93356 MYOCRD STRAIN IMG SPCKL TRCK: CPT | Performed by: INTERNAL MEDICINE

## 2020-08-31 PROCEDURE — 93356 MYOCRD STRAIN IMG SPCKL TRCK: CPT

## 2020-09-02 ENCOUNTER — OFFICE VISIT (OUTPATIENT)
Dept: ONCOLOGY | Facility: CLINIC | Age: 45
End: 2020-09-02

## 2020-09-02 ENCOUNTER — HOSPITAL ENCOUNTER (OUTPATIENT)
Dept: ONCOLOGY | Facility: HOSPITAL | Age: 45
Setting detail: INFUSION SERIES
Discharge: HOME | End: 2020-09-02

## 2020-09-02 VITALS
DIASTOLIC BLOOD PRESSURE: 98 MMHG | HEART RATE: 84 BPM | TEMPERATURE: 97.5 F | SYSTOLIC BLOOD PRESSURE: 146 MMHG | WEIGHT: 196 LBS | BODY MASS INDEX: 32.65 KG/M2 | HEIGHT: 65 IN | OXYGEN SATURATION: 98 % | RESPIRATION RATE: 18 BRPM

## 2020-09-02 DIAGNOSIS — C50.811 MALIGNANT NEOPLASM OF OVERLAPPING SITES OF RIGHT BREAST IN FEMALE, ESTROGEN RECEPTOR POSITIVE (HCC): Primary | ICD-10-CM

## 2020-09-02 DIAGNOSIS — Z17.0 MALIGNANT NEOPLASM OF OVERLAPPING SITES OF RIGHT BREAST IN FEMALE, ESTROGEN RECEPTOR POSITIVE (HCC): Primary | ICD-10-CM

## 2020-09-02 DIAGNOSIS — C50.811 MALIGNANT NEOPLASM OF OVERLAPPING SITES OF RIGHT FEMALE BREAST, UNSPECIFIED ESTROGEN RECEPTOR STATUS (HCC): ICD-10-CM

## 2020-09-02 DIAGNOSIS — Z79.811 AROMATASE INHIBITOR USE: ICD-10-CM

## 2020-09-02 LAB
ALBUMIN SERPL-MCNC: 3.6 G/DL (ref 3.5–5.2)
ALBUMIN/GLOB SERPL: 0.9 G/DL
ALP SERPL-CCNC: 116 U/L (ref 39–117)
ALT SERPL W P-5'-P-CCNC: 21 U/L (ref 1–33)
ANION GAP SERPL CALCULATED.3IONS-SCNC: 11 MMOL/L (ref 5–15)
AST SERPL-CCNC: 24 U/L (ref 1–32)
BILIRUB SERPL-MCNC: 0.2 MG/DL (ref 0–1.2)
BUN SERPL-MCNC: 12 MG/DL (ref 6–20)
BUN/CREAT SERPL: 21.8 (ref 7–25)
CALCIUM SPEC-SCNC: 9.4 MG/DL (ref 8.6–10.5)
CHLORIDE SERPL-SCNC: 96 MMOL/L (ref 98–107)
CO2 SERPL-SCNC: 23 MMOL/L (ref 22–29)
CREAT SERPL-MCNC: 0.55 MG/DL (ref 0.57–1)
ERYTHROCYTE [DISTWIDTH] IN BLOOD BY AUTOMATED COUNT: 13.5 % (ref 12.3–15.4)
GFR SERPL CREATININE-BSD FRML MDRD: 121 ML/MIN/1.73
GLOBULIN UR ELPH-MCNC: 3.8 GM/DL
GLUCOSE SERPL-MCNC: 307 MG/DL (ref 65–99)
HCT VFR BLD AUTO: 44.4 % (ref 34–46.6)
HGB BLD-MCNC: 14.9 G/DL (ref 12–15.9)
LYMPHOCYTES # BLD AUTO: 3.4 10*3/MM3 (ref 0.7–3.1)
LYMPHOCYTES NFR BLD AUTO: 24.4 % (ref 19.6–45.3)
MCH RBC QN AUTO: 30.1 PG (ref 26.6–33)
MCHC RBC AUTO-ENTMCNC: 33.5 G/DL (ref 31.5–35.7)
MCV RBC AUTO: 89.7 FL (ref 79–97)
MONOCYTES # BLD AUTO: 0.2 10*3/MM3 (ref 0.1–0.9)
MONOCYTES NFR BLD AUTO: 1.4 % (ref 5–12)
NEUTROPHILS NFR BLD AUTO: 10.3 10*3/MM3 (ref 1.7–7)
NEUTROPHILS NFR BLD AUTO: 74.2 % (ref 42.7–76)
PLATELET # BLD AUTO: 262 10*3/MM3 (ref 140–450)
PMV BLD AUTO: 7.6 FL (ref 6–12)
POTASSIUM SERPL-SCNC: 3.9 MMOL/L (ref 3.5–5.2)
PROT SERPL-MCNC: 7.4 G/DL (ref 6–8.5)
RBC # BLD AUTO: 4.95 10*6/MM3 (ref 3.77–5.28)
SODIUM SERPL-SCNC: 130 MMOL/L (ref 136–145)
WBC # BLD AUTO: 13.9 10*3/MM3 (ref 3.4–10.8)

## 2020-09-02 PROCEDURE — 99214 OFFICE O/P EST MOD 30 MIN: CPT | Performed by: NURSE PRACTITIONER

## 2020-09-02 PROCEDURE — 25010000002 ADO-TRASTUZUMAB 100 MG RECONSTITUTED SOLUTION 1 EACH VIAL: Performed by: INTERNAL MEDICINE

## 2020-09-02 PROCEDURE — 85025 COMPLETE CBC W/AUTO DIFF WBC: CPT | Performed by: INTERNAL MEDICINE

## 2020-09-02 PROCEDURE — 25010000002 DEXAMETHASONE SODIUM PHOSPHATE 120 MG/30ML SOLUTION: Performed by: INTERNAL MEDICINE

## 2020-09-02 PROCEDURE — 25010000003 HEPARIN LOCK FLUSH PER 10 UNITS: Performed by: INTERNAL MEDICINE

## 2020-09-02 PROCEDURE — 36415 COLL VENOUS BLD VENIPUNCTURE: CPT

## 2020-09-02 PROCEDURE — 96375 TX/PRO/DX INJ NEW DRUG ADDON: CPT

## 2020-09-02 PROCEDURE — 96413 CHEMO IV INFUSION 1 HR: CPT

## 2020-09-02 PROCEDURE — 80053 COMPREHEN METABOLIC PANEL: CPT | Performed by: INTERNAL MEDICINE

## 2020-09-02 RX ORDER — GABAPENTIN 300 MG/1
CAPSULE ORAL
Qty: 90 CAPSULE | Refills: 1 | OUTPATIENT
Start: 2020-09-02 | End: 2020-09-23 | Stop reason: SDUPTHER

## 2020-09-02 RX ORDER — DOXYCYCLINE HYCLATE 100 MG/1
CAPSULE ORAL
COMMUNITY
Start: 2020-08-18 | End: 2020-09-02

## 2020-09-02 RX ORDER — OXYCODONE AND ACETAMINOPHEN 7.5; 325 MG/1; MG/1
TABLET ORAL
COMMUNITY
Start: 2020-08-18 | End: 2020-09-14

## 2020-09-02 RX ORDER — HEPARIN SODIUM (PORCINE) LOCK FLUSH IV SOLN 100 UNIT/ML 100 UNIT/ML
500 SOLUTION INTRAVENOUS AS NEEDED
Status: DISCONTINUED | OUTPATIENT
Start: 2020-09-02 | End: 2020-09-03 | Stop reason: HOSPADM

## 2020-09-02 RX ORDER — SULFAMETHOXAZOLE AND TRIMETHOPRIM 800; 160 MG/1; MG/1
TABLET ORAL
COMMUNITY
Start: 2020-08-03 | End: 2020-09-14

## 2020-09-02 RX ORDER — HEPARIN SODIUM (PORCINE) LOCK FLUSH IV SOLN 100 UNIT/ML 100 UNIT/ML
500 SOLUTION INTRAVENOUS AS NEEDED
Status: CANCELLED | OUTPATIENT
Start: 2020-09-02

## 2020-09-02 RX ORDER — OXYCODONE HYDROCHLORIDE AND ACETAMINOPHEN 5; 325 MG/1; MG/1
TABLET ORAL
COMMUNITY
Start: 2020-08-13 | End: 2020-09-14

## 2020-09-02 RX ADMIN — HEPARIN 500 UNITS: 100 SYRINGE at 14:02

## 2020-09-02 RX ADMIN — DEXAMETHASONE SODIUM PHOSPHATE 12 MG: 4 INJECTION, SOLUTION INTRA-ARTICULAR; INTRALESIONAL; INTRAMUSCULAR; INTRAVENOUS; SOFT TISSUE at 12:56

## 2020-09-02 RX ADMIN — ADO-TRASTUZUMAB EMTANSINE 300 MG: 20 INJECTION, POWDER, LYOPHILIZED, FOR SOLUTION INTRAVENOUS at 13:13

## 2020-09-02 NOTE — PROGRESS NOTES
PROBLEM LIST:  1. [id number] ER+ LA+ Her2+ invasive ductal carcinoma of the right breast  A) presented with a mass on self-exam.  Mammogram 1/7/20 showed a 4.1 cm mass in the right breast, with 2 adjacent smaller masses.  1.9 cm right axillary lymph node.   FNA of lymph node negative.  Biopsy of right breast mass showed grade 2 IDC, Er 95%, LA 2%, Her2 3+.  B) neoadjuvant chemotherapy with TCHP started 2/5/2020  C) right mastectomy on 6/23/20.  Pathology showed a 2 x 1 x 0.6 cm intermediate grade IDC.  0/2 SLN involved.  [id number]  D) adjuvant Kadcyla x 1 year started 7/22/2020.   E) anastrozole started 08/12/2020.   2. PCOS  3. Anxiety/depression  4. DM2  5. GERD  6. Hyperlipidemia  7. Hypertension  8.  Hidradenitis    Subjective     CHIEF COMPLAINT: breast cancer    HISTORY OF PRESENT ILLNESS:   Josefina Rodriguez returns for follow-up.  She continues on maintenance Kadcyla.  She started anastrozole 8/12/2020.  She complains of increasing peripheral neuropathy in her hands and feet over the last few weeks.  She is taking gabapentin 300 mg at bedtime which is not controlling her neuropathic pain.  In the last week she has had increased knee and hip pain bilaterally that is not controlled with ibuprofen 600 mg and Tylenol.  Her sleep is very interrupted due to neuropathic pain and joint pain.  She is only sleeping 1 to 2 hours a night.  She has noticed increased hot flashes over the last few weeks.  The dizziness and lightheadedness has improved since last treatment.  She did require plastic surgery on her left pinky.  The procedure went well.        Past Medical History, Past Surgical History, Social History, Family History have been reviewed and are without significant changes except as mentioned.    Review of Systems   Constitutional: Positive for fatigue. Negative for appetite change, fever and unexpected weight change.   HENT: Negative for mouth sores, sore throat and trouble swallowing.   "  Respiratory: Negative for cough, shortness of breath and wheezing.    Cardiovascular: Negative for chest pain, palpitations and leg swelling.   Gastrointestinal: Negative for abdominal distention, abdominal pain, constipation, diarrhea, nausea and vomiting.   Genitourinary: Negative for difficulty urinating, dysuria and frequency.   Musculoskeletal: Negative for arthralgias.   Skin: Positive for wound. Negative for pallor and rash.   Neurological: Positive for numbness. Negative for dizziness, weakness, light-headedness and headaches.   Hematological: Does not bruise/bleed easily.   Psychiatric/Behavioral: Positive for sleep disturbance. Negative for confusion. The patient is not nervous/anxious.       A comprehensive 14 point review of systems was performed and was negative except as mentioned.    Medications:  The current medication list was reviewed in the EMR    ALLERGIES:    Allergies   Allergen Reactions   • Cashew Nut Hives and Itching   • Naproxen Hives   • Penicillins Hives     PT STATES CAN TOLERATE KELFEX   • Pistachio Nut Hives and Itching       Objective      /98   Pulse 84   Temp 97.5 °F (36.4 °C) (Temporal)   Resp 18   Ht 165.1 cm (65\")   Wt 88.9 kg (196 lb)   SpO2 98%   BMI 32.62 kg/m²    Vitals:    09/02/20 1120   PainSc:   7        Performance Status: 0    General: well appearing female in no acute distress  Neuro: alert and oriented  HEENT: sclera anicteric, oropharynx clear  Lymphatics: no cervical, supraclavicular, or axillary adenopathy  Cardiovascular: regular rate and rhythm, no murmurs  Lungs: clear to auscultation bilaterally  Breast: right mastectomy incision healing well. Left breast not examined  Abdomen: soft, nontender, nondistended.  No palpable organomegaly  Extremeties: no lower extremity edema, left pinky finger with stitches   Skin: no rashes, lesions, bruising, or petechiae  Psych: mood and affect appropriate      RECENT LABS:  Lab Results   Component Value Date    " WBC 10.20 08/12/2020    HGB 14.2 08/12/2020    HCT 40.4 08/12/2020    MCV 91.1 08/12/2020     08/12/2020     Lab Results   Component Value Date    GLUCOSE 335 (H) 08/12/2020    BUN 12 08/12/2020    CREATININE 0.57 08/12/2020    EGFRIFNONA 116 08/12/2020    BCR 21.1 08/12/2020    K 3.9 08/12/2020    CO2 25.0 08/12/2020    CALCIUM 9.8 08/12/2020    ALBUMIN 3.80 08/12/2020    AST 20 08/12/2020    ALT 24 08/12/2020 8/31/2020 Echo: Estimated EF = 70%.        Assessment/Plan   Josefina Rodriguez is a 45 y.o. year old female with stage IB ER+ Her2+ IDC of the right breast, here for follow-up.    Breast cancer: Status post mastectomy in June 2020 with partial response pathologically.  She has been switched from herceptin and perjeta to kadcyla and is here today for cycle #3.  She is to complete a total of 1 year.  Echocardiogram 8/31/2020 showed EF of 70%.  Next echo will be due end of December 2020.    She started anastrozole 8/12/2020 and is tolerating it relatively well.  She is having joint pain in her knees and hips and hot flashes.  I have suggested she take ibuprofen 800 mg p.o. every 6 hours as needed for severe pain.  Baseline bone density is scheduled for January 21, 2021.      She is receiving Lupron every 3 months for ovarian suppression which we will continue. Next dose due 10/14/2020.     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  Neuropathy seems to be getting worse in her hands and feet.  Gabapentin 300 mg is not controlling the pain.  I will increase her gabapentin to 300 mg in the morning and 600 mg at bedtime.  I have asked her to contact us next week to let us know if this is helping with the neuropathic pain.  If this is not improving we could look at adding a third dose of the day or increasing her morning or bedtime dose.      Dizziness / lightheadedness: Improved with last cycle.    Follow-up in 3 weeks to reevaluate symptoms.     I spent 30 minutes with the patient. I spent > 50%  percent of this time counseling and discussing prognosis, diagnostic testing, evaluation, current status and management.        Aimee Johnson APRN  [facility]    9/2/2020

## 2020-09-09 ENCOUNTER — TELEPHONE (OUTPATIENT)
Dept: ONCOLOGY | Facility: CLINIC | Age: 45
End: 2020-09-09

## 2020-09-09 DIAGNOSIS — Z17.0 MALIGNANT NEOPLASM OF OVERLAPPING SITES OF RIGHT BREAST IN FEMALE, ESTROGEN RECEPTOR POSITIVE (HCC): Primary | ICD-10-CM

## 2020-09-09 DIAGNOSIS — C50.811 MALIGNANT NEOPLASM OF OVERLAPPING SITES OF RIGHT BREAST IN FEMALE, ESTROGEN RECEPTOR POSITIVE (HCC): Primary | ICD-10-CM

## 2020-09-09 DIAGNOSIS — R42 DIZZINESS: ICD-10-CM

## 2020-09-09 DIAGNOSIS — R51.9 INCREASED FREQUENCY OF HEADACHES: ICD-10-CM

## 2020-09-09 NOTE — TELEPHONE ENCOUNTER
"Patient reports dizziness has returned starting last week and is increasing daily.  She also is having headaches every day.  She states on Monday she had an episode where she \"passed out\" for 1 to 2 minutes.  She did not seek medical care following this episode.  She stays well-hydrated she states she drinks plenty of fluids all day long.  She is eating well.    She states the peripheral neuropathy in her feet is mildly improved with the Neurontin but she feels like that she still has significant pain at bedtime and in the morning.  She would like to increase her doses.  We discussed an increase of the Neurontin to 300 mg take 2 capsules in the morning and 3 capsules at bedtime.    I will order an MRI of the brain with and without contrast to evaluate the increasing dizziness and headaches.  A  will call with appointment.  I did reinforce the importance if she has another episode of loss of consciousness she needs to immediately go to be evaluated in the emergency department.      "

## 2020-09-12 ENCOUNTER — APPOINTMENT (OUTPATIENT)
Dept: MRI IMAGING | Facility: HOSPITAL | Age: 45
End: 2020-09-12

## 2020-09-12 ENCOUNTER — APPOINTMENT (OUTPATIENT)
Dept: GENERAL RADIOLOGY | Facility: HOSPITAL | Age: 45
End: 2020-09-12

## 2020-09-12 ENCOUNTER — APPOINTMENT (OUTPATIENT)
Dept: CT IMAGING | Facility: HOSPITAL | Age: 45
End: 2020-09-12

## 2020-09-12 ENCOUNTER — HOSPITAL ENCOUNTER (EMERGENCY)
Facility: HOSPITAL | Age: 45
Discharge: HOME | End: 2020-09-12
Attending: EMERGENCY MEDICINE | Admitting: EMERGENCY MEDICINE

## 2020-09-12 VITALS
BODY MASS INDEX: 30.86 KG/M2 | TEMPERATURE: 97.8 F | SYSTOLIC BLOOD PRESSURE: 158 MMHG | HEART RATE: 78 BPM | WEIGHT: 192 LBS | RESPIRATION RATE: 18 BRPM | HEIGHT: 66 IN | OXYGEN SATURATION: 96 % | DIASTOLIC BLOOD PRESSURE: 103 MMHG

## 2020-09-12 DIAGNOSIS — R10.84 GENERALIZED ABDOMINAL PAIN: ICD-10-CM

## 2020-09-12 DIAGNOSIS — R51.9 ACUTE NONINTRACTABLE HEADACHE, UNSPECIFIED HEADACHE TYPE: ICD-10-CM

## 2020-09-12 DIAGNOSIS — R11.2 NON-INTRACTABLE VOMITING WITH NAUSEA, UNSPECIFIED VOMITING TYPE: ICD-10-CM

## 2020-09-12 DIAGNOSIS — K76.0 HEPATIC STEATOSIS: ICD-10-CM

## 2020-09-12 DIAGNOSIS — R42 DIZZINESS: Primary | ICD-10-CM

## 2020-09-12 DIAGNOSIS — Z85.3 HISTORY OF BREAST CANCER: ICD-10-CM

## 2020-09-12 LAB
ALBUMIN SERPL-MCNC: 4 G/DL (ref 3.5–5.2)
ALBUMIN/GLOB SERPL: 1 G/DL
ALP SERPL-CCNC: 150 U/L (ref 39–117)
ALT SERPL W P-5'-P-CCNC: 25 U/L (ref 1–33)
ANION GAP SERPL CALCULATED.3IONS-SCNC: 11 MMOL/L (ref 5–15)
AST SERPL-CCNC: 30 U/L (ref 1–32)
B-HCG UR QL: NEGATIVE
BACTERIA UR QL AUTO: NORMAL /HPF
BASOPHILS # BLD AUTO: 0.04 10*3/MM3 (ref 0–0.2)
BASOPHILS NFR BLD AUTO: 0.3 % (ref 0–1.5)
BILIRUB SERPL-MCNC: 0.3 MG/DL (ref 0–1.2)
BILIRUB UR QL STRIP: NEGATIVE
BUN SERPL-MCNC: 12 MG/DL (ref 6–20)
BUN/CREAT SERPL: 20.3 (ref 7–25)
CALCIUM SPEC-SCNC: 10.3 MG/DL (ref 8.6–10.5)
CHLORIDE SERPL-SCNC: 96 MMOL/L (ref 98–107)
CLARITY UR: ABNORMAL
CO2 SERPL-SCNC: 29 MMOL/L (ref 22–29)
COLOR UR: YELLOW
CREAT SERPL-MCNC: 0.59 MG/DL (ref 0.57–1)
DEPRECATED RDW RBC AUTO: 39.1 FL (ref 37–54)
EOSINOPHIL # BLD AUTO: 0.22 10*3/MM3 (ref 0–0.4)
EOSINOPHIL NFR BLD AUTO: 1.8 % (ref 0.3–6.2)
ERYTHROCYTE [DISTWIDTH] IN BLOOD BY AUTOMATED COUNT: 12.3 % (ref 12.3–15.4)
GFR SERPL CREATININE-BSD FRML MDRD: 111 ML/MIN/1.73
GLOBULIN UR ELPH-MCNC: 4.1 GM/DL
GLUCOSE BLDC GLUCOMTR-MCNC: 360 MG/DL (ref 70–130)
GLUCOSE BLDC GLUCOMTR-MCNC: 360 MG/DL (ref 70–130)
GLUCOSE SERPL-MCNC: 332 MG/DL (ref 65–99)
GLUCOSE UR STRIP-MCNC: ABNORMAL MG/DL
HCT VFR BLD AUTO: 47.6 % (ref 34–46.6)
HGB BLD-MCNC: 16 G/DL (ref 12–15.9)
HGB UR QL STRIP.AUTO: NEGATIVE
HOLD SPECIMEN: NORMAL
HOLD SPECIMEN: NORMAL
HYALINE CASTS UR QL AUTO: NORMAL /LPF
IMM GRANULOCYTES # BLD AUTO: 0.05 10*3/MM3 (ref 0–0.05)
IMM GRANULOCYTES NFR BLD AUTO: 0.4 % (ref 0–0.5)
INTERNAL NEGATIVE CONTROL: NEGATIVE
INTERNAL POSITIVE CONTROL: POSITIVE
KETONES UR QL STRIP: ABNORMAL
LEUKOCYTE ESTERASE UR QL STRIP.AUTO: NEGATIVE
LYMPHOCYTES # BLD AUTO: 2.85 10*3/MM3 (ref 0.7–3.1)
LYMPHOCYTES NFR BLD AUTO: 23.1 % (ref 19.6–45.3)
Lab: NORMAL
MAGNESIUM SERPL-MCNC: 1.6 MG/DL (ref 1.6–2.6)
MCH RBC QN AUTO: 29.5 PG (ref 26.6–33)
MCHC RBC AUTO-ENTMCNC: 33.6 G/DL (ref 31.5–35.7)
MCV RBC AUTO: 87.8 FL (ref 79–97)
MONOCYTES # BLD AUTO: 0.73 10*3/MM3 (ref 0.1–0.9)
MONOCYTES NFR BLD AUTO: 5.9 % (ref 5–12)
NEUTROPHILS NFR BLD AUTO: 68.5 % (ref 42.7–76)
NEUTROPHILS NFR BLD AUTO: 8.47 10*3/MM3 (ref 1.7–7)
NITRITE UR QL STRIP: NEGATIVE
NRBC BLD AUTO-RTO: 0 /100 WBC (ref 0–0.2)
PH UR STRIP.AUTO: 7 [PH] (ref 5–8)
PLATELET # BLD AUTO: 205 10*3/MM3 (ref 140–450)
PMV BLD AUTO: 10.5 FL (ref 6–12)
POTASSIUM SERPL-SCNC: 3.5 MMOL/L (ref 3.5–5.2)
PROT SERPL-MCNC: 8.1 G/DL (ref 6–8.5)
PROT UR QL STRIP: ABNORMAL
RBC # BLD AUTO: 5.42 10*6/MM3 (ref 3.77–5.28)
RBC # UR: NORMAL /HPF
REF LAB TEST METHOD: NORMAL
SODIUM SERPL-SCNC: 136 MMOL/L (ref 136–145)
SP GR UR STRIP: 1.02 (ref 1–1.03)
SQUAMOUS #/AREA URNS HPF: NORMAL /HPF
TROPONIN T SERPL-MCNC: <0.01 NG/ML (ref 0–0.03)
TROPONIN T SERPL-MCNC: <0.01 NG/ML (ref 0–0.03)
UROBILINOGEN UR QL STRIP: ABNORMAL
WBC # BLD AUTO: 12.36 10*3/MM3 (ref 3.4–10.8)
WBC UR QL AUTO: NORMAL /HPF
WHOLE BLOOD HOLD SPECIMEN: NORMAL
WHOLE BLOOD HOLD SPECIMEN: NORMAL

## 2020-09-12 PROCEDURE — 99284 EMERGENCY DEPT VISIT MOD MDM: CPT

## 2020-09-12 PROCEDURE — 81025 URINE PREGNANCY TEST: CPT | Performed by: EMERGENCY MEDICINE

## 2020-09-12 PROCEDURE — 25010000003 HEPARIN LOCK FLUSH PER 10 UNITS: Performed by: EMERGENCY MEDICINE

## 2020-09-12 PROCEDURE — 85025 COMPLETE CBC W/AUTO DIFF WBC: CPT

## 2020-09-12 PROCEDURE — 93005 ELECTROCARDIOGRAM TRACING: CPT | Performed by: EMERGENCY MEDICINE

## 2020-09-12 PROCEDURE — 71045 X-RAY EXAM CHEST 1 VIEW: CPT

## 2020-09-12 PROCEDURE — A9577 INJ MULTIHANCE: HCPCS | Performed by: EMERGENCY MEDICINE

## 2020-09-12 PROCEDURE — 80053 COMPREHEN METABOLIC PANEL: CPT | Performed by: EMERGENCY MEDICINE

## 2020-09-12 PROCEDURE — 70553 MRI BRAIN STEM W/O & W/DYE: CPT

## 2020-09-12 PROCEDURE — 93005 ELECTROCARDIOGRAM TRACING: CPT

## 2020-09-12 PROCEDURE — 25010000002 ONDANSETRON PER 1 MG: Performed by: EMERGENCY MEDICINE

## 2020-09-12 PROCEDURE — 96374 THER/PROPH/DIAG INJ IV PUSH: CPT

## 2020-09-12 PROCEDURE — 82962 GLUCOSE BLOOD TEST: CPT

## 2020-09-12 PROCEDURE — 84484 ASSAY OF TROPONIN QUANT: CPT | Performed by: EMERGENCY MEDICINE

## 2020-09-12 PROCEDURE — 83735 ASSAY OF MAGNESIUM: CPT | Performed by: EMERGENCY MEDICINE

## 2020-09-12 PROCEDURE — 96375 TX/PRO/DX INJ NEW DRUG ADDON: CPT

## 2020-09-12 PROCEDURE — 81001 URINALYSIS AUTO W/SCOPE: CPT | Performed by: EMERGENCY MEDICINE

## 2020-09-12 PROCEDURE — 74177 CT ABD & PELVIS W/CONTRAST: CPT

## 2020-09-12 PROCEDURE — 25010000002 IOPAMIDOL 61 % SOLUTION: Performed by: EMERGENCY MEDICINE

## 2020-09-12 PROCEDURE — 25010000002 DIAZEPAM PER 5 MG: Performed by: EMERGENCY MEDICINE

## 2020-09-12 PROCEDURE — 0 GADOBENATE DIMEGLUMINE 529 MG/ML SOLUTION: Performed by: EMERGENCY MEDICINE

## 2020-09-12 RX ORDER — ONDANSETRON 2 MG/ML
4 INJECTION INTRAMUSCULAR; INTRAVENOUS ONCE
Status: COMPLETED | OUTPATIENT
Start: 2020-09-12 | End: 2020-09-12

## 2020-09-12 RX ORDER — DIAZEPAM 5 MG/ML
5 INJECTION, SOLUTION INTRAMUSCULAR; INTRAVENOUS ONCE
Status: COMPLETED | OUTPATIENT
Start: 2020-09-12 | End: 2020-09-12

## 2020-09-12 RX ORDER — MECLIZINE HYDROCHLORIDE 25 MG/1
25 TABLET ORAL ONCE
Status: COMPLETED | OUTPATIENT
Start: 2020-09-12 | End: 2020-09-12

## 2020-09-12 RX ORDER — SODIUM CHLORIDE 0.9 % (FLUSH) 0.9 %
10 SYRINGE (ML) INJECTION AS NEEDED
Status: DISCONTINUED | OUTPATIENT
Start: 2020-09-12 | End: 2020-09-12 | Stop reason: HOSPADM

## 2020-09-12 RX ORDER — MECLIZINE HYDROCHLORIDE 25 MG/1
25 TABLET ORAL 4 TIMES DAILY PRN
Qty: 30 TABLET | Refills: 0 | Status: SHIPPED | OUTPATIENT
Start: 2020-09-12 | End: 2020-10-12

## 2020-09-12 RX ORDER — HEPARIN SODIUM (PORCINE) LOCK FLUSH IV SOLN 100 UNIT/ML 100 UNIT/ML
300 SOLUTION INTRAVENOUS ONCE
Status: COMPLETED | OUTPATIENT
Start: 2020-09-12 | End: 2020-09-12

## 2020-09-12 RX ADMIN — ONDANSETRON 4 MG: 2 INJECTION INTRAMUSCULAR; INTRAVENOUS at 15:50

## 2020-09-12 RX ADMIN — MECLIZINE HYDROCHLORIDE 25 MG: 25 TABLET ORAL at 15:50

## 2020-09-12 RX ADMIN — IOPAMIDOL 85 ML: 612 INJECTION, SOLUTION INTRAVENOUS at 17:15

## 2020-09-12 RX ADMIN — HEPARIN 300 UNITS: 100 SYRINGE at 19:52

## 2020-09-12 RX ADMIN — SODIUM CHLORIDE 1000 ML: 9 INJECTION, SOLUTION INTRAVENOUS at 15:55

## 2020-09-12 RX ADMIN — GADOBENATE DIMEGLUMINE 18 ML: 529 INJECTION, SOLUTION INTRAVENOUS at 18:31

## 2020-09-12 RX ADMIN — DIAZEPAM 5 MG: 5 INJECTION, SOLUTION INTRAMUSCULAR; INTRAVENOUS at 15:50

## 2020-09-12 NOTE — ED PROVIDER NOTES
Subjective   Pt is a 44 yo female presenting to ED with complaints of dizziness. PMHx significant for Breast cancer s/p right mastectomy and currently on chemo with most recent 1.5 weeks ago. She is followed by Dr. Connell. Pt also with hx for Anxiety, Depression, DM, Hiatal hernia and PCOS. Pt reports intermittent dizziness that began 1 week ago. Worse with movement of head and standing up. She has had an intermittent frontal headache since last week as well. She reports passing out on Monday but denies any injury. She denies numbness, tingling or weakness to UE or LE. She denies slurred speech, facial droop or confusion. She has intermittent nausea and vomiting but denies new from baseline since chemo began. She also complains of upper abdominal pain that began this morning. She denies fever, chills, vision changes, CP, SOB, cough, leg swelling or urinary sx. Her prior abdominal surgical hx includes cholecystectomy. She uses tobacco but denies ETOH use. She denies known covid exposure.      History provided by:  Medical records and patient      Review of Systems   Constitutional: Negative for chills and fever.   HENT: Negative for congestion, ear pain, sore throat and trouble swallowing.    Eyes: Negative for pain, redness and visual disturbance.   Respiratory: Negative for cough, chest tightness and shortness of breath.    Cardiovascular: Negative for chest pain and leg swelling.   Gastrointestinal: Positive for abdominal pain, nausea and vomiting. Negative for constipation and diarrhea.   Genitourinary: Negative for difficulty urinating, dysuria, flank pain, hematuria and vaginal bleeding.   Musculoskeletal: Negative for arthralgias, back pain and joint swelling.   Skin: Negative for rash and wound.   Neurological: Positive for dizziness, syncope and headaches. Negative for speech difficulty, weakness and numbness.   Psychiatric/Behavioral: Negative for confusion.   All other systems reviewed and are  negative.      Past Medical History:   Diagnosis Date   • Anxiety    • Depression    • Diabetes mellitus (CMS/HCC)    • Hiatal hernia    • Hypertension    • Malignant neoplasm of overlapping sites of right breast in female, estrogen receptor positive (CMS/HCC) 1/21/2020   • PCOS (polycystic ovarian syndrome)    • Wears glasses        Allergies   Allergen Reactions   • Cashew Nut Hives and Itching   • Naproxen Hives   • Penicillins Hives     PT STATES CAN TOLERATE KELFEX   • Pistachio Nut Hives and Itching       Past Surgical History:   Procedure Laterality Date   • BREAST BIOPSY Right 03/2019    axillary lymph node bx/fna   • CHOLECYSTECTOMY  03/2010   • COLONOSCOPY  2016   • MASTECTOMY W/ SENTINEL NODE BIOPSY Right 6/23/2020    Procedure: MASTECTOMY WITH SENTINEL NODE BIOPSY RIGHT, PORT PLACEMENT;  Surgeon: Rachel Baker MD;  Location: [facility];  Service: General;  Laterality: Right;   • US GUIDED FINE NEEDLE ASPIRATION  1/15/2020   • VENOUS ACCESS DEVICE (PORT) INSERTION Left 01/31/2020       Family History   Problem Relation Age of Onset   • Breast cancer Mother 64   • Heart disease Mother    • Hypertension Father    • Lung cancer Father    • Ovarian cancer Neg Hx        Social History     Socioeconomic History   • Marital status:      Spouse name: Not on file   • Number of children: Not on file   • Years of education: Not on file   • Highest education level: Not on file   Tobacco Use   • Smoking status: Current Every Day Smoker     Packs/day: 1.00     Years: 20.00     Pack years: 20.00     Types: Cigarettes   • Smokeless tobacco: Never Used   Substance and Sexual Activity   • Alcohol use: Yes     Comment: occasionally a couple times a month   • Drug use: Never   • Sexual activity: Defer           Objective   Physical Exam  Vitals signs and nursing note reviewed.   Constitutional:       Appearance: She is well-developed.   HENT:      Head: Atraumatic.      Nose: Nose normal.   Eyes:      General:  Lids are normal.      Conjunctiva/sclera: Conjunctivae normal.      Pupils: Pupils are equal, round, and reactive to light.   Neck:      Musculoskeletal: Normal range of motion and neck supple.   Cardiovascular:      Rate and Rhythm: Normal rate and regular rhythm.      Heart sounds: Normal heart sounds.   Pulmonary:      Effort: Pulmonary effort is normal.      Breath sounds: Normal breath sounds. No wheezing.   Abdominal:      General: There is no distension.      Palpations: Abdomen is soft.      Tenderness: There is generalized abdominal tenderness. There is no guarding or rebound.   Musculoskeletal: Normal range of motion.         General: No tenderness.   Skin:     General: Skin is warm and dry.      Findings: No erythema or rash.   Neurological:      Mental Status: She is alert and oriented to person, place, and time.      Sensory: No sensory deficit.   Psychiatric:         Speech: Speech normal.         Behavior: Behavior normal.         Procedures           ED Course  ED Course as of Sep 12 1929   Sat Sep 12, 2020   1902 Glucose(!): 332 [RT]   1902 Glucose(!): >=1000 mg/dL (3+) [RT]      ED Course User Index  [RT] Jeana Irwin PA      Discussed results with patient and tx plan. Will dc home with Meclizine. She will f/u with PCP and Dr. Connell. She is feeling better after meds and fluids in ED.      Reviewed old record.     Recent Results (from the past 24 hour(s))   Comprehensive Metabolic Panel    Collection Time: 09/12/20  2:03 PM    Specimen: Arm, Left; Blood   Result Value Ref Range    Glucose 332 (H) 65 - 99 mg/dL    BUN 12 6 - 20 mg/dL    Creatinine 0.59 0.57 - 1.00 mg/dL    Sodium 136 136 - 145 mmol/L    Potassium 3.5 3.5 - 5.2 mmol/L    Chloride 96 (L) 98 - 107 mmol/L    CO2 29.0 22.0 - 29.0 mmol/L    Calcium 10.3 8.6 - 10.5 mg/dL    Total Protein 8.1 6.0 - 8.5 g/dL    Albumin 4.00 3.50 - 5.20 g/dL    ALT (SGPT) 25 1 - 33 U/L    AST (SGOT) 30 1 - 32 U/L    Alkaline Phosphatase 150 (H) 39 - 117  U/L    Total Bilirubin 0.3 0.0 - 1.2 mg/dL    eGFR Non African Amer 111 >60 mL/min/1.73    Globulin 4.1 gm/dL    A/G Ratio 1.0 g/dL    BUN/Creatinine Ratio 20.3 7.0 - 25.0    Anion Gap 11.0 5.0 - 15.0 mmol/L   Troponin    Collection Time: 09/12/20  2:03 PM    Specimen: Arm, Left; Blood   Result Value Ref Range    Troponin T <0.010 0.000 - 0.030 ng/mL   Magnesium    Collection Time: 09/12/20  2:03 PM    Specimen: Arm, Left; Blood   Result Value Ref Range    Magnesium 1.6 1.6 - 2.6 mg/dL   Light Blue Top    Collection Time: 09/12/20  2:03 PM   Result Value Ref Range    Extra Tube hold for add-on    Green Top (Gel)    Collection Time: 09/12/20  2:03 PM   Result Value Ref Range    Extra Tube Hold for add-ons.    Lavender Top    Collection Time: 09/12/20  2:03 PM   Result Value Ref Range    Extra Tube hold for add-on    Gold Top - SST    Collection Time: 09/12/20  2:03 PM   Result Value Ref Range    Extra Tube Hold for add-ons.    CBC Auto Differential    Collection Time: 09/12/20  2:03 PM    Specimen: Arm, Left; Blood   Result Value Ref Range    WBC 12.36 (H) 3.40 - 10.80 10*3/mm3    RBC 5.42 (H) 3.77 - 5.28 10*6/mm3    Hemoglobin 16.0 (H) 12.0 - 15.9 g/dL    Hematocrit 47.6 (H) 34.0 - 46.6 %    MCV 87.8 79.0 - 97.0 fL    MCH 29.5 26.6 - 33.0 pg    MCHC 33.6 31.5 - 35.7 g/dL    RDW 12.3 12.3 - 15.4 %    RDW-SD 39.1 37.0 - 54.0 fl    MPV 10.5 6.0 - 12.0 fL    Platelets 205 140 - 450 10*3/mm3    Neutrophil % 68.5 42.7 - 76.0 %    Lymphocyte % 23.1 19.6 - 45.3 %    Monocyte % 5.9 5.0 - 12.0 %    Eosinophil % 1.8 0.3 - 6.2 %    Basophil % 0.3 0.0 - 1.5 %    Immature Grans % 0.4 0.0 - 0.5 %    Neutrophils, Absolute 8.47 (H) 1.70 - 7.00 10*3/mm3    Lymphocytes, Absolute 2.85 0.70 - 3.10 10*3/mm3    Monocytes, Absolute 0.73 0.10 - 0.90 10*3/mm3    Eosinophils, Absolute 0.22 0.00 - 0.40 10*3/mm3    Basophils, Absolute 0.04 0.00 - 0.20 10*3/mm3    Immature Grans, Absolute 0.05 0.00 - 0.05 10*3/mm3    nRBC 0.0 0.0 - 0.2 /100 WBC    Urinalysis With Microscopic If Indicated (No Culture) - Urine, Clean Catch    Collection Time: 09/12/20  2:35 PM    Specimen: Urine, Clean Catch   Result Value Ref Range    Color, UA Yellow Yellow, Straw    Appearance, UA Cloudy (A) Clear    pH, UA 7.0 5.0 - 8.0    Specific Gravity, UA 1.025 1.001 - 1.030    Glucose, UA >=1000 mg/dL (3+) (A) Negative    Ketones, UA Trace (A) Negative    Bilirubin, UA Negative Negative    Blood, UA Negative Negative    Protein,  mg/dL (2+) (A) Negative    Leuk Esterase, UA Negative Negative    Nitrite, UA Negative Negative    Urobilinogen, UA 0.2 E.U./dL 0.2 - 1.0 E.U./dL   Urinalysis, Microscopic Only - Urine, Clean Catch    Collection Time: 09/12/20  2:35 PM    Specimen: Urine, Clean Catch   Result Value Ref Range    RBC, UA 0-2 None Seen, 0-2 /HPF    WBC, UA 0-2 None Seen, 0-2 /HPF    Bacteria, UA None Seen None Seen, Trace /HPF    Squamous Epithelial Cells, UA 0-2 None Seen, 0-2 /HPF    Hyaline Casts, UA 0-6 0 - 6 /LPF    Methodology Automated Microscopy    POC Glucose Once    Collection Time: 09/12/20  2:37 PM    Specimen: Blood   Result Value Ref Range    Glucose 360 (A) 70 - 130 mg/dL   POCT pregnancy, urine    Collection Time: 09/12/20  2:37 PM    Specimen: Urine   Result Value Ref Range    HCG, Urine, QL Negative Negative    Lot Number [id number]     Internal Positive Control Positive     Internal Negative Control Negative    POC Glucose Once    Collection Time: 09/12/20  2:37 PM    Specimen: Blood   Result Value Ref Range    Glucose 360 (H) 70 - 130 mg/dL   Troponin    Collection Time: 09/12/20  4:23 PM    Specimen: Blood   Result Value Ref Range    Troponin T <0.010 0.000 - 0.030 ng/mL     Note: In addition to lab results from this visit, the labs listed above may include labs taken at another facility or during a different encounter within the last 24 hours. Please correlate lab times with ED admission and discharge times for further clarification of the services  performed during this visit.    MRI Brain With & Without Contrast   Preliminary Result   No acute intracranial finding specifically no evidence for   acute infarction or abnormal enhancement. Trace left mastoid effusion.               CT Abdomen Pelvis With Contrast   Final Result   1. No mechanical obstructive process or loculated fluid collection.       2. Hepatic steatosis.       3. Prominent right adnexal soft tissue density and right ovary although   far decreased from prior comparison 01/30/2020 in overall prominence   without bulky pelvic adenopathy or free fluid Incidentally noted       DICTATED:   09/12/2020   EDITED/ls :   09/12/2020            This report was finalized on 9/12/2020 7:12 PM by Dr. Tian Sewell.          XR Chest 1 View   Preliminary Result   No acute cardiopulmonary process.       DICTATED:   09/12/2020   EDITED/ls :   09/12/2020                 Vitals:    09/12/20 1439 09/12/20 1440 09/12/20 1459 09/12/20 1557   BP: (!) 146/103 140/96 140/96 144/98   BP Location:       Patient Position:  Lying Sitting    Pulse: 105 88 90 78   Resp:   18 18   Temp:       TempSrc:       SpO2:    96%   Weight:       Height:         Medications   sodium chloride 0.9 % flush 10 mL (has no administration in time range)   sodium chloride 0.9 % bolus 1,000 mL (1,000 mL Intravenous New Bag 9/12/20 1555)   ondansetron (ZOFRAN) injection 4 mg (4 mg Intravenous Given 9/12/20 1550)   diazePAM (VALIUM) injection 5 mg (5 mg Intravenous Given 9/12/20 1550)   meclizine (ANTIVERT) tablet 25 mg (25 mg Oral Given 9/12/20 1550)   iopamidol (ISOVUE-300) 61 % injection 85 mL (85 mL Intravenous Given 9/12/20 1715)   gadobenate dimeglumine (MULTIHANCE) injection 18 mL (18 mL Intravenous Given 9/12/20 1831)     ECG/EMG Results (last 24 hours)     Procedure Component Value Units Date/Time    ECG 12 Lead [[id number]] Collected: 09/12/20 1358     Updated: 09/12/20 1400    ECG 12 Lead [[id number]] Collected: 09/12/20 1622     Updated:  09/12/20 1621        ECG 12 Lead         ECG 12 Lead               COVID-19 RISK SCREEN     1. Has the patient had close contact without PPE with a lab confirmed COVID-19 (+) person or a person under investigation (PUI) for COVID-19 infection?  -- No     2. Has the patient had respiratory symptoms, worsened/new cough and/or SOA, unexplained fever, or sudden loss of smell and/or taste in the past 7 days? --  No    3. Does the patient have baseline higher exposure risk such as working in healthcare field, currently residing in healthcare facility, or ongoing hemodialysis?  --  No           DISCHARGE    Patient discharged in stable condition.    Reviewed implications of results, diagnosis, meds, responsibility to follow up, warning signs and symptoms of possible worsening, potential complications and reasons to return to ER.    Patient/Family voiced understanding of above instructions.    Discussed plan for discharge, as there is no emergent indication for admission.  Pt/family is agreeable and understands need for follow up and possible repeat testing.  Pt/family is aware that discharge does not mean that nothing is wrong but that it indicates no emergency is currently present that requires admission and they must continue care with follow-up as given below or with a physician of their choice.     FOLLOW-UP  Nia Higuera MD  [address]  [place] [id number]  [phone]    Schedule an appointment as soon as possible for a visit       Jolene Connell MD  [address]  [place] [id number]  [phone]    Schedule an appointment as soon as possible for a visit       [facility] Emergency Department  [address] [address] [phone]    If symptoms worsen         Medication List      New Prescriptions    meclizine 25 MG tablet  Commonly known as: ANTIVERT  Take 1 tablet by mouth 4 (Four) Times a Day As Needed for Dizziness for up to 30  days.        Changed    diphenoxylate-atropine 2.5-0.025 MG per tablet  Commonly known as: LOMOTIL  TAKE TWO TABLETS BY MOUTH FOUR TIMES A DAY AS NEEDED FOR DIARRHEA  What changed: See the new instructions.     gabapentin 300 MG capsule  Commonly known as: NEURONTIN  Take one capsule every morning and take 2 capsules every night  What changed:   · how much to take  · how to take this  · additional instructions     ondansetron 8 MG tablet  Commonly known as: ZOFRAN  TAKE ONE TABLET BY MOUTH THREE TIMES A DAY AS NEEDED FOR NAUSEA AND VOMITING  What changed: See the new instructions.           Where to Get Your Medications      You can get these medications from any pharmacy    Bring a paper prescription for each of these medications  · meclizine 25 MG tablet                                         MDM    Final diagnoses:   Dizziness   Acute nonintractable headache, unspecified headache type   Non-intractable vomiting with nausea, unspecified vomiting type   Generalized abdominal pain   Hepatic steatosis   History of breast cancer            Jeana Irwin PA  09/12/20 1929

## 2020-09-14 ENCOUNTER — DOCUMENTATION (OUTPATIENT)
Dept: ONCOLOGY | Facility: CLINIC | Age: 45
End: 2020-09-14

## 2020-09-14 ENCOUNTER — OFFICE VISIT (OUTPATIENT)
Dept: ONCOLOGY | Facility: CLINIC | Age: 45
End: 2020-09-14

## 2020-09-14 ENCOUNTER — LAB (OUTPATIENT)
Dept: LAB | Facility: HOSPITAL | Age: 45
End: 2020-09-14

## 2020-09-14 ENCOUNTER — TELEPHONE (OUTPATIENT)
Dept: ONCOLOGY | Facility: CLINIC | Age: 45
End: 2020-09-14

## 2020-09-14 VITALS
BODY MASS INDEX: 30.05 KG/M2 | TEMPERATURE: 98.2 F | HEIGHT: 66 IN | DIASTOLIC BLOOD PRESSURE: 120 MMHG | WEIGHT: 187 LBS | HEART RATE: 93 BPM | RESPIRATION RATE: 17 BRPM | OXYGEN SATURATION: 99 % | SYSTOLIC BLOOD PRESSURE: 160 MMHG

## 2020-09-14 DIAGNOSIS — Z17.0 MALIGNANT NEOPLASM OF OVERLAPPING SITES OF RIGHT BREAST IN FEMALE, ESTROGEN RECEPTOR POSITIVE (HCC): Primary | ICD-10-CM

## 2020-09-14 DIAGNOSIS — C50.811 MALIGNANT NEOPLASM OF OVERLAPPING SITES OF RIGHT BREAST IN FEMALE, ESTROGEN RECEPTOR POSITIVE (HCC): Primary | ICD-10-CM

## 2020-09-14 DIAGNOSIS — R10.13 EPIGASTRIC PAIN: Primary | ICD-10-CM

## 2020-09-14 DIAGNOSIS — C50.811 MALIGNANT NEOPLASM OF OVERLAPPING SITES OF RIGHT FEMALE BREAST, UNSPECIFIED ESTROGEN RECEPTOR STATUS (HCC): ICD-10-CM

## 2020-09-14 LAB
ALBUMIN SERPL-MCNC: 3.9 G/DL (ref 3.5–5.2)
ALBUMIN/GLOB SERPL: 0.9 G/DL
ALP SERPL-CCNC: 145 U/L (ref 39–117)
ALT SERPL W P-5'-P-CCNC: 28 U/L (ref 1–33)
ANION GAP SERPL CALCULATED.3IONS-SCNC: 14 MMOL/L (ref 5–15)
AST SERPL-CCNC: 34 U/L (ref 1–32)
BILIRUB SERPL-MCNC: 0.4 MG/DL (ref 0–1.2)
BUN SERPL-MCNC: 9 MG/DL (ref 6–20)
BUN/CREAT SERPL: 15.8 (ref 7–25)
CALCIUM SPEC-SCNC: 10.4 MG/DL (ref 8.6–10.5)
CHLORIDE SERPL-SCNC: 95 MMOL/L (ref 98–107)
CO2 SERPL-SCNC: 27 MMOL/L (ref 22–29)
CREAT SERPL-MCNC: 0.57 MG/DL (ref 0.57–1)
ERYTHROCYTE [DISTWIDTH] IN BLOOD BY AUTOMATED COUNT: 13.6 % (ref 12.3–15.4)
GFR SERPL CREATININE-BSD FRML MDRD: 116 ML/MIN/1.73
GLOBULIN UR ELPH-MCNC: 4.5 GM/DL
GLUCOSE SERPL-MCNC: 215 MG/DL (ref 65–99)
HCT VFR BLD AUTO: 48.6 % (ref 34–46.6)
HGB BLD-MCNC: 16.2 G/DL (ref 12–15.9)
LYMPHOCYTES # BLD AUTO: 3 10*3/MM3 (ref 0.7–3.1)
LYMPHOCYTES NFR BLD AUTO: 26.9 % (ref 19.6–45.3)
MCH RBC QN AUTO: 29.5 PG (ref 26.6–33)
MCHC RBC AUTO-ENTMCNC: 33.3 G/DL (ref 31.5–35.7)
MCV RBC AUTO: 88.6 FL (ref 79–97)
MONOCYTES # BLD AUTO: 0.5 10*3/MM3 (ref 0.1–0.9)
MONOCYTES NFR BLD AUTO: 4.5 % (ref 5–12)
NEUTROPHILS NFR BLD AUTO: 68.6 % (ref 42.7–76)
NEUTROPHILS NFR BLD AUTO: 7.6 10*3/MM3 (ref 1.7–7)
PLATELET # BLD AUTO: 238 10*3/MM3 (ref 140–450)
PMV BLD AUTO: 7.5 FL (ref 6–12)
POTASSIUM SERPL-SCNC: 3.5 MMOL/L (ref 3.5–5.2)
PROT SERPL-MCNC: 8.4 G/DL (ref 6–8.5)
RBC # BLD AUTO: 5.48 10*6/MM3 (ref 3.77–5.28)
SODIUM SERPL-SCNC: 136 MMOL/L (ref 136–145)
WBC # BLD AUTO: 11.1 10*3/MM3 (ref 3.4–10.8)

## 2020-09-14 PROCEDURE — 99214 OFFICE O/P EST MOD 30 MIN: CPT | Performed by: NURSE PRACTITIONER

## 2020-09-14 PROCEDURE — 80053 COMPREHEN METABOLIC PANEL: CPT | Performed by: NURSE PRACTITIONER

## 2020-09-14 PROCEDURE — 85025 COMPLETE CBC W/AUTO DIFF WBC: CPT | Performed by: NURSE PRACTITIONER

## 2020-09-14 PROCEDURE — 36415 COLL VENOUS BLD VENIPUNCTURE: CPT | Performed by: NURSE PRACTITIONER

## 2020-09-14 RX ORDER — SUCRALFATE ORAL 1 G/10ML
1 SUSPENSION ORAL
Qty: 420 ML | Refills: 0 | Status: SHIPPED | OUTPATIENT
Start: 2020-09-14 | End: 2020-11-06

## 2020-09-14 RX ORDER — LIDOCAINE HYDROCHLORIDE 20 MG/ML
5 SOLUTION OROPHARYNGEAL 4 TIMES DAILY PRN
Qty: 100 ML | Refills: 0 | Status: SHIPPED | OUTPATIENT
Start: 2020-09-14 | End: 2020-11-06

## 2020-09-14 NOTE — PROGRESS NOTES
Called patient to discuss symptoms that she is experiencing.  Patient crying intermittently during telephone call and states she would like to be seen to further evaluate symptoms of abdominal pain.  Patient given an appointment in the [facility] [place].  Labs ordered.

## 2020-09-14 NOTE — TELEPHONE ENCOUNTER
"Patient called triage line stating she had been to ER over the weekend. But her belly feels like she has rocks in it. She is requesting a phone call \"ASAP\". She stated that she had a Ct scan performed in the ER.   "

## 2020-09-14 NOTE — TELEPHONE ENCOUNTER
Pt's [facility] is needing a Prior Auth for medication sucralfate (Carafate) 1 GM/10ML suspension

## 2020-09-14 NOTE — PROGRESS NOTES
"ONCOLOGY URGENT CARE CLINIC VISIT    Josefina Rodriguez  [id number]  1975    Chief Complaint:  Abdominal pain    History of present illness:  Josefina Rodriguez is a 45 y.o. year old female who is currently undergoing treatment for breast cancer with Kadcycla every 3 weeks.  She called the triage line today with complaints of abdominal pain and requested to be seen.  She was given an appointment in the oncology urgent care clinic for further evaluation and management.      Patient complains of abdominal pain that began 2 days ago.  She describes the pain as \"feeling like I have rocks in my stomach\".  She rates pain 8/10 and states it is constant in her epigastric area.  She hasn't really been eating because she said she tried to eat the noodles in her chicken noodle soup and the pain was worse.  She is taking Protonix which helps a little.  She was seen in the ED on 9/12/2020 for dizziness and abdominal pain.  She was given IV fluids with Zofran and valium.  CT abd/pelvis did not show any acute findings.  MRI brain was negative.  She was discharge home with Meclizine and denies any further dizziness.  She states the pain is not any worse since Saturday but it is constant.  She has intermittent nausea but no vomiting.  She was concerned that she may be constipated so she took a stool softener 2 days ago.  She reports several small bowel movements that were more formed than usual for her.  She does have some heartburn and bloating that was relieved after taking a couple Rolaids.  She denies any fever or chills.  Her weight is down 5 pounds in the past 2 days.  She drank 16 ounces of water and 10 ounces of Gatorade yesterday.  Patient has been taking ibuprofen 600 mg since injuring her  finger a little over a month ago and increased to 800 mg twice a day at her last clinic visit on 9/2/2020 for joint pain after starting anastrozole.  She denies any melena or hematochezia.  Patient has history of " cholecystectomy and a hiatal hernia.        Oncology History:    Oncology/Hematology History   Malignant neoplasm of overlapping sites of right breast in female, estrogen receptor positive (CMS/HCC)   1/21/2020 Initial Diagnosis    Malignant neoplasm of overlapping sites of right breast in female, estrogen receptor positive (CMS/HCC)     1/21/2020 Cancer Staged    Staging form: Breast, AJCC 8th Edition  - Clinical: Stage IB (cT2, cN0, cM0, G2, ER+, MO+, HER2+) - Signed by Jolene Connell MD on 1/21/2020 2/5/2020 - 6/9/2020 Chemotherapy    OP BREAST TCH-P DOCEtaxel / CARBOplatin AUC=6 / Trastuzumab-anns / Pertuzumab     2/5/2020 -  Chemotherapy    OP SUPPORTIVE LEUPROLIDE 11.25 MG Q3M     2/5/2020 -  Chemotherapy    OP CENTRAL VENOUS ACCESS DEVICE ACCESS, CARE, AND MAINTENANCE (CVAD)     2/11/2020 - 6/17/2020 Chemotherapy    OP SUPPORTIVE HYDRATION + ANTIEMETICS     6/10/2020 - 7/21/2020 Chemotherapy    OP BREAST Pertuzumab / Trastuzumab-anns  Q21D     7/22/2020 -  Chemotherapy    OP BREAST Ado-Trastuzumab Emtansine         Past Medical History:   Diagnosis Date   • Anxiety    • Depression    • Diabetes mellitus (CMS/HCC)    • Hiatal hernia    • Hypertension    • Malignant neoplasm of overlapping sites of right breast in female, estrogen receptor positive (CMS/HCC) 1/21/2020   • PCOS (polycystic ovarian syndrome)    • Wears glasses        Past Surgical History:   Procedure Laterality Date   • BREAST BIOPSY Right 03/2019    axillary lymph node bx/fna   • CHOLECYSTECTOMY  03/2010   • COLONOSCOPY  2016   • MASTECTOMY W/ SENTINEL NODE BIOPSY Right 6/23/2020    Procedure: MASTECTOMY WITH SENTINEL NODE BIOPSY RIGHT, PORT PLACEMENT;  Surgeon: Rachel Baker MD;  Location: [facility];  Service: General;  Laterality: Right;   • US GUIDED FINE NEEDLE ASPIRATION  1/15/2020   • VENOUS ACCESS DEVICE (PORT) INSERTION Left 01/31/2020       Family History   Problem Relation Age of Onset   • Breast cancer Mother 64   • Heart  disease Mother    • Hypertension Father    • Lung cancer Father    • Ovarian cancer Neg Hx        Past medical history, social history and family history was reviewed.    Medications: The current medication list was reviewed and reconciled.     Allergies:  is allergic to cashew nut; naproxen; penicillins; and pistachio nut.    Review of Systems:    Review of Systems   Constitutional: Positive for appetite change and unexpected weight change. Negative for chills, fatigue and fever.   HENT: Negative for mouth sores, sore throat and trouble swallowing.    Respiratory: Negative for cough, shortness of breath and wheezing.    Cardiovascular: Negative for chest pain, palpitations and leg swelling.   Gastrointestinal: Positive for abdominal pain and nausea. Negative for abdominal distention, blood in stool, constipation, diarrhea and vomiting.   Genitourinary: Negative for difficulty urinating, dysuria and frequency.   Musculoskeletal: Negative for arthralgias.   Skin: Negative for pallor, rash and wound.   Neurological: Negative for dizziness and weakness.   Hematological: Does not bruise/bleed easily.   Psychiatric/Behavioral: Negative for confusion and sleep disturbance. The patient is not nervous/anxious.        PHYSICAL EXAM:    Vitals:    09/14/20 1308   BP: (!) 160/120   Pulse:    Resp:    Temp:    SpO2:      Vitals:    09/14/20 1305   PainSc:   8   PainLoc: Abdomen   Josefina Joyash Michael reports a pain score of 8.  Given her pain assessment as noted, treatment options were discussed and the following options were decided upon as a follow-up plan to address the patient's pain: prescription for non-opiod analgesics and referral to GI for further evaluation.         ECOG: (1) Restricted in Physically Strenuous Activity, Ambulatory & Able to Do Work of Light Nature  General: well appearing female in no acute distress  HEENT: sclerae anicteric, oropharynx clear  Lymphatics: no cervical, supraclavicular, inguinal,  or axillary adenopathy  Neck: Supple. No thyromegaly.  Cardiovascular: regular rate and rhythm, no murmurs  Lungs: clear to auscultation bilaterally. No respiratory distress  Abdomen: soft, non distended, tender to palpation epigastric area.  No rebound tenderness noted.    Extremities: no lower extremity edema, cyanosis, or clubbing  Skin: no rashes, lesions, bruising, or petechiae  Neuro: Alert and oriented x3. Moves all extremities.  Psych: Tearful    Assessment and Plan:    Diagnoses and all orders for this visit:    Epigastric pain  -     sucralfate (Carafate) 1 GM/10ML suspension; Take 10 mL by mouth 3 (Three) Times a Day With Meals.  -     Lidocaine Viscous HCl (XYLOCAINE) 2 % solution; Take 5 mL by mouth 4 (Four) Times a Day As Needed for Mild Pain .  -     Ambulatory Referral to Gastroenterology    Malignant neoplasm of overlapping sites of right female breast, unspecified estrogen receptor status (CMS/HCC)      Discussion: Patient currently on adjuvant treatment with Kadcyla every 3 weeks for breast cancer.  She presents today with complaints of epigastric pain with associated nausea.  She was evaluated in the ED 2 days ago and CT abdomen/pelvis did not show any acute findings.  CBC and CMP were obtained today and results are unremarkable.  Symptoms concerning for peptic ulcer given her increased NSAID use over the past 6 weeks.  Patient states she has not taken any ibuprofen since Saturday when her pain developed.  Instructed her to continue to hold ibuprofen for now.  Discussed with Dr. Fields and will start patient on Carafate 1 gm TID and viscous lidocaine as needed.  Refer patient to gastroenterology for further evaluation and possible EGD.  She will continue to take stool softeners and Zofran as needed.  She will contact us if symptoms worsen or do not improve.  Encouraged her to increase fluid intake and bland diet as tolerated until symptoms improve.  Blood pressure elevated today likely due to pain.   Patient states she took her blood pressure medication right before she came to appointment.  Encouraged her to continue to monitor and if it remains elevated to contact PCP for medication adjustment.  Patient is scheduled for follow up and next treatment on 9/23/2020.        I spent 30 minutes with the patient. I spent > 50% percent of this time counseling and discussing symptoms, diagnostic testing, evaluation, current status and management.    Nisreen Conte, APRN    9/14/2020

## 2020-09-15 NOTE — TELEPHONE ENCOUNTER
PA done and authorized, patient aware this will be ready in about a hour per the pharmacy. Verbalized understanding.

## 2020-09-16 ENCOUNTER — HOSPITAL ENCOUNTER (EMERGENCY)
Facility: HOSPITAL | Age: 45
Discharge: HOME | End: 2020-09-16
Attending: EMERGENCY MEDICINE | Admitting: EMERGENCY MEDICINE

## 2020-09-16 ENCOUNTER — APPOINTMENT (OUTPATIENT)
Dept: ONCOLOGY | Facility: HOSPITAL | Age: 45
End: 2020-09-16

## 2020-09-16 ENCOUNTER — APPOINTMENT (OUTPATIENT)
Dept: CT IMAGING | Facility: HOSPITAL | Age: 45
End: 2020-09-16

## 2020-09-16 ENCOUNTER — TELEPHONE (OUTPATIENT)
Dept: ONCOLOGY | Facility: CLINIC | Age: 45
End: 2020-09-16

## 2020-09-16 ENCOUNTER — APPOINTMENT (OUTPATIENT)
Dept: GENERAL RADIOLOGY | Facility: HOSPITAL | Age: 45
End: 2020-09-16

## 2020-09-16 VITALS
WEIGHT: 192 LBS | RESPIRATION RATE: 18 BRPM | HEART RATE: 83 BPM | SYSTOLIC BLOOD PRESSURE: 174 MMHG | HEIGHT: 66 IN | OXYGEN SATURATION: 99 % | TEMPERATURE: 98.6 F | DIASTOLIC BLOOD PRESSURE: 97 MMHG | BODY MASS INDEX: 30.86 KG/M2

## 2020-09-16 DIAGNOSIS — C50.919 MALIGNANT NEOPLASM OF FEMALE BREAST, UNSPECIFIED ESTROGEN RECEPTOR STATUS, UNSPECIFIED LATERALITY, UNSPECIFIED SITE OF BREAST (HCC): ICD-10-CM

## 2020-09-16 DIAGNOSIS — Z17.0 MALIGNANT NEOPLASM OF OVERLAPPING SITES OF RIGHT BREAST IN FEMALE, ESTROGEN RECEPTOR POSITIVE (HCC): ICD-10-CM

## 2020-09-16 DIAGNOSIS — R10.13 EPIGASTRIC PAIN: ICD-10-CM

## 2020-09-16 DIAGNOSIS — C50.811 MALIGNANT NEOPLASM OF OVERLAPPING SITES OF RIGHT BREAST IN FEMALE, ESTROGEN RECEPTOR POSITIVE (HCC): ICD-10-CM

## 2020-09-16 DIAGNOSIS — B37.0 ORAL PHARYNGEAL CANDIDIASIS: Primary | ICD-10-CM

## 2020-09-16 DIAGNOSIS — R10.13 EPIGASTRIC PAIN: Primary | ICD-10-CM

## 2020-09-16 LAB
ALBUMIN SERPL-MCNC: 4 G/DL (ref 3.5–5.2)
ALBUMIN/GLOB SERPL: 0.9 G/DL
ALP SERPL-CCNC: 142 U/L (ref 39–117)
ALT SERPL W P-5'-P-CCNC: 31 U/L (ref 1–33)
ANION GAP SERPL CALCULATED.3IONS-SCNC: 12 MMOL/L (ref 5–15)
AST SERPL-CCNC: 34 U/L (ref 1–32)
B-HCG UR QL: NEGATIVE
BACTERIA UR QL AUTO: ABNORMAL /HPF
BASOPHILS # BLD AUTO: 0.02 10*3/MM3 (ref 0–0.2)
BASOPHILS NFR BLD AUTO: 0.2 % (ref 0–1.5)
BILIRUB SERPL-MCNC: 0.4 MG/DL (ref 0–1.2)
BILIRUB UR QL STRIP: NEGATIVE
BUN SERPL-MCNC: 11 MG/DL (ref 6–20)
BUN/CREAT SERPL: 17.5 (ref 7–25)
CALCIUM SPEC-SCNC: 10.1 MG/DL (ref 8.6–10.5)
CHLORIDE SERPL-SCNC: 96 MMOL/L (ref 98–107)
CLARITY UR: ABNORMAL
CO2 SERPL-SCNC: 27 MMOL/L (ref 22–29)
COLOR UR: ABNORMAL
CREAT SERPL-MCNC: 0.63 MG/DL (ref 0.57–1)
DEPRECATED RDW RBC AUTO: 38.9 FL (ref 37–54)
EOSINOPHIL # BLD AUTO: 0.22 10*3/MM3 (ref 0–0.4)
EOSINOPHIL NFR BLD AUTO: 2.4 % (ref 0.3–6.2)
ERYTHROCYTE [DISTWIDTH] IN BLOOD BY AUTOMATED COUNT: 12.4 % (ref 12.3–15.4)
GFR SERPL CREATININE-BSD FRML MDRD: 103 ML/MIN/1.73
GLOBULIN UR ELPH-MCNC: 4.5 GM/DL
GLUCOSE SERPL-MCNC: 221 MG/DL (ref 65–99)
GLUCOSE UR STRIP-MCNC: ABNORMAL MG/DL
GRAN CASTS URNS QL MICRO: ABNORMAL /LPF
HCT VFR BLD AUTO: 47.3 % (ref 34–46.6)
HGB BLD-MCNC: 16.3 G/DL (ref 12–15.9)
HGB UR QL STRIP.AUTO: NEGATIVE
HOLD SPECIMEN: NORMAL
HOLD SPECIMEN: NORMAL
HYALINE CASTS UR QL AUTO: ABNORMAL /LPF
IMM GRANULOCYTES # BLD AUTO: 0.03 10*3/MM3 (ref 0–0.05)
IMM GRANULOCYTES NFR BLD AUTO: 0.3 % (ref 0–0.5)
INTERNAL NEGATIVE CONTROL: NEGATIVE
INTERNAL POSITIVE CONTROL: POSITIVE
KETONES UR QL STRIP: ABNORMAL
LEUKOCYTE ESTERASE UR QL STRIP.AUTO: ABNORMAL
LIPASE SERPL-CCNC: 31 U/L (ref 13–60)
LYMPHOCYTES # BLD AUTO: 2.72 10*3/MM3 (ref 0.7–3.1)
LYMPHOCYTES NFR BLD AUTO: 29.8 % (ref 19.6–45.3)
Lab: NORMAL
MCH RBC QN AUTO: 29.9 PG (ref 26.6–33)
MCHC RBC AUTO-ENTMCNC: 34.5 G/DL (ref 31.5–35.7)
MCV RBC AUTO: 86.8 FL (ref 79–97)
MONOCYTES # BLD AUTO: 0.72 10*3/MM3 (ref 0.1–0.9)
MONOCYTES NFR BLD AUTO: 7.9 % (ref 5–12)
MUCOUS THREADS URNS QL MICRO: ABNORMAL /HPF
NEUTROPHILS NFR BLD AUTO: 5.43 10*3/MM3 (ref 1.7–7)
NEUTROPHILS NFR BLD AUTO: 59.4 % (ref 42.7–76)
NITRITE UR QL STRIP: NEGATIVE
NRBC BLD AUTO-RTO: 0 /100 WBC (ref 0–0.2)
PH UR STRIP.AUTO: 6 [PH] (ref 5–8)
PLATELET # BLD AUTO: 234 10*3/MM3 (ref 140–450)
PMV BLD AUTO: 10 FL (ref 6–12)
POTASSIUM SERPL-SCNC: 3.4 MMOL/L (ref 3.5–5.2)
PROT SERPL-MCNC: 8.5 G/DL (ref 6–8.5)
PROT UR QL STRIP: ABNORMAL
RBC # BLD AUTO: 5.45 10*6/MM3 (ref 3.77–5.28)
RBC # UR: ABNORMAL /HPF
REF LAB TEST METHOD: ABNORMAL
SODIUM SERPL-SCNC: 135 MMOL/L (ref 136–145)
SP GR UR STRIP: 1.02 (ref 1–1.03)
SQUAMOUS #/AREA URNS HPF: ABNORMAL /HPF
TROPONIN T SERPL-MCNC: <0.01 NG/ML (ref 0–0.03)
UROBILINOGEN UR QL STRIP: ABNORMAL
WBC # BLD AUTO: 9.14 10*3/MM3 (ref 3.4–10.8)
WBC UR QL AUTO: ABNORMAL /HPF
WHOLE BLOOD HOLD SPECIMEN: NORMAL
WHOLE BLOOD HOLD SPECIMEN: NORMAL

## 2020-09-16 PROCEDURE — 25010000002 ONDANSETRON PER 1 MG: Performed by: EMERGENCY MEDICINE

## 2020-09-16 PROCEDURE — 96374 THER/PROPH/DIAG INJ IV PUSH: CPT

## 2020-09-16 PROCEDURE — 84484 ASSAY OF TROPONIN QUANT: CPT | Performed by: EMERGENCY MEDICINE

## 2020-09-16 PROCEDURE — 96361 HYDRATE IV INFUSION ADD-ON: CPT

## 2020-09-16 PROCEDURE — 93005 ELECTROCARDIOGRAM TRACING: CPT

## 2020-09-16 PROCEDURE — 81001 URINALYSIS AUTO W/SCOPE: CPT | Performed by: EMERGENCY MEDICINE

## 2020-09-16 PROCEDURE — 83690 ASSAY OF LIPASE: CPT | Performed by: EMERGENCY MEDICINE

## 2020-09-16 PROCEDURE — 71045 X-RAY EXAM CHEST 1 VIEW: CPT

## 2020-09-16 PROCEDURE — 99284 EMERGENCY DEPT VISIT MOD MDM: CPT

## 2020-09-16 PROCEDURE — 81025 URINE PREGNANCY TEST: CPT | Performed by: EMERGENCY MEDICINE

## 2020-09-16 PROCEDURE — 96375 TX/PRO/DX INJ NEW DRUG ADDON: CPT

## 2020-09-16 PROCEDURE — 93005 ELECTROCARDIOGRAM TRACING: CPT | Performed by: EMERGENCY MEDICINE

## 2020-09-16 PROCEDURE — 80053 COMPREHEN METABOLIC PANEL: CPT | Performed by: EMERGENCY MEDICINE

## 2020-09-16 PROCEDURE — 85025 COMPLETE CBC W/AUTO DIFF WBC: CPT | Performed by: EMERGENCY MEDICINE

## 2020-09-16 RX ORDER — FLUCONAZOLE 200 MG/1
200 TABLET ORAL DAILY
Qty: 10 TABLET | Refills: 0 | Status: SHIPPED | OUTPATIENT
Start: 2020-09-16 | End: 2020-09-23 | Stop reason: SDUPTHER

## 2020-09-16 RX ORDER — HEPARIN SODIUM (PORCINE) LOCK FLUSH IV SOLN 100 UNIT/ML 100 UNIT/ML
300 SOLUTION INTRAVENOUS ONCE
Status: DISCONTINUED | OUTPATIENT
Start: 2020-09-16 | End: 2020-09-16 | Stop reason: HOSPADM

## 2020-09-16 RX ORDER — ALUMINA, MAGNESIA, AND SIMETHICONE 2400; 2400; 240 MG/30ML; MG/30ML; MG/30ML
15 SUSPENSION ORAL ONCE
Status: COMPLETED | OUTPATIENT
Start: 2020-09-16 | End: 2020-09-16

## 2020-09-16 RX ORDER — SODIUM CHLORIDE 0.9 % (FLUSH) 0.9 %
10 SYRINGE (ML) INJECTION AS NEEDED
Status: DISCONTINUED | OUTPATIENT
Start: 2020-09-16 | End: 2020-09-16 | Stop reason: HOSPADM

## 2020-09-16 RX ORDER — PANTOPRAZOLE SODIUM 40 MG/10ML
40 INJECTION, POWDER, LYOPHILIZED, FOR SOLUTION INTRAVENOUS ONCE
Status: COMPLETED | OUTPATIENT
Start: 2020-09-16 | End: 2020-09-16

## 2020-09-16 RX ORDER — LIDOCAINE HYDROCHLORIDE 20 MG/ML
15 SOLUTION OROPHARYNGEAL ONCE
Status: COMPLETED | OUTPATIENT
Start: 2020-09-16 | End: 2020-09-16

## 2020-09-16 RX ORDER — ONDANSETRON 2 MG/ML
4 INJECTION INTRAMUSCULAR; INTRAVENOUS ONCE
Status: COMPLETED | OUTPATIENT
Start: 2020-09-16 | End: 2020-09-16

## 2020-09-16 RX ADMIN — ONDANSETRON 4 MG: 2 INJECTION INTRAMUSCULAR; INTRAVENOUS at 14:34

## 2020-09-16 RX ADMIN — ALUMINUM HYDROXIDE, MAGNESIUM HYDROXIDE, AND DIMETHICONE 15 ML: 400; 400; 40 SUSPENSION ORAL at 14:34

## 2020-09-16 RX ADMIN — LIDOCAINE HYDROCHLORIDE 15 ML: 20 SOLUTION ORAL; TOPICAL at 14:34

## 2020-09-16 RX ADMIN — PANTOPRAZOLE SODIUM 40 MG: 40 INJECTION, POWDER, FOR SOLUTION INTRAVENOUS at 14:34

## 2020-09-16 RX ADMIN — SODIUM CHLORIDE 1000 ML: 9 INJECTION, SOLUTION INTRAVENOUS at 14:33

## 2020-09-16 NOTE — ED PROVIDER NOTES
Subjective   This patient is a very pleasant 45-year-old female with an unfortunate diagnosis of breast cancer currently undergoing treatment with  who comes in for her third evaluation over the last 4 to 5 days.  She was seen in the emergency department on 12 September for dizziness and abdominal pain.  I have reviewed past medical documentation.  She had a CT scan of the abdomen and pelvis as well as an MRI of the brain at that time.  She was sent home to follow-up with her PCP and oncology.  Patient tells me that abdominal pain became worse and has been relatively consistent since Saturday.  Became so uncomfortable that she saw the oncology team on Monday.  A nurse practitioner saw her in the office and thought that the patient had a story consistent with ulcerative disease.  Labs were obtained and reviewed.  Carafate was prescribed.  Patient tells me she has been on Protonix for quite some time.  Patient tells me the Carafate has made the symptoms worse and the pain overall has become worse over the last 2 days.  She is crying that she tells me this.  She tells me the pain is in the midepigastrium.  Nothing specifically seems to make it better or worse.  Patient denies any hemoptysis or hematemesis.  Denies any dark tarry stools or bright red blood per rectum.  Denies any history of known ulcerative disease, or reflux.  When asked why she was taking Protonix, she tells me she believes she was started on this secondary to a hiatal hernia.  Patient denies chest pain or shortness of breath.  Denies any leg swelling.  Denies any fevers chills cough or congestion.  She is here with her significant other who confirms the aforementioned story.  In summary, we have a 45-year-old female here for third clinical examination and evaluation for abdominal pain over the last 3 to 5 days.    Past medical history  Breast cancer    Family history  Breast cancer, mother          Review of Systems   Constitutional: Negative.   Negative for chills, fatigue, fever and unexpected weight change.   HENT: Negative for dental problem, ear pain, hearing loss and sinus pressure.    Eyes: Negative for pain and visual disturbance.   Respiratory: Negative for chest tightness and shortness of breath.    Cardiovascular: Negative for chest pain, palpitations and leg swelling.   Gastrointestinal: Positive for abdominal pain and nausea. Negative for blood in stool, diarrhea and vomiting.   Genitourinary: Negative for difficulty urinating, dysuria, frequency, hematuria and urgency.   Musculoskeletal: Negative for myalgias, neck pain and neck stiffness.   Neurological: Negative for seizures, syncope, speech difficulty, light-headedness and headaches.   Psychiatric/Behavioral: Negative for confusion.   All other systems reviewed and are negative.      Past Medical History:   Diagnosis Date   • Anxiety    • Depression    • Diabetes mellitus (CMS/HCC)    • Hiatal hernia    • Hypertension    • Malignant neoplasm of overlapping sites of right breast in female, estrogen receptor positive (CMS/HCC) 1/21/2020   • PCOS (polycystic ovarian syndrome)    • Wears glasses        Allergies   Allergen Reactions   • Cashew Nut Hives and Itching   • Naproxen Hives   • Penicillins Hives     PT STATES CAN TOLERATE KELFEX   • Pistachio Nut Hives and Itching       Past Surgical History:   Procedure Laterality Date   • BREAST BIOPSY Right 03/2019    axillary lymph node bx/fna   • CHOLECYSTECTOMY  03/2010   • COLONOSCOPY  2016   • MASTECTOMY W/ SENTINEL NODE BIOPSY Right 6/23/2020    Procedure: MASTECTOMY WITH SENTINEL NODE BIOPSY RIGHT, PORT PLACEMENT;  Surgeon: Rachel Baker MD;  Location: [facility];  Service: General;  Laterality: Right;   • US GUIDED FINE NEEDLE ASPIRATION  1/15/2020   • VENOUS ACCESS DEVICE (PORT) INSERTION Left 01/31/2020       Family History   Problem Relation Age of Onset   • Breast cancer Mother 64   • Heart disease Mother    • Hypertension Father     • Lung cancer Father    • Ovarian cancer Neg Hx        Social History     Socioeconomic History   • Marital status:      Spouse name: Not on file   • Number of children: Not on file   • Years of education: Not on file   • Highest education level: Not on file   Tobacco Use   • Smoking status: Current Every Day Smoker     Packs/day: 1.00     Years: 20.00     Pack years: 20.00     Types: Cigarettes   • Smokeless tobacco: Never Used   Substance and Sexual Activity   • Alcohol use: Yes     Comment: occasionally a couple times a month   • Drug use: Never   • Sexual activity: Defer           Objective   Physical Exam  Vitals signs and nursing note reviewed.   Constitutional:       Appearance: She is well-developed. She is not toxic-appearing.      Comments: Patient without signs of acute distress but she is tearful during history and exam processes.   HENT:      Head: Normocephalic and atraumatic.      Jaw: No trismus.      Right Ear: Tympanic membrane, ear canal and external ear normal.      Left Ear: Tympanic membrane, ear canal and external ear normal.      Nose: Nose normal.      Mouth/Throat:      Mouth: Mucous membranes are not dry. No oral lesions.      Dentition: No dental abscesses.      Pharynx: No posterior oropharyngeal erythema or uvula swelling.      Tonsils: No tonsillar exudate or tonsillar abscesses.   Eyes:      Conjunctiva/sclera:      Right eye: Right conjunctiva is not injected.      Left eye: Left conjunctiva is not injected.      Pupils: Pupils are equal, round, and reactive to light.   Neck:      Musculoskeletal: Normal range of motion and neck supple. Normal range of motion. No neck rigidity.      Vascular: No JVD.      Trachea: No tracheal tenderness.   Cardiovascular:      Rate and Rhythm: Normal rate and regular rhythm.      Heart sounds: Normal heart sounds. No friction rub. No gallop.    Pulmonary:      Effort: Pulmonary effort is normal.      Breath sounds: Normal breath sounds. No  wheezing or rales.   Chest:      Chest wall: No tenderness.   Abdominal:      General: Bowel sounds are normal. There is no distension.      Palpations: Abdomen is soft. Abdomen is not rigid. There is no mass or pulsatile mass.      Tenderness: There is abdominal tenderness. There is no guarding or rebound. Negative signs include McBurney's sign.      Comments: No signs of acute abdomen.  No pain at McBurney's point.  No pulsatile abdominal mass.  Mild tenderness palpation in the midepigastrium only.   Musculoskeletal: Normal range of motion.         General: No tenderness or deformity.   Lymphadenopathy:      Cervical: No cervical adenopathy.   Skin:     General: Skin is warm and dry.      Findings: No erythema or rash.      Comments: No diaphoresis, lesions, nevi, petechia, purpura   Neurological:      Mental Status: She is alert and oriented to person, place, and time.      Cranial Nerves: No cranial nerve deficit.      Sensory: No sensory deficit.      Motor: No tremor or abnormal muscle tone.      Comments: 5/5 strength bilaterally with flexion and extension of fingers, wrist, elbows, knees and hips as well as plantar and dorsiflexion of the foot.   Psychiatric:         Attention and Perception: She is attentive.         Speech: Speech normal.         Behavior: Behavior normal.         Thought Content: Thought content normal.         Judgment: Judgment normal.         Procedures           ED Course  ED Course as of Sep 16 1612   Wed Sep 16, 2020   1408 I had a good conversation with the patient and her significant other.  I let them know about the differential diagnosis and medical decision making here.  At this point, the patient has had a CT scan of the abdomen and pelvis as well as an MRI of the head within the last 4 days.  She has had lab work done both 4 days ago as well as 2 days ago.  The pain is consistent with pain that started 4 days ago.  I do believe GI evaluation is going to be an important neck  step and plan to assess whether this needs to take place here in the emergency department, as an outpatient or, or potentially as an inpatient.  I have ordered IV fluid rehydration, GI cocktail, and Zofran.  I have also ordered Protonix.  I hope to get the patient feeling better.  She does not have an acute abdomen at this point.  Abdomen is quite the opposite, very soft and nonacute.  She does have some mild tenderness in the midepigastric area but she tells me this is consistent with the last 4 days.    [PAN]   1409 All questions have been answered at this time.  Patient and  are agreeable to plan and without question or complaint.  I am certainly hopeful that I can get the patient evaluated by GI more expeditiously than next Tuesday.    [PAN]   1417 Chest x-ray shows no evidence of acute disease process.    [PAN]   1508 I discussed the case with Dr. Brunner, GI.  He recommended reevaluating the patient to assess for potential yeast infection, which he thought might be quite likely given the patient's immunocompromise state and cancer therapy.  I plan to reexamine the patient and look for potential lesions.  He recommended Diflucan if anything was noted.  If not, Dr. Kumar thought that it may make sense to bring the patient in for observation and potential EGD given the patient's worsening symptoms.  I plan to discuss case with oncology before admission, should this pathway be required.    [PAN]   1532 I reexamined the patient and found clear evidence of oral thrush/candidiasis.  I talked the patient about treating her with Diflucan and following up on Friday with oncology.  I discussed the case personally with her oncologist who would like to this plan and promised to have his nurse practitioner, Nisreen, call the patient to schedule an appointment on Friday for follow-up as we discussed.  If the patient is not doing well at that time, patient will be made a direct admit to be seen by GI and for further care.   Patient tells me she is feeling much better.  We are going to ensure that she gets the rest of her fluids and at that time discharge her home.  Patient's impression will include oral thrush/candidiasis, abdominal pain, breast cancer.  Plan will include Diflucan, maintain good fluid intake.  Follow-up with oncology on Friday.  They will call for an appointment.  Return immediately for new or changing concerns.  Patient is agreeable to the plan and without question or complaint will be discharged home accordingly following completion of her IV fluid rehydration.    [PAN]      ED Course User Index  [PAN] Geoff Daniels MD     Recent Results (from the past 24 hour(s))   Urinalysis With Microscopic If Indicated (No Culture) - Urine, Clean Catch    Collection Time: 09/16/20  1:55 PM    Specimen: Urine, Clean Catch   Result Value Ref Range    Color, UA Dark Yellow (A) Yellow, Straw    Appearance, UA Cloudy (A) Clear    pH, UA 6.0 5.0 - 8.0    Specific Gravity, UA 1.025 1.001 - 1.030    Glucose,  mg/dL (1+) (A) Negative    Ketones, UA 40 mg/dL (2+) (A) Negative    Bilirubin, UA Negative Negative    Blood, UA Negative Negative    Protein, UA >=300 mg/dL (3+) (A) Negative    Leuk Esterase, UA Trace (A) Negative    Nitrite, UA Negative Negative    Urobilinogen, UA 1.0 E.U./dL 0.2 - 1.0 E.U./dL   Urinalysis, Microscopic Only - Urine, Clean Catch    Collection Time: 09/16/20  1:55 PM    Specimen: Urine, Clean Catch   Result Value Ref Range    RBC, UA 3-6 (A) None Seen, 0-2 /HPF    WBC, UA 3-5 (A) None Seen, 0-2 /HPF    Bacteria, UA None Seen None Seen, Trace /HPF    Squamous Epithelial Cells, UA 3-6 (A) None Seen, 0-2 /HPF    Hyaline Casts, UA 21-30 0 - 6 /LPF    Granular Casts, UA 3-6 None Seen /LPF    Mucus, UA Moderate/2+ (A) None Seen, Trace /HPF    Methodology Manual Light Microscopy    Comprehensive Metabolic Panel    Collection Time: 09/16/20  2:15 PM    Specimen: Blood   Result Value Ref Range    Glucose 221 (H)  65 - 99 mg/dL    BUN 11 6 - 20 mg/dL    Creatinine 0.63 0.57 - 1.00 mg/dL    Sodium 135 (L) 136 - 145 mmol/L    Potassium 3.4 (L) 3.5 - 5.2 mmol/L    Chloride 96 (L) 98 - 107 mmol/L    CO2 27.0 22.0 - 29.0 mmol/L    Calcium 10.1 8.6 - 10.5 mg/dL    Total Protein 8.5 6.0 - 8.5 g/dL    Albumin 4.00 3.50 - 5.20 g/dL    ALT (SGPT) 31 1 - 33 U/L    AST (SGOT) 34 (H) 1 - 32 U/L    Alkaline Phosphatase 142 (H) 39 - 117 U/L    Total Bilirubin 0.4 0.0 - 1.2 mg/dL    eGFR Non African Amer 103 >60 mL/min/1.73    Globulin 4.5 gm/dL    A/G Ratio 0.9 g/dL    BUN/Creatinine Ratio 17.5 7.0 - 25.0    Anion Gap 12.0 5.0 - 15.0 mmol/L   Lipase    Collection Time: 09/16/20  2:15 PM    Specimen: Blood   Result Value Ref Range    Lipase 31 13 - 60 U/L   Troponin    Collection Time: 09/16/20  2:15 PM    Specimen: Blood   Result Value Ref Range    Troponin T <0.010 0.000 - 0.030 ng/mL   Light Blue Top    Collection Time: 09/16/20  2:15 PM   Result Value Ref Range    Extra Tube hold for add-on    Green Top (Gel)    Collection Time: 09/16/20  2:15 PM   Result Value Ref Range    Extra Tube Hold for add-ons.    Lavender Top    Collection Time: 09/16/20  2:15 PM   Result Value Ref Range    Extra Tube hold for add-on    Gold Top - SST    Collection Time: 09/16/20  2:15 PM   Result Value Ref Range    Extra Tube Hold for add-ons.    CBC Auto Differential    Collection Time: 09/16/20  2:15 PM    Specimen: Blood   Result Value Ref Range    WBC 9.14 3.40 - 10.80 10*3/mm3    RBC 5.45 (H) 3.77 - 5.28 10*6/mm3    Hemoglobin 16.3 (H) 12.0 - 15.9 g/dL    Hematocrit 47.3 (H) 34.0 - 46.6 %    MCV 86.8 79.0 - 97.0 fL    MCH 29.9 26.6 - 33.0 pg    MCHC 34.5 31.5 - 35.7 g/dL    RDW 12.4 12.3 - 15.4 %    RDW-SD 38.9 37.0 - 54.0 fl    MPV 10.0 6.0 - 12.0 fL    Platelets 234 140 - 450 10*3/mm3    Neutrophil % 59.4 42.7 - 76.0 %    Lymphocyte % 29.8 19.6 - 45.3 %    Monocyte % 7.9 5.0 - 12.0 %    Eosinophil % 2.4 0.3 - 6.2 %    Basophil % 0.2 0.0 - 1.5 %     "Immature Grans % 0.3 0.0 - 0.5 %    Neutrophils, Absolute 5.43 1.70 - 7.00 10*3/mm3    Lymphocytes, Absolute 2.72 0.70 - 3.10 10*3/mm3    Monocytes, Absolute 0.72 0.10 - 0.90 10*3/mm3    Eosinophils, Absolute 0.22 0.00 - 0.40 10*3/mm3    Basophils, Absolute 0.02 0.00 - 0.20 10*3/mm3    Immature Grans, Absolute 0.03 0.00 - 0.05 10*3/mm3    nRBC 0.0 0.0 - 0.2 /100 WBC   POCT Pregnancy, Urine    Collection Time: 09/16/20  2:31 PM    Specimen: Urine   Result Value Ref Range    HCG, Urine, QL Negative Negative    Lot Number [id number]     Internal Positive Control Positive     Internal Negative Control Negative      Note: In addition to lab results from this visit, the labs listed above may include labs taken at another facility or during a different encounter within the last 24 hours. Please correlate lab times with ED admission and discharge times for further clarification of the services performed during this visit.    XR Chest 1 View   Preliminary Result   No acute cardiopulmonary disease.               D:  09/16/2020   E:  09/16/2020            Vitals:    09/16/20 1309 09/16/20 1431 09/16/20 1500 09/16/20 1530   BP: (!) 169/109 (!) 179/113 164/100 (!) 162/111   BP Location: Left arm Left arm     Patient Position: Sitting Sitting     Pulse: 88 83     Resp: 16 18     Temp: 98.6 °F (37 °C)      TempSrc: Tympanic      SpO2: 98% 98% 99% 98%   Weight: 87.1 kg (192 lb)      Height: 167.6 cm (66\")        Medications   sodium chloride 0.9 % flush 10 mL (has no administration in time range)   sodium chloride 0.9 % bolus 1,000 mL (1,000 mL Intravenous New Bag 9/16/20 1433)   ondansetron (ZOFRAN) injection 4 mg (4 mg Intravenous Given 9/16/20 1434)   aluminum-magnesium hydroxide-simethicone (MAALOX MAX) 400-400-40 MG/5ML suspension 15 mL (15 mL Oral Given 9/16/20 1434)   Lidocaine Viscous HCl (XYLOCAINE) 2 % mouth solution 15 mL (15 mL Mouth/Throat Given 9/16/20 4867)   pantoprazole (PROTONIX) injection 40 mg (40 mg Intravenous " Given 9/16/20 1434)     ECG/EMG Results (last 24 hours)     Procedure Component Value Units Date/Time    ECG 12 Lead [[id number]] Collected: 09/16/20 1320     Updated: 09/16/20 1605    Narrative:      Test Reason : Upper Abdominal Pain Triage Protocol  Blood Pressure : **/** mmHG  Vent. Rate : 088 BPM     Atrial Rate : 088 BPM     P-R Int : 126 ms          QRS Dur : 080 ms      QT Int : 368 ms       P-R-T Axes : 078 -35 049 degrees     QTc Int : 445 ms    Normal sinus rhythm  Left axis deviation  Minimal voltage criteria for LVH, may be normal variant  Abnormal ECG  When compared with ECG of 12-SEP-2020 16:22,  No significant change was found  Confirmed by GEOFF DANIELS MD ([id number]) on 9/16/2020 4:04:57 PM    Referred By:  EFREN           Confirmed By:GEOFF DANIELS MD        ECG 12 Lead   Final Result   Test Reason : Upper Abdominal Pain Triage Protocol   Blood Pressure : **/** mmHG   Vent. Rate : 088 BPM     Atrial Rate : 088 BPM      P-R Int : 126 ms          QRS Dur : 080 ms       QT Int : 368 ms       P-R-T Axes : 078 -35 049 degrees      QTc Int : 445 ms      Normal sinus rhythm   Left axis deviation   Minimal voltage criteria for LVH, may be normal variant   Abnormal ECG   When compared with ECG of 12-SEP-2020 16:22,   No significant change was found   Confirmed by GEOFF DANIELS MD ([id number]) on 9/16/2020 4:04:57 PM      Referred By:  EFREN           Confirmed By:GEOFF DANIELS MD                                                  [facility]    Final diagnoses:   Oral pharyngeal candidiasis   Malignant neoplasm of female breast, unspecified estrogen receptor status, unspecified laterality, unspecified site of breast (CMS/HCC)   Epigastric pain            Geoff Daniels MD  09/16/20 6021

## 2020-09-16 NOTE — DISCHARGE INSTRUCTIONS
Take medication as prescribed.    Maintain good fluid intake.    Return to the emergency department immediately for new or changing concerns.    Follow-up with your PCP in 1 week and follow-up with the oncology clinic on Friday.  You will make your appointment with the primary care physician.  The oncology team will call you for appointment timing.    Please review the medications you are supposed to be taking according to prior physician recommendations. I have not changed your home medications during this visit. If your discharge instructions indicate that I have changed your home medications, this is not the case, and you should disregard. If you have any questions about the medication you should be taking at home, please call your physician.

## 2020-09-16 NOTE — TELEPHONE ENCOUNTER
Patient calling in to the triage line again today regarding ongoing stomach pain. Patient has tried the lidocaine and carafate with little to no relief. Patient states the lidocaine does help but the carafate seems to make it worse. Patient has not been able to eat in 5 days due to stomach pain. Discussed with Nisreen GONZALEZ, patient is advised to try the lidocaine first wait 15-20 min take the carafate and then try to eat something bland. Fluids and IV zofran ordered, scheduled in infusion today at 3. Patient verbalized understanding.

## 2020-09-16 NOTE — TELEPHONE ENCOUNTER
Patients significant other, Isaiah, calling back in to the triage line. The patient is in an unbearable amount of pain still stating the lidocaine and carafate are not working and possibly making the pain worse. Patient did get an appointment with GI, scheduled for Tuesday 9/22, however with the high level of pain the patient is not able to wait this long. Discussed with Nisreen GONZALEZ. Advised patient to go to the ED for further evaluation. Appointment for fluids in infusion canceled.  Verbalized understanding.

## 2020-09-18 ENCOUNTER — OFFICE VISIT (OUTPATIENT)
Dept: ONCOLOGY | Facility: CLINIC | Age: 45
End: 2020-09-18

## 2020-09-18 VITALS
WEIGHT: 183 LBS | SYSTOLIC BLOOD PRESSURE: 170 MMHG | DIASTOLIC BLOOD PRESSURE: 94 MMHG | TEMPERATURE: 98 F | HEIGHT: 66 IN | HEART RATE: 86 BPM | BODY MASS INDEX: 29.41 KG/M2 | OXYGEN SATURATION: 95 % | RESPIRATION RATE: 16 BRPM

## 2020-09-18 DIAGNOSIS — B37.0 THRUSH: ICD-10-CM

## 2020-09-18 DIAGNOSIS — C50.811 MALIGNANT NEOPLASM OF OVERLAPPING SITES OF RIGHT BREAST IN FEMALE, ESTROGEN RECEPTOR POSITIVE (HCC): Primary | ICD-10-CM

## 2020-09-18 DIAGNOSIS — Z17.0 MALIGNANT NEOPLASM OF OVERLAPPING SITES OF RIGHT BREAST IN FEMALE, ESTROGEN RECEPTOR POSITIVE (HCC): Primary | ICD-10-CM

## 2020-09-18 PROCEDURE — 99214 OFFICE O/P EST MOD 30 MIN: CPT | Performed by: INTERNAL MEDICINE

## 2020-09-18 NOTE — PROGRESS NOTES
Follow Up Office Visit      Date: 2020     Patient Name: Josefina Rodriguez  MRN: [id number]  : 1975    Chief Complaint: Follow-up for abdominal pain/thrush    History of Present Illness:   1. [id number] ER+ LA+ Her2+ invasive ductal carcinoma of the right breast  A) presented with a mass on self-exam.  Mammogram 20 showed a 4.1 cm mass in the right breast, with 2 adjacent smaller masses.  1.9 cm right axillary lymph node.   FNA of lymph node negative.  Biopsy of right breast mass showed grade 2 IDC, Er 95%, LA 2%, Her2 3+.  B) neoadjuvant chemotherapy with TCHP started 2020  C) right mastectomy on 20.  Pathology showed a 2 x 1 x 0.6 cm intermediate grade IDC.  0/2 SLN involved.  [id number]  2. PCOS  3. Anxiety/depression  4. DM2  5. GERD  6. Hyperlipidemia  7. Hypertension  8.  Hidradenitis    Interval History:  Presents to clinic today for follow-up.  Over the past week she has been having worsening abdominal discomfort and pain.  She was worried as she had stopped eating and was only eating a few bites of food for about 5 straight days.  She was initially prescribed PPI and Carafate for presumed reflux which did not improve her symptoms.  She was seen in the ER and was noted to have thrush in her mouth.  Patient started on Diflucan 2 days ago and states that her symptoms have improved.  She has been eating soup with minimal difficulty.  States the whiteness in her mouth has improved significantly.  Otherwise has some mild abdominal pain and notes some slight diarrhea but has not used any of her PRN Lomotil/Imodium as of yet.    Oncology History:    Oncology/Hematology History   Malignant neoplasm of overlapping sites of right breast in female, estrogen receptor positive (CMS/HCC)   2020 Initial Diagnosis    Malignant neoplasm of overlapping sites of right breast in female, estrogen receptor positive (CMS/HCC)     2020 Cancer Staged    Staging form: Breast, AJCC 8th  Edition  - Clinical: Stage IB (cT2, cN0, cM0, G2, ER+, AZ+, HER2+) - Signed by Jolene Connell MD on 1/21/2020 2/5/2020 - 6/9/2020 Chemotherapy    OP BREAST TCH-P DOCEtaxel / CARBOplatin AUC=6 / Trastuzumab-anns / Pertuzumab     2/5/2020 -  Chemotherapy    OP SUPPORTIVE LEUPROLIDE 11.25 MG Q3M     2/5/2020 -  Chemotherapy    OP CENTRAL VENOUS ACCESS DEVICE ACCESS, CARE, AND MAINTENANCE (CVAD)     2/11/2020 - 6/17/2020 Chemotherapy    OP SUPPORTIVE HYDRATION + ANTIEMETICS     6/10/2020 - 7/21/2020 Chemotherapy    OP BREAST Pertuzumab / Trastuzumab-anns  Q21D     7/22/2020 -  Chemotherapy    OP BREAST Ado-Trastuzumab Emtansine     9/16/2020 -  Chemotherapy    OP SUPPORTIVE HYDRATION + ANTIEMETICS         Subjective      Review of Systems:   Endorses improved abdominal pain, swallowing, thrush-like symptoms otherwise 10 point review systems is complete is otherwise negative                        Past Medical History/Past Surgical History/ Family History/ Social History: Reviewed by me and unchanged from previous documentation done in September 2020.     Medications:     Current Outpatient Medications:   •  anastrozole (ARIMIDEX) 1 MG tablet, Take 1 tablet by mouth Daily., Disp: 30 tablet, Rfl: 11  •  B Complex Vitamins (VITAMIN B COMPLEX PO), Take 1 tablet by mouth Daily., Disp: , Rfl:   •  diphenoxylate-atropine (LOMOTIL) 2.5-0.025 MG per tablet, TAKE TWO TABLETS BY MOUTH FOUR TIMES A DAY AS NEEDED FOR DIARRHEA (Patient taking differently: Take 1 tablet by mouth 4 (Four) Times a Day As Needed.), Disp: 30 tablet, Rfl: 4  •  fluconazole (DIFLUCAN) 200 MG tablet, Take 1 tablet by mouth Daily for 10 days., Disp: 10 tablet, Rfl: 0  •  fluticasone (FLONASE) 50 MCG/ACT nasal spray, 1 spray into the nostril(s) as directed by provider Every 12 (Twelve) Hours., Disp: , Rfl:   •  gabapentin (NEURONTIN) 300 MG capsule, Take one capsule every morning and take 2 capsules every night (Patient taking differently: Take 1,500 mg by  mouth. PATIENT TAKES 600MG IN THE MORNING AND 900MG AT HS), Disp: 90 capsule, Rfl: 1  •  LANTUS SOLOSTAR 100 UNIT/ML injection pen, Per sliding scale, Disp: , Rfl:   •  Lidocaine Viscous HCl (XYLOCAINE) 2 % solution, Take 5 mL by mouth 4 (Four) Times a Day As Needed for Mild Pain ., Disp: 100 mL, Rfl: 0  •  lidocaine-prilocaine (EMLA) 2.5-2.5 % cream, APPLY TOPICALLY TO THE APPROPRIATE AREA AS DIRECTED AND AS NEEDED. APPLY 45-60 MINUTES PRIOR TO PORT ACCESS. COVER WITH SARAN/ PLASTIC WRAP., Disp: 30 each, Rfl: 2  •  lisinopril (PRINIVIL,ZESTRIL) 10 MG tablet, Take 10 mg by mouth., Disp: , Rfl:   •  loratadine (Claritin) 10 MG tablet, Take 10 mg by mouth Daily., Disp: , Rfl:   •  LORazepam (ATIVAN) 1 MG tablet, Take 1 mg by mouth As Needed for Anxiety., Disp: , Rfl:   •  meclizine (ANTIVERT) 25 MG tablet, Take 1 tablet by mouth 4 (Four) Times a Day As Needed for Dizziness for up to 30 days., Disp: 30 tablet, Rfl: 0  •  Misc Natural Products (GINSENG-COMPLEX PO), Take 1 tablet by mouth Daily., Disp: , Rfl:   •  montelukast (SINGULAIR) 10 MG tablet, Take 10 mg by mouth Every Night., Disp: , Rfl:   •  ondansetron (ZOFRAN) 8 MG tablet, TAKE ONE TABLET BY MOUTH THREE TIMES A DAY AS NEEDED FOR NAUSEA AND VOMITING (Patient taking differently: Take 8 mg by mouth Every 8 (Eight) Hours As Needed for Nausea.), Disp: 30 tablet, Rfl: 4  •  pantoprazole (PROTONIX) 40 MG EC tablet, Take 40 mg by mouth Daily., Disp: , Rfl:   •  potassium chloride (K-DUR,KLOR-CON) 10 MEQ CR tablet, Take 10 mEq by mouth Daily., Disp: , Rfl:   •  prochlorperazine (COMPAZINE) 10 MG tablet, Take 1 tablet by mouth Every 6 (Six) Hours As Needed for Nausea or Vomiting., Disp: 60 tablet, Rfl: 1  •  SITagliptin (JANUVIA) 100 MG tablet, Take 100 mg by mouth Daily., Disp: , Rfl:   •  sucralfate (Carafate) 1 GM/10ML suspension, Take 10 mL by mouth 3 (Three) Times a Day With Meals., Disp: 420 mL, Rfl: 0    Allergies:   Allergies   Allergen Reactions   • Cashew  "Nut Hives and Itching   • Naproxen Hives   • Penicillins Hives     PT STATES CAN TOLERATE KELFEX   • Pistachio Nut Hives and Itching       Objective     Physical Exam:  Vital Signs:   Vitals:    09/18/20 1332   BP: 170/94   Pulse: 86   Resp: 16   Temp: 98 °F (36.7 °C)   TempSrc: Temporal   SpO2: 95%   Weight: 83 kg (183 lb)   Height: 167.6 cm (65.98\")   PainSc:   3     Pain Score    09/18/20 1332   PainSc:   3     ECOG Performance Status: 1 - Symptomatic but completely ambulatory    Constitutional: NAD, ECOG 1  Eyes: PERRLA, scleral anicteric  ENT: No LAD, no thyromegaly, whiteness on tongue, no mouth sores noted  Respiratory: CTAB, no wheezing, rales, rhonchi  Cardiovascular: RRR, no murmurs, pulses 2+ bilaterally  Abdomen: soft, NT/ND, no HSM  Musculoskeletal: strength 5/5 bilaterally, no c/c/e  Neurologic: A&O x 3, CN II-XII intact grossly    Results Review:   Admission on 09/16/2020, Discharged on 09/16/2020   Component Date Value Ref Range Status   • Glucose 09/16/2020 221* 65 - 99 mg/dL Final   • BUN 09/16/2020 11  6 - 20 mg/dL Final   • Creatinine 09/16/2020 0.63  0.57 - 1.00 mg/dL Final   • Sodium 09/16/2020 135* 136 - 145 mmol/L Final   • Potassium 09/16/2020 3.4* 3.5 - 5.2 mmol/L Final    Slight hemolysis detected by analyzer. Results may be affected.   • Chloride 09/16/2020 96* 98 - 107 mmol/L Final   • CO2 09/16/2020 27.0  22.0 - 29.0 mmol/L Final   • Calcium 09/16/2020 10.1  8.6 - 10.5 mg/dL Final   • Total Protein 09/16/2020 8.5  6.0 - 8.5 g/dL Final   • Albumin 09/16/2020 4.00  3.50 - 5.20 g/dL Final   • ALT (SGPT) 09/16/2020 31  1 - 33 U/L Final   • AST (SGOT) 09/16/2020 34* 1 - 32 U/L Final   • Alkaline Phosphatase 09/16/2020 142* 39 - 117 U/L Final   • Total Bilirubin 09/16/2020 0.4  0.0 - 1.2 mg/dL Final   • eGFR Non African Amer 09/16/2020 103  >60 mL/min/1.73 Final   • Globulin 09/16/2020 4.5  gm/dL Final   • A/G Ratio 09/16/2020 0.9  g/dL Final   • BUN/Creatinine Ratio 09/16/2020 17.5  7.0 - " 25.0 Final   • Anion Gap 09/16/2020 12.0  5.0 - 15.0 mmol/L Final   • Lipase 09/16/2020 31  13 - 60 U/L Final   • Troponin T 09/16/2020 <0.010  0.000 - 0.030 ng/mL Final   • Color, UA 09/16/2020 Dark Yellow* Yellow, Straw Final   • Appearance, UA 09/16/2020 Cloudy* Clear Final   • pH, UA 09/16/2020 6.0  5.0 - 8.0 Final   • Specific Gravity, UA 09/16/2020 1.025  1.001 - 1.030 Final   • Glucose, UA 09/16/2020 250 mg/dL (1+)* Negative Final   • Ketones, UA 09/16/2020 40 mg/dL (2+)* Negative Final   • Bilirubin, UA 09/16/2020 Negative  Negative Final   • Blood, UA 09/16/2020 Negative  Negative Final   • Protein, UA 09/16/2020 >=300 mg/dL (3+)* Negative Final   • Leuk Esterase, UA 09/16/2020 Trace* Negative Final   • Nitrite, UA 09/16/2020 Negative  Negative Final   • Urobilinogen, UA 09/16/2020 1.0 E.U./dL  0.2 - 1.0 E.U./dL Final   • HCG, Urine, QL 09/16/2020 Negative  Negative Final   • Lot Number 09/16/2020 [id number]   Final   • Internal Positive Control 09/16/2020 Positive   Final   • Internal Negative Control 09/16/2020 Negative   Final   • Extra Tube 09/16/2020 hold for add-on   Final    Auto resulted   • Extra Tube 09/16/2020 Hold for add-ons.   Final    Auto resulted.   • Extra Tube 09/16/2020 hold for add-on   Final    Auto resulted   • Extra Tube 09/16/2020 Hold for add-ons.   Final    Auto resulted.   • WBC 09/16/2020 9.14  3.40 - 10.80 10*3/mm3 Final   • RBC 09/16/2020 5.45* 3.77 - 5.28 10*6/mm3 Final   • Hemoglobin 09/16/2020 16.3* 12.0 - 15.9 g/dL Final   • Hematocrit 09/16/2020 47.3* 34.0 - 46.6 % Final   • MCV 09/16/2020 86.8  79.0 - 97.0 fL Final   • MCH 09/16/2020 29.9  26.6 - 33.0 pg Final   • MCHC 09/16/2020 34.5  31.5 - 35.7 g/dL Final   • RDW 09/16/2020 12.4  12.3 - 15.4 % Final   • RDW-SD 09/16/2020 38.9  37.0 - 54.0 fl Final   • MPV 09/16/2020 10.0  6.0 - 12.0 fL Final   • Platelets 09/16/2020 234  140 - 450 10*3/mm3 Final   • Neutrophil % 09/16/2020 59.4  42.7 - 76.0 % Final   • Lymphocyte %  09/16/2020 29.8  19.6 - 45.3 % Final   • Monocyte % 09/16/2020 7.9  5.0 - 12.0 % Final   • Eosinophil % 09/16/2020 2.4  0.3 - 6.2 % Final   • Basophil % 09/16/2020 0.2  0.0 - 1.5 % Final   • Immature Grans % 09/16/2020 0.3  0.0 - 0.5 % Final   • Neutrophils, Absolute 09/16/2020 5.43  1.70 - 7.00 10*3/mm3 Final   • Lymphocytes, Absolute 09/16/2020 2.72  0.70 - 3.10 10*3/mm3 Final   • Monocytes, Absolute 09/16/2020 0.72  0.10 - 0.90 10*3/mm3 Final   • Eosinophils, Absolute 09/16/2020 0.22  0.00 - 0.40 10*3/mm3 Final   • Basophils, Absolute 09/16/2020 0.02  0.00 - 0.20 10*3/mm3 Final   • Immature Grans, Absolute 09/16/2020 0.03  0.00 - 0.05 10*3/mm3 Final   • nRBC 09/16/2020 0.0  0.0 - 0.2 /100 WBC Final   • RBC, UA 09/16/2020 3-6* None Seen, 0-2 /HPF Final   • WBC, UA 09/16/2020 3-5* None Seen, 0-2 /HPF Final   • Bacteria, UA 09/16/2020 None Seen  None Seen, Trace /HPF Final   • Squamous Epithelial Cells, UA 09/16/2020 3-6* None Seen, 0-2 /HPF Final   • Hyaline Casts, UA 09/16/2020 21-30  0 - 6 /LPF Final   • Granular Casts, UA 09/16/2020 3-6  None Seen /LPF Final   • Mucus, UA 09/16/2020 Moderate/2+* None Seen, Trace /HPF Final   • Methodology 09/16/2020 Manual Light Microscopy   Final   Orders Only on 09/14/2020   Component Date Value Ref Range Status   • Glucose 09/14/2020 215* 65 - 99 mg/dL Final   • BUN 09/14/2020 9  6 - 20 mg/dL Final   • Creatinine 09/14/2020 0.57  0.57 - 1.00 mg/dL Final   • Sodium 09/14/2020 136  136 - 145 mmol/L Final   • Potassium 09/14/2020 3.5  3.5 - 5.2 mmol/L Final   • Chloride 09/14/2020 95* 98 - 107 mmol/L Final   • CO2 09/14/2020 27.0  22.0 - 29.0 mmol/L Final   • Calcium 09/14/2020 10.4  8.6 - 10.5 mg/dL Final   • Total Protein 09/14/2020 8.4  6.0 - 8.5 g/dL Final   • Albumin 09/14/2020 3.90  3.50 - 5.20 g/dL Final   • ALT (SGPT) 09/14/2020 28  1 - 33 U/L Final   • AST (SGOT) 09/14/2020 34* 1 - 32 U/L Final   • Alkaline Phosphatase 09/14/2020 145* 39 - 117 U/L Final   • Total  Bilirubin 09/14/2020 0.4  0.0 - 1.2 mg/dL Final   • eGFR Non  Amer 09/14/2020 116  >60 mL/min/1.73 Final   • Globulin 09/14/2020 4.5  gm/dL Final   • A/G Ratio 09/14/2020 0.9  g/dL Final   • BUN/Creatinine Ratio 09/14/2020 15.8  7.0 - 25.0 Final   • Anion Gap 09/14/2020 14.0  5.0 - 15.0 mmol/L Final   • WBC 09/14/2020 11.10* 3.40 - 10.80 10*3/mm3 Final   • RBC 09/14/2020 5.48* 3.77 - 5.28 10*6/mm3 Final   • Hemoglobin 09/14/2020 16.2* 12.0 - 15.9 g/dL Final   • Hematocrit 09/14/2020 48.6* 34.0 - 46.6 % Final   • RDW 09/14/2020 13.6  12.3 - 15.4 % Final   • MCV 09/14/2020 88.6  79.0 - 97.0 fL Final   • MCH 09/14/2020 29.5  26.6 - 33.0 pg Final   • MCHC 09/14/2020 33.3  31.5 - 35.7 g/dL Final   • MPV 09/14/2020 7.5  6.0 - 12.0 fL Final   • Platelets 09/14/2020 238  140 - 450 10*3/mm3 Final   • Neutrophil % 09/14/2020 68.6  42.7 - 76.0 % Final   • Lymphocyte % 09/14/2020 26.9  19.6 - 45.3 % Final   • Monocyte % 09/14/2020 4.5* 5.0 - 12.0 % Final   • Neutrophils, Absolute 09/14/2020 7.60* 1.70 - 7.00 10*3/mm3 Final   • Lymphocytes, Absolute 09/14/2020 3.00  0.70 - 3.10 10*3/mm3 Final   • Monocytes, Absolute 09/14/2020 0.50  0.10 - 0.90 10*3/mm3 Final   Admission on 09/12/2020, Discharged on 09/12/2020   Component Date Value Ref Range Status   • Glucose 09/12/2020 360* 70 - 130 mg/dL Final   • Glucose 09/12/2020 332* 65 - 99 mg/dL Final   • BUN 09/12/2020 12  6 - 20 mg/dL Final   • Creatinine 09/12/2020 0.59  0.57 - 1.00 mg/dL Final   • Sodium 09/12/2020 136  136 - 145 mmol/L Final   • Potassium 09/12/2020 3.5  3.5 - 5.2 mmol/L Final    Slight hemolysis detected by analyzer. Results may be affected.   • Chloride 09/12/2020 96* 98 - 107 mmol/L Final   • CO2 09/12/2020 29.0  22.0 - 29.0 mmol/L Final   • Calcium 09/12/2020 10.3  8.6 - 10.5 mg/dL Final   • Total Protein 09/12/2020 8.1  6.0 - 8.5 g/dL Final   • Albumin 09/12/2020 4.00  3.50 - 5.20 g/dL Final   • ALT (SGPT) 09/12/2020 25  1 - 33 U/L Final   • AST (SGOT)  09/12/2020 30  1 - 32 U/L Final   • Alkaline Phosphatase 09/12/2020 150* 39 - 117 U/L Final   • Total Bilirubin 09/12/2020 0.3  0.0 - 1.2 mg/dL Final   • eGFR Non African Amer 09/12/2020 111  >60 mL/min/1.73 Final   • Globulin 09/12/2020 4.1  gm/dL Final   • A/G Ratio 09/12/2020 1.0  g/dL Final   • BUN/Creatinine Ratio 09/12/2020 20.3  7.0 - 25.0 Final   • Anion Gap 09/12/2020 11.0  5.0 - 15.0 mmol/L Final   • Troponin T 09/12/2020 <0.010  0.000 - 0.030 ng/mL Final   • Magnesium 09/12/2020 1.6  1.6 - 2.6 mg/dL Final   • Color, UA 09/12/2020 Yellow  Yellow, Straw Final   • Appearance, UA 09/12/2020 Cloudy* Clear Final   • pH, UA 09/12/2020 7.0  5.0 - 8.0 Final   • Specific Gravity, UA 09/12/2020 1.025  1.001 - 1.030 Final   • Glucose, UA 09/12/2020 >=1000 mg/dL (3+)* Negative Final   • Ketones, UA 09/12/2020 Trace* Negative Final   • Bilirubin, UA 09/12/2020 Negative  Negative Final   • Blood, UA 09/12/2020 Negative  Negative Final   • Protein, UA 09/12/2020 100 mg/dL (2+)* Negative Final   • Leuk Esterase, UA 09/12/2020 Negative  Negative Final   • Nitrite, UA 09/12/2020 Negative  Negative Final   • Urobilinogen, UA 09/12/2020 0.2 E.U./dL  0.2 - 1.0 E.U./dL Final   • HCG, Urine, QL 09/12/2020 Negative  Negative Final   • Lot Number 09/12/2020 [id number]   Final   • Internal Positive Control 09/12/2020 Positive   Final   • Internal Negative Control 09/12/2020 Negative   Final   • Extra Tube 09/12/2020 hold for add-on   Final    Auto resulted   • Extra Tube 09/12/2020 Hold for add-ons.   Final    Auto resulted.   • Extra Tube 09/12/2020 hold for add-on   Final    Auto resulted   • Extra Tube 09/12/2020 Hold for add-ons.   Final    Auto resulted.   • WBC 09/12/2020 12.36* 3.40 - 10.80 10*3/mm3 Final   • RBC 09/12/2020 5.42* 3.77 - 5.28 10*6/mm3 Final   • Hemoglobin 09/12/2020 16.0* 12.0 - 15.9 g/dL Final   • Hematocrit 09/12/2020 47.6* 34.0 - 46.6 % Final   • MCV 09/12/2020 87.8  79.0 - 97.0 fL Final   • MCH 09/12/2020  29.5  26.6 - 33.0 pg Final   • MCHC 09/12/2020 33.6  31.5 - 35.7 g/dL Final   • RDW 09/12/2020 12.3  12.3 - 15.4 % Final   • RDW-SD 09/12/2020 39.1  37.0 - 54.0 fl Final   • MPV 09/12/2020 10.5  6.0 - 12.0 fL Final   • Platelets 09/12/2020 205  140 - 450 10*3/mm3 Final   • Neutrophil % 09/12/2020 68.5  42.7 - 76.0 % Final   • Lymphocyte % 09/12/2020 23.1  19.6 - 45.3 % Final   • Monocyte % 09/12/2020 5.9  5.0 - 12.0 % Final   • Eosinophil % 09/12/2020 1.8  0.3 - 6.2 % Final   • Basophil % 09/12/2020 0.3  0.0 - 1.5 % Final   • Immature Grans % 09/12/2020 0.4  0.0 - 0.5 % Final   • Neutrophils, Absolute 09/12/2020 8.47* 1.70 - 7.00 10*3/mm3 Final   • Lymphocytes, Absolute 09/12/2020 2.85  0.70 - 3.10 10*3/mm3 Final   • Monocytes, Absolute 09/12/2020 0.73  0.10 - 0.90 10*3/mm3 Final   • Eosinophils, Absolute 09/12/2020 0.22  0.00 - 0.40 10*3/mm3 Final   • Basophils, Absolute 09/12/2020 0.04  0.00 - 0.20 10*3/mm3 Final   • Immature Grans, Absolute 09/12/2020 0.05  0.00 - 0.05 10*3/mm3 Final   • nRBC 09/12/2020 0.0  0.0 - 0.2 /100 WBC Final   • Glucose 09/12/2020 360* 70 - 130 mg/dL Final   • RBC, UA 09/12/2020 0-2  None Seen, 0-2 /HPF Final   • WBC, UA 09/12/2020 0-2  None Seen, 0-2 /HPF Final   • Bacteria, UA 09/12/2020 None Seen  None Seen, Trace /HPF Final   • Squamous Epithelial Cells, UA 09/12/2020 0-2  None Seen, 0-2 /HPF Final   • Hyaline Casts, UA 09/12/2020 0-6  0 - 6 /LPF Final   • Methodology 09/12/2020 Automated Microscopy   Final   • Troponin T 09/12/2020 <0.010  0.000 - 0.030 ng/mL Final       Mri Brain With & Without Contrast    Result Date: 9/13/2020  Narrative: EXAMINATION: MRI BRAIN WWO CONTRAST - 09/12/2020  INDICATION: Dizziness.  TECHNIQUE: Multiplanar MRI of the brain with and without intervenous contrast.  COMPARISON: None.  FINDINGS: No restriction on diffusion-weighted sequences. Midline structures are symmetric without evidence of mass, mass effect or midline shift. Ventricles and sulci within  normal limits. No intra-axial hemorrhage or extra-axial fluid collection. No white matter disease signal aberration involvement or advanced chronic small vessel ischemic disease. Globes and orbits retain normal T2 signal characteristics. Visualized paranasal sinuses and mastoid cells are grossly clear and well-pneumatized with the exception of a trace left mastoid effusion. Pituitary and sella within normal limits. Cervicomedullary junction widely patent. Postcontrast sequences without abnormal enhancement, specifically no soft tissue nodule or mass occupying focus of abnormal enhancement identified.  Expected  inappropriate vascular enhancement identified with [place] of [place] grossly patent and superior sagittal sinus grossly patent and unremarkable.      Impression: No acute intracranial findings, specifically no evidence for acute infarction or abnormal enhancement. Trace left mastoid effusion.   DICTATED:   09/12/2020 EDITED/ls :   09/13/2020  This report was finalized on 9/13/2020 12:04 PM by Dr. Tian Sewell.      Ct Abdomen Pelvis With Contrast    Result Date: 9/12/2020  Narrative: EXAMINATION: CT ABDOMEN/PELVIS W CONTRAST - 09/12/2020  INDICATION: Upper abdominal pain.  TECHNIQUE: CT abdomen and pelvis with intravenous contrast.  The radiation dose reduction device was turned on for each scan per the ALARA (As Low as Reasonably Achievable) protocol.  COMPARISON: CT dated 01/30/2020.  FINDINGS: Lung bases demonstrate mild to moderate subsegmental atelectasis. Liver demonstrates mild diffuse attenuation of hepatic steatosis with focal fatty infiltration adjacent to the falciform. Gallbladder surgically absent. No biliary dilatation. Pancreas and spleen within normal limits. Adrenals without distinct nodule. Kidneys without hydronephrosis or hydroureter. Grossly symmetric nephrogram and subsequent excretory phase imaging without filling defect to suggest obstructive uropathy or urolithiasis. Normal layering  contrast in the dependent portion of the urinary bladder. No bulky retroperitoneal adenopathy. Atherosclerotic nonaneurysmal patent abdominal aorta. Portal veins and IVC patent. GI tract evaluation without focal thickening or disproportionate dilatation of bowel to suggest mechanical obstructive process. Appendix visualized and unremarkable. No free fluid or intra-abdominal free air. Prominence of the right adnexa and right ovary noted but decreased from prior comparison 01/30/2020 where this was much larger, now 5.8 x 3.5 cm and previously 6.6 x 5.8 cm. No bulky pelvic adenopathy or free fluid. Degenerative changes of the spine and pelvis without aggressive osseous lesion. No soft tissue body wall findings of acuity.      Impression: 1. No mechanical obstructive process or loculated fluid collection.  2. Hepatic steatosis.  3. Prominent right adnexal soft tissue density and right ovary although far decreased from prior comparison 01/30/2020 in overall prominence without bulky pelvic adenopathy or free fluid Incidentally noted  DICTATED:   09/12/2020 EDITED/ls :   09/12/2020   This report was finalized on 9/12/2020 7:12 PM by Dr. Tian Sewell.      Xr Chest 1 View    Result Date: 9/18/2020  Narrative: EXAMINATION: XR CHEST 1 VW- 09/16/2020  INDICATION: Upper Abdominal Pain Triage Protocol  COMPARISON: 09/12/2020  FINDINGS: Portable chest reveals Port-A-Catheter identified left tip in the SVC. Lung fields are clear. No focal right opacification present. No pleural effusion or pneumothorax. The bony structures are unremarkable. Pulmonary vascularity is within normal limits.         Impression: No acute cardiopulmonary disease.    D:  09/16/2020 E:  09/16/2020  This report was finalized on 9/18/2020 11:15 AM by Dr. Irish Hansen MD.      Xr Chest 1 View    Result Date: 9/13/2020  Narrative:  EXAMINATION: XR CHEST 1 VW - 09/12/2020  INDICATION: Weakness, dizziness, altered mental status.  COMPARISON: Chest x-ray  06/23/2020.  FINDINGS: Left chest wall Port-A-Cath in place. Cardiac size within normal limits. Pulmonary vascularity within normal limits. No focal opacification or consolidation. No pneumothorax or pleural effusion. Degenerative changes of the spine.         Impression: No acute cardiopulmonary process.  DICTATED:   09/12/2020 EDITED/ls :   09/12/2020  This report was finalized on 9/13/2020 12:03 PM by Dr. Tian Sewell.        Assessment / Plan      Assessment/Plan:   Thrush  - Noted on exam and has improved per patient starting Diflucan  -Likely secondary to prolonged antibiotic use with Bactrim  -Patient to continue therapy for 8 more days to complete a total of 10 days of Diflucan.  -Advised patient to advance diet as tolerated  -Advised patient to contact clinic should her symptoms not improve further over the weekend    Diarrhea  -Likely secondary to Kadcyla  -Advised patient to start PRN Imodium/Lomotil and if no improvement to notify the clinic on Monday as we will likely need to rule out C. difficile should she not improve with PRN medications    Stage IB ER+ Her2+ IDC of the right breast  - On Kadcyla on anastrozole  -Continue follow-up with Dr. Connell as scheduled    Hypertension  - /94  - Patient states she is compliant with her antihypertensives  -Acute elevation likely in setting of distress and pain from multiple acute illnesses including thrush and diarrhea  -Advised patient to continue to monitor at home to notify PCP should her blood pressure continue to remain elevated    Follow Up:   Follow-up with Dr. Connell as previously scheduled     Morgan Tiwari MD  Hematology and Oncology     Please note that portions of this note may have been completed with a voice recognition program. Efforts were made to edit the dictations, but occasionally words are mistranscribed.

## 2020-09-21 ENCOUNTER — APPOINTMENT (OUTPATIENT)
Dept: MRI IMAGING | Facility: HOSPITAL | Age: 45
End: 2020-09-21

## 2020-09-22 ENCOUNTER — OFFICE VISIT (OUTPATIENT)
Dept: GASTROENTEROLOGY | Facility: CLINIC | Age: 45
End: 2020-09-22

## 2020-09-22 VITALS
WEIGHT: 183 LBS | SYSTOLIC BLOOD PRESSURE: 141 MMHG | TEMPERATURE: 97.1 F | HEIGHT: 66 IN | HEART RATE: 105 BPM | DIASTOLIC BLOOD PRESSURE: 96 MMHG | BODY MASS INDEX: 29.41 KG/M2

## 2020-09-22 DIAGNOSIS — B37.0 THRUSH: ICD-10-CM

## 2020-09-22 DIAGNOSIS — R10.13 EPIGASTRIC PAIN: Primary | ICD-10-CM

## 2020-09-22 PROCEDURE — 99213 OFFICE O/P EST LOW 20 MIN: CPT | Performed by: NURSE PRACTITIONER

## 2020-09-22 NOTE — PROGRESS NOTES
GASTROENTEROLOGY OFFICE NOTE  Josefina Rodriguez  [id number]  1975    CARE TEAM  Patient Care Team:  Nia Higuera MD as PCP - General (Internal Medicine)  Jolene Connell MD as Consulting Physician (Hematology and Oncology)    Referring Provider: Nia Higuera MD    Chief Complaint   Patient presents with   • Abdominal Pain        HISTORY OF PRESENT ILLNESS:  The patient is a very pleasant 45-year-old female with breast cancer currently undergoing treatment with Dr. Fields seen recently in the emergency department on 3 occasions with abdominal pain.  Work-up included CT abdomen/pelvis which was nondiagnostic for her symptoms.  At her last visit in the emergency department on 9/16/2020 she was diagnosed with oral thrush and started on Diflucan 200 mg daily for 10 days.  She is on her sixth day of Diflucan.  She reports that her abdominal pain has much improved but not completely resolved.  She continues to have a white coating on her tongue although this has improved as well.  She denies odynophagia and has never experienced painful swallowing throughout her complaints.  She denies dysphagia.  She takes Protonix 40 mg daily for acid reflux and feels she has good control.  She has had no changes in her bowel habits.  She has a soft formed bowel movement daily and denies any black stools or blood in her stool.  She is eating well with no loss of appetite.    PAST MEDICAL HISTORY  Past Medical History:   Diagnosis Date   • Anxiety    • Depression    • Diabetes mellitus (CMS/HCC)    • Hiatal hernia    • Hypertension    • Malignant neoplasm of overlapping sites of right breast in female, estrogen receptor positive (CMS/HCC) 1/21/2020   • PCOS (polycystic ovarian syndrome)    • Wears glasses         PAST SURGICAL HISTORY  Past Surgical History:   Procedure Laterality Date   • BREAST BIOPSY Right 03/2019    axillary lymph node bx/fna   • CHOLECYSTECTOMY  03/2010   • COLONOSCOPY  2016   • MASTECTOMY W/  SENTINEL NODE BIOPSY Right 6/23/2020    Procedure: MASTECTOMY WITH SENTINEL NODE BIOPSY RIGHT, PORT PLACEMENT;  Surgeon: Rachel Baker MD;  Location: [facility];  Service: General;  Laterality: Right;   • US GUIDED FINE NEEDLE ASPIRATION  1/15/2020   • VENOUS ACCESS DEVICE (PORT) INSERTION Left 01/31/2020        MEDICATIONS:    Current Outpatient Medications:   •  anastrozole (ARIMIDEX) 1 MG tablet, Take 1 tablet by mouth Daily., Disp: 30 tablet, Rfl: 11  •  B Complex Vitamins (VITAMIN B COMPLEX PO), Take 1 tablet by mouth Daily., Disp: , Rfl:   •  diphenoxylate-atropine (LOMOTIL) 2.5-0.025 MG per tablet, TAKE TWO TABLETS BY MOUTH FOUR TIMES A DAY AS NEEDED FOR DIARRHEA (Patient taking differently: Take 1 tablet by mouth 4 (Four) Times a Day As Needed.), Disp: 30 tablet, Rfl: 4  •  fluconazole (DIFLUCAN) 200 MG tablet, Take 1 tablet by mouth Daily for 7 days., Disp: 7 tablet, Rfl: 0  •  fluticasone (FLONASE) 50 MCG/ACT nasal spray, 1 spray into the nostril(s) as directed by provider Every 12 (Twelve) Hours., Disp: , Rfl:   •  gabapentin (NEURONTIN) 300 MG capsule, Take two capsules every morning by mouth  and take 3 capsules every night, Disp: 150 capsule, Rfl: 1  •  LANTUS SOLOSTAR 100 UNIT/ML injection pen, Per sliding scale, Disp: , Rfl:   •  Lidocaine Viscous HCl (XYLOCAINE) 2 % solution, Take 5 mL by mouth 4 (Four) Times a Day As Needed for Mild Pain ., Disp: 100 mL, Rfl: 0  •  lidocaine-prilocaine (EMLA) 2.5-2.5 % cream, APPLY TOPICALLY TO THE APPROPRIATE AREA AS DIRECTED AND AS NEEDED. APPLY 45-60 MINUTES PRIOR TO PORT ACCESS. COVER WITH SARAN/ PLASTIC WRAP., Disp: 30 each, Rfl: 2  •  lisinopril (PRINIVIL,ZESTRIL) 10 MG tablet, Take 10 mg by mouth., Disp: , Rfl:   •  loratadine (Claritin) 10 MG tablet, Take 10 mg by mouth Daily., Disp: , Rfl:   •  LORazepam (ATIVAN) 1 MG tablet, Take 1 mg by mouth As Needed for Anxiety., Disp: , Rfl:   •  meclizine (ANTIVERT) 25 MG tablet, Take 1 tablet by mouth 4  (Four) Times a Day As Needed for Dizziness for up to 30 days., Disp: 30 tablet, Rfl: 0  •  Misc Natural Products (GINSENG-COMPLEX PO), Take 1 tablet by mouth Daily., Disp: , Rfl:   •  montelukast (SINGULAIR) 10 MG tablet, Take 10 mg by mouth Every Night., Disp: , Rfl:   •  ondansetron (ZOFRAN) 8 MG tablet, TAKE ONE TABLET BY MOUTH THREE TIMES A DAY AS NEEDED FOR NAUSEA AND VOMITING (Patient taking differently: Take 8 mg by mouth Every 8 (Eight) Hours As Needed for Nausea.), Disp: 30 tablet, Rfl: 4  •  pantoprazole (PROTONIX) 40 MG EC tablet, Take 40 mg by mouth Daily., Disp: , Rfl:   •  potassium chloride (K-DUR,KLOR-CON) 10 MEQ CR tablet, Take 10 mEq by mouth Daily., Disp: , Rfl:   •  prochlorperazine (COMPAZINE) 10 MG tablet, Take 1 tablet by mouth Every 6 (Six) Hours As Needed for Nausea or Vomiting., Disp: 60 tablet, Rfl: 1  •  SITagliptin (JANUVIA) 100 MG tablet, Take 100 mg by mouth Daily., Disp: , Rfl:   •  sucralfate (Carafate) 1 GM/10ML suspension, Take 10 mL by mouth 3 (Three) Times a Day With Meals., Disp: 420 mL, Rfl: 0    ALLERGIES  Allergies   Allergen Reactions   • Cashew Nut Hives and Itching   • Naproxen Hives   • Penicillins Hives     PT STATES CAN TOLERATE KELFEX   • Pistachio Nut Hives and Itching       FAMILY HISTORY:  Family History   Problem Relation Age of Onset   • Breast cancer Mother 64   • Heart disease Mother    • Hypertension Father    • Lung cancer Father    • Ovarian cancer Neg Hx    • Colon cancer Neg Hx    • Colon polyps Neg Hx        SOCIAL HISTORY  Social History     Socioeconomic History   • Marital status:      Spouse name: Not on file   • Number of children: Not on file   • Years of education: Not on file   • Highest education level: Not on file   Tobacco Use   • Smoking status: Current Every Day Smoker     Packs/day: 1.00     Years: 20.00     Pack years: 20.00     Types: Cigarettes   • Smokeless tobacco: Never Used   Substance and Sexual Activity   • Alcohol use: Yes      Comment: occasionally a couple times a month   • Drug use: Never   • Sexual activity: Defer       REVIEW OF SYSTEMS  Review of Systems   Constitutional: Negative for activity change, appetite change, chills, diaphoresis, fatigue, fever, unexpected weight gain and unexpected weight loss.   HENT: Negative for congestion, dental problem, drooling, ear discharge, ear pain, facial swelling, hearing loss, mouth sores, nosebleeds, postnasal drip, rhinorrhea, sinus pressure, sneezing, sore throat, swollen glands, tinnitus, trouble swallowing and voice change.    Respiratory: Negative for apnea, cough, choking, chest tightness, shortness of breath, wheezing and stridor.    Cardiovascular: Negative for chest pain, palpitations and leg swelling.   Gastrointestinal: Positive for abdominal pain. Negative for abdominal distention, anal bleeding, blood in stool, constipation, diarrhea, nausea, rectal pain, vomiting, GERD and indigestion.   Endocrine: Negative for cold intolerance, heat intolerance, polydipsia, polyphagia and polyuria.   Musculoskeletal: Negative for arthralgias, back pain, gait problem, joint swelling, myalgias, neck pain, neck stiffness and bursitis.   Skin: Negative for color change, dry skin, rash and skin lesions.   Allergic/Immunologic: Negative for environmental allergies, food allergies and immunocompromised state.   Neurological: Positive for dizziness. Negative for tremors, seizures, syncope, facial asymmetry, speech difficulty, weakness, light-headedness, numbness, headache, memory problem and confusion.   Hematological: Negative for adenopathy. Does not bruise/bleed easily.   Psychiatric/Behavioral: Negative for agitation, behavioral problems, decreased concentration, dysphoric mood, hallucinations, self-injury, sleep disturbance, suicidal ideas, negative for hyperactivity, depressed mood and stress. The patient is not nervous/anxious.          PHYSICAL EXAM   /96 (BP Location: Right arm, Patient  "Position: Sitting, Cuff Size: Adult)   Pulse 105   Temp 97.1 °F (36.2 °C) (Temporal)   Ht 167.6 cm (65.98\")   Wt 83 kg (183 lb)   BMI 29.55 kg/m²   Physical Exam  Vitals signs and nursing note reviewed.   Constitutional:       Appearance: Normal appearance. She is well-developed.   HENT:      Head: Normocephalic and atraumatic.      Nose: Nose normal.      Mouth/Throat:      Mouth: Mucous membranes are moist.      Pharynx: Oropharynx is clear.      Comments: White coating consistent with thrush noted on tongue  Eyes:      Pupils: Pupils are equal, round, and reactive to light.   Neck:      Musculoskeletal: Normal range of motion and neck supple.   Cardiovascular:      Rate and Rhythm: Normal rate and regular rhythm.   Pulmonary:      Effort: Pulmonary effort is normal.      Breath sounds: Normal breath sounds. No wheezing or rales.   Abdominal:      General: Bowel sounds are normal.      Palpations: Abdomen is soft. There is no mass.      Tenderness: There is no abdominal tenderness. There is no guarding or rebound.      Hernia: No hernia is present.   Musculoskeletal: Normal range of motion.   Skin:     General: Skin is warm and dry.   Neurological:      Mental Status: She is alert and oriented to person, place, and time.      Cranial Nerves: No cranial nerve deficit.   Psychiatric:         Behavior: Behavior normal.         Judgment: Judgment normal.       Results Review:  [facility] records reviewed and discussed with patient.     ASSESSMENT / PLAN  1.  Abdominal pain  2.  Oral thrush  -Complete course of Diflucan as ordered  -EGD    Return for Follow up after procedures.    I discussed the patients findings and my recommendations with patient    LISA Aburto                    "

## 2020-09-23 ENCOUNTER — HOSPITAL ENCOUNTER (OUTPATIENT)
Dept: ONCOLOGY | Facility: HOSPITAL | Age: 45
Setting detail: INFUSION SERIES
Discharge: HOME | End: 2020-09-23

## 2020-09-23 ENCOUNTER — OFFICE VISIT (OUTPATIENT)
Dept: ONCOLOGY | Facility: CLINIC | Age: 45
End: 2020-09-23

## 2020-09-23 VITALS
TEMPERATURE: 98.2 F | HEART RATE: 91 BPM | DIASTOLIC BLOOD PRESSURE: 106 MMHG | BODY MASS INDEX: 29.57 KG/M2 | SYSTOLIC BLOOD PRESSURE: 158 MMHG | RESPIRATION RATE: 16 BRPM | WEIGHT: 184 LBS | OXYGEN SATURATION: 98 % | HEIGHT: 66 IN

## 2020-09-23 DIAGNOSIS — C50.811 MALIGNANT NEOPLASM OF OVERLAPPING SITES OF RIGHT FEMALE BREAST, UNSPECIFIED ESTROGEN RECEPTOR STATUS (HCC): ICD-10-CM

## 2020-09-23 DIAGNOSIS — C50.811 MALIGNANT NEOPLASM OF OVERLAPPING SITES OF RIGHT BREAST IN FEMALE, ESTROGEN RECEPTOR POSITIVE (HCC): Primary | ICD-10-CM

## 2020-09-23 DIAGNOSIS — Z79.811 AROMATASE INHIBITOR USE: ICD-10-CM

## 2020-09-23 DIAGNOSIS — Z17.0 MALIGNANT NEOPLASM OF OVERLAPPING SITES OF RIGHT BREAST IN FEMALE, ESTROGEN RECEPTOR POSITIVE (HCC): Primary | ICD-10-CM

## 2020-09-23 DIAGNOSIS — R10.13 EPIGASTRIC PAIN: ICD-10-CM

## 2020-09-23 LAB
ALBUMIN SERPL-MCNC: 3.9 G/DL (ref 3.5–5.2)
ALBUMIN/GLOB SERPL: 1 G/DL
ALP SERPL-CCNC: 143 U/L (ref 39–117)
ALT SERPL W P-5'-P-CCNC: 39 U/L (ref 1–33)
ANION GAP SERPL CALCULATED.3IONS-SCNC: 11 MMOL/L (ref 5–15)
AST SERPL-CCNC: 32 U/L (ref 1–32)
BILIRUB SERPL-MCNC: 0.2 MG/DL (ref 0–1.2)
BUN SERPL-MCNC: 6 MG/DL (ref 6–20)
BUN/CREAT SERPL: 11.3 (ref 7–25)
CALCIUM SPEC-SCNC: 10.2 MG/DL (ref 8.6–10.5)
CHLORIDE SERPL-SCNC: 98 MMOL/L (ref 98–107)
CO2 SERPL-SCNC: 27 MMOL/L (ref 22–29)
CREAT SERPL-MCNC: 0.53 MG/DL (ref 0.57–1)
ERYTHROCYTE [DISTWIDTH] IN BLOOD BY AUTOMATED COUNT: 14 % (ref 12.3–15.4)
GFR SERPL CREATININE-BSD FRML MDRD: 126 ML/MIN/1.73
GLOBULIN UR ELPH-MCNC: 3.9 GM/DL
GLUCOSE SERPL-MCNC: 204 MG/DL (ref 65–99)
HCT VFR BLD AUTO: 46.3 % (ref 34–46.6)
HGB BLD-MCNC: 15.4 G/DL (ref 12–15.9)
LYMPHOCYTES # BLD AUTO: 3.6 10*3/MM3 (ref 0.7–3.1)
LYMPHOCYTES NFR BLD AUTO: 28.7 % (ref 19.6–45.3)
MCH RBC QN AUTO: 29.3 PG (ref 26.6–33)
MCHC RBC AUTO-ENTMCNC: 33.3 G/DL (ref 31.5–35.7)
MCV RBC AUTO: 88 FL (ref 79–97)
MONOCYTES # BLD AUTO: 0.4 10*3/MM3 (ref 0.1–0.9)
MONOCYTES NFR BLD AUTO: 3.2 % (ref 5–12)
NEUTROPHILS NFR BLD AUTO: 68.1 % (ref 42.7–76)
NEUTROPHILS NFR BLD AUTO: 8.4 10*3/MM3 (ref 1.7–7)
PLATELET # BLD AUTO: 278 10*3/MM3 (ref 140–450)
PMV BLD AUTO: 7.8 FL (ref 6–12)
POTASSIUM SERPL-SCNC: 3.2 MMOL/L (ref 3.5–5.2)
PROT SERPL-MCNC: 7.8 G/DL (ref 6–8.5)
RBC # BLD AUTO: 5.27 10*6/MM3 (ref 3.77–5.28)
SODIUM SERPL-SCNC: 136 MMOL/L (ref 136–145)
WBC # BLD AUTO: 12.4 10*3/MM3 (ref 3.4–10.8)

## 2020-09-23 PROCEDURE — 25010000003 HEPARIN LOCK FLUSH PER 10 UNITS: Performed by: INTERNAL MEDICINE

## 2020-09-23 PROCEDURE — 25010000002 DEXAMETHASONE SODIUM PHOSPHATE 20 MG/5ML SOLUTION: Performed by: INTERNAL MEDICINE

## 2020-09-23 PROCEDURE — 80053 COMPREHEN METABOLIC PANEL: CPT | Performed by: INTERNAL MEDICINE

## 2020-09-23 PROCEDURE — 85025 COMPLETE CBC W/AUTO DIFF WBC: CPT | Performed by: INTERNAL MEDICINE

## 2020-09-23 PROCEDURE — 96360 HYDRATION IV INFUSION INIT: CPT

## 2020-09-23 PROCEDURE — 99214 OFFICE O/P EST MOD 30 MIN: CPT | Performed by: NURSE PRACTITIONER

## 2020-09-23 PROCEDURE — 96361 HYDRATE IV INFUSION ADD-ON: CPT

## 2020-09-23 PROCEDURE — 25010000002 ADO-TRASTUZUMAB 100 MG RECONSTITUTED SOLUTION 1 EACH VIAL: Performed by: INTERNAL MEDICINE

## 2020-09-23 PROCEDURE — 96375 TX/PRO/DX INJ NEW DRUG ADDON: CPT

## 2020-09-23 PROCEDURE — 96413 CHEMO IV INFUSION 1 HR: CPT

## 2020-09-23 RX ORDER — SODIUM CHLORIDE 9 MG/ML
250 INJECTION, SOLUTION INTRAVENOUS ONCE
Status: CANCELLED | OUTPATIENT
Start: 2020-10-14

## 2020-09-23 RX ORDER — HEPARIN SODIUM (PORCINE) LOCK FLUSH IV SOLN 100 UNIT/ML 100 UNIT/ML
500 SOLUTION INTRAVENOUS AS NEEDED
Status: DISCONTINUED | OUTPATIENT
Start: 2020-09-23 | End: 2020-09-24 | Stop reason: HOSPADM

## 2020-09-23 RX ORDER — FLUCONAZOLE 200 MG/1
200 TABLET ORAL DAILY
Qty: 7 TABLET | Refills: 0 | Status: SHIPPED | OUTPATIENT
Start: 2020-09-23 | End: 2020-09-23 | Stop reason: SDUPTHER

## 2020-09-23 RX ORDER — HEPARIN SODIUM (PORCINE) LOCK FLUSH IV SOLN 100 UNIT/ML 100 UNIT/ML
500 SOLUTION INTRAVENOUS AS NEEDED
Status: CANCELLED | OUTPATIENT
Start: 2020-09-23

## 2020-09-23 RX ORDER — GABAPENTIN 300 MG/1
CAPSULE ORAL
Qty: 150 CAPSULE | Refills: 1 | Status: SHIPPED | OUTPATIENT
Start: 2020-09-23 | End: 2020-10-14

## 2020-09-23 RX ORDER — FLUCONAZOLE 200 MG/1
200 TABLET ORAL DAILY
Qty: 7 TABLET | Refills: 0 | Status: SHIPPED | OUTPATIENT
Start: 2020-09-23 | End: 2020-09-30

## 2020-09-23 RX ORDER — SODIUM CHLORIDE 9 MG/ML
250 INJECTION, SOLUTION INTRAVENOUS ONCE
Status: COMPLETED | OUTPATIENT
Start: 2020-09-23 | End: 2020-09-23

## 2020-09-23 RX ADMIN — ADO-TRASTUZUMAB EMTANSINE 300 MG: 20 INJECTION, POWDER, LYOPHILIZED, FOR SOLUTION INTRAVENOUS at 12:47

## 2020-09-23 RX ADMIN — SODIUM CHLORIDE 1000 ML: 9 INJECTION, SOLUTION INTRAVENOUS at 12:10

## 2020-09-23 RX ADMIN — HEPARIN 500 UNITS: 100 SYRINGE at 13:53

## 2020-09-23 RX ADMIN — DEXAMETHASONE SODIUM PHOSPHATE 12 MG: 4 INJECTION, SOLUTION INTRA-ARTICULAR; INTRALESIONAL; INTRAMUSCULAR; INTRAVENOUS; SOFT TISSUE at 12:21

## 2020-09-23 RX ADMIN — SODIUM CHLORIDE 250 ML: 9 INJECTION, SOLUTION INTRAVENOUS at 12:47

## 2020-09-23 NOTE — PROGRESS NOTES
PROBLEM LIST:  1. [id number] ER+ PA+ Her2+ invasive ductal carcinoma of the right breast  A) presented with a mass on self-exam.  Mammogram 1/7/20 showed a 4.1 cm mass in the right breast, with 2 adjacent smaller masses.  1.9 cm right axillary lymph node.   FNA of lymph node negative.  Biopsy of right breast mass showed grade 2 IDC, Er 95%, PA 2%, Her2 3+.  B) neoadjuvant chemotherapy with TCHP started 2/5/2020  C) right mastectomy on 6/23/20.  Pathology showed a 2 x 1 x 0.6 cm intermediate grade IDC.  0/2 SLN involved.  [id number]  D) adjuvant Kadcyla x 1 year started 7/22/2020.   E) anastrozole started 08/12/2020.   2. PCOS  3. Anxiety/depression  4. DM2  5. GERD  6. Hyperlipidemia  7. Hypertension  8.  Hidradenitis      Subjective     CHIEF COMPLAINT: breast cancer    HISTORY OF PRESENT ILLNESS:   Josefina Rodriguez returns for follow-up. Her abdominal pain has improved with the use of Diflucan 200 mg po daily. She still has intermittent abdominal pain and difficulty eating but reports symptoms are better. She had some diarrhea that resolved with a few doses of lomotil.  She is now taking stool softeners prn for firm stools.  She continues on anastrozole with no complaints. She states her neuropathic pain in her feet and hands has improved with the increase of gabapentin to 600 mg po q am and 900 mg po q hs.     Past Medical History, Past Surgical History, Social History, Family History have been reviewed and are without significant changes except as mentioned.    Review of Systems   A comprehensive 14 point review of systems was performed and was negative except as mentioned.    Medications:  The current medication list was reviewed in the EMR    ALLERGIES:    Allergies   Allergen Reactions   • Cashew Nut Hives and Itching   • Naproxen Hives   • Penicillins Hives     PT STATES CAN TOLERATE KELFEX   • Pistachio Nut Hives and Itching       Objective      BP (!) 158/106   Pulse 91   Temp 98.2 °F (36.8  "°C) (Temporal)   Resp 16   Ht 167.6 cm (65.98\")   Wt 83.5 kg (184 lb)   SpO2 98%   BMI 29.71 kg/m²    Vitals:    09/23/20 1050   PainSc:   2               Performance Status: 0    General: well appearing female in no acute distress  Neuro: alert and oriented  HEENT: sclera anicteric,whiteness on tongue, no mouth sores noted  Lymphatics: no cervical, supraclavicular, or axillary adenopathy  Cardiovascular: regular rate and rhythm, no murmurs  Lungs: clear to auscultation bilaterally  Abdomen: soft, nontender, nondistended.  No palpable organomegaly  Extremeties: no lower extremity edema  Skin: no rashes, lesions, bruising, or petechiae  Psych: mood and affect appropriate      RECENT LABS:  Lab Results   Component Value Date    WBC 9.14 09/16/2020    HGB 16.3 (H) 09/16/2020    HCT 47.3 (H) 09/16/2020    MCV 86.8 09/16/2020     09/16/2020     Lab Results   Component Value Date    GLUCOSE 221 (H) 09/16/2020    BUN 11 09/16/2020    CREATININE 0.63 09/16/2020    EGFRIFNONA 103 09/16/2020    BCR 17.5 09/16/2020    K 3.4 (L) 09/16/2020    CO2 27.0 09/16/2020    CALCIUM 10.1 09/16/2020    ALBUMIN 4.00 09/16/2020    AST 34 (H) 09/16/2020    ALT 31 09/16/2020     Echocardiogram 08/31/2020  · Estimated EF = 70%.        Assessment/Plan   Josefina Rodriguez is a 45 y.o. year old female with stage IB ER+ Her2+ IDC of the right breast, here for follow-up.    Breast cancer: partial response pathologically.  She began Kadcyla 7/22/2020. She is due for cycle 4 of Kadcyla today. Next echo due end of November 2020.  She continues on anastrozole and is tolerating it relatively well.  We will continue Lupron. Next dose due 10/14/2020.     Oral thrush:  Noted on exam and has improved per patient since starting Diflucan. After consultation with Dr. Connell we will continue the Diflucan until patient has her EGD which is scheduled for 10/2/2020.  Advised patient to advance diet as tolerated    Abdominal pain secondary to " "thrush: Patient is scheduled for EGD on 10/2/2020 per Dr. Mckinnon. Patient reports abdominal pain has improved \"some\" since starting Diflucan.     Diarrhea: Likely secondary to Kadcyla has resolved.      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  Discussed that this may improve with time. Patient is tolerating neuropathic pain better with gabapentin 600 mg po q am and 900 mg po q hs. Refill sent to pharmacy today.     Hypertension: She is currently on lisinopril 10 mg po daily. She is going to follow up with PCP for HTN not controlled currently.     Follow-up in 6 weeks.                  I spent 30 minutes with the patient. I spent > 50% percent of this time counseling and discussing prognosis, diagnostic testing, evaluation, current status and management.        Aimee Johnson, LISA  [facility]    9/23/2020          CC:          "

## 2020-09-28 ENCOUNTER — TELEPHONE (OUTPATIENT)
Dept: ONCOLOGY | Facility: CLINIC | Age: 45
End: 2020-09-28

## 2020-09-28 NOTE — TELEPHONE ENCOUNTER
I talked with patient and she said last week when she saw Lee Ann she thought the pain was better but now it has gone deep into the balls of her feet. She is having pain with walking.  I talked to LISA Sahu and she said we could change the gabapentin and add a dose at midday of 600mg.  Patient will now take 600mg gabapentin in a.m, 600mg in afternoon and 900mg at night.   I called patient back and told her that the plan was to adjust gabapentin (increase) and she verbalized understanding.  I told her I would talk with Dr. Connell as we may need to decrease dose of kadcyla.  I called [facility] pharmacy with new dosing of gabapentin.

## 2020-09-28 NOTE — TELEPHONE ENCOUNTER
Pt is having horrible foot pain states neuropathy is getting so bad she cant walk. She wanted to know if Dr Connell would recommend a podiatrists ?    Please advise and give call  [phone] (H)

## 2020-09-28 NOTE — PROGRESS NOTES
Subjective     PROBLEM LIST:  1. T2N0M0 Er+ MS+ Her2+ invasive ductal carcinoma of the right breast  A) presented with a mass on self-exam.  Mammogram 20 showed a 4.1 cm mass in the right breast, with 2 adjacent smaller masses.  1.9 cm right axillary lymph node.   FNA of lymph node negative.  Biopsy of right breast mass showed grade 2 IDC, Er 95%, MS 2%, Her2 3+.  2. PCOS  3. Anxiety/depression  4. DM2  5. GERD  6. Hyperlipidemia  7. Hypertension  8.  Hidradenitis      CHIEF COMPLAINT: breast cancer      HISTORY OF PRESENT ILLNESS:  The patient is a 44 y.o. female, referred for evaluation of a recently diagnosed breast cancer.    She says she first noticed a mass in her breast in December.  She immediately went in for evaluation.  She has a family history of breast cancer in her mother who was diagnosed at age 63, and then had a recurrence at age 75.  She had mammogram and biopsy.  A enlarged right axillary lymph node was benign, but the right breast mass showed triple positive cancer.  She reports that she has had enlarged axillary lymph nodes in the past, likely secondary to hidradenitis she has otherwise been feeling well.    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  She lives with her boyfriend and 10-year-old daughter.  Her  passed away from heart attack about 2 years ago.    REVIEW OF SYSTEMS:  A 14 point review of systems was performed and is negative except as noted above.    Past Medical History:   Diagnosis Date   • Anxiety    • Depression    • Diabetes mellitus (CMS/HCC)    • Hiatal hernia    • Hypertension    • Malignant neoplasm of overlapping sites of right breast in female, estrogen receptor positive (CMS/HCC) 2020   • PCOS (polycystic ovarian syndrome)        GYN History: .  1st birth age 33.  Menarche at 13.    Current Outpatient Medications on File Prior to Visit   Medication Sig Dispense Refill   • B Complex Vitamins (VITAMIN B COMPLEX PO) Take  by  mouth.     • fluticasone (FLONASE) 50 MCG/ACT nasal spray Every 12 (Twelve) Hours.     • lisinopril (PRINIVIL,ZESTRIL) 10 MG tablet lisinopril 10 mg tablet   Take 1 tablet every day by oral route.     • LORazepam (ATIVAN) 1 MG tablet Take 1 mg by mouth As Needed for Anxiety.     • metFORMIN (GLUCOPHAGE) 500 MG tablet Every 12 (Twelve) Hours.     • Misc Natural Products (GINSENG-COMPLEX PO) Take  by mouth.     • montelukast (SINGULAIR) 10 MG tablet montelukast 10 mg tablet   Take 1 tablet every day by oral route for 30 days.     • Omega-3 Fatty Acids (FISH OIL) 1000 MG capsule capsule Take  by mouth Daily With Breakfast.     • pantoprazole (PROTONIX) 40 MG EC tablet      • SITagliptin (JANUVIA) 100 MG tablet Januvia 100 mg tablet   Take 1 tablet every day by oral route.       No current facility-administered medications on file prior to visit.        Allergies   Allergen Reactions   • Cashew Nut Hives and Itching   • Naproxen Hives   • Penicillins Hives   • Pistachio Nut Hives and Itching       Past Surgical History:   Procedure Laterality Date   • BREAST BIOPSY Right 03/2019    axillary lymph node bx/fna   • CHOLECYSTECTOMY  03/2010   • COLONOSCOPY  2016   • US GUIDED FINE NEEDLE ASPIRATION  1/15/2020       Social History     Socioeconomic History   • Marital status:      Spouse name: Not on file   • Number of children: Not on file   • Years of education: Not on file   • Highest education level: Not on file   Tobacco Use   • Smoking status: Current Every Day Smoker     Packs/day: 1.00     Years: 20.00     Pack years: 20.00     Types: Cigarettes   • Smokeless tobacco: Never Used   Substance and Sexual Activity   • Alcohol use: Yes     Comment: occasionally a couple times a month   • Drug use: Never   • Sexual activity: Defer       Family History   Problem Relation Age of Onset   • Breast cancer Mother 64   • Heart disease Mother    • Hypertension Father    • Lung cancer Father    • Ovarian cancer Neg Hx   "      Objective     /96 Comment: LUE  Pulse 89   Temp 98.2 °F (36.8 °C) (Temporal)   Resp 18   Ht 163.8 cm (64.5\")   Wt 84.4 kg (186 lb)   SpO2 98% Comment: RA  BMI 31.43 kg/m²   Performance Status: 0  General: well appearing female in no acute distress  Neuro: alert and oriented  HEENT: sclerae anicteric, oropharynx clear  Lymphatics: no cervical, supraclavicular, or axillary adenopathy  Breast: In the right breast at about 9:00 there is a 4-1/2 cm firm movable mass  Cardiovascular: regular rate and rhythm, no murmurs  Lungs: clear to auscultation bilaterally  Abdomen: soft, nontender, nondistended.  No palpable organomegaly  Extremities: no lower extremity edema  Skin: no rashes, lesions, bruising, or petechiae  Psych: mood and affect appropriate            Assessment/Plan     Josefina Rodriguez is a 44 y.o. year old female with stage IB ER+ Her2+ IDC of the right breast, here to discuss neoadjuvant chemotherapy.    I would recommend neoadjuvant treatment with TCHP.   We discussed the side effects of this regimen including alopecia, nausea, fatigue, myelosuppression, infusion reaction, neuropathy, diarrhea, and cardiotoxicity.    We discussed that based on the tumor response at the time of surgery, we would either continue with herceptin/perjeta, or kadcyla.  She will also be a candidate for adjuvant endocrine treatment.  We discussed that radiation may be indicated as well depending on the aguilar status as well as the type of surgery that she chooses.    I will order staging studies as she has a fairly large multifocal tumor with an aggressive biology.  We will also do a baseline echocardiogram.    We discussed that chemotherapy can make her infertile.  We will plan to use Lupron for ovarian suppression during treatment, and consider continuing this after treatment as part of her endocrine therapy.  She is comfortable with possible infertility, as she is not planning on having anymore children. "  She says she has struggled with infertility anyway because of her PCOS.    Given her family history of breast cancer in her mother as well as her young age I will refer her to genetic counseling.    We will plan to start treatment in the next 1 to 2 weeks.      I spent 60 minutes with the patient. I spent > 50% percent of this time counseling and discussing prognosis, diagnostic testing, evaluation, current status and management.         Jolene Connell MD    1/22/2020         [facility] Counseling: I discussed with the patient the risks of risankizumab-rzaa including but not limited to immunosuppression, and serious infections.  The patient understands that monitoring is required including a PPD at baseline and must alert us or the primary physician if symptoms of infection or other concerning signs are noted.

## 2020-09-29 ENCOUNTER — APPOINTMENT (OUTPATIENT)
Dept: PREADMISSION TESTING | Facility: HOSPITAL | Age: 45
End: 2020-09-29

## 2020-09-29 PROCEDURE — C9803 HOPD COVID-19 SPEC COLLECT: HCPCS

## 2020-09-29 PROCEDURE — U0004 COV-19 TEST NON-CDC HGH THRU: HCPCS

## 2020-09-30 LAB — SARS-COV-2 RNA NOSE QL NAA+PROBE: NOT DETECTED

## 2020-10-02 ENCOUNTER — OUTSIDE FACILITY SERVICE (OUTPATIENT)
Dept: GASTROENTEROLOGY | Facility: CLINIC | Age: 45
End: 2020-10-02

## 2020-10-02 PROCEDURE — 88305 TISSUE EXAM BY PATHOLOGIST: CPT | Performed by: INTERNAL MEDICINE

## 2020-10-02 PROCEDURE — 43239 EGD BIOPSY SINGLE/MULTIPLE: CPT | Performed by: INTERNAL MEDICINE

## 2020-10-05 ENCOUNTER — LAB REQUISITION (OUTPATIENT)
Dept: LAB | Facility: HOSPITAL | Age: 45
End: 2020-10-05

## 2020-10-05 ENCOUNTER — TELEPHONE (OUTPATIENT)
Dept: ONCOLOGY | Facility: CLINIC | Age: 45
End: 2020-10-05

## 2020-10-05 DIAGNOSIS — T45.1X5A NEUROPATHY DUE TO CHEMOTHERAPEUTIC DRUG (HCC): ICD-10-CM

## 2020-10-05 DIAGNOSIS — C50.811 MALIGNANT NEOPLASM OF OVERLAPPING SITES OF RIGHT BREAST IN FEMALE, ESTROGEN RECEPTOR POSITIVE (HCC): Primary | ICD-10-CM

## 2020-10-05 DIAGNOSIS — R10.9 UNSPECIFIED ABDOMINAL PAIN: ICD-10-CM

## 2020-10-05 DIAGNOSIS — Z17.0 MALIGNANT NEOPLASM OF OVERLAPPING SITES OF RIGHT BREAST IN FEMALE, ESTROGEN RECEPTOR POSITIVE (HCC): Primary | ICD-10-CM

## 2020-10-05 DIAGNOSIS — Z12.11 ENCOUNTER FOR SCREENING FOR MALIGNANT NEOPLASM OF COLON: ICD-10-CM

## 2020-10-05 DIAGNOSIS — G62.0 NEUROPATHY DUE TO CHEMOTHERAPEUTIC DRUG (HCC): ICD-10-CM

## 2020-10-05 RX ORDER — DULOXETIN HYDROCHLORIDE 60 MG/1
60 CAPSULE, DELAYED RELEASE ORAL DAILY
Qty: 30 CAPSULE | Refills: 2 | Status: SHIPPED | OUTPATIENT
Start: 2020-10-05 | End: 2021-01-05

## 2020-10-05 NOTE — TELEPHONE ENCOUNTER
Patient called she is having pain in her toes and it has gotten worse she can't even wear her house shoes. She needs a call back to discuss this.

## 2020-10-05 NOTE — TELEPHONE ENCOUNTER
I talked with LISA Irving and she said we could increase the gabapentin to 900mg tid.  I called the patient back and she was reluctant to increase the gabapentin since it has not helped and she asked about meloxicam.  I told her I would find out and when I spoke to Lee Ann, she said to make sure with Dr. Connell.  I sent msg to Dr. Connell and she said to try cymbalta 60mg and to make appt for patient on 14th prior to infusion as we may need to greatly decrease kadcyla or stop.  Patient verbalized understanding.  Script sent to pharmacy.

## 2020-10-06 ENCOUNTER — INFUSION (OUTPATIENT)
Dept: ONCOLOGY | Facility: HOSPITAL | Age: 45
End: 2020-10-06

## 2020-10-06 ENCOUNTER — TELEPHONE (OUTPATIENT)
Dept: ONCOLOGY | Facility: CLINIC | Age: 45
End: 2020-10-06

## 2020-10-06 VITALS
RESPIRATION RATE: 14 BRPM | BODY MASS INDEX: 28.58 KG/M2 | SYSTOLIC BLOOD PRESSURE: 163 MMHG | TEMPERATURE: 97.4 F | HEART RATE: 87 BPM | WEIGHT: 177 LBS | DIASTOLIC BLOOD PRESSURE: 95 MMHG

## 2020-10-06 DIAGNOSIS — C50.811 MALIGNANT NEOPLASM OF OVERLAPPING SITES OF RIGHT FEMALE BREAST, UNSPECIFIED ESTROGEN RECEPTOR STATUS (HCC): ICD-10-CM

## 2020-10-06 DIAGNOSIS — C50.811 MALIGNANT NEOPLASM OF OVERLAPPING SITES OF RIGHT BREAST IN FEMALE, ESTROGEN RECEPTOR POSITIVE (HCC): ICD-10-CM

## 2020-10-06 DIAGNOSIS — R10.13 EPIGASTRIC PAIN: Primary | ICD-10-CM

## 2020-10-06 DIAGNOSIS — Z17.0 MALIGNANT NEOPLASM OF OVERLAPPING SITES OF RIGHT BREAST IN FEMALE, ESTROGEN RECEPTOR POSITIVE (HCC): ICD-10-CM

## 2020-10-06 DIAGNOSIS — Z17.0 MALIGNANT NEOPLASM OF OVERLAPPING SITES OF RIGHT BREAST IN FEMALE, ESTROGEN RECEPTOR POSITIVE (HCC): Primary | ICD-10-CM

## 2020-10-06 DIAGNOSIS — C50.811 MALIGNANT NEOPLASM OF OVERLAPPING SITES OF RIGHT BREAST IN FEMALE, ESTROGEN RECEPTOR POSITIVE (HCC): Primary | ICD-10-CM

## 2020-10-06 LAB
CYTO UR: NORMAL
LAB AP CASE REPORT: NORMAL
LAB AP CLINICAL INFORMATION: NORMAL
PATH REPORT.FINAL DX SPEC: NORMAL
PATH REPORT.GROSS SPEC: NORMAL

## 2020-10-06 PROCEDURE — 96360 HYDRATION IV INFUSION INIT: CPT

## 2020-10-06 PROCEDURE — 25010000003 HEPARIN LOCK FLUSH PER 10 UNITS: Performed by: INTERNAL MEDICINE

## 2020-10-06 PROCEDURE — 96361 HYDRATE IV INFUSION ADD-ON: CPT

## 2020-10-06 RX ORDER — HEPARIN SODIUM (PORCINE) LOCK FLUSH IV SOLN 100 UNIT/ML 100 UNIT/ML
500 SOLUTION INTRAVENOUS AS NEEDED
Status: CANCELLED | OUTPATIENT
Start: 2020-10-06

## 2020-10-06 RX ORDER — HEPARIN SODIUM (PORCINE) LOCK FLUSH IV SOLN 100 UNIT/ML 100 UNIT/ML
500 SOLUTION INTRAVENOUS AS NEEDED
Status: DISCONTINUED | OUTPATIENT
Start: 2020-10-06 | End: 2020-10-06 | Stop reason: HOSPADM

## 2020-10-06 RX ADMIN — HEPARIN 500 UNITS: 100 SYRINGE at 12:26

## 2020-10-06 RX ADMIN — SODIUM CHLORIDE 1000 ML: 9 INJECTION, SOLUTION INTRAVENOUS at 10:32

## 2020-10-06 NOTE — TELEPHONE ENCOUNTER
I called the patient and she said that she has been having GI issues and has seen Dr. Mckinnon for EGD and has a colonoscopy scheduled soon. The last 24-48 hours she has had a lot of nausea and diarrhea and feels weak and dehydrated.  I talked with Jolene Connell and she said patient could come in for fluids today.  I called infusion and they have a spot in [place] at 10.  I called patient back and told her and gave her directions to [facility].

## 2020-10-06 NOTE — TELEPHONE ENCOUNTER
Pt called and stated that she has not been feeling well and has been throwing up and having diarrhea. She believes it is due to her treatment and wants to try to come in and get fluids today if possible. Please give pt a call at [phone]

## 2020-10-14 ENCOUNTER — OFFICE VISIT (OUTPATIENT)
Dept: ONCOLOGY | Facility: CLINIC | Age: 45
End: 2020-10-14

## 2020-10-14 ENCOUNTER — APPOINTMENT (OUTPATIENT)
Dept: ONCOLOGY | Facility: HOSPITAL | Age: 45
End: 2020-10-14

## 2020-10-14 ENCOUNTER — LAB (OUTPATIENT)
Dept: LAB | Facility: HOSPITAL | Age: 45
End: 2020-10-14

## 2020-10-14 VITALS
BODY MASS INDEX: 28.77 KG/M2 | RESPIRATION RATE: 16 BRPM | SYSTOLIC BLOOD PRESSURE: 164 MMHG | HEIGHT: 66 IN | HEART RATE: 86 BPM | DIASTOLIC BLOOD PRESSURE: 101 MMHG | WEIGHT: 179 LBS | TEMPERATURE: 98.2 F | OXYGEN SATURATION: 97 %

## 2020-10-14 DIAGNOSIS — Z17.0 MALIGNANT NEOPLASM OF OVERLAPPING SITES OF RIGHT BREAST IN FEMALE, ESTROGEN RECEPTOR POSITIVE (HCC): ICD-10-CM

## 2020-10-14 DIAGNOSIS — N83.209 CYST OF OVARY, UNSPECIFIED LATERALITY: Primary | ICD-10-CM

## 2020-10-14 DIAGNOSIS — G62.9 NEUROPATHY: ICD-10-CM

## 2020-10-14 DIAGNOSIS — C50.811 MALIGNANT NEOPLASM OF OVERLAPPING SITES OF RIGHT BREAST IN FEMALE, ESTROGEN RECEPTOR POSITIVE (HCC): ICD-10-CM

## 2020-10-14 LAB
ALBUMIN SERPL-MCNC: 4 G/DL (ref 3.5–5.2)
ALBUMIN/GLOB SERPL: 1.1 G/DL
ALP SERPL-CCNC: 136 U/L (ref 39–117)
ALT SERPL W P-5'-P-CCNC: 30 U/L (ref 1–33)
ANION GAP SERPL CALCULATED.3IONS-SCNC: 9 MMOL/L (ref 5–15)
AST SERPL-CCNC: 26 U/L (ref 1–32)
BILIRUB SERPL-MCNC: 0.4 MG/DL (ref 0–1.2)
BUN SERPL-MCNC: 6 MG/DL (ref 6–20)
BUN/CREAT SERPL: 15.4 (ref 7–25)
CALCIUM SPEC-SCNC: 9.8 MG/DL (ref 8.6–10.5)
CHLORIDE SERPL-SCNC: 101 MMOL/L (ref 98–107)
CO2 SERPL-SCNC: 28 MMOL/L (ref 22–29)
CREAT SERPL-MCNC: 0.39 MG/DL (ref 0.57–1)
ERYTHROCYTE [DISTWIDTH] IN BLOOD BY AUTOMATED COUNT: 14.9 % (ref 12.3–15.4)
GFR SERPL CREATININE-BSD FRML MDRD: >150 ML/MIN/1.73
GLOBULIN UR ELPH-MCNC: 3.8 GM/DL
GLUCOSE SERPL-MCNC: 180 MG/DL (ref 65–99)
HCT VFR BLD AUTO: 47.3 % (ref 34–46.6)
HGB BLD-MCNC: 15.6 G/DL (ref 12–15.9)
LYMPHOCYTES # BLD AUTO: 3.2 10*3/MM3 (ref 0.7–3.1)
LYMPHOCYTES NFR BLD AUTO: 29.3 % (ref 19.6–45.3)
MCH RBC QN AUTO: 29.6 PG (ref 26.6–33)
MCHC RBC AUTO-ENTMCNC: 33 G/DL (ref 31.5–35.7)
MCV RBC AUTO: 89.7 FL (ref 79–97)
MONOCYTES # BLD AUTO: 0.3 10*3/MM3 (ref 0.1–0.9)
MONOCYTES NFR BLD AUTO: 3.1 % (ref 5–12)
NEUTROPHILS NFR BLD AUTO: 67.6 % (ref 42.7–76)
NEUTROPHILS NFR BLD AUTO: 7.3 10*3/MM3 (ref 1.7–7)
PLATELET # BLD AUTO: 262 10*3/MM3 (ref 140–450)
PMV BLD AUTO: 8.1 FL (ref 6–12)
POTASSIUM SERPL-SCNC: 3.4 MMOL/L (ref 3.5–5.2)
PROT SERPL-MCNC: 7.8 G/DL (ref 6–8.5)
RBC # BLD AUTO: 5.27 10*6/MM3 (ref 3.77–5.28)
SODIUM SERPL-SCNC: 138 MMOL/L (ref 136–145)
WBC # BLD AUTO: 10.8 10*3/MM3 (ref 3.4–10.8)

## 2020-10-14 PROCEDURE — 99214 OFFICE O/P EST MOD 30 MIN: CPT | Performed by: INTERNAL MEDICINE

## 2020-10-14 PROCEDURE — 36415 COLL VENOUS BLD VENIPUNCTURE: CPT

## 2020-10-14 PROCEDURE — 80053 COMPREHEN METABOLIC PANEL: CPT

## 2020-10-14 PROCEDURE — 85025 COMPLETE CBC W/AUTO DIFF WBC: CPT

## 2020-10-14 RX ORDER — SODIUM, POTASSIUM,MAG SULFATES 17.5-3.13G
2 SOLUTION, RECONSTITUTED, ORAL ORAL TAKE AS DIRECTED
Qty: 354 ML | Refills: 0 | Status: SHIPPED | OUTPATIENT
Start: 2020-10-14 | End: 2020-11-06

## 2020-10-14 RX ORDER — PREGABALIN 75 MG/1
75 CAPSULE ORAL 2 TIMES DAILY
Qty: 60 CAPSULE | Refills: 3 | Status: SHIPPED | OUTPATIENT
Start: 2020-10-14 | End: 2021-02-22

## 2020-10-14 RX ORDER — LISINOPRIL 20 MG/1
TABLET ORAL
COMMUNITY
Start: 2020-09-23 | End: 2021-12-02

## 2020-10-14 NOTE — PROGRESS NOTES
PROBLEM LIST:  1. [id number] ER+ ND+ Her2+ invasive ductal carcinoma of the right breast  A) presented with a mass on self-exam.  Mammogram 1/7/20 showed a 4.1 cm mass in the right breast, with 2 adjacent smaller masses.  1.9 cm right axillary lymph node.   FNA of lymph node negative.  Biopsy of right breast mass showed grade 2 IDC, Er 95%, ND 2%, Her2 3+.  B) neoadjuvant chemotherapy with TCHP started 2/5/2020  C) right mastectomy on 6/23/20.  Pathology showed a 2 x 1 x 0.6 cm intermediate grade IDC.  0/2 SLN involved.  [id number]  D) adjuvant Kadcyla x 1 year started 7/22/2020.   Kadcyla stopped October 14, 2020 and switched back to Herceptin and Perjeta due to progressive severe neuropathy.  E) anastrozole started 08/12/2020.   2. PCOS  3. Anxiety/depression  4. DM2  5. GERD  6. Hyperlipidemia  7. Hypertension  8.  Hidradenitis  9.  Peripheral neuropathy secondary to chemotherapy      Subjective     CHIEF COMPLAINT: breast cancer    HISTORY OF PRESENT ILLNESS:   Josefina Rodriguez returns for follow-up.  She is in a wheelchair today.  The neuropathy symptoms have continued to be severe.  The gabapentin is up to 900 mg 3 times a day and she really has not noticed any relief from this.  She started Cymbalta about a week ago.  No improvement so far.  She still is having trouble with her stomach.  She had a CT scan in the emergency room about a month ago which was unremarkable other than the known ovarian cyst.  She has a colonoscopy scheduled for next week.  She had an upper endoscopy earlier this month which was unremarkable.    Past Medical History, Past Surgical History, Social History, Family History have been reviewed and are without significant changes except as mentioned.    Review of Systems   A comprehensive 14 point review of systems was performed and was negative except as mentioned.    Medications:  The current medication list was reviewed in the EMR    ALLERGIES:    Allergies   Allergen  "Reactions   • Cashew Nut Hives and Itching   • Naproxen Hives   • Penicillins Hives     PT STATES CAN TOLERATE KELFEX   • Pistachio Nut Hives and Itching       Objective      BP (!) 164/101 Comment: PT did not take her BP meds this AM  Pulse 86   Temp 98.2 °F (36.8 °C)   Resp 16   Ht 167.6 cm (66\")   Wt 81.2 kg (179 lb)   SpO2 97%   BMI 28.89 kg/m²    Vitals:    10/14/20 1014   PainSc: 0-No pain  Comment: No new pain. Old pain = 9   Neuropathy in both feet               Performance Status: 0    General: well appearing female in no acute distress  Neuro: alert and oriented  HEENT: sclera anicteric  Lymphatics: no cervical, supraclavicular, or axillary adenopathy  Cardiovascular: regular rate and rhythm, no murmurs  Lungs: clear to auscultation bilaterally  Abdomen: soft, nontender, nondistended.  No palpable organomegaly  Extremeties: no lower extremity edema  Skin: no rashes, lesions, bruising, or petechiae  Psych: mood and affect appropriate      RECENT LABS:  Lab Results   Component Value Date    WBC 12.40 (H) 09/23/2020    HGB 15.4 09/23/2020    HCT 46.3 09/23/2020    MCV 88.0 09/23/2020     09/23/2020     Lab Results   Component Value Date    GLUCOSE 204 (H) 09/23/2020    BUN 6 09/23/2020    CREATININE 0.53 (L) 09/23/2020    EGFRIFNONA 126 09/23/2020    BCR 11.3 09/23/2020    K 3.2 (L) 09/23/2020    CO2 27.0 09/23/2020    CALCIUM 10.2 09/23/2020    ALBUMIN 3.90 09/23/2020    AST 32 09/23/2020    ALT 39 (H) 09/23/2020     ·         Assessment/Plan   Josefina Rodriguez is a 45 y.o. year old female with stage IB ER+ Her2+ IDC of the right breast, here for follow-up.    Breast cancer: partial response pathologically.  She began Kadcyla 7/22/2020.  Due to progressive neuropathy we will stop Kadcyla and I will put her back on Herceptin and Perjeta starting next week.  Continue anastrozole.    Abdominal pain: CT abdomen unremarkable.  EGD unremarkable with no obvious candidiasis.  She has a " colonoscopy scheduled for next week.    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  Stop Kadcyla.  I will stop her gabapentin and switch to Lyrica, starting dose 75 mg twice daily given lack of benefit from gabapentin.  Discussed that Cymbalta can take up to a month to show benefit.    Hypertension: Follow-up with primary care doctor.    Weight loss: Ongoing GI evaluation.  No evidence of cancer recurrence on imaging.    Follow-up 1 month.                I spent 25 minutes with the patient. I spent > 50% percent of this time counseling and discussing prognosis, diagnostic testing, evaluation, current status and management.        Jolene Connell MD  [facility]    10/14/2020          CC:

## 2020-10-18 ENCOUNTER — APPOINTMENT (OUTPATIENT)
Dept: PREADMISSION TESTING | Facility: HOSPITAL | Age: 45
End: 2020-10-18

## 2020-10-18 PROCEDURE — U0004 COV-19 TEST NON-CDC HGH THRU: HCPCS

## 2020-10-18 PROCEDURE — C9803 HOPD COVID-19 SPEC COLLECT: HCPCS

## 2020-10-19 LAB — SARS-COV-2 RNA RESP QL NAA+PROBE: NOT DETECTED

## 2020-10-21 ENCOUNTER — OUTSIDE FACILITY SERVICE (OUTPATIENT)
Dept: GASTROENTEROLOGY | Facility: CLINIC | Age: 45
End: 2020-10-21

## 2020-10-21 DIAGNOSIS — Z17.0 MALIGNANT NEOPLASM OF OVERLAPPING SITES OF RIGHT BREAST IN FEMALE, ESTROGEN RECEPTOR POSITIVE (HCC): Primary | ICD-10-CM

## 2020-10-21 DIAGNOSIS — C50.811 MALIGNANT NEOPLASM OF OVERLAPPING SITES OF RIGHT BREAST IN FEMALE, ESTROGEN RECEPTOR POSITIVE (HCC): Primary | ICD-10-CM

## 2020-10-21 PROCEDURE — 45380 COLONOSCOPY AND BIOPSY: CPT | Performed by: INTERNAL MEDICINE

## 2020-10-21 PROCEDURE — 45385 COLONOSCOPY W/LESION REMOVAL: CPT | Performed by: INTERNAL MEDICINE

## 2020-10-21 RX ORDER — SODIUM CHLORIDE 9 MG/ML
250 INJECTION, SOLUTION INTRAVENOUS ONCE
Status: CANCELLED | OUTPATIENT
Start: 2020-10-22

## 2020-10-22 ENCOUNTER — DOCUMENTATION (OUTPATIENT)
Dept: NUTRITION | Facility: HOSPITAL | Age: 45
End: 2020-10-22

## 2020-10-22 ENCOUNTER — HOSPITAL ENCOUNTER (OUTPATIENT)
Dept: ONCOLOGY | Facility: HOSPITAL | Age: 45
Discharge: HOME | End: 2020-10-22
Admitting: INTERNAL MEDICINE

## 2020-10-22 VITALS
DIASTOLIC BLOOD PRESSURE: 86 MMHG | HEIGHT: 66 IN | WEIGHT: 178 LBS | SYSTOLIC BLOOD PRESSURE: 134 MMHG | HEART RATE: 113 BPM | TEMPERATURE: 98.4 F | BODY MASS INDEX: 28.61 KG/M2 | RESPIRATION RATE: 16 BRPM

## 2020-10-22 DIAGNOSIS — C50.811 MALIGNANT NEOPLASM OF OVERLAPPING SITES OF RIGHT BREAST IN FEMALE, ESTROGEN RECEPTOR POSITIVE (HCC): ICD-10-CM

## 2020-10-22 DIAGNOSIS — Z17.0 MALIGNANT NEOPLASM OF OVERLAPPING SITES OF RIGHT BREAST IN FEMALE, ESTROGEN RECEPTOR POSITIVE (HCC): ICD-10-CM

## 2020-10-22 DIAGNOSIS — Z17.0 MALIGNANT NEOPLASM OF OVERLAPPING SITES OF RIGHT BREAST IN FEMALE, ESTROGEN RECEPTOR POSITIVE (HCC): Primary | ICD-10-CM

## 2020-10-22 DIAGNOSIS — R10.13 EPIGASTRIC PAIN: Primary | ICD-10-CM

## 2020-10-22 DIAGNOSIS — C50.811 MALIGNANT NEOPLASM OF OVERLAPPING SITES OF RIGHT BREAST IN FEMALE, ESTROGEN RECEPTOR POSITIVE (HCC): Primary | ICD-10-CM

## 2020-10-22 DIAGNOSIS — C50.811 MALIGNANT NEOPLASM OF OVERLAPPING SITES OF RIGHT FEMALE BREAST, UNSPECIFIED ESTROGEN RECEPTOR STATUS (HCC): ICD-10-CM

## 2020-10-22 DIAGNOSIS — R10.13 EPIGASTRIC PAIN: ICD-10-CM

## 2020-10-22 LAB
ALBUMIN SERPL-MCNC: 3.8 G/DL (ref 3.5–5.2)
ALBUMIN/GLOB SERPL: 1.2 G/DL
ALP SERPL-CCNC: 142 U/L (ref 39–117)
ALT SERPL W P-5'-P-CCNC: 24 U/L (ref 1–33)
ANION GAP SERPL CALCULATED.3IONS-SCNC: 8 MMOL/L (ref 5–15)
AST SERPL-CCNC: 20 U/L (ref 1–32)
BILIRUB SERPL-MCNC: 0.3 MG/DL (ref 0–1.2)
BUN SERPL-MCNC: 7 MG/DL (ref 6–20)
BUN/CREAT SERPL: 14.3 (ref 7–25)
CALCIUM SPEC-SCNC: 9.6 MG/DL (ref 8.6–10.5)
CHLORIDE SERPL-SCNC: 103 MMOL/L (ref 98–107)
CO2 SERPL-SCNC: 27 MMOL/L (ref 22–29)
CREAT SERPL-MCNC: 0.49 MG/DL (ref 0.57–1)
ERYTHROCYTE [DISTWIDTH] IN BLOOD BY AUTOMATED COUNT: 14.9 % (ref 12.3–15.4)
GFR SERPL CREATININE-BSD FRML MDRD: 138 ML/MIN/1.73
GLOBULIN UR ELPH-MCNC: 3.1 GM/DL
GLUCOSE BLDC GLUCOMTR-MCNC: 229 MG/DL (ref 70–130)
GLUCOSE SERPL-MCNC: 245 MG/DL (ref 65–99)
HCT VFR BLD AUTO: 43.3 % (ref 34–46.6)
HGB BLD-MCNC: 14.5 G/DL (ref 12–15.9)
LYMPHOCYTES # BLD AUTO: 2.5 10*3/MM3 (ref 0.7–3.1)
LYMPHOCYTES NFR BLD AUTO: 19.8 % (ref 19.6–45.3)
MCH RBC QN AUTO: 30.2 PG (ref 26.6–33)
MCHC RBC AUTO-ENTMCNC: 33.5 G/DL (ref 31.5–35.7)
MCV RBC AUTO: 90 FL (ref 79–97)
MONOCYTES # BLD AUTO: 0.3 10*3/MM3 (ref 0.1–0.9)
MONOCYTES NFR BLD AUTO: 2.4 % (ref 5–12)
NEUTROPHILS NFR BLD AUTO: 77.8 % (ref 42.7–76)
NEUTROPHILS NFR BLD AUTO: 9.7 10*3/MM3 (ref 1.7–7)
PLATELET # BLD AUTO: 224 10*3/MM3 (ref 140–450)
PMV BLD AUTO: 7.8 FL (ref 6–12)
POTASSIUM SERPL-SCNC: 3.7 MMOL/L (ref 3.5–5.2)
PROT SERPL-MCNC: 6.9 G/DL (ref 6–8.5)
RBC # BLD AUTO: 4.81 10*6/MM3 (ref 3.77–5.28)
SODIUM SERPL-SCNC: 138 MMOL/L (ref 136–145)
WBC # BLD AUTO: 12.5 10*3/MM3 (ref 3.4–10.8)

## 2020-10-22 PROCEDURE — 96415 CHEMO IV INFUSION ADDL HR: CPT

## 2020-10-22 PROCEDURE — 85025 COMPLETE CBC W/AUTO DIFF WBC: CPT | Performed by: INTERNAL MEDICINE

## 2020-10-22 PROCEDURE — 80053 COMPREHEN METABOLIC PANEL: CPT | Performed by: INTERNAL MEDICINE

## 2020-10-22 PROCEDURE — 25010000002 TRASTUZUMAB-ANNS 420 MG RECONSTITUTED SOLUTION 1 EACH VIAL: Performed by: INTERNAL MEDICINE

## 2020-10-22 PROCEDURE — 96413 CHEMO IV INFUSION 1 HR: CPT

## 2020-10-22 PROCEDURE — 82962 GLUCOSE BLOOD TEST: CPT

## 2020-10-22 PROCEDURE — 96402 CHEMO HORMON ANTINEOPL SQ/IM: CPT

## 2020-10-22 PROCEDURE — 25010000002 GOSERELIN PER 3.6 MG: Performed by: NURSE PRACTITIONER

## 2020-10-22 PROCEDURE — 25010000003 HEPARIN LOCK FLUSH PER 10 UNITS: Performed by: INTERNAL MEDICINE

## 2020-10-22 PROCEDURE — 96361 HYDRATE IV INFUSION ADD-ON: CPT

## 2020-10-22 PROCEDURE — 25010000002 PERTUZUMAB 420 MG/14ML SOLUTION 420 MG VIAL: Performed by: INTERNAL MEDICINE

## 2020-10-22 PROCEDURE — 96417 CHEMO IV INFUS EACH ADDL SEQ: CPT

## 2020-10-22 PROCEDURE — 96372 THER/PROPH/DIAG INJ SC/IM: CPT

## 2020-10-22 PROCEDURE — 96360 HYDRATION IV INFUSION INIT: CPT

## 2020-10-22 RX ORDER — HEPARIN SODIUM (PORCINE) LOCK FLUSH IV SOLN 100 UNIT/ML 100 UNIT/ML
500 SOLUTION INTRAVENOUS AS NEEDED
Status: CANCELLED | OUTPATIENT
Start: 2020-10-22

## 2020-10-22 RX ORDER — HEPARIN SODIUM (PORCINE) LOCK FLUSH IV SOLN 100 UNIT/ML 100 UNIT/ML
500 SOLUTION INTRAVENOUS AS NEEDED
Status: DISCONTINUED | OUTPATIENT
Start: 2020-10-22 | End: 2020-10-23 | Stop reason: HOSPADM

## 2020-10-22 RX ADMIN — PERTUZUMAB 840 MG: 30 INJECTION, SOLUTION, CONCENTRATE INTRAVENOUS at 10:51

## 2020-10-22 RX ADMIN — HEPARIN 500 UNITS: 100 SYRINGE at 14:40

## 2020-10-22 RX ADMIN — GOSERELIN ACETATE 3.6 MG: 3.6 IMPLANT SUBCUTANEOUS at 12:00

## 2020-10-22 RX ADMIN — TRASTUZUMAB-ANNS 650 MG: 420 INJECTION, POWDER, LYOPHILIZED, FOR SOLUTION INTRAVENOUS at 13:00

## 2020-10-22 RX ADMIN — SODIUM CHLORIDE 1000 ML: 9 INJECTION, SOLUTION INTRAVENOUS at 10:37

## 2020-10-22 NOTE — PROGRESS NOTES
"Onc Nutrition     Patient:  Josefina Rodriguez  YOB: 1975    Diagnosis: stage IB ER+ Her2+ IDC right breast  Surgery: right mastectomy (6/23/20)   Current Treatment: Kanjinti / Perjeta - every 21 days x 7 cycles    Weight: 178# / ~19# (9.7%) weight loss x 5 weeks / stable the past ~2 weeks  Nutrition Symptoms: poor appetite, taste changes and nausea     Patient has been identified at severe chronic disease related malnutrition due to significant weight loss and insufficient energy intake.  Follow up with patient during her chemotherapy infusion appointment.  Note patient has started on a new treatment plan due to progressive neuropathy.  Patient's chart reviewed and patient reports being diagnosed with thrush which caused a poor appetite / oral intake which resulted in weight loss as above.  She states her appetite is slowly returning and she reports eating small portion ~5 times per day.  She also complains of taste changes and nausea.    Reviewed the importance of good nutrition during her treatment course.  Advised her to be eating smaller more frequent meals/snacks throughout the day (6-8) to aid with nausea management.  Also advised her to be avoiding highly seasoned / spicy and fried / fatty / greasy foods to aid with nausea management.  Instructed her to use the baking soda / salt mouth rinse before and after eating to aid with taste changes and overall oral care.  Also recommended she use plastic utensils to aid with metallic taste.  Discussed ONS and their role in the diet.  Provided sample of Boost Glucose Control as well as coupon for Boost products.  Also suggested she could try [place] Breakfast Essentials Light Start as another ONS option.  Provided and reviewed written diet materials \"Managing Nausea and Vomiting\" and \"Taste and Smell Changes\" to reinforce information discussed.    Answered her questions and she voiced understanding of information discussed.  Encouraged her to " call RD with questions.  Will monitor as needed during her treatment course.    Irish Mak, MARY LOU  10/22/20

## 2020-11-06 ENCOUNTER — OFFICE VISIT (OUTPATIENT)
Dept: GYNECOLOGIC ONCOLOGY | Facility: CLINIC | Age: 45
End: 2020-11-06

## 2020-11-06 VITALS
WEIGHT: 177.1 LBS | RESPIRATION RATE: 17 BRPM | HEART RATE: 101 BPM | BODY MASS INDEX: 28.46 KG/M2 | SYSTOLIC BLOOD PRESSURE: 165 MMHG | TEMPERATURE: 98.4 F | DIASTOLIC BLOOD PRESSURE: 104 MMHG | HEIGHT: 66 IN

## 2020-11-06 DIAGNOSIS — N83.201 CYSTS OF BOTH OVARIES: Primary | ICD-10-CM

## 2020-11-06 DIAGNOSIS — N83.202 CYSTS OF BOTH OVARIES: Primary | ICD-10-CM

## 2020-11-06 PROCEDURE — 76830 TRANSVAGINAL US NON-OB: CPT | Performed by: OBSTETRICS & GYNECOLOGY

## 2020-11-06 PROCEDURE — 99213 OFFICE O/P EST LOW 20 MIN: CPT | Performed by: OBSTETRICS & GYNECOLOGY

## 2020-11-06 RX ORDER — INSULIN ASPART 100 [IU]/ML
INJECTION, SUSPENSION SUBCUTANEOUS 2 TIMES DAILY WITH MEALS
COMMUNITY
End: 2021-02-03 | Stop reason: ALTCHOICE

## 2020-11-06 NOTE — PROGRESS NOTES
Josefina Rodriguez  [id number]  1975      Reason for visit: Ovarian cyst, personal history of breast cancer      History of present illness:  The patient is a 45 y.o. year old female who presents today for treatment and evaluation of the above issues. Patient has invasive ductal carcinoma of the right breast. She is currently being treated with Herceptin and Perjeta per Dr. Connell. She continues her anastrazole and Lupron.    Patient has a history of PCOS. She previously has had a 6 x 5 x 6 cm right ovary and a 2 x 3 cm left ovarian cyst that have stable across multiple evaluations. Patient reports history of cysts for many decades.  She had several ultrasounds demonstrating cysts.  She continues to only has occasional discomfort related to them. She reports occasional dull pelvic pain that does not interfere with activities.  She does have nausea and anorexia associated with chemo treatment for breast cancer. She reports a bad episode of oral thrush recently. She otherwise feels well.      OBGYN History:  She is a G 2 P 1.  She does not use HRT. She does have a history of abnormal pap smears.  She had abnormal Pap smears prior to having children.  She had one cervical biopsy that was normal.  She has not had any other cervical procedures. Reports having HPV+ pap in the past. Has received Gardasil. Most recent Pap was in 2019 with Dr. Higuera and was normal.    Oncologic History:  Oncology/Hematology History   Malignant neoplasm of overlapping sites of right breast in female, estrogen receptor positive (CMS/HCC)   1/21/2020 Initial Diagnosis    Malignant neoplasm of overlapping sites of right breast in female, estrogen receptor positive (CMS/HCC)     1/21/2020 Cancer Staged    Staging form: Breast, AJCC 8th Edition  - Clinical: Stage IB (cT2, cN0, cM0, G2, ER+, FL+, HER2+) - Signed by Jolene Connell MD on 1/21/2020 2/5/2020 - 6/9/2020 Chemotherapy    OP BREAST TCH-P DOCEtaxel / CARBOplatin AUC=6 /  Trastuzumab-anns / Pertuzumab     2/5/2020 - 10/13/2020 Chemotherapy    OP SUPPORTIVE LEUPROLIDE 11.25 MG Q3M     2/5/2020 -  Chemotherapy    OP CENTRAL VENOUS ACCESS DEVICE ACCESS, CARE, AND MAINTENANCE (CVAD)     2/11/2020 - 6/17/2020 Chemotherapy    OP SUPPORTIVE HYDRATION + ANTIEMETICS     6/10/2020 - 7/21/2020 Chemotherapy    OP BREAST Pertuzumab / Trastuzumab-anns  Q21D     7/22/2020 - 10/13/2020 Chemotherapy    OP BREAST Ado-Trastuzumab Emtansine     9/23/2020 -  Chemotherapy    OP SUPPORTIVE HYDRATION + ANTIEMETICS     10/22/2020 -  Chemotherapy    OP BREAST Pertuzumab / Trastuzumab-anns  Q21D     10/22/2020 -  Chemotherapy    OP SUPPORTIVE Goserelin 3.6 mg Q28D           Past Medical History:   Diagnosis Date   • Anxiety    • Depression    • Diabetes mellitus (CMS/HCC)    • Hiatal hernia    • Hypertension    • Malignant neoplasm of overlapping sites of right breast in female, estrogen receptor positive (CMS/HCC) 1/21/2020   • PCOS (polycystic ovarian syndrome)    • Wears glasses        Past Surgical History:   Procedure Laterality Date   • BREAST BIOPSY Right 03/2019    axillary lymph node bx/fna   • CHOLECYSTECTOMY  03/2010   • COLONOSCOPY  2016   • MASTECTOMY W/ SENTINEL NODE BIOPSY Right 6/23/2020    Procedure: MASTECTOMY WITH SENTINEL NODE BIOPSY RIGHT, PORT PLACEMENT;  Surgeon: Rachel Baker MD;  Location: [facility];  Service: General;  Laterality: Right;   • US GUIDED FINE NEEDLE ASPIRATION  1/15/2020   • VENOUS ACCESS DEVICE (PORT) INSERTION Left 01/31/2020       MEDICATIONS: The current medication list was reviewed with the patient and updated in the EMR this date per the Medical Assistant. Medication dosages and frequencies were confirmed to be accurate.      Allergies:  is allergic to cashew nut; naproxen; penicillins; and pistachio nut.    Social History:   Social History     Socioeconomic History   • Marital status:      Spouse name: Not on file   • Number of children: Not on file    • Years of education: Not on file   • Highest education level: Not on file   Tobacco Use   • Smoking status: Current Every Day Smoker     Packs/day: 1.00     Years: 20.00     Pack years: 20.00     Types: Cigarettes   • Smokeless tobacco: Never Used   Substance and Sexual Activity   • Alcohol use: Yes     Comment: occasionally a couple times a month   • Drug use: Never   • Sexual activity: Defer       Family History:    Family History   Problem Relation Age of Onset   • Breast cancer Mother 64   • Heart disease Mother    • Hypertension Father    • Lung cancer Father    • Ovarian cancer Neg Hx    • Colon cancer Neg Hx    • Colon polyps Neg Hx        Health Maintenance:    Health Maintenance   Topic Date Due   • URINE MICROALBUMIN  1975   • ANNUAL PHYSICAL  03/04/1980   • Pneumococcal Vaccine 0-64 (1 of 1 - PPSV23) 03/04/1983   • Hepatitis B (1 of 3 - Risk 3-dose series) 03/04/1996   • TDAP/TD VACCINES (1 - Tdap) 03/04/1996   • HEPATITIS C SCREENING  01/20/2020   • DIABETIC FOOT EXAM  01/20/2020   • PAP SMEAR  01/20/2020   • DIABETIC EYE EXAM  01/20/2020   • INFLUENZA VACCINE  08/01/2020   • HEMOGLOBIN A1C  12/21/2020   • MAMMOGRAM  01/07/2021       Review of Systems   Constitutional: Positive for fatigue. Negative for appetite change, chills, fever and unexpected weight change.   HENT: Negative for congestion, ear discharge, ear pain, hearing loss, mouth sores, nosebleeds, sinus pressure, sinus pain, sore throat, tinnitus, trouble swallowing and voice change.         Oral thrush   Eyes: Negative for redness, itching and visual disturbance.   Respiratory: Negative for cough, shortness of breath and wheezing.    Cardiovascular: Negative for chest pain, palpitations and leg swelling.   Gastrointestinal: Positive for diarrhea and nausea. Negative for abdominal distention, abdominal pain, blood in stool, constipation and vomiting.   Endocrine: Negative.  Negative for cold intolerance and heat intolerance.  "  Genitourinary: Negative for difficulty urinating, dyspareunia, dysuria, enuresis, flank pain, frequency, genital sores, hematuria, pelvic pain, urgency, vaginal bleeding, vaginal discharge and vaginal pain.   Musculoskeletal: Negative for arthralgias, back pain, gait problem, joint swelling and myalgias.   Skin: Negative for color change, rash and wound.   Allergic/Immunologic: Positive for food allergies. Negative for immunocompromised state.   Neurological: Negative for dizziness, seizures, syncope, weakness, light-headedness, numbness and headaches.   Hematological: Negative for adenopathy. Does not bruise/bleed easily.   Psychiatric/Behavioral: Positive for dysphoric mood. Negative for confusion and sleep disturbance. The patient is nervous/anxious.        Physical Exam    Vitals:    11/06/20 1350   BP: (!) 165/104   Pulse: 101   Resp: 17   Temp: 98.4 °F (36.9 °C)   TempSrc: Temporal   Weight: 80.3 kg (177 lb 1.6 oz)   Height: 167.6 cm (65.98\")   PainSc: 0-No pain     Body mass index is 28.6 kg/m².    Wt Readings from Last 3 Encounters:   11/06/20 80.3 kg (177 lb 1.6 oz)   10/22/20 80.7 kg (178 lb)   10/14/20 81.2 kg (179 lb)         GENERAL: Alert, well-appearing female appearing her stated age who is in no apparent distress.   HEENT: Sclera anicteric. Head normocephalic, atraumatic. Mucus membranes moist.   NECK: Trachea midline, supple, without masses.  No thyromegaly.   BREASTS: Deferred  CARDIOVASCULAR: Normal rate, regular rhythm, no murmurs, rubs, or gallops.  No peripheral edema.  RESPIRATORY: Clear to auscultation bilaterally, normal respiratory effort  BACK:  No CVA tenderness, no vertebral tenderness on palpation  GASTROINTESTINAL:  Abdomen is soft, non-tender, non-distended, no rebound or guarding, no masses, or hernias. No HSM.  SKIN:  Warm, dry, well-perfused.  All visible areas intact.  No rashes, lesions, ulcers.  PSYCHIATRIC: AO x3, with appropriate affect, normal thought " processes.  NEUROLOGIC: No focal deficits.  Moves extremities well.  MUSCULOSKELETAL: Normal gait and station.   EXTREMITIES:   No cyanosis, clubbing, symmetric.  LYMPHATICS:  No cervical or inguinal adenopathy noted.     PELVIC exam:    External genitalia were unremarkable    ECOG PS 0    PROCEDURES: Ultrasound: stable right ovarian cyst. Left ovary appears normal. Uterus appears normal.  See report    Diagnostic Data:    Mri Brain With & Without Contrast    Result Date: 9/13/2020  No acute intracranial findings, specifically no evidence for acute infarction or abnormal enhancement. Trace left mastoid effusion.   DICTATED:   09/12/2020 EDITED/ls :   09/13/2020  This report was finalized on 9/13/2020 12:04 PM by Dr. Tian Sewell.      Ct Abdomen Pelvis With Contrast    Result Date: 9/12/2020  1. No mechanical obstructive process or loculated fluid collection.  2. Hepatic steatosis.  3. Prominent right adnexal soft tissue density and right ovary although far decreased from prior comparison 01/30/2020 in overall prominence without bulky pelvic adenopathy or free fluid Incidentally noted  DICTATED:   09/12/2020 EDITED/ls :   09/12/2020   This report was finalized on 9/12/2020 7:12 PM by Dr. Tian Sewell.      Xr Chest 1 View    Result Date: 9/18/2020  No acute cardiopulmonary disease.    D:  09/16/2020 E:  09/16/2020  This report was finalized on 9/18/2020 11:15 AM by Dr. Irish Hansen MD.      Xr Chest 1 View    Result Date: 9/13/2020  No acute cardiopulmonary process.  DICTATED:   09/12/2020 EDITED/ls :   09/12/2020  This report was finalized on 9/13/2020 12:03 PM by Dr. Tian Sewell.      Lab Results   Component Value Date    WBC 12.50 (H) 10/22/2020    HGB 14.5 10/22/2020    HCT 43.3 10/22/2020    MCV 90.0 10/22/2020     10/22/2020    NEUTROABS 9.70 (H) 10/22/2020    GLUCOSE 245 (H) 10/22/2020    BUN 7 10/22/2020    CREATININE 0.49 (L) 10/22/2020    EGFRIFNONA 138 10/22/2020     10/22/2020    K  3.7 10/22/2020     10/22/2020    CO2 27.0 10/22/2020    MG 1.6 09/12/2020    CALCIUM 9.6 10/22/2020    ALBUMIN 3.80 10/22/2020    AST 20 10/22/2020    ALT 24 10/22/2020    BILITOT 0.3 10/22/2020     Lab Results   Component Value Date     29.8 03/18/2020           Assessment/Plan   This is a 45 y.o. woman invasive ductal carcinoma of the right breast, currently on Herceptin and Perjeta as well as anastrazole and Lupron. She presents today for follow up of ovarian cysts.   Encounter Diagnosis   Name Primary?   • Cysts of both ovaries Yes     Ovarian cyst, PCOS  Repeat ultrasound today stable, cysts appear benign, not complex. They are the same to smaller in size today.  She denies significant issues from these cysts.   She will finish her treatment for her breast cancer in 3 months. Discussed returning in ~3 months at that time for repeat evaluation. If cysts are persistent at that time will discuss possibly pursuing more invasive management. We discussed the risk for torsion with persistent cysts and that she can move up her follow up if she begins to have issues.  Patient is uncertain at this point, but may consider surgical intervention for her persistent ovarian cysts in the future.    Breast cancer  Followed by medical oncology    Family history of breast cancer  Status post genetic counseling/testing.     Pain assessment was performed today as a part of patient’s care.  For patients with pain related to surgery, gynecologic malignancy or cancer treatment, the plan is as noted in the assessment/plan.  For patients with pain not related to these issues, they are to seek any further needed care from a more appropriate provider, such as PCP.  Depression screening was performed today as a part of patient's care.  For depression scores greater than 5 appropriate intervention was performed.    Orders Placed This Encounter   Procedures   • US Non-OB Transvaginal     Standing Status:   Future     Number of  Occurrences:   1     Standing Expiration Date:   11/6/2021     Order Specific Question:   Reason for Exam:     Answer:   bilateral ovarian cysts     FOLLOW UP: Repeat ultrasound 2/2021; patient may consider surgical endeavor after completion of her breast cancer treatment due to longtime persistent cysts.    Patient was seen and examined with Dr. Beckman,  resident, who performed portions of the examination and documentation for this patient's care under my direct supervision.  I agree with the above documentation and plan.    Cuca Sands MD  11/08/20  16:23 EST

## 2020-11-11 ENCOUNTER — HOSPITAL ENCOUNTER (OUTPATIENT)
Dept: ONCOLOGY | Facility: HOSPITAL | Age: 45
Setting detail: INFUSION SERIES
Discharge: HOME | End: 2020-11-11

## 2020-11-11 ENCOUNTER — DOCUMENTATION (OUTPATIENT)
Dept: NUTRITION | Facility: HOSPITAL | Age: 45
End: 2020-11-11

## 2020-11-11 ENCOUNTER — OFFICE VISIT (OUTPATIENT)
Dept: ONCOLOGY | Facility: CLINIC | Age: 45
End: 2020-11-11

## 2020-11-11 VITALS
SYSTOLIC BLOOD PRESSURE: 144 MMHG | OXYGEN SATURATION: 96 % | BODY MASS INDEX: 28.93 KG/M2 | DIASTOLIC BLOOD PRESSURE: 90 MMHG | HEART RATE: 105 BPM | WEIGHT: 180 LBS | RESPIRATION RATE: 18 BRPM | TEMPERATURE: 98 F | HEIGHT: 66 IN

## 2020-11-11 DIAGNOSIS — C50.811 MALIGNANT NEOPLASM OF OVERLAPPING SITES OF RIGHT BREAST IN FEMALE, ESTROGEN RECEPTOR POSITIVE (HCC): Primary | ICD-10-CM

## 2020-11-11 DIAGNOSIS — I42.7 CARDIOMYOPATHY DUE TO DRUG AND EXTERNAL AGENT (HCC): ICD-10-CM

## 2020-11-11 DIAGNOSIS — C50.811 MALIGNANT NEOPLASM OF OVERLAPPING SITES OF RIGHT FEMALE BREAST, UNSPECIFIED ESTROGEN RECEPTOR STATUS (HCC): ICD-10-CM

## 2020-11-11 DIAGNOSIS — Z17.0 MALIGNANT NEOPLASM OF OVERLAPPING SITES OF RIGHT BREAST IN FEMALE, ESTROGEN RECEPTOR POSITIVE (HCC): Primary | ICD-10-CM

## 2020-11-11 LAB
ALBUMIN SERPL-MCNC: 3.9 G/DL (ref 3.5–5.2)
ALBUMIN/GLOB SERPL: 1.2 G/DL
ALP SERPL-CCNC: 181 U/L (ref 39–117)
ALT SERPL W P-5'-P-CCNC: 29 U/L (ref 1–33)
ANION GAP SERPL CALCULATED.3IONS-SCNC: 11 MMOL/L (ref 5–15)
AST SERPL-CCNC: 25 U/L (ref 1–32)
BILIRUB SERPL-MCNC: 0.2 MG/DL (ref 0–1.2)
BUN SERPL-MCNC: 8 MG/DL (ref 6–20)
BUN/CREAT SERPL: 18.2 (ref 7–25)
CALCIUM SPEC-SCNC: 9.9 MG/DL (ref 8.6–10.5)
CHLORIDE SERPL-SCNC: 101 MMOL/L (ref 98–107)
CO2 SERPL-SCNC: 24 MMOL/L (ref 22–29)
CREAT SERPL-MCNC: 0.44 MG/DL (ref 0.57–1)
ERYTHROCYTE [DISTWIDTH] IN BLOOD BY AUTOMATED COUNT: 15.4 % (ref 12.3–15.4)
GFR SERPL CREATININE-BSD FRML MDRD: >150 ML/MIN/1.73
GLOBULIN UR ELPH-MCNC: 3.2 GM/DL
GLUCOSE SERPL-MCNC: 133 MG/DL (ref 65–99)
HCT VFR BLD AUTO: 40.5 % (ref 34–46.6)
HGB BLD-MCNC: 14 G/DL (ref 12–15.9)
LYMPHOCYTES # BLD AUTO: 3.3 10*3/MM3 (ref 0.7–3.1)
LYMPHOCYTES NFR BLD AUTO: 25.1 % (ref 19.6–45.3)
MCH RBC QN AUTO: 31.4 PG (ref 26.6–33)
MCHC RBC AUTO-ENTMCNC: 34.6 G/DL (ref 31.5–35.7)
MCV RBC AUTO: 90.9 FL (ref 79–97)
MONOCYTES # BLD AUTO: 0.4 10*3/MM3 (ref 0.1–0.9)
MONOCYTES NFR BLD AUTO: 3 % (ref 5–12)
NEUTROPHILS NFR BLD AUTO: 71.9 % (ref 42.7–76)
NEUTROPHILS NFR BLD AUTO: 9.3 10*3/MM3 (ref 1.7–7)
PLATELET # BLD AUTO: 295 10*3/MM3 (ref 140–450)
PMV BLD AUTO: 7.4 FL (ref 6–12)
POTASSIUM SERPL-SCNC: 3.9 MMOL/L (ref 3.5–5.2)
PROT SERPL-MCNC: 7.1 G/DL (ref 6–8.5)
RBC # BLD AUTO: 4.46 10*6/MM3 (ref 3.77–5.28)
SODIUM SERPL-SCNC: 136 MMOL/L (ref 136–145)
WBC # BLD AUTO: 13 10*3/MM3 (ref 3.4–10.8)

## 2020-11-11 PROCEDURE — 25010000003 HEPARIN LOCK FLUSH PER 10 UNITS: Performed by: INTERNAL MEDICINE

## 2020-11-11 PROCEDURE — 80053 COMPREHEN METABOLIC PANEL: CPT | Performed by: INTERNAL MEDICINE

## 2020-11-11 PROCEDURE — 25010000002 PERTUZUMAB 420 MG/14ML SOLUTION 420 MG VIAL: Performed by: INTERNAL MEDICINE

## 2020-11-11 PROCEDURE — 96417 CHEMO IV INFUS EACH ADDL SEQ: CPT

## 2020-11-11 PROCEDURE — 96413 CHEMO IV INFUSION 1 HR: CPT

## 2020-11-11 PROCEDURE — 25010000002 TRASTUZUMAB-ANNS 420 MG RECONSTITUTED SOLUTION 1 EACH VIAL: Performed by: INTERNAL MEDICINE

## 2020-11-11 PROCEDURE — 85025 COMPLETE CBC W/AUTO DIFF WBC: CPT | Performed by: INTERNAL MEDICINE

## 2020-11-11 PROCEDURE — 99213 OFFICE O/P EST LOW 20 MIN: CPT | Performed by: INTERNAL MEDICINE

## 2020-11-11 RX ORDER — SODIUM CHLORIDE 9 MG/ML
250 INJECTION, SOLUTION INTRAVENOUS ONCE
Status: CANCELLED | OUTPATIENT
Start: 2020-12-23

## 2020-11-11 RX ORDER — SODIUM CHLORIDE 9 MG/ML
250 INJECTION, SOLUTION INTRAVENOUS ONCE
Status: CANCELLED | OUTPATIENT
Start: 2020-12-02

## 2020-11-11 RX ORDER — SODIUM CHLORIDE 9 MG/ML
250 INJECTION, SOLUTION INTRAVENOUS ONCE
Status: CANCELLED | OUTPATIENT
Start: 2020-11-11

## 2020-11-11 RX ORDER — SODIUM CHLORIDE 9 MG/ML
250 INJECTION, SOLUTION INTRAVENOUS ONCE
Status: COMPLETED | OUTPATIENT
Start: 2020-11-11 | End: 2020-11-11

## 2020-11-11 RX ORDER — HEPARIN SODIUM (PORCINE) LOCK FLUSH IV SOLN 100 UNIT/ML 100 UNIT/ML
500 SOLUTION INTRAVENOUS AS NEEDED
Status: CANCELLED | OUTPATIENT
Start: 2020-11-11

## 2020-11-11 RX ORDER — HEPARIN SODIUM (PORCINE) LOCK FLUSH IV SOLN 100 UNIT/ML 100 UNIT/ML
500 SOLUTION INTRAVENOUS AS NEEDED
Status: DISCONTINUED | OUTPATIENT
Start: 2020-11-11 | End: 2020-11-12 | Stop reason: HOSPADM

## 2020-11-11 RX ADMIN — TRASTUZUMAB-ANNS 490 MG: 420 INJECTION, POWDER, LYOPHILIZED, FOR SOLUTION INTRAVENOUS at 11:04

## 2020-11-11 RX ADMIN — PERTUZUMAB 420 MG: 30 INJECTION, SOLUTION, CONCENTRATE INTRAVENOUS at 09:58

## 2020-11-11 RX ADMIN — HEPARIN 500 UNITS: 100 SYRINGE at 11:48

## 2020-11-11 RX ADMIN — SODIUM CHLORIDE 250 ML: 9 INJECTION, SOLUTION INTRAVENOUS at 09:58

## 2020-11-11 NOTE — PROGRESS NOTES
Onc Nutrition    Patient: Josefina Rodriguez  YOB: 1975    Diagnosis: stage IB ER+ Her2+ IDC right breast  Surgery: right mastectomy (6/23/20)   Current Treatment: Kanjinti / Perjeta - every 21 days x 7 cycles     Weight: 180#  / 2# weight gain x 3 weeks  Weight Change: ~19# (9.7%) weight loss x 5 weeks / stable the past 5 weeks  Nutrition Symptoms: poor appetite, taste changes, nausea and diarrhea    Follow up with patient during her infusion appointment.  She reports she is feeling much better.  She states her appetite has improved and that she is managing her symptoms well.  She also reports her blood sugars have been better, average of ~140 mg/dL.    Advised her to continue eating smaller more frequent snacks throughout the day to aid with nausea and diarrhea management.  Encouraged her to continue using the baking soda / salt mouth rinse to aid with taste changes.  Also encouraged her to drink ONS as needed to aid with oral intake of days appetite is decreased.    Answered her questions and she voiced understanding of information discussed.  Encouraged to call RD with questions.  Will monitor as needed during her treatment course.    Irish Mak RD  11/11/20

## 2020-11-16 RX ORDER — PROCHLORPERAZINE MALEATE 10 MG
TABLET ORAL
Qty: 60 TABLET | Refills: 1 | Status: SHIPPED | OUTPATIENT
Start: 2020-11-16

## 2020-11-19 ENCOUNTER — HOSPITAL ENCOUNTER (OUTPATIENT)
Dept: ONCOLOGY | Facility: HOSPITAL | Age: 45
Setting detail: INFUSION SERIES
Discharge: HOME | End: 2020-11-19

## 2020-11-19 ENCOUNTER — HOSPITAL ENCOUNTER (OUTPATIENT)
Dept: CARDIOLOGY | Facility: HOSPITAL | Age: 45
Discharge: HOME | End: 2020-11-19
Admitting: INTERNAL MEDICINE

## 2020-11-19 VITALS
RESPIRATION RATE: 16 BRPM | HEART RATE: 107 BPM | BODY MASS INDEX: 28.77 KG/M2 | DIASTOLIC BLOOD PRESSURE: 97 MMHG | HEIGHT: 66 IN | WEIGHT: 179 LBS | TEMPERATURE: 98 F | SYSTOLIC BLOOD PRESSURE: 132 MMHG

## 2020-11-19 VITALS — WEIGHT: 179 LBS | BODY MASS INDEX: 28.77 KG/M2 | HEIGHT: 66 IN

## 2020-11-19 DIAGNOSIS — I42.7 CARDIOMYOPATHY DUE TO DRUG AND EXTERNAL AGENT (HCC): ICD-10-CM

## 2020-11-19 DIAGNOSIS — Z17.0 MALIGNANT NEOPLASM OF OVERLAPPING SITES OF RIGHT BREAST IN FEMALE, ESTROGEN RECEPTOR POSITIVE (HCC): Primary | ICD-10-CM

## 2020-11-19 DIAGNOSIS — C50.811 MALIGNANT NEOPLASM OF OVERLAPPING SITES OF RIGHT BREAST IN FEMALE, ESTROGEN RECEPTOR POSITIVE (HCC): Primary | ICD-10-CM

## 2020-11-19 LAB
ALBUMIN SERPL-MCNC: 3.9 G/DL (ref 3.5–5.2)
ALBUMIN/GLOB SERPL: 1.3 G/DL
ALP SERPL-CCNC: 166 U/L (ref 39–117)
ALT SERPL W P-5'-P-CCNC: 26 U/L (ref 1–33)
ANION GAP SERPL CALCULATED.3IONS-SCNC: 10 MMOL/L (ref 5–15)
AST SERPL-CCNC: 20 U/L (ref 1–32)
BILIRUB SERPL-MCNC: 0.2 MG/DL (ref 0–1.2)
BUN SERPL-MCNC: 9 MG/DL (ref 6–20)
BUN/CREAT SERPL: 19.1 (ref 7–25)
CALCIUM SPEC-SCNC: 9.6 MG/DL (ref 8.6–10.5)
CHLORIDE SERPL-SCNC: 102 MMOL/L (ref 98–107)
CO2 SERPL-SCNC: 27 MMOL/L (ref 22–29)
CREAT SERPL-MCNC: 0.47 MG/DL (ref 0.57–1)
GFR SERPL CREATININE-BSD FRML MDRD: 145 ML/MIN/1.73
GLOBULIN UR ELPH-MCNC: 2.9 GM/DL
GLUCOSE BLDC GLUCOMTR-MCNC: 192 MG/DL (ref 70–130)
GLUCOSE SERPL-MCNC: 187 MG/DL (ref 65–99)
POTASSIUM SERPL-SCNC: 3.6 MMOL/L (ref 3.5–5.2)
PROT SERPL-MCNC: 6.8 G/DL (ref 6–8.5)
SODIUM SERPL-SCNC: 139 MMOL/L (ref 136–145)

## 2020-11-19 PROCEDURE — 93308 TTE F-UP OR LMTD: CPT | Performed by: INTERNAL MEDICINE

## 2020-11-19 PROCEDURE — 96402 CHEMO HORMON ANTINEOPL SQ/IM: CPT

## 2020-11-19 PROCEDURE — 96401 CHEMO ANTI-NEOPL SQ/IM: CPT

## 2020-11-19 PROCEDURE — 93308 TTE F-UP OR LMTD: CPT

## 2020-11-19 PROCEDURE — 25010000002 GOSERELIN PER 3.6 MG: Performed by: NURSE PRACTITIONER

## 2020-11-19 PROCEDURE — 80053 COMPREHEN METABOLIC PANEL: CPT | Performed by: NURSE PRACTITIONER

## 2020-11-19 PROCEDURE — 93356 MYOCRD STRAIN IMG SPCKL TRCK: CPT | Performed by: INTERNAL MEDICINE

## 2020-11-19 PROCEDURE — 82962 GLUCOSE BLOOD TEST: CPT

## 2020-11-19 RX ADMIN — GOSERELIN ACETATE 3.6 MG: 3.6 IMPLANT SUBCUTANEOUS at 12:16

## 2020-11-20 LAB
BH CV ECHO MEAS - AO ROOT AREA (BSA CORRECTED): 1.6
BH CV ECHO MEAS - AO ROOT AREA: 7.2 CM^2
BH CV ECHO MEAS - AO ROOT DIAM: 3 CM
BH CV ECHO MEAS - BSA(HAYCOCK): 2 M^2
BH CV ECHO MEAS - BSA: 1.9 M^2
BH CV ECHO MEAS - BZI_BMI: 28.9 KILOGRAMS/M^2
BH CV ECHO MEAS - BZI_METRIC_HEIGHT: 167.6 CM
BH CV ECHO MEAS - BZI_METRIC_WEIGHT: 81.2 KG
BH CV ECHO MEAS - EDV(CUBED): 97.4 ML
BH CV ECHO MEAS - EDV(MOD-SP2): 49 ML
BH CV ECHO MEAS - EDV(MOD-SP4): 78 ML
BH CV ECHO MEAS - EDV(TEICH): 97.4 ML
BH CV ECHO MEAS - EF(CUBED): 76.3 %
BH CV ECHO MEAS - EF(MOD-BP): 65 %
BH CV ECHO MEAS - EF(MOD-SP2): 65.3 %
BH CV ECHO MEAS - EF(MOD-SP4): 65.4 %
BH CV ECHO MEAS - EF(TEICH): 68.4 %
BH CV ECHO MEAS - ESV(CUBED): 23.1 ML
BH CV ECHO MEAS - ESV(MOD-SP2): 17 ML
BH CV ECHO MEAS - ESV(MOD-SP4): 27 ML
BH CV ECHO MEAS - ESV(TEICH): 30.8 ML
BH CV ECHO MEAS - FS: 38.1 %
BH CV ECHO MEAS - IVS/LVPW: 0.96
BH CV ECHO MEAS - IVSD: 0.82 CM
BH CV ECHO MEAS - LAD MAJOR: 5 CM
BH CV ECHO MEAS - LV DIASTOLIC VOL/BSA (35-75): 40.9 ML/M^2
BH CV ECHO MEAS - LV MASS(C)D: 125.1 GRAMS
BH CV ECHO MEAS - LV MASS(C)DI: 65.6 GRAMS/M^2
BH CV ECHO MEAS - LV SYSTOLIC VOL/BSA (12-30): 14.2 ML/M^2
BH CV ECHO MEAS - LVIDD: 4.6 CM
BH CV ECHO MEAS - LVIDS: 2.8 CM
BH CV ECHO MEAS - LVLD AP2: 7.1 CM
BH CV ECHO MEAS - LVLD AP4: 7.5 CM
BH CV ECHO MEAS - LVLS AP2: 6 CM
BH CV ECHO MEAS - LVLS AP4: 6.7 CM
BH CV ECHO MEAS - LVOT AREA (M): 7.6 CM^2
BH CV ECHO MEAS - LVOT AREA: 7.4 CM^2
BH CV ECHO MEAS - LVOT DIAM: 3.1 CM
BH CV ECHO MEAS - LVPWD: 0.85 CM
BH CV ECHO MEAS - SI(CUBED): 39 ML/M^2
BH CV ECHO MEAS - SI(MOD-SP2): 16.8 ML/M^2
BH CV ECHO MEAS - SI(MOD-SP4): 26.7 ML/M^2
BH CV ECHO MEAS - SI(TEICH): 34.9 ML/M^2
BH CV ECHO MEAS - SV(CUBED): 74.3 ML
BH CV ECHO MEAS - SV(MOD-SP2): 32 ML
BH CV ECHO MEAS - SV(MOD-SP4): 51 ML
BH CV ECHO MEAS - SV(TEICH): 66.6 ML
BH CV VAS BP LEFT ARM: NORMAL MMHG
BH CV XLRA - RV BASE: 3.2 CM
BH CV XLRA - RV LENGTH: 7.2 CM
BH CV XLRA - RV MID: 3.3 CM
LEFT ATRIUM VOLUME INDEX: 13.6 ML/M^2
LEFT ATRIUM VOLUME: 26 ML

## 2020-11-30 ENCOUNTER — TELEPHONE (OUTPATIENT)
Dept: ONCOLOGY | Facility: CLINIC | Age: 45
End: 2020-11-30

## 2020-11-30 DIAGNOSIS — C50.811 MALIGNANT NEOPLASM OF OVERLAPPING SITES OF RIGHT BREAST IN FEMALE, ESTROGEN RECEPTOR POSITIVE (HCC): ICD-10-CM

## 2020-11-30 DIAGNOSIS — Z17.0 MALIGNANT NEOPLASM OF OVERLAPPING SITES OF RIGHT BREAST IN FEMALE, ESTROGEN RECEPTOR POSITIVE (HCC): ICD-10-CM

## 2020-11-30 RX ORDER — LIDOCAINE AND PRILOCAINE 25; 25 MG/G; MG/G
CREAM TOPICAL AS NEEDED
Qty: 30 EACH | Refills: 3 | Status: SHIPPED | OUTPATIENT
Start: 2020-11-30

## 2020-11-30 NOTE — TELEPHONE ENCOUNTER
SWEETIE WITH [facility] CALLING    NEEDS A MAX QUANTITY ON HOW MANY TIMES A DAY SHE CAN USE THE LIDOCAINE-PRILOCAINE FOR THE SCRIPT THEY RECEIVED, PLEASE ADVISE?    CALL BACK #[phone]

## 2020-11-30 NOTE — TELEPHONE ENCOUNTER
LMOM at Pharmacy  APPLY TOPICALLY TO THE APPROPRIATE AREA AS DIRECTED AND AS NEEDED. APPLY 45-60 MINUTES PRIOR TO PORT ACCESS. COVER WITH SARAN/ PLASTIC WRAP.  [id number]  ~Shell

## 2020-12-02 ENCOUNTER — HOSPITAL ENCOUNTER (OUTPATIENT)
Dept: ONCOLOGY | Facility: HOSPITAL | Age: 45
Setting detail: INFUSION SERIES
Discharge: HOME | End: 2020-12-02

## 2020-12-02 VITALS
WEIGHT: 181 LBS | HEART RATE: 89 BPM | TEMPERATURE: 97.6 F | BODY MASS INDEX: 29.09 KG/M2 | RESPIRATION RATE: 18 BRPM | SYSTOLIC BLOOD PRESSURE: 165 MMHG | DIASTOLIC BLOOD PRESSURE: 91 MMHG | HEIGHT: 66 IN

## 2020-12-02 DIAGNOSIS — C50.811 MALIGNANT NEOPLASM OF OVERLAPPING SITES OF RIGHT FEMALE BREAST, UNSPECIFIED ESTROGEN RECEPTOR STATUS (HCC): ICD-10-CM

## 2020-12-02 DIAGNOSIS — C50.811 MALIGNANT NEOPLASM OF OVERLAPPING SITES OF RIGHT BREAST IN FEMALE, ESTROGEN RECEPTOR POSITIVE (HCC): Primary | ICD-10-CM

## 2020-12-02 DIAGNOSIS — Z17.0 MALIGNANT NEOPLASM OF OVERLAPPING SITES OF RIGHT BREAST IN FEMALE, ESTROGEN RECEPTOR POSITIVE (HCC): Primary | ICD-10-CM

## 2020-12-02 LAB
ALBUMIN SERPL-MCNC: 3.6 G/DL (ref 3.5–5.2)
ALBUMIN/GLOB SERPL: 1.1 G/DL
ALP SERPL-CCNC: 169 U/L (ref 39–117)
ALT SERPL W P-5'-P-CCNC: 24 U/L (ref 1–33)
ANION GAP SERPL CALCULATED.3IONS-SCNC: 7 MMOL/L (ref 5–15)
AST SERPL-CCNC: 24 U/L (ref 1–32)
BILIRUB SERPL-MCNC: 0.2 MG/DL (ref 0–1.2)
BUN SERPL-MCNC: 9 MG/DL (ref 6–20)
BUN/CREAT SERPL: 16.7 (ref 7–25)
CALCIUM SPEC-SCNC: 9.7 MG/DL (ref 8.6–10.5)
CHLORIDE SERPL-SCNC: 106 MMOL/L (ref 98–107)
CO2 SERPL-SCNC: 31 MMOL/L (ref 22–29)
CREAT SERPL-MCNC: 0.54 MG/DL (ref 0.57–1)
ERYTHROCYTE [DISTWIDTH] IN BLOOD BY AUTOMATED COUNT: 15.8 % (ref 12.3–15.4)
GFR SERPL CREATININE-BSD FRML MDRD: 123 ML/MIN/1.73
GLOBULIN UR ELPH-MCNC: 3.3 GM/DL
GLUCOSE SERPL-MCNC: 64 MG/DL (ref 65–99)
HCT VFR BLD AUTO: 42.3 % (ref 34–46.6)
HGB BLD-MCNC: 13.6 G/DL (ref 12–15.9)
LYMPHOCYTES # BLD AUTO: 3.2 10*3/MM3 (ref 0.7–3.1)
LYMPHOCYTES NFR BLD AUTO: 23.7 % (ref 19.6–45.3)
MCH RBC QN AUTO: 30.3 PG (ref 26.6–33)
MCHC RBC AUTO-ENTMCNC: 32.2 G/DL (ref 31.5–35.7)
MCV RBC AUTO: 94.2 FL (ref 79–97)
MONOCYTES # BLD AUTO: 0.3 10*3/MM3 (ref 0.1–0.9)
MONOCYTES NFR BLD AUTO: 2.1 % (ref 5–12)
NEUTROPHILS NFR BLD AUTO: 10.1 10*3/MM3 (ref 1.7–7)
NEUTROPHILS NFR BLD AUTO: 74.2 % (ref 42.7–76)
PLATELET # BLD AUTO: 286 10*3/MM3 (ref 140–450)
PMV BLD AUTO: 7.4 FL (ref 6–12)
POTASSIUM SERPL-SCNC: 4.2 MMOL/L (ref 3.5–5.2)
PROT SERPL-MCNC: 6.9 G/DL (ref 6–8.5)
RBC # BLD AUTO: 4.48 10*6/MM3 (ref 3.77–5.28)
SODIUM SERPL-SCNC: 144 MMOL/L (ref 136–145)
WBC # BLD AUTO: 13.6 10*3/MM3 (ref 3.4–10.8)

## 2020-12-02 PROCEDURE — 85025 COMPLETE CBC W/AUTO DIFF WBC: CPT | Performed by: INTERNAL MEDICINE

## 2020-12-02 PROCEDURE — 25010000003 HEPARIN LOCK FLUSH PER 10 UNITS: Performed by: INTERNAL MEDICINE

## 2020-12-02 PROCEDURE — 25010000002 PERTUZUMAB 420 MG/14ML SOLUTION 420 MG VIAL: Performed by: INTERNAL MEDICINE

## 2020-12-02 PROCEDURE — 25010000002 TRASTUZUMAB-ANNS 420 MG RECONSTITUTED SOLUTION 1 EACH VIAL: Performed by: INTERNAL MEDICINE

## 2020-12-02 PROCEDURE — 96413 CHEMO IV INFUSION 1 HR: CPT

## 2020-12-02 PROCEDURE — 80053 COMPREHEN METABOLIC PANEL: CPT | Performed by: INTERNAL MEDICINE

## 2020-12-02 PROCEDURE — 96417 CHEMO IV INFUS EACH ADDL SEQ: CPT

## 2020-12-02 RX ORDER — SODIUM CHLORIDE 9 MG/ML
250 INJECTION, SOLUTION INTRAVENOUS ONCE
Status: DISCONTINUED | OUTPATIENT
Start: 2020-12-02 | End: 2020-12-03 | Stop reason: HOSPADM

## 2020-12-02 RX ORDER — HEPARIN SODIUM (PORCINE) LOCK FLUSH IV SOLN 100 UNIT/ML 100 UNIT/ML
500 SOLUTION INTRAVENOUS AS NEEDED
Status: DISCONTINUED | OUTPATIENT
Start: 2020-12-02 | End: 2020-12-03 | Stop reason: HOSPADM

## 2020-12-02 RX ORDER — HEPARIN SODIUM (PORCINE) LOCK FLUSH IV SOLN 100 UNIT/ML 100 UNIT/ML
500 SOLUTION INTRAVENOUS AS NEEDED
Status: CANCELLED | OUTPATIENT
Start: 2020-12-02

## 2020-12-02 RX ADMIN — TRASTUZUMAB-ANNS 490 MG: 420 INJECTION, POWDER, LYOPHILIZED, FOR SOLUTION INTRAVENOUS at 15:38

## 2020-12-02 RX ADMIN — HEPARIN 500 UNITS: 100 SYRINGE at 16:14

## 2020-12-02 RX ADMIN — PERTUZUMAB 420 MG: 30 INJECTION, SOLUTION, CONCENTRATE INTRAVENOUS at 14:30

## 2020-12-16 DIAGNOSIS — Z17.0 MALIGNANT NEOPLASM OF OVERLAPPING SITES OF RIGHT BREAST IN FEMALE, ESTROGEN RECEPTOR POSITIVE (HCC): Primary | ICD-10-CM

## 2020-12-16 DIAGNOSIS — C50.811 MALIGNANT NEOPLASM OF OVERLAPPING SITES OF RIGHT BREAST IN FEMALE, ESTROGEN RECEPTOR POSITIVE (HCC): Primary | ICD-10-CM

## 2020-12-17 ENCOUNTER — HOSPITAL ENCOUNTER (OUTPATIENT)
Dept: ONCOLOGY | Facility: HOSPITAL | Age: 45
Setting detail: INFUSION SERIES
Discharge: HOME | End: 2020-12-17

## 2020-12-17 VITALS
HEART RATE: 109 BPM | HEIGHT: 66 IN | RESPIRATION RATE: 18 BRPM | WEIGHT: 179 LBS | TEMPERATURE: 97.7 F | BODY MASS INDEX: 28.77 KG/M2 | SYSTOLIC BLOOD PRESSURE: 140 MMHG | DIASTOLIC BLOOD PRESSURE: 89 MMHG

## 2020-12-17 DIAGNOSIS — Z17.0 MALIGNANT NEOPLASM OF OVERLAPPING SITES OF RIGHT BREAST IN FEMALE, ESTROGEN RECEPTOR POSITIVE (HCC): Primary | ICD-10-CM

## 2020-12-17 DIAGNOSIS — C50.811 MALIGNANT NEOPLASM OF OVERLAPPING SITES OF RIGHT BREAST IN FEMALE, ESTROGEN RECEPTOR POSITIVE (HCC): Primary | ICD-10-CM

## 2020-12-17 LAB
ALBUMIN SERPL-MCNC: 3.8 G/DL (ref 3.5–5.2)
ALBUMIN/GLOB SERPL: 0.9 G/DL
ALP SERPL-CCNC: 182 U/L (ref 39–117)
ALT SERPL W P-5'-P-CCNC: 21 U/L (ref 1–33)
ANION GAP SERPL CALCULATED.3IONS-SCNC: 10 MMOL/L (ref 5–15)
AST SERPL-CCNC: 21 U/L (ref 1–32)
BILIRUB SERPL-MCNC: 0.2 MG/DL (ref 0–1.2)
BUN SERPL-MCNC: 12 MG/DL (ref 6–20)
BUN/CREAT SERPL: 21.1 (ref 7–25)
CALCIUM SPEC-SCNC: 9.9 MG/DL (ref 8.6–10.5)
CHLORIDE SERPL-SCNC: 103 MMOL/L (ref 98–107)
CO2 SERPL-SCNC: 27 MMOL/L (ref 22–29)
CREAT SERPL-MCNC: 0.57 MG/DL (ref 0.57–1)
GFR SERPL CREATININE-BSD FRML MDRD: 116 ML/MIN/1.73
GLOBULIN UR ELPH-MCNC: 4.2 GM/DL
GLUCOSE BLDC GLUCOMTR-MCNC: 106 MG/DL (ref 70–130)
GLUCOSE SERPL-MCNC: 109 MG/DL (ref 65–99)
POTASSIUM SERPL-SCNC: 3.6 MMOL/L (ref 3.5–5.2)
PROT SERPL-MCNC: 8 G/DL (ref 6–8.5)
SODIUM SERPL-SCNC: 140 MMOL/L (ref 136–145)

## 2020-12-17 PROCEDURE — 80053 COMPREHEN METABOLIC PANEL: CPT | Performed by: INTERNAL MEDICINE

## 2020-12-17 PROCEDURE — 25010000002 GOSERELIN PER 3.6 MG: Performed by: INTERNAL MEDICINE

## 2020-12-17 PROCEDURE — 82962 GLUCOSE BLOOD TEST: CPT

## 2020-12-17 PROCEDURE — 96402 CHEMO HORMON ANTINEOPL SQ/IM: CPT

## 2020-12-17 PROCEDURE — 36416 COLLJ CAPILLARY BLOOD SPEC: CPT

## 2020-12-17 PROCEDURE — 96372 THER/PROPH/DIAG INJ SC/IM: CPT

## 2020-12-17 RX ADMIN — GOSERELIN ACETATE 3.6 MG: 3.6 IMPLANT SUBCUTANEOUS at 13:39

## 2020-12-23 ENCOUNTER — DOCUMENTATION (OUTPATIENT)
Dept: NUTRITION | Facility: HOSPITAL | Age: 45
End: 2020-12-23

## 2020-12-23 ENCOUNTER — HOSPITAL ENCOUNTER (OUTPATIENT)
Dept: ONCOLOGY | Facility: HOSPITAL | Age: 45
Setting detail: INFUSION SERIES
Discharge: HOME | End: 2020-12-23

## 2020-12-23 ENCOUNTER — OFFICE VISIT (OUTPATIENT)
Dept: ONCOLOGY | Facility: CLINIC | Age: 45
End: 2020-12-23

## 2020-12-23 VITALS
RESPIRATION RATE: 16 BRPM | SYSTOLIC BLOOD PRESSURE: 160 MMHG | HEIGHT: 66 IN | BODY MASS INDEX: 29.89 KG/M2 | TEMPERATURE: 98.2 F | HEART RATE: 87 BPM | OXYGEN SATURATION: 96 % | WEIGHT: 186 LBS | DIASTOLIC BLOOD PRESSURE: 88 MMHG

## 2020-12-23 DIAGNOSIS — Z17.0 MALIGNANT NEOPLASM OF OVERLAPPING SITES OF RIGHT BREAST IN FEMALE, ESTROGEN RECEPTOR POSITIVE (HCC): Primary | ICD-10-CM

## 2020-12-23 DIAGNOSIS — C50.811 MALIGNANT NEOPLASM OF OVERLAPPING SITES OF RIGHT BREAST IN FEMALE, ESTROGEN RECEPTOR POSITIVE (HCC): Primary | ICD-10-CM

## 2020-12-23 DIAGNOSIS — C50.811 MALIGNANT NEOPLASM OF OVERLAPPING SITES OF RIGHT FEMALE BREAST, UNSPECIFIED ESTROGEN RECEPTOR STATUS (HCC): ICD-10-CM

## 2020-12-23 LAB
ALBUMIN SERPL-MCNC: 3.9 G/DL (ref 3.5–5.2)
ALBUMIN/GLOB SERPL: 1 G/DL
ALP SERPL-CCNC: 177 U/L (ref 39–117)
ALT SERPL W P-5'-P-CCNC: 25 U/L (ref 1–33)
ANION GAP SERPL CALCULATED.3IONS-SCNC: 9 MMOL/L (ref 5–15)
AST SERPL-CCNC: 26 U/L (ref 1–32)
BILIRUB SERPL-MCNC: 0.3 MG/DL (ref 0–1.2)
BUN SERPL-MCNC: 11 MG/DL (ref 6–20)
BUN/CREAT SERPL: 22.4 (ref 7–25)
CALCIUM SPEC-SCNC: 9.9 MG/DL (ref 8.6–10.5)
CHLORIDE SERPL-SCNC: 100 MMOL/L (ref 98–107)
CO2 SERPL-SCNC: 28 MMOL/L (ref 22–29)
CREAT SERPL-MCNC: 0.49 MG/DL (ref 0.57–1)
ERYTHROCYTE [DISTWIDTH] IN BLOOD BY AUTOMATED COUNT: 14.1 % (ref 12.3–15.4)
GFR SERPL CREATININE-BSD FRML MDRD: 138 ML/MIN/1.73
GLOBULIN UR ELPH-MCNC: 3.8 GM/DL
GLUCOSE SERPL-MCNC: 129 MG/DL (ref 65–99)
HCT VFR BLD AUTO: 41.1 % (ref 34–46.6)
HGB BLD-MCNC: 13.6 G/DL (ref 12–15.9)
LYMPHOCYTES # BLD AUTO: 2.8 10*3/MM3 (ref 0.7–3.1)
LYMPHOCYTES NFR BLD AUTO: 23.4 % (ref 19.6–45.3)
MCH RBC QN AUTO: 30.5 PG (ref 26.6–33)
MCHC RBC AUTO-ENTMCNC: 33 G/DL (ref 31.5–35.7)
MCV RBC AUTO: 92.3 FL (ref 79–97)
MONOCYTES # BLD AUTO: 0.4 10*3/MM3 (ref 0.1–0.9)
MONOCYTES NFR BLD AUTO: 3.5 % (ref 5–12)
NEUTROPHILS NFR BLD AUTO: 73.1 % (ref 42.7–76)
NEUTROPHILS NFR BLD AUTO: 8.8 10*3/MM3 (ref 1.7–7)
PLATELET # BLD AUTO: 282 10*3/MM3 (ref 140–450)
PMV BLD AUTO: 7.4 FL (ref 6–12)
POTASSIUM SERPL-SCNC: 3.5 MMOL/L (ref 3.5–5.2)
PROT SERPL-MCNC: 7.7 G/DL (ref 6–8.5)
RBC # BLD AUTO: 4.45 10*6/MM3 (ref 3.77–5.28)
SODIUM SERPL-SCNC: 137 MMOL/L (ref 136–145)
WBC # BLD AUTO: 12 10*3/MM3 (ref 3.4–10.8)

## 2020-12-23 PROCEDURE — 96417 CHEMO IV INFUS EACH ADDL SEQ: CPT

## 2020-12-23 PROCEDURE — 36416 COLLJ CAPILLARY BLOOD SPEC: CPT

## 2020-12-23 PROCEDURE — 80053 COMPREHEN METABOLIC PANEL: CPT | Performed by: INTERNAL MEDICINE

## 2020-12-23 PROCEDURE — 25010000002 PERTUZUMAB 420 MG/14ML SOLUTION 420 MG VIAL: Performed by: INTERNAL MEDICINE

## 2020-12-23 PROCEDURE — 25010000003 HEPARIN LOCK FLUSH PER 10 UNITS: Performed by: INTERNAL MEDICINE

## 2020-12-23 PROCEDURE — 99213 OFFICE O/P EST LOW 20 MIN: CPT | Performed by: NURSE PRACTITIONER

## 2020-12-23 PROCEDURE — 96413 CHEMO IV INFUSION 1 HR: CPT

## 2020-12-23 PROCEDURE — 25010000002 TRASTUZUMAB-ANNS 420 MG RECONSTITUTED SOLUTION 1 EACH VIAL: Performed by: INTERNAL MEDICINE

## 2020-12-23 PROCEDURE — 85025 COMPLETE CBC W/AUTO DIFF WBC: CPT | Performed by: INTERNAL MEDICINE

## 2020-12-23 RX ORDER — SODIUM CHLORIDE 9 MG/ML
250 INJECTION, SOLUTION INTRAVENOUS ONCE
Status: CANCELLED | OUTPATIENT
Start: 2021-02-03

## 2020-12-23 RX ORDER — HEPARIN SODIUM (PORCINE) LOCK FLUSH IV SOLN 100 UNIT/ML 100 UNIT/ML
500 SOLUTION INTRAVENOUS AS NEEDED
Status: DISCONTINUED | OUTPATIENT
Start: 2020-12-23 | End: 2020-12-24 | Stop reason: HOSPADM

## 2020-12-23 RX ORDER — SODIUM CHLORIDE 9 MG/ML
250 INJECTION, SOLUTION INTRAVENOUS ONCE
Status: COMPLETED | OUTPATIENT
Start: 2020-12-23 | End: 2020-12-23

## 2020-12-23 RX ORDER — SODIUM CHLORIDE 9 MG/ML
250 INJECTION, SOLUTION INTRAVENOUS ONCE
Status: CANCELLED | OUTPATIENT
Start: 2021-01-13

## 2020-12-23 RX ORDER — HEPARIN SODIUM (PORCINE) LOCK FLUSH IV SOLN 100 UNIT/ML 100 UNIT/ML
500 SOLUTION INTRAVENOUS AS NEEDED
Status: CANCELLED | OUTPATIENT
Start: 2020-12-23

## 2020-12-23 RX ADMIN — HEPARIN 500 UNITS: 100 SYRINGE at 12:15

## 2020-12-23 RX ADMIN — TRASTUZUMAB-ANNS 504 MG: 420 INJECTION, POWDER, LYOPHILIZED, FOR SOLUTION INTRAVENOUS at 11:41

## 2020-12-23 RX ADMIN — SODIUM CHLORIDE 250 ML: 9 INJECTION, SOLUTION INTRAVENOUS at 10:23

## 2020-12-23 RX ADMIN — PERTUZUMAB 420 MG: 30 INJECTION, SOLUTION, CONCENTRATE INTRAVENOUS at 10:23

## 2020-12-23 NOTE — PROGRESS NOTES
Onc Nutrition    Patient: Josefina Rodriguez  YOB: 1975    Diagnosis: stage IB ER+ Her2+ IDC right breast  Surgery: right mastectomy (6/23/20)   Current Treatment: Kanjinti / Perjeta - every 21 days x 7 cycles     Weight: 186#  / 6# weight gain x 6 weeks   Weight Change: ~19# (9.7%) weight loss x 5 weeks / stable/regained weight the past 11 weeks  Nutrition Symptoms: taste changes and diarrhea    Follow up with patient during her infusion appointment.  She reports tolerating her chemo pretty well overall.  She states her appetite has come back and that her oral intake has improved.  She continues to complain of taste changes but reports the baking soda and salt mouth rinse helps.  She also continues to complain of diarrhea but states it is manageable with Imodium.      Encouraged her to continue eating smaller more frequent snacks throughout the day.  Advised her to continue using the baking soda / salt mouth rinse to aid with taste changes and oral care.      She voiced understanding of information discussed.  Encouraged to call RD with questions.  Will follow up as indicated.    Irish Mak, MARY LOU  12/23/20

## 2020-12-23 NOTE — PROGRESS NOTES
PROBLEM LIST:  1. [id number] ER+ AL+ Her2+ invasive ductal carcinoma of the right breast  A) presented with a mass on self-exam.  Mammogram 1/7/20 showed a 4.1 cm mass in the right breast, with 2 adjacent smaller masses.  1.9 cm right axillary lymph node.   FNA of lymph node negative.  Biopsy of right breast mass showed grade 2 IDC, Er 95%, AL 2%, Her2 3+.  B) neoadjuvant chemotherapy with TCHP started 2/5/2020  C) right mastectomy on 6/23/20.  Pathology showed a 2 x 1 x 0.6 cm intermediate grade IDC.  0/2 SLN involved.  [id number]  D) adjuvant Kadcyla x 1 year started 7/22/2020.   Kadcyla stopped October 14, 2020 and switched back to Herceptin and Perjeta due to progressive severe neuropathy.  E) anastrozole started 08/12/2020.   2. PCOS  3. Anxiety/depression  4. DM2  5. GERD  6. Hyperlipidemia  7. Hypertension  8.  Hidradenitis  9.  Peripheral neuropathy secondary to chemotherapy      Subjective     CHIEF COMPLAINT: breast cancer    HISTORY OF PRESENT ILLNESS:   Josefina Rodriguez returns for follow-up.  She continues on Herceptin and Perjeta and is tolerating it relatively well.  She does have some diarrhea that is controlled with Imodium and Lomotil.  Her peripheral neuropathy is better controlled with the Lyrica and Cymbalta.  She continues on anastrozole and is tolerating it well.  She denies any bone pain, cough, dyspnea, headaches or diplopia.      Past Medical History, Past Surgical History, Social History, Family History have been reviewed and are without significant changes except as mentioned.    Review of Systems   A comprehensive 14 point review of systems was performed and was negative except as mentioned.    Medications:  The current medication list was reviewed in the EMR    ALLERGIES:    Allergies   Allergen Reactions   • Cashew Nut Hives and Itching   • Naproxen Hives   • Penicillins Hives     PT STATES CAN TOLERATE KELFEX   • Pistachio Nut Hives and Itching       Objective      BP  "160/88   Pulse 87   Temp 98.2 °F (36.8 °C)   Resp 16   Ht 167.6 cm (66\")   Wt 84.4 kg (186 lb)   SpO2 96%   BMI 30.02 kg/m²    Vitals:    12/23/20 0829   PainSc: 0-No pain               Performance Status: 0    General: well appearing female in no acute distress  Neuro: alert and oriented  HEENT: sclera anicteric  Lymphatics: no cervical, supraclavicular, or axillary adenopathy  Cardiovascular: regular rate and rhythm, no murmurs  Lungs: clear to auscultation bilaterally  Abdomen: soft, nontender, nondistended.  No palpable organomegaly  Extremeties: no lower extremity edema  Skin: no rashes, lesions, bruising, or petechiae  Psych: mood and affect appropriate      RECENT LABS:  Lab Results   Component Value Date    WBC 13.60 (H) 12/02/2020    HGB 13.6 12/02/2020    HCT 42.3 12/02/2020    MCV 94.2 12/02/2020     12/02/2020     Lab Results   Component Value Date    GLUCOSE 109 (H) 12/17/2020    BUN 12 12/17/2020    CREATININE 0.57 12/17/2020    EGFRIFNONA 116 12/17/2020    BCR 21.1 12/17/2020    K 3.6 12/17/2020    CO2 27.0 12/17/2020    CALCIUM 9.9 12/17/2020    ALBUMIN 3.80 12/17/2020    AST 21 12/17/2020    ALT 21 12/17/2020     ·         Assessment/Plan   Josefina Rodriguez is a 45 y.o. year old female with stage IB ER+ Her2+ IDC of the right breast, here for follow-up.    Breast cancer: We will continue Herceptin and Perjeta through early February.  Discussed that she may have some diarrhea with the Perjeta.  Continue anastrozole.  Her next Zoladex is due 1/14/2021.      Neuropathy: Significantly improved.  Continue Cymbalta and Lyrica.    Hypertension: Follow-up with primary care doctor.    Follow-up 6 weeks.                I spent 15 minutes with the patient. I spent > 50% percent of this time counseling and discussing prognosis, diagnostic testing, evaluation, current status and management.        Aimee Johnson, LISA  [facility]    12/23/2020          CC: "

## 2021-01-04 DIAGNOSIS — C50.811 MALIGNANT NEOPLASM OF OVERLAPPING SITES OF RIGHT BREAST IN FEMALE, ESTROGEN RECEPTOR POSITIVE (HCC): ICD-10-CM

## 2021-01-04 DIAGNOSIS — Z17.0 MALIGNANT NEOPLASM OF OVERLAPPING SITES OF RIGHT BREAST IN FEMALE, ESTROGEN RECEPTOR POSITIVE (HCC): ICD-10-CM

## 2021-01-04 DIAGNOSIS — T45.1X5A NEUROPATHY DUE TO CHEMOTHERAPEUTIC DRUG (HCC): ICD-10-CM

## 2021-01-04 DIAGNOSIS — G62.0 NEUROPATHY DUE TO CHEMOTHERAPEUTIC DRUG (HCC): ICD-10-CM

## 2021-01-05 RX ORDER — DIPHENOXYLATE HYDROCHLORIDE AND ATROPINE SULFATE 2.5; .025 MG/1; MG/1
TABLET ORAL
Qty: 30 TABLET | Refills: 3 | Status: SHIPPED | OUTPATIENT
Start: 2021-01-05 | End: 2021-05-05

## 2021-01-05 RX ORDER — DULOXETIN HYDROCHLORIDE 60 MG/1
CAPSULE, DELAYED RELEASE ORAL
Qty: 30 CAPSULE | Refills: 1 | Status: SHIPPED | OUTPATIENT
Start: 2021-01-05 | End: 2021-03-19

## 2021-01-05 RX ORDER — ONDANSETRON HYDROCHLORIDE 8 MG/1
TABLET, FILM COATED ORAL
Qty: 30 TABLET | Refills: 3 | Status: SHIPPED | OUTPATIENT
Start: 2021-01-05

## 2021-01-08 ENCOUNTER — HOSPITAL ENCOUNTER (OUTPATIENT)
Dept: MAMMOGRAPHY | Facility: HOSPITAL | Age: 46
Discharge: HOME | End: 2021-01-08
Admitting: NURSE PRACTITIONER

## 2021-01-08 DIAGNOSIS — Z17.0 MALIGNANT NEOPLASM OF OVERLAPPING SITES OF RIGHT BREAST IN FEMALE, ESTROGEN RECEPTOR POSITIVE (HCC): ICD-10-CM

## 2021-01-08 DIAGNOSIS — C50.811 MALIGNANT NEOPLASM OF OVERLAPPING SITES OF RIGHT BREAST IN FEMALE, ESTROGEN RECEPTOR POSITIVE (HCC): ICD-10-CM

## 2021-01-08 PROCEDURE — 77067 SCR MAMMO BI INCL CAD: CPT

## 2021-01-08 PROCEDURE — 77063 BREAST TOMOSYNTHESIS BI: CPT | Performed by: RADIOLOGY

## 2021-01-08 PROCEDURE — 77063 BREAST TOMOSYNTHESIS BI: CPT

## 2021-01-08 PROCEDURE — 77067 SCR MAMMO BI INCL CAD: CPT | Performed by: RADIOLOGY

## 2021-01-13 ENCOUNTER — HOSPITAL ENCOUNTER (OUTPATIENT)
Dept: ONCOLOGY | Facility: HOSPITAL | Age: 46
Setting detail: INFUSION SERIES
Discharge: HOME | End: 2021-01-13

## 2021-01-13 VITALS
RESPIRATION RATE: 20 BRPM | HEIGHT: 66 IN | TEMPERATURE: 97.6 F | WEIGHT: 188 LBS | DIASTOLIC BLOOD PRESSURE: 80 MMHG | BODY MASS INDEX: 30.22 KG/M2 | SYSTOLIC BLOOD PRESSURE: 131 MMHG | HEART RATE: 85 BPM

## 2021-01-13 DIAGNOSIS — C50.811 MALIGNANT NEOPLASM OF OVERLAPPING SITES OF RIGHT BREAST IN FEMALE, ESTROGEN RECEPTOR POSITIVE (HCC): Primary | ICD-10-CM

## 2021-01-13 DIAGNOSIS — C50.811 MALIGNANT NEOPLASM OF OVERLAPPING SITES OF RIGHT FEMALE BREAST, UNSPECIFIED ESTROGEN RECEPTOR STATUS (HCC): ICD-10-CM

## 2021-01-13 DIAGNOSIS — Z17.0 MALIGNANT NEOPLASM OF OVERLAPPING SITES OF RIGHT BREAST IN FEMALE, ESTROGEN RECEPTOR POSITIVE (HCC): Primary | ICD-10-CM

## 2021-01-13 LAB
ALBUMIN SERPL-MCNC: 3.8 G/DL (ref 3.5–5.2)
ALBUMIN/GLOB SERPL: 1.2 G/DL
ALP SERPL-CCNC: 172 U/L (ref 39–117)
ALT SERPL W P-5'-P-CCNC: 23 U/L (ref 1–33)
ANION GAP SERPL CALCULATED.3IONS-SCNC: 7 MMOL/L (ref 5–15)
AST SERPL-CCNC: 19 U/L (ref 1–32)
BILIRUB SERPL-MCNC: 0.2 MG/DL (ref 0–1.2)
BUN SERPL-MCNC: 14 MG/DL (ref 6–20)
BUN/CREAT SERPL: 25 (ref 7–25)
CALCIUM SPEC-SCNC: 9.8 MG/DL (ref 8.6–10.5)
CHLORIDE SERPL-SCNC: 102 MMOL/L (ref 98–107)
CO2 SERPL-SCNC: 27 MMOL/L (ref 22–29)
CREAT SERPL-MCNC: 0.56 MG/DL (ref 0.57–1)
ERYTHROCYTE [DISTWIDTH] IN BLOOD BY AUTOMATED COUNT: 14 % (ref 12.3–15.4)
GFR SERPL CREATININE-BSD FRML MDRD: 118 ML/MIN/1.73
GLOBULIN UR ELPH-MCNC: 3.3 GM/DL
GLUCOSE SERPL-MCNC: 83 MG/DL (ref 65–99)
HCT VFR BLD AUTO: 40.7 % (ref 34–46.6)
HGB BLD-MCNC: 13.4 G/DL (ref 12–15.9)
LYMPHOCYTES # BLD AUTO: 2.9 10*3/MM3 (ref 0.7–3.1)
LYMPHOCYTES NFR BLD AUTO: 26.4 % (ref 19.6–45.3)
MCH RBC QN AUTO: 31.2 PG (ref 26.6–33)
MCHC RBC AUTO-ENTMCNC: 32.9 G/DL (ref 31.5–35.7)
MCV RBC AUTO: 94.8 FL (ref 79–97)
MONOCYTES # BLD AUTO: 0.3 10*3/MM3 (ref 0.1–0.9)
MONOCYTES NFR BLD AUTO: 2.3 % (ref 5–12)
NEUTROPHILS NFR BLD AUTO: 7.9 10*3/MM3 (ref 1.7–7)
NEUTROPHILS NFR BLD AUTO: 71.3 % (ref 42.7–76)
PLATELET # BLD AUTO: 296 10*3/MM3 (ref 140–450)
PMV BLD AUTO: 7.9 FL (ref 6–12)
POTASSIUM SERPL-SCNC: 4 MMOL/L (ref 3.5–5.2)
PROT SERPL-MCNC: 7.1 G/DL (ref 6–8.5)
RBC # BLD AUTO: 4.3 10*6/MM3 (ref 3.77–5.28)
SODIUM SERPL-SCNC: 136 MMOL/L (ref 136–145)
WBC # BLD AUTO: 11.1 10*3/MM3 (ref 3.4–10.8)

## 2021-01-13 PROCEDURE — 80053 COMPREHEN METABOLIC PANEL: CPT | Performed by: NURSE PRACTITIONER

## 2021-01-13 PROCEDURE — 25010000002 PERTUZUMAB 420 MG/14ML SOLUTION 420 MG VIAL: Performed by: NURSE PRACTITIONER

## 2021-01-13 PROCEDURE — 96413 CHEMO IV INFUSION 1 HR: CPT

## 2021-01-13 PROCEDURE — 25010000003 HEPARIN LOCK FLUSH PER 10 UNITS: Performed by: INTERNAL MEDICINE

## 2021-01-13 PROCEDURE — 25010000002 GOSERELIN PER 3.6 MG: Performed by: NURSE PRACTITIONER

## 2021-01-13 PROCEDURE — 85025 COMPLETE CBC W/AUTO DIFF WBC: CPT | Performed by: NURSE PRACTITIONER

## 2021-01-13 PROCEDURE — 36415 COLL VENOUS BLD VENIPUNCTURE: CPT

## 2021-01-13 PROCEDURE — 96372 THER/PROPH/DIAG INJ SC/IM: CPT

## 2021-01-13 PROCEDURE — 96402 CHEMO HORMON ANTINEOPL SQ/IM: CPT

## 2021-01-13 PROCEDURE — 96417 CHEMO IV INFUS EACH ADDL SEQ: CPT

## 2021-01-13 PROCEDURE — 25010000002 TRASTUZUMAB-ANNS 420 MG RECONSTITUTED SOLUTION 1 EACH VIAL: Performed by: NURSE PRACTITIONER

## 2021-01-13 RX ORDER — HEPARIN SODIUM (PORCINE) LOCK FLUSH IV SOLN 100 UNIT/ML 100 UNIT/ML
500 SOLUTION INTRAVENOUS AS NEEDED
Status: DISCONTINUED | OUTPATIENT
Start: 2021-01-13 | End: 2021-01-14 | Stop reason: HOSPADM

## 2021-01-13 RX ORDER — HEPARIN SODIUM (PORCINE) LOCK FLUSH IV SOLN 100 UNIT/ML 100 UNIT/ML
500 SOLUTION INTRAVENOUS AS NEEDED
Status: CANCELLED | OUTPATIENT
Start: 2021-01-13

## 2021-01-13 RX ORDER — SODIUM CHLORIDE 9 MG/ML
250 INJECTION, SOLUTION INTRAVENOUS ONCE
Status: COMPLETED | OUTPATIENT
Start: 2021-01-13 | End: 2021-01-13

## 2021-01-13 RX ADMIN — SODIUM CHLORIDE 250 ML: 9 INJECTION, SOLUTION INTRAVENOUS at 13:15

## 2021-01-13 RX ADMIN — PERTUZUMAB 420 MG: 30 INJECTION, SOLUTION, CONCENTRATE INTRAVENOUS at 13:19

## 2021-01-13 RX ADMIN — TRASTUZUMAB-ANNS 490 MG: 420 INJECTION, POWDER, LYOPHILIZED, FOR SOLUTION INTRAVENOUS at 14:24

## 2021-01-13 RX ADMIN — GOSERELIN ACETATE 3.6 MG: 3.6 IMPLANT SUBCUTANEOUS at 13:26

## 2021-01-13 RX ADMIN — HEPARIN 500 UNITS: 100 SYRINGE at 15:05

## 2021-01-19 ENCOUNTER — APPOINTMENT (OUTPATIENT)
Dept: CT IMAGING | Facility: HOSPITAL | Age: 46
End: 2021-01-19

## 2021-01-19 ENCOUNTER — HOSPITAL ENCOUNTER (EMERGENCY)
Facility: HOSPITAL | Age: 46
Discharge: HOME | End: 2021-01-19
Attending: EMERGENCY MEDICINE | Admitting: EMERGENCY MEDICINE

## 2021-01-19 ENCOUNTER — TELEPHONE (OUTPATIENT)
Dept: ONCOLOGY | Facility: CLINIC | Age: 46
End: 2021-01-19

## 2021-01-19 VITALS
DIASTOLIC BLOOD PRESSURE: 83 MMHG | SYSTOLIC BLOOD PRESSURE: 129 MMHG | HEART RATE: 67 BPM | BODY MASS INDEX: 28.61 KG/M2 | TEMPERATURE: 97.8 F | HEIGHT: 66 IN | OXYGEN SATURATION: 97 % | RESPIRATION RATE: 18 BRPM | WEIGHT: 178 LBS

## 2021-01-19 DIAGNOSIS — Z85.3 HISTORY OF BREAST CANCER: ICD-10-CM

## 2021-01-19 DIAGNOSIS — Z17.0 MALIGNANT NEOPLASM OF OVERLAPPING SITES OF RIGHT BREAST IN FEMALE, ESTROGEN RECEPTOR POSITIVE (HCC): Primary | ICD-10-CM

## 2021-01-19 DIAGNOSIS — J90 PLEURAL EFFUSION: ICD-10-CM

## 2021-01-19 DIAGNOSIS — S22.31XA CLOSED FRACTURE OF ONE RIB OF RIGHT SIDE, INITIAL ENCOUNTER: Primary | ICD-10-CM

## 2021-01-19 DIAGNOSIS — C50.811 MALIGNANT NEOPLASM OF OVERLAPPING SITES OF RIGHT BREAST IN FEMALE, ESTROGEN RECEPTOR POSITIVE (HCC): Primary | ICD-10-CM

## 2021-01-19 LAB
ALBUMIN SERPL-MCNC: 3.7 G/DL (ref 3.5–5.2)
ALBUMIN/GLOB SERPL: 0.9 G/DL
ALP SERPL-CCNC: 190 U/L (ref 39–117)
ALT SERPL W P-5'-P-CCNC: 24 U/L (ref 1–33)
ANION GAP SERPL CALCULATED.3IONS-SCNC: 10 MMOL/L (ref 5–15)
AST SERPL-CCNC: 24 U/L (ref 1–32)
BASOPHILS # BLD AUTO: 0.03 10*3/MM3 (ref 0–0.2)
BASOPHILS NFR BLD AUTO: 0.2 % (ref 0–1.5)
BILIRUB SERPL-MCNC: 0.3 MG/DL (ref 0–1.2)
BUN SERPL-MCNC: 12 MG/DL (ref 6–20)
BUN/CREAT SERPL: 19.7 (ref 7–25)
CALCIUM SPEC-SCNC: 9.6 MG/DL (ref 8.6–10.5)
CHLORIDE SERPL-SCNC: 105 MMOL/L (ref 98–107)
CO2 SERPL-SCNC: 28 MMOL/L (ref 22–29)
CREAT SERPL-MCNC: 0.61 MG/DL (ref 0.57–1)
DEPRECATED RDW RBC AUTO: 42.2 FL (ref 37–54)
EOSINOPHIL # BLD AUTO: 0.39 10*3/MM3 (ref 0–0.4)
EOSINOPHIL NFR BLD AUTO: 3 % (ref 0.3–6.2)
ERYTHROCYTE [DISTWIDTH] IN BLOOD BY AUTOMATED COUNT: 12.3 % (ref 12.3–15.4)
GFR SERPL CREATININE-BSD FRML MDRD: 107 ML/MIN/1.73
GLOBULIN UR ELPH-MCNC: 4.2 GM/DL
GLUCOSE SERPL-MCNC: 104 MG/DL (ref 65–99)
HCT VFR BLD AUTO: 41.7 % (ref 34–46.6)
HGB BLD-MCNC: 13.4 G/DL (ref 12–15.9)
HOLD SPECIMEN: NORMAL
HOLD SPECIMEN: NORMAL
IMM GRANULOCYTES # BLD AUTO: 0.04 10*3/MM3 (ref 0–0.05)
IMM GRANULOCYTES NFR BLD AUTO: 0.3 % (ref 0–0.5)
LIPASE SERPL-CCNC: 24 U/L (ref 13–60)
LYMPHOCYTES # BLD AUTO: 2.63 10*3/MM3 (ref 0.7–3.1)
LYMPHOCYTES NFR BLD AUTO: 20.6 % (ref 19.6–45.3)
MCH RBC QN AUTO: 30 PG (ref 26.6–33)
MCHC RBC AUTO-ENTMCNC: 32.1 G/DL (ref 31.5–35.7)
MCV RBC AUTO: 93.3 FL (ref 79–97)
MONOCYTES # BLD AUTO: 0.64 10*3/MM3 (ref 0.1–0.9)
MONOCYTES NFR BLD AUTO: 5 % (ref 5–12)
NEUTROPHILS NFR BLD AUTO: 70.9 % (ref 42.7–76)
NEUTROPHILS NFR BLD AUTO: 9.06 10*3/MM3 (ref 1.7–7)
NRBC BLD AUTO-RTO: 0 /100 WBC (ref 0–0.2)
NT-PROBNP SERPL-MCNC: 22.5 PG/ML (ref 0–450)
PLATELET # BLD AUTO: 277 10*3/MM3 (ref 140–450)
PMV BLD AUTO: 9.6 FL (ref 6–12)
POTASSIUM SERPL-SCNC: 3.5 MMOL/L (ref 3.5–5.2)
PROT SERPL-MCNC: 7.9 G/DL (ref 6–8.5)
QT INTERVAL: 390 MS
QTC INTERVAL: 438 MS
RBC # BLD AUTO: 4.47 10*6/MM3 (ref 3.77–5.28)
SODIUM SERPL-SCNC: 143 MMOL/L (ref 136–145)
TROPONIN T SERPL-MCNC: <0.01 NG/ML (ref 0–0.03)
TROPONIN T SERPL-MCNC: <0.01 NG/ML (ref 0–0.03)
WBC # BLD AUTO: 12.79 10*3/MM3 (ref 3.4–10.8)
WHOLE BLOOD HOLD SPECIMEN: NORMAL
WHOLE BLOOD HOLD SPECIMEN: NORMAL

## 2021-01-19 PROCEDURE — 71275 CT ANGIOGRAPHY CHEST: CPT

## 2021-01-19 PROCEDURE — 96374 THER/PROPH/DIAG INJ IV PUSH: CPT

## 2021-01-19 PROCEDURE — 25010000002 HYDROMORPHONE PER 4 MG: Performed by: EMERGENCY MEDICINE

## 2021-01-19 PROCEDURE — 0 IOPAMIDOL PER 1 ML: Performed by: EMERGENCY MEDICINE

## 2021-01-19 PROCEDURE — 99283 EMERGENCY DEPT VISIT LOW MDM: CPT

## 2021-01-19 PROCEDURE — 83690 ASSAY OF LIPASE: CPT | Performed by: EMERGENCY MEDICINE

## 2021-01-19 PROCEDURE — 85025 COMPLETE CBC W/AUTO DIFF WBC: CPT | Performed by: EMERGENCY MEDICINE

## 2021-01-19 PROCEDURE — 93005 ELECTROCARDIOGRAM TRACING: CPT | Performed by: EMERGENCY MEDICINE

## 2021-01-19 PROCEDURE — 84484 ASSAY OF TROPONIN QUANT: CPT | Performed by: EMERGENCY MEDICINE

## 2021-01-19 PROCEDURE — 80053 COMPREHEN METABOLIC PANEL: CPT | Performed by: EMERGENCY MEDICINE

## 2021-01-19 PROCEDURE — 83880 ASSAY OF NATRIURETIC PEPTIDE: CPT | Performed by: EMERGENCY MEDICINE

## 2021-01-19 RX ORDER — SODIUM CHLORIDE 0.9 % (FLUSH) 0.9 %
10 SYRINGE (ML) INJECTION AS NEEDED
Status: DISCONTINUED | OUTPATIENT
Start: 2021-01-19 | End: 2021-01-19 | Stop reason: HOSPADM

## 2021-01-19 RX ORDER — HYDROCODONE BITARTRATE AND ACETAMINOPHEN 5; 325 MG/1; MG/1
1 TABLET ORAL EVERY 6 HOURS PRN
Qty: 12 TABLET | Refills: 0 | Status: SHIPPED | OUTPATIENT
Start: 2021-01-19 | End: 2021-02-03 | Stop reason: ALTCHOICE

## 2021-01-19 RX ORDER — HYDROMORPHONE HYDROCHLORIDE 1 MG/ML
0.5 INJECTION, SOLUTION INTRAMUSCULAR; INTRAVENOUS; SUBCUTANEOUS ONCE
Status: COMPLETED | OUTPATIENT
Start: 2021-01-19 | End: 2021-01-19

## 2021-01-19 RX ADMIN — IOPAMIDOL 73 ML: 755 INJECTION, SOLUTION INTRAVENOUS at 15:58

## 2021-01-19 RX ADMIN — HYDROMORPHONE HYDROCHLORIDE 0.5 MG: 1 INJECTION, SOLUTION INTRAMUSCULAR; INTRAVENOUS; SUBCUTANEOUS at 17:42

## 2021-01-19 NOTE — TELEPHONE ENCOUNTER
Patient called triage line stating she has had pain that started in her back and went around to front part and hurts to were she is unable to move.    Upon calling patient asking her exactly where her pain is patient reports pain starts in shoulder and goes to front and hurts to even breath. Informing patient to give scale of 1-10 pain. Patient reports scale of 9. Stating that tylenol or ibuprofen doesn't help. Upon reporting pain to Aimee GONZALEZ. Advised patient to go on to ER. Informing her she needed to be evaluated for a blood clot. Patient stating she understood and would do so.

## 2021-01-19 NOTE — ED PROVIDER NOTES
Subjective   45-year-old female presents for evaluation of chest pain and upper back pain.  Of note, the patient has a history of breast cancer and is currently being treated with chemotherapy by Dr. Connell of oncology.  She states that on Thursday she began experiencing pain in her upper back that has persisted since that time.  It has also progressed to chest pain and shortness of breath with deep inspiration.  Given her symptoms, she saw Dr. Connell today and was sent to the emergency department to be evaluated for potential pulmonary embolism.  The patient denies any cough or fever.  She denies any similar episodes before in the past.          Review of Systems   Respiratory: Positive for shortness of breath.    Cardiovascular: Positive for chest pain.   Musculoskeletal: Positive for back pain.   All other systems reviewed and are negative.      Past Medical History:   Diagnosis Date   • Anxiety    • Depression    • Diabetes mellitus (CMS/HCC)    • Hiatal hernia    • Hypertension    • Malignant neoplasm of overlapping sites of right breast in female, estrogen receptor positive (CMS/HCC) 1/21/2020   • PCOS (polycystic ovarian syndrome)    • Wears glasses        Allergies   Allergen Reactions   • Cashew Nut Hives and Itching   • Naproxen Hives   • Penicillins Hives     PT STATES CAN TOLERATE KELFEX   • Pistachio Nut Hives and Itching       Past Surgical History:   Procedure Laterality Date   • BREAST BIOPSY Right 03/2019    axillary lymph node bx/fna   • CHOLECYSTECTOMY  03/2010   • COLONOSCOPY  2016   • MASTECTOMY W/ SENTINEL NODE BIOPSY Right 6/23/2020    Procedure: MASTECTOMY WITH SENTINEL NODE BIOPSY RIGHT, PORT PLACEMENT;  Surgeon: Rachel Baker MD;  Location: [facility];  Service: General;  Laterality: Right;   • US GUIDED FINE NEEDLE ASPIRATION  1/15/2020   • VENOUS ACCESS DEVICE (PORT) INSERTION Left 01/31/2020       Family History   Problem Relation Age of Onset   • Breast cancer Mother 64   • Heart  disease Mother    • Hypertension Father    • Lung cancer Father    • Ovarian cancer Neg Hx    • Colon cancer Neg Hx    • Colon polyps Neg Hx        Social History     Socioeconomic History   • Marital status:      Spouse name: Not on file   • Number of children: Not on file   • Years of education: Not on file   • Highest education level: Not on file   Tobacco Use   • Smoking status: Current Every Day Smoker     Packs/day: 1.00     Years: 20.00     Pack years: 20.00     Types: Cigarettes   • Smokeless tobacco: Never Used   Substance and Sexual Activity   • Alcohol use: Yes     Comment: occasionally a couple times a month   • Drug use: Never   • Sexual activity: Defer           Objective   Physical Exam  Vitals signs and nursing note reviewed.   Constitutional:       General: She is not in acute distress.     Appearance: She is well-developed. She is not diaphoretic.      Comments: Well-appearing female in no acute distress   HENT:      Head: Normocephalic and atraumatic.   Eyes:      Pupils: Pupils are equal, round, and reactive to light.   Neck:      Musculoskeletal: Normal range of motion and neck supple.      Vascular: No JVD.   Cardiovascular:      Rate and Rhythm: Normal rate and regular rhythm.      Heart sounds: Normal heart sounds. No murmur. No friction rub. No gallop.    Pulmonary:      Effort: Pulmonary effort is normal. No respiratory distress.      Breath sounds: Normal breath sounds. No wheezing or rales.   Abdominal:      General: Bowel sounds are normal. There is no distension.      Palpations: Abdomen is soft. There is no mass.      Tenderness: There is no abdominal tenderness. There is no guarding or rebound.   Musculoskeletal: Normal range of motion.      Right lower leg: No edema.      Left lower leg: No edema.   Skin:     General: Skin is warm and dry.      Findings: No erythema or rash.   Neurological:      General: No focal deficit present.      Mental Status: She is alert and oriented  to person, place, and time.   Psychiatric:         Mood and Affect: Mood normal.         Thought Content: Thought content normal.         Judgment: Judgment normal.         Procedures           ED Course  ED Course as of Jan 19 1827   Tue Jan 19, 2021   1404 45-year-old female with a history of breast cancer presents for evaluation of upper back pain and chest pain.  The patient was sent here by her oncologist, Dr. Connell, to be assessed for potential pulmonary embolism.  On arrival to the ED, the patient is nontoxic-appearing.  Vital signs are reassuring.  We will obtain labs, EKG, and advanced imaging, and we will reassess following initial interventions.    [DD]   1431 Initial EKG revealed normal sinus rhythm with a heart rate of 76 and no ST segments suggestive of or concerning for ischemia.    [DD]   1742 I notified the patient of her CT findings.    [DD]   1742 Upon reevaluation, the patient feels improved.  Her pain is adequately controlled.  Patient given a short course of Norco for pain control as well as an incentive spirometer for her rib fracture.  I advised her to follow-up with Dr. Connell, her oncologist, within the next week.  Agreeable with plan and given appropriate strict return precautions.    [DD]      ED Course User Index  [DD] Marco A Smith MD                                   Recent Results (from the past 24 hour(s))   Troponin    Collection Time: 01/19/21  2:04 PM    Specimen: Blood   Result Value Ref Range    Troponin T <0.010 0.000 - 0.030 ng/mL   Comprehensive Metabolic Panel    Collection Time: 01/19/21  2:04 PM    Specimen: Blood   Result Value Ref Range    Glucose 104 (H) 65 - 99 mg/dL    BUN 12 6 - 20 mg/dL    Creatinine 0.61 0.57 - 1.00 mg/dL    Sodium 143 136 - 145 mmol/L    Potassium 3.5 3.5 - 5.2 mmol/L    Chloride 105 98 - 107 mmol/L    CO2 28.0 22.0 - 29.0 mmol/L    Calcium 9.6 8.6 - 10.5 mg/dL    Total Protein 7.9 6.0 - 8.5 g/dL    Albumin 3.70 3.50 - 5.20 g/dL    ALT  (SGPT) 24 1 - 33 U/L    AST (SGOT) 24 1 - 32 U/L    Alkaline Phosphatase 190 (H) 39 - 117 U/L    Total Bilirubin 0.3 0.0 - 1.2 mg/dL    eGFR Non African Amer 107 >60 mL/min/1.73    Globulin 4.2 gm/dL    A/G Ratio 0.9 g/dL    BUN/Creatinine Ratio 19.7 7.0 - 25.0    Anion Gap 10.0 5.0 - 15.0 mmol/L   Lipase    Collection Time: 01/19/21  2:04 PM    Specimen: Blood   Result Value Ref Range    Lipase 24 13 - 60 U/L   BNP    Collection Time: 01/19/21  2:04 PM    Specimen: Blood   Result Value Ref Range    proBNP 22.5 0.0 - 450.0 pg/mL   Light Blue Top    Collection Time: 01/19/21  2:04 PM   Result Value Ref Range    Extra Tube hold for add-on    Green Top (Gel)    Collection Time: 01/19/21  2:04 PM   Result Value Ref Range    Extra Tube Hold for add-ons.    Lavender Top    Collection Time: 01/19/21  2:04 PM   Result Value Ref Range    Extra Tube hold for add-on    Gold Top - SST    Collection Time: 01/19/21  2:04 PM   Result Value Ref Range    Extra Tube Hold for add-ons.    CBC Auto Differential    Collection Time: 01/19/21  2:04 PM    Specimen: Blood   Result Value Ref Range    WBC 12.79 (H) 3.40 - 10.80 10*3/mm3    RBC 4.47 3.77 - 5.28 10*6/mm3    Hemoglobin 13.4 12.0 - 15.9 g/dL    Hematocrit 41.7 34.0 - 46.6 %    MCV 93.3 79.0 - 97.0 fL    MCH 30.0 26.6 - 33.0 pg    MCHC 32.1 31.5 - 35.7 g/dL    RDW 12.3 12.3 - 15.4 %    RDW-SD 42.2 37.0 - 54.0 fl    MPV 9.6 6.0 - 12.0 fL    Platelets 277 140 - 450 10*3/mm3    Neutrophil % 70.9 42.7 - 76.0 %    Lymphocyte % 20.6 19.6 - 45.3 %    Monocyte % 5.0 5.0 - 12.0 %    Eosinophil % 3.0 0.3 - 6.2 %    Basophil % 0.2 0.0 - 1.5 %    Immature Grans % 0.3 0.0 - 0.5 %    Neutrophils, Absolute 9.06 (H) 1.70 - 7.00 10*3/mm3    Lymphocytes, Absolute 2.63 0.70 - 3.10 10*3/mm3    Monocytes, Absolute 0.64 0.10 - 0.90 10*3/mm3    Eosinophils, Absolute 0.39 0.00 - 0.40 10*3/mm3    Basophils, Absolute 0.03 0.00 - 0.20 10*3/mm3    Immature Grans, Absolute 0.04 0.00 - 0.05 10*3/mm3    nRBC 0.0  0.0 - 0.2 /100 WBC   Troponin    Collection Time: 01/19/21  4:20 PM    Specimen: Blood   Result Value Ref Range    Troponin T <0.010 0.000 - 0.030 ng/mL     Note: In addition to lab results from this visit, the labs listed above may include labs taken at another facility or during a different encounter within the last 24 hours. Please correlate lab times with ED admission and discharge times for further clarification of the services performed during this visit.    CT Angiogram Chest   Preliminary Result   1. No evidence of pulmonary embolic disease.   2. Minimal right pleural effusion, and nonspecific patchy bibasilar   interstitial disease, possibly just dependent atelectasis. There is some   feature overlap with Covid 19 pneumonia, although degree of suspicion   for the is relatively low.   3. Interval development of miliary disease pattern the lungs, worrisome   for micrometastatic disease.   4. Pathologic, nondisplaced right seventh rib fracture.                Vitals:    01/19/21 1640 01/19/21 1648 01/19/21 1700 01/19/21 1740   BP: 130/89  126/85 129/83   BP Location:       Patient Position:       Pulse: 67  70 67   Resp:  18     Temp:       SpO2: 97%  96% 97%   Weight:       Height:         Medications   sodium chloride 0.9 % flush 10 mL (has no administration in time range)   iopamidol (ISOVUE-370) 76 % injection 100 mL (73 mL Intravenous Given 1/19/21 1558)   HYDROmorphone (DILAUDID) injection 0.5 mg (0.5 mg Intravenous Given 1/19/21 1742)     ECG/EMG Results (last 24 hours)     Procedure Component Value Units Date/Time    ECG 12 Lead [[id number]] Collected: 01/19/21 1351     Updated: 01/19/21 1451    ECG 12 Lead [[id number]] Collected: 01/19/21 1630     Updated: 01/19/21 1630        ECG 12 Lead         ECG 12 Lead                     MDM    Final diagnoses:   Closed fracture of one rib of right side, initial encounter   Pleural effusion   History of breast cancer            Marco A Smith MD  01/19/21  1828

## 2021-01-20 ENCOUNTER — TELEMEDICINE (OUTPATIENT)
Dept: ONCOLOGY | Facility: CLINIC | Age: 46
End: 2021-01-20

## 2021-01-20 DIAGNOSIS — R52 PAIN: ICD-10-CM

## 2021-01-20 DIAGNOSIS — C50.811 MALIGNANT NEOPLASM OF OVERLAPPING SITES OF RIGHT FEMALE BREAST, UNSPECIFIED ESTROGEN RECEPTOR STATUS (HCC): Primary | ICD-10-CM

## 2021-01-20 LAB
QT INTERVAL: 424 MS
QTC INTERVAL: 444 MS

## 2021-01-20 PROCEDURE — 99214 OFFICE O/P EST MOD 30 MIN: CPT | Performed by: INTERNAL MEDICINE

## 2021-01-20 RX ORDER — HYDROCODONE BITARTRATE AND ACETAMINOPHEN 5; 325 MG/1; MG/1
TABLET ORAL
Qty: 40 TABLET | Refills: 0 | Status: SHIPPED | OUTPATIENT
Start: 2021-01-20 | End: 2021-12-02

## 2021-01-20 NOTE — PROGRESS NOTES
PROBLEM LIST:  1. [id number] ER+ PA+ Her2+ invasive ductal carcinoma of the right breast  A) presented with a mass on self-exam.  Mammogram 1/7/20 showed a 4.1 cm mass in the right breast, with 2 adjacent smaller masses.  1.9 cm right axillary lymph node.   FNA of lymph node negative.  Biopsy of right breast mass showed grade 2 IDC, Er 95%, PA 2%, Her2 3+.  B) neoadjuvant chemotherapy with TCHP started 2/5/2020  C) right mastectomy on 6/23/20.  Pathology showed a 2 x 1 x 0.6 cm intermediate grade IDC.  0/2 SLN involved.  [id number]  D) adjuvant Kadcyla x 1 year started 7/22/2020.   Kadcyla stopped October 14, 2020 and switched back to Herceptin and Perjeta due to progressive severe neuropathy.  E) anastrozole started 08/12/2020.   2. PCOS  3. Anxiety/depression  4. DM2  5. GERD  6. Hyperlipidemia  7. Hypertension  8.  Hidradenitis  9.  Peripheral neuropathy secondary to chemotherapy      Subjective     CHIEF COMPLAINT: breast cancer    HISTORY OF PRESENT ILLNESS:   Josefina Rodriguez returns for follow-up.  She was seen in the emergency room last night with severe chest pain.  It hurts right under the arm on the ride side.   A CT chest was done which did not show any evidence of pulmonary embolism but there did appear to be a pathologic rib fracture as well as concern for miliary spread of cancer in the lung.    She says she is hurting.  She was given Norco in the emergency room last night which did help some.      Past Medical History, Past Surgical History, Social History, Family History have been reviewed and are without significant changes except as mentioned.    Review of Systems   A comprehensive 14 point review of systems was performed and was negative except as mentioned.    Medications:  The current medication list was reviewed in the EMR    ALLERGIES:    Allergies   Allergen Reactions   • Cashew Nut Hives and Itching   • Naproxen Hives   • Penicillins Hives     PT STATES CAN TOLERATE KELFEX   •  Pistachio Nut Hives and Itching       Objective      There were no vitals taken for this visit.   There were no vitals filed for this visit.            Performance Status: 0    General: well appearing female in no acute distress  Neuro: alert and oriented  HEENT: sclera anicteric  Lymphatics: no cervical, supraclavicular, or axillary adenopathy  Cardiovascular: regular rate and rhythm, no murmurs  Lungs: clear to auscultation bilaterally  Abdomen: soft, nontender, nondistended.  No palpable organomegaly  Extremeties: no lower extremity edema  Skin: no rashes, lesions, bruising, or petechiae  Psych: mood and affect appropriate      RECENT LABS:  Lab Results   Component Value Date    WBC 12.79 (H) 01/19/2021    HGB 13.4 01/19/2021    HCT 41.7 01/19/2021    MCV 93.3 01/19/2021     01/19/2021     Lab Results   Component Value Date    GLUCOSE 104 (H) 01/19/2021    BUN 12 01/19/2021    CREATININE 0.61 01/19/2021    EGFRIFNONA 107 01/19/2021    BCR 19.7 01/19/2021    K 3.5 01/19/2021    CO2 28.0 01/19/2021    CALCIUM 9.6 01/19/2021    ALBUMIN 3.70 01/19/2021    AST 24 01/19/2021    ALT 24 01/19/2021     CT Angiogram Chest  Narrative: EXAMINATION: CT ANGIOGRAM CHEST-      INDICATION: Shortness of breath, concern for PE.      TECHNIQUE: Spiral acquisition 3 mm post IV contrast images to the chest  with sagittal and coronal 2 mm 2-D reconstructions.     The radiation dose reduction device was turned on for each scan per the  ALARA (As Low as Reasonably Achievable) protocol.     COMPARISON: 01/30/2020 chest CT scan.     FINDINGS: Patient history indicates dyspnea, right-sided chest pain.  History of previous right mastectomy.     There is good contrast opacification of the pulmonary arteries. No  filling defects are seen to suggest pulmonary embolic disease. There is  no evidence of thoracic aortic aneurysm or dissection. There is a  minimal pericardial effusion and no evidence of significant adenopathy.  Lung window  images show trace right pleural effusion and mild patchy  subpleural interstitial disease in the posterior right lower lobe. There  is a fine miliary disease pattern in the background of the lung  parenchyma, very subtle initially, and more noticeable in the upper  lungs. This is new compared to the 01/30/2020 exam. No nodule larger  than approximately 4 mm is identified.     Included images of the upper abdomen show extensive fatty liver change,  and a few granulomatous calcifications in the liver and spleen. Included  pancreatic tail, adrenal glands, and upper renal poles appear grossly  normal. The gallbladder is surgically absent. Review of the bony  structures shows a pathologic nondisplaced fracture of the right seventh  rib, axial image 57 series 6, suggesting the patient's miliary disease  process represents micrometastatic disease. No destructive bony lesions  are appreciated elsewhere.     Impression: 1. No evidence of pulmonary embolic disease.  2. Minimal right pleural effusion, and nonspecific patchy bibasilar  interstitial disease, possibly just dependent atelectasis. There is some  feature overlap with COVID-19 pneumonia, although degree of suspicion  for this is relatively low.  3. Interval development of miliary disease pattern of the lungs,  worrisome for micrometastatic disease.  4. Pathologic, nondisplaced right seventh rib fracture.     D:  01/19/2021  E:  01/20/2021           I personally reviewed the imaging studies.    Assessment/Plan   Josefina Rodriguez is a 45 y.o. year old female with stage IB ER+ Her2+ IDC of the right breast, here for follow-up.    Breast cancer: She is currently on Herceptin and Perjeta along with anastrozole.  We discussed that her CT scan from last night has some findings that are worrisome for recurrence.  The rib fracture was nontraumatic and was interpreted as a pathologic fracture by the radiologist.  I do not see clear signs of other bone involvement on  the imaging.  There are some tiny lung nodules of uncertain significance, but could represent metastatic disease.  I recommend a PET scan for further evaluation.  I will also check tumor markers.  If there is a site that is amenable to biopsy I would recommend this to confirm the diagnosis as well as repeat markers.  If she does have confirmed progression, she may be a candidate for either tucatinib or Enhertu, given her fairly recent treatment with Kadcyla.    Neuropathy:  Continue Cymbalta and Lyrica.    Follow-up as scheduled.  We will contact her when scan results are available.                  Jolene Connell MD  [facility]    1/20/2021          CC:

## 2021-01-21 ENCOUNTER — HOSPITAL ENCOUNTER (OUTPATIENT)
Dept: BONE DENSITY | Facility: HOSPITAL | Age: 46
Discharge: HOME | End: 2021-01-21
Admitting: NURSE PRACTITIONER

## 2021-01-21 DIAGNOSIS — Z79.811 AROMATASE INHIBITOR USE: ICD-10-CM

## 2021-01-21 PROCEDURE — 77080 DXA BONE DENSITY AXIAL: CPT

## 2021-01-28 ENCOUNTER — HOSPITAL ENCOUNTER (OUTPATIENT)
Dept: PET IMAGING | Facility: HOSPITAL | Age: 46
Discharge: HOME | End: 2021-01-28

## 2021-01-28 ENCOUNTER — TELEPHONE (OUTPATIENT)
Dept: ONCOLOGY | Facility: CLINIC | Age: 46
End: 2021-01-28

## 2021-01-28 ENCOUNTER — LAB (OUTPATIENT)
Dept: LAB | Facility: HOSPITAL | Age: 46
End: 2021-01-28

## 2021-01-28 DIAGNOSIS — C50.811 MALIGNANT NEOPLASM OF OVERLAPPING SITES OF RIGHT FEMALE BREAST, UNSPECIFIED ESTROGEN RECEPTOR STATUS (HCC): ICD-10-CM

## 2021-01-28 DIAGNOSIS — C50.811 MALIGNANT NEOPLASM OF OVERLAPPING SITES OF RIGHT BREAST IN FEMALE, ESTROGEN RECEPTOR POSITIVE (HCC): ICD-10-CM

## 2021-01-28 DIAGNOSIS — Z17.0 MALIGNANT NEOPLASM OF OVERLAPPING SITES OF RIGHT BREAST IN FEMALE, ESTROGEN RECEPTOR POSITIVE (HCC): ICD-10-CM

## 2021-01-28 LAB
ALBUMIN SERPL-MCNC: 3.8 G/DL (ref 3.5–5.2)
ALBUMIN/GLOB SERPL: 1 G/DL
ALP SERPL-CCNC: 183 U/L (ref 39–117)
ALT SERPL W P-5'-P-CCNC: 24 U/L (ref 1–33)
ANION GAP SERPL CALCULATED.3IONS-SCNC: 9 MMOL/L (ref 5–15)
AST SERPL-CCNC: 25 U/L (ref 1–32)
BASOPHILS # BLD AUTO: 0.03 10*3/MM3 (ref 0–0.2)
BASOPHILS NFR BLD AUTO: 0.3 % (ref 0–1.5)
BILIRUB SERPL-MCNC: 0.2 MG/DL (ref 0–1.2)
BUN SERPL-MCNC: 12 MG/DL (ref 6–20)
BUN/CREAT SERPL: 22.2 (ref 7–25)
CALCIUM SPEC-SCNC: 9.8 MG/DL (ref 8.6–10.5)
CANCER AG15-3 SERPL-ACNC: 23.1 U/ML
CHLORIDE SERPL-SCNC: 105 MMOL/L (ref 98–107)
CO2 SERPL-SCNC: 28 MMOL/L (ref 22–29)
CREAT SERPL-MCNC: 0.54 MG/DL (ref 0.57–1)
DEPRECATED RDW RBC AUTO: 43.6 FL (ref 37–54)
EOSINOPHIL # BLD AUTO: 0.52 10*3/MM3 (ref 0–0.4)
EOSINOPHIL NFR BLD AUTO: 4.4 % (ref 0.3–6.2)
ERYTHROCYTE [DISTWIDTH] IN BLOOD BY AUTOMATED COUNT: 12.2 % (ref 12.3–15.4)
GFR SERPL CREATININE-BSD FRML MDRD: 123 ML/MIN/1.73
GLOBULIN UR ELPH-MCNC: 3.9 GM/DL
GLUCOSE BLDC GLUCOMTR-MCNC: 125 MG/DL (ref 70–130)
GLUCOSE SERPL-MCNC: 119 MG/DL (ref 65–99)
HCT VFR BLD AUTO: 43.1 % (ref 34–46.6)
HGB BLD-MCNC: 13.6 G/DL (ref 12–15.9)
IMM GRANULOCYTES # BLD AUTO: 0.05 10*3/MM3 (ref 0–0.05)
IMM GRANULOCYTES NFR BLD AUTO: 0.4 % (ref 0–0.5)
LYMPHOCYTES # BLD AUTO: 2.38 10*3/MM3 (ref 0.7–3.1)
LYMPHOCYTES NFR BLD AUTO: 20 % (ref 19.6–45.3)
MCH RBC QN AUTO: 30.6 PG (ref 26.6–33)
MCHC RBC AUTO-ENTMCNC: 31.6 G/DL (ref 31.5–35.7)
MCV RBC AUTO: 96.9 FL (ref 79–97)
MONOCYTES # BLD AUTO: 0.59 10*3/MM3 (ref 0.1–0.9)
MONOCYTES NFR BLD AUTO: 4.9 % (ref 5–12)
NEUTROPHILS NFR BLD AUTO: 70 % (ref 42.7–76)
NEUTROPHILS NFR BLD AUTO: 8.35 10*3/MM3 (ref 1.7–7)
NRBC BLD AUTO-RTO: 0 /100 WBC (ref 0–0.2)
PLATELET # BLD AUTO: 298 10*3/MM3 (ref 140–450)
PMV BLD AUTO: 9.7 FL (ref 6–12)
POTASSIUM SERPL-SCNC: 4.8 MMOL/L (ref 3.5–5.2)
PROT SERPL-MCNC: 7.7 G/DL (ref 6–8.5)
RBC # BLD AUTO: 4.45 10*6/MM3 (ref 3.77–5.28)
SODIUM SERPL-SCNC: 142 MMOL/L (ref 136–145)
WBC # BLD AUTO: 11.92 10*3/MM3 (ref 3.4–10.8)

## 2021-01-28 PROCEDURE — 82962 GLUCOSE BLOOD TEST: CPT

## 2021-01-28 PROCEDURE — 86300 IMMUNOASSAY TUMOR CA 15-3: CPT

## 2021-01-28 PROCEDURE — 85025 COMPLETE CBC W/AUTO DIFF WBC: CPT

## 2021-01-28 PROCEDURE — 0 FLUDEOXYGLUCOSE F18 SOLUTION: Performed by: INTERNAL MEDICINE

## 2021-01-28 PROCEDURE — 36415 COLL VENOUS BLD VENIPUNCTURE: CPT

## 2021-01-28 PROCEDURE — A9552 F18 FDG: HCPCS | Performed by: INTERNAL MEDICINE

## 2021-01-28 PROCEDURE — 78815 PET IMAGE W/CT SKULL-THIGH: CPT

## 2021-01-28 PROCEDURE — 80053 COMPREHEN METABOLIC PANEL: CPT

## 2021-01-28 RX ADMIN — FLUDEOXYGLUCOSE F18 1 DOSE: 300 INJECTION INTRAVENOUS at 08:49

## 2021-01-28 NOTE — TELEPHONE ENCOUNTER
Patient called triage line advising that she has developed a rash under her left breast that is getting bigger and more red every day.  Reviewed patient chart and discussed with Jayla Middleton rn as well as Dr. Chamberlain who advised to have patient go to pcp tomorrow to make sure it is not a cellulitis, etc.  This is on opposite side of patient cancer.  Patient advised she will see her pcp tomorrow and have them call us if they have any questions.  Encouraged patient to keep appointment next week with office as well.

## 2021-01-29 LAB — CANCER AG27-29 SERPL-ACNC: 31.9 U/ML (ref 0–38.6)

## 2021-02-03 ENCOUNTER — HOSPITAL ENCOUNTER (OUTPATIENT)
Dept: ONCOLOGY | Facility: HOSPITAL | Age: 46
Setting detail: INFUSION SERIES
Discharge: HOME | End: 2021-02-03

## 2021-02-03 ENCOUNTER — OFFICE VISIT (OUTPATIENT)
Dept: ONCOLOGY | Facility: CLINIC | Age: 46
End: 2021-02-03

## 2021-02-03 VITALS
BODY MASS INDEX: 30.37 KG/M2 | TEMPERATURE: 98.2 F | OXYGEN SATURATION: 96 % | HEIGHT: 66 IN | SYSTOLIC BLOOD PRESSURE: 164 MMHG | RESPIRATION RATE: 16 BRPM | DIASTOLIC BLOOD PRESSURE: 92 MMHG | WEIGHT: 189 LBS | HEART RATE: 92 BPM

## 2021-02-03 DIAGNOSIS — R52 PAIN: ICD-10-CM

## 2021-02-03 DIAGNOSIS — C50.811 MALIGNANT NEOPLASM OF OVERLAPPING SITES OF RIGHT BREAST IN FEMALE, ESTROGEN RECEPTOR POSITIVE (HCC): Primary | ICD-10-CM

## 2021-02-03 DIAGNOSIS — C50.811 MALIGNANT NEOPLASM OF OVERLAPPING SITES OF RIGHT FEMALE BREAST, UNSPECIFIED ESTROGEN RECEPTOR STATUS (HCC): ICD-10-CM

## 2021-02-03 DIAGNOSIS — Z17.0 MALIGNANT NEOPLASM OF OVERLAPPING SITES OF RIGHT BREAST IN FEMALE, ESTROGEN RECEPTOR POSITIVE (HCC): Primary | ICD-10-CM

## 2021-02-03 PROCEDURE — 25010000003 HEPARIN LOCK FLUSH PER 10 UNITS: Performed by: INTERNAL MEDICINE

## 2021-02-03 PROCEDURE — 25010000002 TRASTUZUMAB-ANNS 420 MG RECONSTITUTED SOLUTION 1 EACH VIAL: Performed by: NURSE PRACTITIONER

## 2021-02-03 PROCEDURE — 96417 CHEMO IV INFUS EACH ADDL SEQ: CPT

## 2021-02-03 PROCEDURE — 96413 CHEMO IV INFUSION 1 HR: CPT

## 2021-02-03 PROCEDURE — 99214 OFFICE O/P EST MOD 30 MIN: CPT | Performed by: INTERNAL MEDICINE

## 2021-02-03 PROCEDURE — 25010000002 PERTUZUMAB 420 MG/14ML SOLUTION 420 MG VIAL: Performed by: NURSE PRACTITIONER

## 2021-02-03 RX ORDER — HEPARIN SODIUM (PORCINE) LOCK FLUSH IV SOLN 100 UNIT/ML 100 UNIT/ML
500 SOLUTION INTRAVENOUS AS NEEDED
Status: DISCONTINUED | OUTPATIENT
Start: 2021-02-03 | End: 2021-02-04 | Stop reason: HOSPADM

## 2021-02-03 RX ORDER — SODIUM CHLORIDE 9 MG/ML
250 INJECTION, SOLUTION INTRAVENOUS ONCE
Status: DISCONTINUED | OUTPATIENT
Start: 2021-02-03 | End: 2021-02-04 | Stop reason: HOSPADM

## 2021-02-03 RX ORDER — INSULIN LISPRO 100 [IU]/ML
INJECTION, SOLUTION INTRAVENOUS; SUBCUTANEOUS
COMMUNITY
Start: 2020-12-18

## 2021-02-03 RX ORDER — HEPARIN SODIUM (PORCINE) LOCK FLUSH IV SOLN 100 UNIT/ML 100 UNIT/ML
500 SOLUTION INTRAVENOUS AS NEEDED
Status: CANCELLED | OUTPATIENT
Start: 2021-02-03

## 2021-02-03 RX ADMIN — HEPARIN 500 UNITS: 100 SYRINGE at 12:51

## 2021-02-03 RX ADMIN — PERTUZUMAB 420 MG: 30 INJECTION, SOLUTION, CONCENTRATE INTRAVENOUS at 10:51

## 2021-02-03 RX ADMIN — TRASTUZUMAB-ANNS 490 MG: 420 INJECTION, POWDER, LYOPHILIZED, FOR SOLUTION INTRAVENOUS at 12:05

## 2021-02-03 NOTE — PROGRESS NOTES
"      PROBLEM LIST:  1. [id number] ER+ WI+ Her2+ invasive ductal carcinoma of the right breast  A) presented with a mass on self-exam.  Mammogram 1/7/20 showed a 4.1 cm mass in the right breast, with 2 adjacent smaller masses.  1.9 cm right axillary lymph node.   FNA of lymph node negative.  Biopsy of right breast mass showed grade 2 IDC, Er 95%, WI 2%, Her2 3+.  B) neoadjuvant chemotherapy with TCHP started 2/5/2020  C) right mastectomy on 6/23/20.  Pathology showed a 2 x 1 x 0.6 cm intermediate grade IDC.  0/2 SLN involved.  [id number]  D) adjuvant Kadcyla x 1 year started 7/22/2020.   Kadcyla stopped October 14, 2020 and switched back to Herceptin and Perjeta due to progressive severe neuropathy.  E) anastrozole started 08/12/2020.   2. PCOS  3. Anxiety/depression  4. DM2  5. GERD  6. Hyperlipidemia  7. Hypertension  8.  Hidradenitis  9.  Peripheral neuropathy secondary to chemotherapy      Subjective     CHIEF COMPLAINT: breast cancer    HISTORY OF PRESENT ILLNESS:   Josefina Rodriguez returns for follow-up.  She says that her rib pain has resolved completely.  She says after thinking about it more she does remember that she was moving furniture and felt a pop in her side.  She had not remembered this previously.  Otherwise she is feeling okay.    Past Medical History, Past Surgical History, Social History, Family History have been reviewed and are without significant changes except as mentioned.    Review of Systems   A comprehensive 14 point review of systems was performed and was negative except as mentioned.    Medications:  The current medication list was reviewed in the EMR    ALLERGIES:    Allergies   Allergen Reactions   • Cashew Nut Hives and Itching   • Naproxen Hives   • Penicillins Hives     PT STATES CAN TOLERATE KELFEX   • Pistachio Nut Hives and Itching       Objective      /92   Pulse 92   Temp 98.2 °F (36.8 °C)   Resp 16   Ht 167.6 cm (66\")   Wt 85.7 kg (189 lb)   SpO2 96%   BMI " 30.51 kg/m²    Vitals:    02/03/21 0916   PainSc: 0-No pain  Comment: No new pain               Performance Status: 0    General: well appearing female in no acute distress  Neuro: alert and oriented  HEENT: sclera anicteric  Extremeties: no lower extremity edema  Skin: no rashes, lesions, bruising, or petechiae  Psych: mood and affect appropriate      RECENT LABS:  Lab Results   Component Value Date    WBC 11.92 (H) 01/28/2021    HGB 13.6 01/28/2021    HCT 43.1 01/28/2021    MCV 96.9 01/28/2021     01/28/2021     Lab Results   Component Value Date    GLUCOSE 119 (H) 01/28/2021    BUN 12 01/28/2021    CREATININE 0.54 (L) 01/28/2021    EGFRIFNONA 123 01/28/2021    BCR 22.2 01/28/2021    K 4.8 01/28/2021    CO2 28.0 01/28/2021    CALCIUM 9.8 01/28/2021    ALBUMIN 3.80 01/28/2021    AST 25 01/28/2021    ALT 24 01/28/2021     NM Pet Skull Base To Mid Thigh  Narrative: EXAMINATION: NM PET, SKULL BASE TO MID THIGH-01/28/2021:      INDICATION: Breast cancer; C50.811-Malignant neoplasm of overlapping  sites of right female breast.     TECHNIQUE: With fasting blood glucose level of 125 mg/dl, a total of  12.1 mCi of FDG was administered via left antecubital vein. Following  appropriate delay, PET and CT images were obtained from the level of the  skull vertex to the mid thighs and fused multiplanar images  reconstructed. The CT scan is an unenhanced low-dose study for reference  to the PET scan only and does not constitute a standard diagnostic CT  scan.     The radiation dose reduction device was turned on as low as reasonably  achievable for each scan per ALARA protocol.     COMPARISON: No previous PET scans. Angiographic chest CT scan of  01/19/2021 by history showing new micronodular disease of the lungs, and  pathologic right lateral rib fracture.     FINDINGS: Today's study shows small hypermetabolic focus along the right  lateral chest wall, corresponding to the patient's known lytic lesion  and rib fracture.  There appears to be normal and normal variant uptake  elsewhere.     Multiplanar images show no significant asymmetry of uptake in the brain.  No hypermetabolic node or mass is seen in the neck.     In the chest, the patient's right rib fracture has maximal SUV of 4.8.  The tiny micronodules noted in the lungs do not appear hypermetabolic,  but at this size, likelihood of detectable hypermetabolic activity is  minimal. No hypermetabolic pulmonary parenchymal disease, axillary  disease or other chest wall disease is seen.     Below the diaphragm, there is the usual heterogeneous uptake pattern of  the liver, with no evidence of hypermetabolic liver mass. Adrenal glands  are non-hypermetabolic. No hypermetabolic solid organ disease,  adenopathy, or peritoneal disease is identified. No other pathologic  marrow space uptake is seen.     Impression: 1. Moderately hypermetabolic activity in the patient's known right rib  lesion, otherwise negative exam.  2. The patient's pulmonary micronodules are probably too small to  register hypermetabolic activity even if they represent active disease.  Continued imaging surveillance is suggested.     D:  01/28/2021  E:  01/28/2021     This report was finalized on 1/31/2021 9:50 AM by Dr. Tee Echeverria MD.           I personally reviewed the imaging studies.    Assessment/Plan   Josefina Rodriguez is a 45 y.o. year old female with stage IB ER+ Her2+ IDC of the right breast, here for follow-up.    Breast cancer: She is currently on Herceptin and Perjeta along with anastrozole.  We reviewed her PET scan results which do show hypermetabolic activity in the area of the rib fracture.  There is no uptake in the lung nodules which are all extremely small.  Her tumor markers were normal.  At this point I do not have definitive evidence that she has recurrent disease.  I have asked radiology to review her scans and let me know if a biopsy of the rib is a possibility.  If so I think it is  reasonable to proceed with this.  If not then I would plan to repeat imaging in about 6 weeks to follow-up on the pulmonary nodules.    Neuropathy:  Continue Cymbalta and Lyrica.    Follow-up in 6 weeks.                  Jolene Connell MD  [facility] Hematology [facility] Oncology    2/3/2021          CC:

## 2021-02-10 ENCOUNTER — APPOINTMENT (OUTPATIENT)
Dept: ONCOLOGY | Facility: HOSPITAL | Age: 46
End: 2021-02-10

## 2021-02-15 ENCOUNTER — APPOINTMENT (OUTPATIENT)
Dept: PREADMISSION TESTING | Facility: HOSPITAL | Age: 46
End: 2021-02-15

## 2021-02-17 ENCOUNTER — HOSPITAL ENCOUNTER (OUTPATIENT)
Dept: RADIATION ONCOLOGY | Facility: HOSPITAL | Age: 46
Setting detail: RADIATION/ONCOLOGY SERIES
Discharge: HOME | End: 2021-02-17

## 2021-02-17 ENCOUNTER — HOSPITAL ENCOUNTER (OUTPATIENT)
Dept: CT IMAGING | Facility: HOSPITAL | Age: 46
End: 2021-02-17

## 2021-02-18 ENCOUNTER — APPOINTMENT (OUTPATIENT)
Dept: ONCOLOGY | Facility: HOSPITAL | Age: 46
End: 2021-02-18

## 2021-02-22 ENCOUNTER — APPOINTMENT (OUTPATIENT)
Dept: PREADMISSION TESTING | Facility: HOSPITAL | Age: 46
End: 2021-02-22

## 2021-02-22 DIAGNOSIS — G62.9 NEUROPATHY: ICD-10-CM

## 2021-02-22 PROCEDURE — U0004 COV-19 TEST NON-CDC HGH THRU: HCPCS

## 2021-02-22 PROCEDURE — C9803 HOPD COVID-19 SPEC COLLECT: HCPCS

## 2021-02-22 RX ORDER — PREGABALIN 75 MG/1
CAPSULE ORAL
Qty: 60 CAPSULE | Refills: 2 | Status: SHIPPED | OUTPATIENT
Start: 2021-02-22 | End: 2021-05-05

## 2021-02-23 LAB — SARS-COV-2 RNA RESP QL NAA+PROBE: NOT DETECTED

## 2021-02-24 ENCOUNTER — HOSPITAL ENCOUNTER (OUTPATIENT)
Dept: ONCOLOGY | Facility: HOSPITAL | Age: 46
Setting detail: INFUSION SERIES
Discharge: HOME | End: 2021-02-24

## 2021-02-24 ENCOUNTER — HOSPITAL ENCOUNTER (OUTPATIENT)
Dept: CT IMAGING | Facility: HOSPITAL | Age: 46
Discharge: HOME | End: 2021-02-24
Admitting: RADIOLOGY

## 2021-02-24 ENCOUNTER — OFFICE VISIT (OUTPATIENT)
Dept: GYNECOLOGIC ONCOLOGY | Facility: CLINIC | Age: 46
End: 2021-02-24

## 2021-02-24 VITALS
HEIGHT: 66 IN | RESPIRATION RATE: 16 BRPM | SYSTOLIC BLOOD PRESSURE: 133 MMHG | WEIGHT: 186.8 LBS | HEART RATE: 86 BPM | TEMPERATURE: 98 F | OXYGEN SATURATION: 97 % | BODY MASS INDEX: 30.02 KG/M2 | DIASTOLIC BLOOD PRESSURE: 83 MMHG

## 2021-02-24 DIAGNOSIS — C50.811 MALIGNANT NEOPLASM OF OVERLAPPING SITES OF RIGHT BREAST IN FEMALE, ESTROGEN RECEPTOR POSITIVE (HCC): ICD-10-CM

## 2021-02-24 DIAGNOSIS — N83.202 CYSTS OF BOTH OVARIES: Primary | ICD-10-CM

## 2021-02-24 DIAGNOSIS — C50.811 MALIGNANT NEOPLASM OF OVERLAPPING SITES OF RIGHT BREAST IN FEMALE, ESTROGEN RECEPTOR POSITIVE (HCC): Primary | ICD-10-CM

## 2021-02-24 DIAGNOSIS — R52 PAIN: ICD-10-CM

## 2021-02-24 DIAGNOSIS — Z17.0 MALIGNANT NEOPLASM OF OVERLAPPING SITES OF RIGHT BREAST IN FEMALE, ESTROGEN RECEPTOR POSITIVE (HCC): Primary | ICD-10-CM

## 2021-02-24 DIAGNOSIS — Z17.0 MALIGNANT NEOPLASM OF OVERLAPPING SITES OF RIGHT BREAST IN FEMALE, ESTROGEN RECEPTOR POSITIVE (HCC): ICD-10-CM

## 2021-02-24 DIAGNOSIS — N83.201 CYSTS OF BOTH OVARIES: Primary | ICD-10-CM

## 2021-02-24 DIAGNOSIS — Z80.3 FAMILY HISTORY OF BREAST CANCER: ICD-10-CM

## 2021-02-24 LAB
APTT PPP: 28.7 SECONDS (ref 24–37)
BASOPHILS # BLD AUTO: 0.04 10*3/MM3 (ref 0–0.2)
BASOPHILS NFR BLD AUTO: 0.4 % (ref 0–1.5)
DEPRECATED RDW RBC AUTO: 41.5 FL (ref 37–54)
EOSINOPHIL # BLD AUTO: 0.45 10*3/MM3 (ref 0–0.4)
EOSINOPHIL NFR BLD AUTO: 4 % (ref 0.3–6.2)
ERYTHROCYTE [DISTWIDTH] IN BLOOD BY AUTOMATED COUNT: 12.6 % (ref 12.3–15.4)
HCT VFR BLD AUTO: 39.7 % (ref 34–46.6)
HGB BLD-MCNC: 13.5 G/DL (ref 12–15.9)
IMM GRANULOCYTES # BLD AUTO: 0.04 10*3/MM3 (ref 0–0.05)
IMM GRANULOCYTES NFR BLD AUTO: 0.4 % (ref 0–0.5)
LYMPHOCYTES # BLD AUTO: 2.43 10*3/MM3 (ref 0.7–3.1)
LYMPHOCYTES NFR BLD AUTO: 21.7 % (ref 19.6–45.3)
MCH RBC QN AUTO: 30.9 PG (ref 26.6–33)
MCHC RBC AUTO-ENTMCNC: 34 G/DL (ref 31.5–35.7)
MCV RBC AUTO: 90.8 FL (ref 79–97)
MONOCYTES # BLD AUTO: 0.52 10*3/MM3 (ref 0.1–0.9)
MONOCYTES NFR BLD AUTO: 4.6 % (ref 5–12)
NEUTROPHILS NFR BLD AUTO: 68.9 % (ref 42.7–76)
NEUTROPHILS NFR BLD AUTO: 7.71 10*3/MM3 (ref 1.7–7)
NRBC BLD AUTO-RTO: 0 /100 WBC (ref 0–0.2)
PLAT MORPH BLD: NORMAL
PLATELET # BLD AUTO: 119 10*3/MM3 (ref 140–450)
PMV BLD AUTO: 11.3 FL (ref 6–12)
RBC # BLD AUTO: 4.37 10*6/MM3 (ref 3.77–5.28)
RBC MORPH BLD: NORMAL
WBC # BLD AUTO: 11.19 10*3/MM3 (ref 3.4–10.8)
WBC MORPH BLD: NORMAL

## 2021-02-24 PROCEDURE — 25010000002 FENTANYL CITRATE (PF) 100 MCG/2ML SOLUTION: Performed by: RADIOLOGY

## 2021-02-24 PROCEDURE — 85007 BL SMEAR W/DIFF WBC COUNT: CPT | Performed by: RADIOLOGY

## 2021-02-24 PROCEDURE — 25010000002 MIDAZOLAM PER 1 MG: Performed by: RADIOLOGY

## 2021-02-24 PROCEDURE — 99152 MOD SED SAME PHYS/QHP 5/>YRS: CPT

## 2021-02-24 PROCEDURE — 99153 MOD SED SAME PHYS/QHP EA: CPT

## 2021-02-24 PROCEDURE — 99212 OFFICE O/P EST SF 10 MIN: CPT | Performed by: OBSTETRICS & GYNECOLOGY

## 2021-02-24 PROCEDURE — 88341 IMHCHEM/IMCYTCHM EA ADD ANTB: CPT | Performed by: INTERNAL MEDICINE

## 2021-02-24 PROCEDURE — 85025 COMPLETE CBC W/AUTO DIFF WBC: CPT | Performed by: RADIOLOGY

## 2021-02-24 PROCEDURE — 25010000003 LIDOCAINE 1 % SOLUTION: Performed by: RADIOLOGY

## 2021-02-24 PROCEDURE — 77012 CT SCAN FOR NEEDLE BIOPSY: CPT

## 2021-02-24 PROCEDURE — 88305 TISSUE EXAM BY PATHOLOGIST: CPT | Performed by: INTERNAL MEDICINE

## 2021-02-24 PROCEDURE — 25010000002 LEUPROLIDE 22.5 MG KIT: Performed by: INTERNAL MEDICINE

## 2021-02-24 PROCEDURE — 88342 IMHCHEM/IMCYTCHM 1ST ANTB: CPT | Performed by: INTERNAL MEDICINE

## 2021-02-24 PROCEDURE — 76830 TRANSVAGINAL US NON-OB: CPT | Performed by: OBSTETRICS & GYNECOLOGY

## 2021-02-24 PROCEDURE — 85730 THROMBOPLASTIN TIME PARTIAL: CPT | Performed by: RADIOLOGY

## 2021-02-24 PROCEDURE — 96402 CHEMO HORMON ANTINEOPL SQ/IM: CPT

## 2021-02-24 RX ORDER — FENTANYL CITRATE 50 UG/ML
INJECTION, SOLUTION INTRAMUSCULAR; INTRAVENOUS
Status: COMPLETED | OUTPATIENT
Start: 2021-02-24 | End: 2021-02-24

## 2021-02-24 RX ORDER — LIDOCAINE HYDROCHLORIDE 10 MG/ML
20 INJECTION, SOLUTION INFILTRATION; PERINEURAL ONCE
Status: COMPLETED | OUTPATIENT
Start: 2021-02-24 | End: 2021-02-24

## 2021-02-24 RX ORDER — MIDAZOLAM HYDROCHLORIDE 1 MG/ML
INJECTION INTRAMUSCULAR; INTRAVENOUS
Status: DISPENSED
Start: 2021-02-24 | End: 2021-02-24

## 2021-02-24 RX ORDER — FENTANYL CITRATE 50 UG/ML
INJECTION, SOLUTION INTRAMUSCULAR; INTRAVENOUS
Status: DISPENSED
Start: 2021-02-24 | End: 2021-02-24

## 2021-02-24 RX ORDER — MIDAZOLAM HYDROCHLORIDE 1 MG/ML
INJECTION INTRAMUSCULAR; INTRAVENOUS
Status: COMPLETED | OUTPATIENT
Start: 2021-02-24 | End: 2021-02-24

## 2021-02-24 RX ORDER — SODIUM CHLORIDE 0.9 % (FLUSH) 0.9 %
3 SYRINGE (ML) INJECTION EVERY 12 HOURS SCHEDULED
Status: DISCONTINUED | OUTPATIENT
Start: 2021-02-24 | End: 2021-02-25 | Stop reason: HOSPADM

## 2021-02-24 RX ADMIN — FENTANYL CITRATE 50 MCG: 50 INJECTION, SOLUTION INTRAMUSCULAR; INTRAVENOUS at 09:40

## 2021-02-24 RX ADMIN — MIDAZOLAM HYDROCHLORIDE 2 MG: 1 INJECTION, SOLUTION INTRAMUSCULAR; INTRAVENOUS at 09:40

## 2021-02-24 RX ADMIN — LEUPROLIDE ACETATE 22.5 MG: KIT SUBCUTANEOUS at 14:49

## 2021-02-24 RX ADMIN — FENTANYL CITRATE 50 MCG: 50 INJECTION, SOLUTION INTRAMUSCULAR; INTRAVENOUS at 10:15

## 2021-02-24 RX ADMIN — MIDAZOLAM HYDROCHLORIDE 1 MG: 1 INJECTION, SOLUTION INTRAMUSCULAR; INTRAVENOUS at 10:16

## 2021-02-24 RX ADMIN — LIDOCAINE HYDROCHLORIDE 20 ML: 10 INJECTION, SOLUTION INFILTRATION; PERINEURAL at 09:55

## 2021-02-24 NOTE — POST-PROCEDURE NOTE
An immediate patient assessment was done prior to the administration of moderate and/or deep conscious sedation.      Josefina Rodriguez      Pre-op Diagnosis:   45 y.o. year old female with stage IB ER+ Her2+ IDC of the right breast; currently on Herceptin and Perjeta along with anastrozole.  PET scan results which do show hypermetabolic activity in the area of the rib fracture. Tumor markers were normal.    Post-op diagnosis:  Same       Anesthesia: Moderate sedation    ASA SCALE ASSESSMENT:  2-Mild to moderate systemic disease, medically well controlled, with no functional limitation    MALLAMPATI CLASSIFICATION:  2-Able to visualize the soft palate, fauces, uvula. The anterior & posterior tonsilar pillars are hidden by the tongue.    Staff:   Merrick Alvarado MD      Estimated Blood Loss: Trace    Urine Voided: * No values recorded between 2/24/2021 12:00 AM and 2/24/2021 10:28 AM *    Specimens:                15 gauge and 11 gauge core placed in formalin      Drains:  None    Findings: Expansile right side rib number 7. New bone seen. May represent healing fracture - uncertain given history of breast cancer.    Complications: No immediate      Merrick Alvarado MD     Date: 2/24/2021  Time: 10:28 EST

## 2021-02-24 NOTE — PROGRESS NOTES
Josefina Rodriguez  [id number]  1975      Reason for visit: Ovarian cyst, personal history of breast cancer      History of present illness:  The patient is a 45 y.o. year old female who presents today for treatment and evaluation of the above issues. Patient has invasive ductal carcinoma of the right breast. She is currently being treated with Herceptin and Perjeta per Dr. Connell. She continues her anastrazole and Lupron.    In review:  Patient has a history of PCOS. She previously has had a 6 x 5 x 6 cm right ovary and a 2 x 3 cm left ovarian cyst that have stable across multiple evaluations. Patient reports history of cysts for many decades.  She had several ultrasounds demonstrating cysts.     Today patient is happy to be finished with breast cancer treatment as of 2/3/21.  However, she recently broke a rib while moving a couch and is in some pain on her right side.  She reports no new pelvic pressure or pain related to her cysts.  She has not had a period while on her breast cancer treatment.  She continues with Anastrozole and Lupron with injection following this appointment.  Reports normal bladder/bowel function and stable weight.     OBGYN History:  She is a G 2 P 1.  She does not use HRT. She does have a history of abnormal pap smears.  She had abnormal Pap smears prior to having children.  She had one cervical biopsy that was normal.  She has not had any other cervical procedures. Reports having HPV+ pap in the past. Has received Gardasil. Most recent Pap was in 2019 with Dr. Higuera and was normal.    Oncologic History:  Oncology/Hematology History   Malignant neoplasm of overlapping sites of right breast in female, estrogen receptor positive (CMS/HCC)   1/21/2020 Initial Diagnosis    Malignant neoplasm of overlapping sites of right breast in female, estrogen receptor positive (CMS/HCC)     1/21/2020 Cancer Staged    Staging form: Breast, AJCC 8th Edition  - Clinical: Stage IB (cT2, cN0, cM0, G2,  ER+, GA+, HER2+) - Signed by Jolene Connell MD on 1/21/2020 2/5/2020 - 6/9/2020 Chemotherapy    OP BREAST TCH-P DOCEtaxel / CARBOplatin AUC=6 / Trastuzumab-anns / Pertuzumab     2/5/2020 - 10/13/2020 Chemotherapy    OP SUPPORTIVE LEUPROLIDE 11.25 MG Q3M     2/5/2020 -  Chemotherapy    OP CENTRAL VENOUS ACCESS DEVICE ACCESS, CARE, AND MAINTENANCE (CVAD)     2/11/2020 - 6/17/2020 Chemotherapy    OP SUPPORTIVE HYDRATION + ANTIEMETICS     6/10/2020 - 7/21/2020 Chemotherapy    OP BREAST Pertuzumab / Trastuzumab-anns  Q21D     7/22/2020 - 10/13/2020 Chemotherapy    OP BREAST Ado-Trastuzumab Emtansine     9/23/2020 - 11/10/2020 Chemotherapy    OP SUPPORTIVE HYDRATION + ANTIEMETICS     10/22/2020 -  Chemotherapy    OP BREAST Pertuzumab / Trastuzumab-anns  Q21D     10/22/2020 - 2/9/2021 Chemotherapy    OP SUPPORTIVE Goserelin 3.6 mg Q28D     2/18/2021 -  Chemotherapy    OP SUPPORTIVE Leuprolide 22.5 mg Q3M           Past Medical History:   Diagnosis Date   • Anxiety    • Depression    • Diabetes mellitus (CMS/HCC)    • Hiatal hernia    • Hypertension    • Malignant neoplasm of overlapping sites of right breast in female, estrogen receptor positive (CMS/HCC) 1/21/2020   • PCOS (polycystic ovarian syndrome)    • Wears glasses        Past Surgical History:   Procedure Laterality Date   • BONE BIOPSY     • BREAST BIOPSY Right 03/2019    axillary lymph node bx/fna   • CHOLECYSTECTOMY  03/2010   • COLONOSCOPY  2016   • MASTECTOMY W/ SENTINEL NODE BIOPSY Right 6/23/2020    Procedure: MASTECTOMY WITH SENTINEL NODE BIOPSY RIGHT, PORT PLACEMENT;  Surgeon: Rachel Baker MD;  Location: [facility];  Service: General;  Laterality: Right;   • US GUIDED FINE NEEDLE ASPIRATION  1/15/2020   • VENOUS ACCESS DEVICE (PORT) INSERTION Left 01/31/2020       MEDICATIONS: The current medication list was reviewed with the patient and updated in the EMR this date per the Medical Assistant. Medication dosages and frequencies were confirmed to  be accurate.      Allergies:  is allergic to cashew nut; naproxen; penicillins; and pistachio nut.    Social History:   Social History     Socioeconomic History   • Marital status:      Spouse name: Not on file   • Number of children: Not on file   • Years of education: Not on file   • Highest education level: Not on file   Tobacco Use   • Smoking status: Current Every Day Smoker     Packs/day: 1.00     Years: 20.00     Pack years: 20.00     Types: Cigarettes   • Smokeless tobacco: Never Used   Substance and Sexual Activity   • Alcohol use: Yes     Comment: occasionally a couple times a month   • Drug use: Never   • Sexual activity: Defer       Family History:    Family History   Problem Relation Age of Onset   • Breast cancer Mother 64   • Heart disease Mother    • Hypertension Father    • Lung cancer Father    • Ovarian cancer Neg Hx    • Colon cancer Neg Hx    • Colon polyps Neg Hx        Health Maintenance:    Health Maintenance   Topic Date Due   • URINE MICROALBUMIN  1975   • ANNUAL PHYSICAL  03/04/1980   • Pneumococcal Vaccine 0-64 (1 of 1 - PPSV23) 03/04/1983   • Hepatitis B (1 of 3 - Risk 3-dose series) 03/04/1996   • TDAP/TD VACCINES (1 - Tdap) 03/04/1996   • HEPATITIS C SCREENING  01/20/2020   • DIABETIC FOOT EXAM  01/20/2020   • PAP SMEAR  01/20/2020   • DIABETIC EYE EXAM  01/20/2020   • INFLUENZA VACCINE  08/01/2020   • HEMOGLOBIN A1C  12/21/2020   • MAMMOGRAM  01/08/2022   • MENINGOCOCCAL VACCINE  Aged Out       Review of Systems   Constitutional: Positive for fatigue. Negative for appetite change, chills, fever and unexpected weight change.   HENT: Negative for congestion, ear discharge, ear pain, hearing loss, mouth sores, nosebleeds, sinus pressure, sinus pain, sore throat, tinnitus, trouble swallowing and voice change.         Oral thrush   Eyes: Negative for redness, itching and visual disturbance.   Respiratory: Negative for cough, shortness of breath and wheezing.    Cardiovascular:  Negative for chest pain, palpitations and leg swelling.   Gastrointestinal: Positive for diarrhea and nausea. Negative for abdominal distention, abdominal pain, blood in stool, constipation and vomiting.   Endocrine: Negative.  Negative for cold intolerance and heat intolerance.   Genitourinary: Negative for difficulty urinating, dyspareunia, dysuria, enuresis, flank pain, frequency, genital sores, hematuria, pelvic pain, urgency, vaginal bleeding, vaginal discharge and vaginal pain.   Musculoskeletal: Negative for arthralgias, back pain, gait problem, joint swelling and myalgias.   Skin: Negative for color change, rash and wound.   Allergic/Immunologic: Positive for food allergies. Negative for immunocompromised state.   Neurological: Negative for dizziness, seizures, syncope, weakness, light-headedness, numbness and headaches.   Hematological: Negative for adenopathy. Does not bruise/bleed easily.   Psychiatric/Behavioral: Positive for dysphoric mood. Negative for confusion and sleep disturbance. The patient is nervous/anxious.        Physical Exam    There were no vitals filed for this visit.  There is no height or weight on file to calculate BMI.    Wt Readings from Last 3 Encounters:   02/24/21 84.8 kg (187 lb)   02/24/21 84.7 kg (186 lb 12.8 oz)   02/03/21 85.7 kg (189 lb)       GENERAL: Alert, well-appearing female appearing her stated age who is in no apparent distress.   HEENT: Sclera anicteric. Head normocephalic, atraumatic. Mucus membranes moist.   NECK: Trachea midline, supple, without masses.  No thyromegaly.   BREASTS: Deferred  CARDIOVASCULAR: Normal rate, regular rhythm, no murmurs, rubs, or gallops.  No peripheral edema.  RESPIRATORY: Clear to auscultation bilaterally, normal respiratory effort  BACK:  No CVA tenderness, no vertebral tenderness on palpation  GASTROINTESTINAL:  Abdomen is soft, non-tender, non-distended, no rebound or guarding, no masses, or hernias. No HSM.  SKIN:  Warm, dry,  well-perfused.  All visible areas intact.  No rashes, lesions, ulcers.  PSYCHIATRIC: AO x3, with appropriate affect, normal thought processes.  NEUROLOGIC: No focal deficits.  Moves extremities well.  MUSCULOSKELETAL: Normal gait and station.   EXTREMITIES:   No cyanosis, clubbing, symmetric.  LYMPHATICS:  No cervical or inguinal adenopathy noted.     PELVIC exam:    External genitalia were unremarkable    ECOG PS 0    PROCEDURES: Ultrasound: stable right and left ovarian cysts.  Normal appearing uterus.  See full US report for details     Diagnostic Data:    Mri Brain With & Without Contrast    Result Date: 9/13/2020  No acute intracranial findings, specifically no evidence for acute infarction or abnormal enhancement. Trace left mastoid effusion.   DICTATED:   09/12/2020 EDITED/ls :   09/13/2020  This report was finalized on 9/13/2020 12:04 PM by Dr. Tian Sewell.      Ct Abdomen Pelvis With Contrast    Result Date: 9/12/2020  1. No mechanical obstructive process or loculated fluid collection.  2. Hepatic steatosis.  3. Prominent right adnexal soft tissue density and right ovary although far decreased from prior comparison 01/30/2020 in overall prominence without bulky pelvic adenopathy or free fluid Incidentally noted  DICTATED:   09/12/2020 EDITED/ls :   09/12/2020   This report was finalized on 9/12/2020 7:12 PM by Dr. Tian Sewell.      Xr Chest 1 View    Result Date: 9/18/2020  No acute cardiopulmonary disease.    D:  09/16/2020 E:  09/16/2020  This report was finalized on 9/18/2020 11:15 AM by Dr. Irish Hansen MD.      Xr Chest 1 View    Result Date: 9/13/2020  No acute cardiopulmonary process.  DICTATED:   09/12/2020 EDITED/ls :   09/12/2020  This report was finalized on 9/13/2020 12:03 PM by Dr. Tian Sewell.      Lab Results   Component Value Date    WBC 11.19 (H) 02/24/2021    HGB 13.5 02/24/2021    HCT 39.7 02/24/2021    MCV 90.8 02/24/2021     (L) 02/24/2021    NEUTROABS 7.71 (H)  02/24/2021    GLUCOSE 119 (H) 01/28/2021    BUN 12 01/28/2021    CREATININE 0.54 (L) 01/28/2021    EGFRIFNONA 123 01/28/2021     01/28/2021    K 4.8 01/28/2021     01/28/2021    CO2 28.0 01/28/2021    MG 1.6 09/12/2020    CALCIUM 9.8 01/28/2021    ALBUMIN 3.80 01/28/2021    AST 25 01/28/2021    ALT 24 01/28/2021    BILITOT 0.2 01/28/2021     Lab Results   Component Value Date     29.8 03/18/2020           Assessment/Plan   This is a 45 y.o. woman invasive ductal carcinoma of the right breast, currently on Herceptin and Perjeta as well as anastrazole and Lupron. She presents today for follow up of ovarian cysts.   Encounter Diagnoses   Name Primary?   • Cysts of both ovaries Yes   • Family history of breast cancer         Ovarian cyst, PCOS  She denies significant issues from these cysts.   Recently completed Herceptin and Perjeta and is now on anastrozole and Lupron.    Cysts appear benign and stable in size on exam today on US.   Patient may follow up with benign gyn and return to gyn-onc clinic only as needed.      Breast cancer  Followed by medical oncology  PET scan results which do show hypermetabolic activity in the area of the rib fracture.  There is no uptake in the lung nodules which are all extremely small.    Family history of breast cancer  Status post genetic counseling/testing.     Pain assessment was performed today as a part of patient’s care.  For patients with pain related to surgery, gynecologic malignancy or cancer treatment, the plan is as noted in the assessment/plan.  For patients with pain not related to these issues, they are to seek any further needed care from a more appropriate provider, such as PCP.  Depression screening was performed today as a part of patient's care.  For depression scores greater than 5 appropriate intervention was performed.    Orders Placed This Encounter   Procedures   • US Non-OB Transvaginal     Standing Status:   Future     Number of  Occurrences:   1     Standing Expiration Date:   2/24/2022     Order Specific Question:   Reason for Exam:     Answer:   cysts of both ovaries     FOLLOW UP: as needed     Patient was seen and examined with Dr. Sheth,  resident, who performed portions of the examination and documentation for this patient's care under my direct supervision.  I agree with the above documentation and plan.    Cuca Sands MD  02/24/21  17:48 EST

## 2021-02-25 ENCOUNTER — TELEPHONE (OUTPATIENT)
Dept: INFUSION THERAPY | Facility: HOSPITAL | Age: 46
End: 2021-02-25

## 2021-03-01 ENCOUNTER — TELEPHONE (OUTPATIENT)
Dept: ONCOLOGY | Facility: CLINIC | Age: 46
End: 2021-03-01

## 2021-03-01 NOTE — TELEPHONE ENCOUNTER
Provider: EMERSON  Caller: *CHRIST MARIA   Relationship to Patient: SELF    Phone Number: [phone]  Reason for Call: PT WANTING BX RESULTS OF HER RIB

## 2021-03-02 ENCOUNTER — TELEPHONE (OUTPATIENT)
Dept: ONCOLOGY | Facility: CLINIC | Age: 46
End: 2021-03-02

## 2021-03-02 NOTE — TELEPHONE ENCOUNTER
Called patient to let her know rib biopsy so far does not appears c/w breast cancer, but final diagnosis is not yet determined.  Pathology has been sent out for review.  Will plan to contact her once we have more information.

## 2021-03-09 RX ORDER — NICOTINE 21 MG/24HR
1 PATCH, TRANSDERMAL 24 HOURS TRANSDERMAL EVERY 24 HOURS
Qty: 28 EACH | Refills: 2 | Status: SHIPPED | OUTPATIENT
Start: 2021-03-09 | End: 2021-05-05

## 2021-03-09 RX ORDER — POLYETHYLENE GLYCOL 3350 17 G
4 POWDER IN PACKET (EA) ORAL AS NEEDED
Qty: 108 EACH | Refills: 3 | Status: SHIPPED | OUTPATIENT
Start: 2021-03-09 | End: 2021-09-02 | Stop reason: SDUPTHER

## 2021-03-09 NOTE — PROGRESS NOTES
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  She primarily appears to have pulmonary involvement with micronodules, as well as solitary bone site in the rib.   Discussed connection with cigarette smoking and emphasized that quitting smoking is imperative.  I sent rx for nicotine patch and lozenge.  She has tried chantix before and felt very jittery with it.  Will discuss diagnosis in more detail next week at her appointment.

## 2021-03-10 ENCOUNTER — TELEPHONE (OUTPATIENT)
Dept: ONCOLOGY | Facility: CLINIC | Age: 46
End: 2021-03-10

## 2021-03-12 ENCOUNTER — MDT ASSESSMENT (OUTPATIENT)
Dept: ONCOLOGY | Facility: CLINIC | Age: 46
End: 2021-03-12

## 2021-03-12 NOTE — PROGRESS NOTES
CANCER CONFERENCE AGENDA  DATE:        SPECIALTY:  Multidisciplinary   PRESENTER:   Jolene Connell M.D.  SITE: Langerhans vs breast recurrence       HPI:  46 YOWF who presented with abnormal surveillance imaging.       REFERRING PROVIDER:  Rachel Baker M.D. (General Surgery - [facility])       PLACE OF RESIDENCE:  [place], [place]       SOCIAL HISTORY:  Current every day smoker 1 PPD X21 years.  She is a .       FAMILY HISTORY:  Breast cancer (mother 64, metastatic recurrence 76), lung cancer (father)       PAST MEDICAL HISTORY:  Stage I ductal adenocarcinoma of right breast (01/15/2020) s/p nick-adjuvant chemotherapy TCHP (started 2020).  On 2020, started adjuvant Kadcyla.  On 10/14/2020, Kadcyla stopped and switched back to Herceptin and Perjeta due to progressive severe neuropathy. On     2020, started Arimidex.  Genetics was negative.       On 2020, had right simple mastectomy with SLN biopsy, tumor site right breast 9 o’clock, ER/MA positive, HER-2 positive by IHC, tumor size 2 x 1 x 0.6 cm, B-R score 6/9, Surgical margins uninvolved, Nodes 2/4.     Presented with a palpable mass on self-examination.       Peripheral neuropathy (due to chemotherapy), hypertension, Type 2 diabetes mellitus, hidradenitis, hiatal hernia.       PAST SURGICAL HISTORY: Right simple mastectomy with SLN biopsy (2020), right breast 9 o’clock US-guided biopsy (01/15/2020), benign right axillary lymph node needle core biopsy (2019), cholecystectomy (), colonoscopy () NEXT GEN SEQUENCING:      IMAGIN2020 ([facility]):  CT chest - There is a fine miliary disease pattern in the background of the lung parenchyma, very subtle initially, and more noticeable in the upper lungs. This is new compared to the 2020 exam.         2021 ([facility]): PET w/CT - Moderately hypermetabolic activity in the patient's known right rib lesion, otherwise negative exam.      CLINICAL STAGE:       SURGICAL PROCEDURE:     PATHOLOGY: Rib lesion negative for breast cancer. [place] cancer tumor markers normal.     TREATMENT PLAN DISCUSSION:      Medical Oncology: Watchful waiting. Monitor by encouraging to quit smoking. Scans also.      Radiation Oncology: Can radiate focal area.       Surgical:      Clinical Trials:       Other Referrals:     Treatment Recommendations/Referrals:     EVIDENCE BASED NATIONAL TREATMENT GUIDELINES:        Please discuss clinical/working stage, TNM, Stage Group, National Treatment Guidelines, and Prognostic Indicators.      Privileged and Confidential Patient Safety Work Product:  The information contained herein has been compiled as part of the [facility] Patient Safety Evaluation System and is deemed to be a Patient Safety Work Product and is privileged and confidential.  Disclaimer:  Multidisciplinary cancer conferences provide consultative services to formulate an effective treatment plan for patients and include physician representation from medical oncology, radiation oncology, radiology, surgery, and pathology.  AJCC or other appropriate staging is discussed, along with site-specific prognostic indicators and treatment planning used by evidence-based treatment guidelines.  Treatment plans which are discussed during conference may/may not necessarily be followed by the patient's managing physician(s), as there may be other factors taken into consideration which impact the treatment plan.  The specific treatment plan is ultimately determined on an individual basis by the patient and the physician(s) involved in his/her care.

## 2021-03-16 ENCOUNTER — TRANSCRIBE ORDERS (OUTPATIENT)
Dept: ADMINISTRATIVE | Facility: HOSPITAL | Age: 46
End: 2021-03-16

## 2021-03-16 DIAGNOSIS — R74.8 ELEVATED ALKALINE PHOSPHATASE LEVEL: Primary | ICD-10-CM

## 2021-03-17 ENCOUNTER — HOSPITAL ENCOUNTER (OUTPATIENT)
Dept: ONCOLOGY | Facility: HOSPITAL | Age: 46
Setting detail: INFUSION SERIES
Discharge: HOME | End: 2021-03-17

## 2021-03-17 ENCOUNTER — OFFICE VISIT (OUTPATIENT)
Dept: ONCOLOGY | Facility: CLINIC | Age: 46
End: 2021-03-17

## 2021-03-17 VITALS
OXYGEN SATURATION: 97 % | WEIGHT: 198 LBS | RESPIRATION RATE: 16 BRPM | HEIGHT: 66 IN | DIASTOLIC BLOOD PRESSURE: 95 MMHG | HEART RATE: 84 BPM | SYSTOLIC BLOOD PRESSURE: 171 MMHG | BODY MASS INDEX: 31.82 KG/M2 | TEMPERATURE: 97.7 F

## 2021-03-17 DIAGNOSIS — J84.82: ICD-10-CM

## 2021-03-17 DIAGNOSIS — Z17.0 MALIGNANT NEOPLASM OF OVERLAPPING SITES OF RIGHT BREAST IN FEMALE, ESTROGEN RECEPTOR POSITIVE (HCC): Primary | ICD-10-CM

## 2021-03-17 DIAGNOSIS — C50.811 MALIGNANT NEOPLASM OF OVERLAPPING SITES OF RIGHT BREAST IN FEMALE, ESTROGEN RECEPTOR POSITIVE (HCC): Primary | ICD-10-CM

## 2021-03-17 PROCEDURE — 96523 IRRIG DRUG DELIVERY DEVICE: CPT

## 2021-03-17 PROCEDURE — 25010000003 HEPARIN LOCK FLUSH PER 10 UNITS: Performed by: INTERNAL MEDICINE

## 2021-03-17 PROCEDURE — 99215 OFFICE O/P EST HI 40 MIN: CPT | Performed by: INTERNAL MEDICINE

## 2021-03-17 RX ORDER — HEPARIN SODIUM (PORCINE) LOCK FLUSH IV SOLN 100 UNIT/ML 100 UNIT/ML
500 SOLUTION INTRAVENOUS AS NEEDED
Status: DISCONTINUED | OUTPATIENT
Start: 2021-03-17 | End: 2021-03-18 | Stop reason: HOSPADM

## 2021-03-17 RX ADMIN — HEPARIN 500 UNITS: 100 SYRINGE at 10:43

## 2021-03-17 NOTE — PROGRESS NOTES
PROBLEM LIST:  1. [id number] ER+ NC+ Her2+ invasive ductal carcinoma of the right breast  A) presented with a mass on self-exam.  Mammogram 1/7/20 showed a 4.1 cm mass in the right breast, with 2 adjacent smaller masses.  1.9 cm right axillary lymph node.   FNA of lymph node negative.  Biopsy of right breast mass showed grade 2 IDC, Er 95%, NC 2%, Her2 3+.  B) neoadjuvant chemotherapy with TCHP started 2/5/2020  C) right mastectomy on 6/23/20.  Pathology showed a 2 x 1 x 0.6 cm intermediate grade IDC.  0/2 SLN involved.  [id number]  D) adjuvant Kadcyla x 1 year started 7/22/2020.   Kadcyla stopped October 14, 2020 and switched back to Herceptin and Perjeta due to progressive severe neuropathy.  E) anastrozole started 08/12/2020.   2. PCOS  3. Anxiety/depression  4. DM2  5. GERD  6. Hyperlipidemia  7. Hypertension  8.  Hidradenitis  9.  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  CT chest 1/19/2021 showed a pathologic nondisplaced fracture of the right seventh rib.  There were also subtle small nodules in the lungs, none larger than 4 mm.  PET/CT on 1/28/2021 showed SUV of 4.8 in the right rib fracture.  Pulmonary micronodules too small to register hypermetabolic activity.  Biopsy of the rib lesion 2/24/2021 showed Langerhans' cell histiocytosis.    Subjective     CHIEF COMPLAINT: breast cancer    HISTORY OF PRESENT ILLNESS:   Josefina Rodriguez returns for follow-up.  She has cut back to 3 to 4 cigarettes a day.  She is having the hardest time with the cigarettes first thing in the morning but she says she is giving herself a week and is determined to stop.  She is not having much pain from her rib lesion at this point but it is mainly because she is limiting her activities.  She is found there are certain ways that she can and cannot move in order to avoid triggering pain.  She saw her primary care provider.  She  "previously had a biopsy of the hidradenitis under the arm, but she says they are doing it again to see if this could potentially be lesions related to Langerhans.        Review of Systems   A comprehensive 14 point review of systems was performed and was negative except as mentioned.      Objective      /95   Pulse 84   Temp 97.7 °F (36.5 °C)   Resp 16   Ht 167.6 cm (66\")   Wt 89.8 kg (198 lb)   SpO2 97%   BMI 31.96 kg/m²    Vitals:    03/17/21 0927   PainSc: 0-No pain               Performance Status: 0    General: well appearing female in no acute distress  Neuro: alert and oriented  HEENT: sclera anicteric  Pulmonary: Lungs are clear to auscultation bilaterally  Cardiovascular: Regular rate and rhythm no murmurs  Extremeties: no lower extremity edema  Skin: no rashes, lesions, bruising, or petechiae  Psych: mood and affect appropriate      RECENT LABS:  Lab Results   Component Value Date    WBC 11.19 (H) 02/24/2021    HGB 13.5 02/24/2021    HCT 39.7 02/24/2021    MCV 90.8 02/24/2021     (L) 02/24/2021     Lab Results   Component Value Date    GLUCOSE 119 (H) 01/28/2021    BUN 12 01/28/2021    CREATININE 0.54 (L) 01/28/2021    EGFRIFNONA 123 01/28/2021    BCR 22.2 01/28/2021    K 4.8 01/28/2021    CO2 28.0 01/28/2021    CALCIUM 9.8 01/28/2021    ALBUMIN 3.80 01/28/2021    AST 25 01/28/2021    ALT 24 01/28/2021               Assessment/Plan   Josefina Rodriguez is a 46 y.o. year old female with stage IB ER+ Her2+ IDC of the right breast, now with a new diagnosis of Langerhans histiocytosis involving the lung as well as a single rib lesion.    Breast cancer: She has completed adjuvant therapy with Herceptin and Perjeta.  She will continue on anastrozole along with Lupron for ovarian suppression.    Langerhans histiocytosis: She appears to have predominantly pulmonary involvement, with a single rib lesion.  Because she is still symptomatic from the rib lesion and is having to alter her " activity, I will refer her to radiation oncology for consideration of treatment of this.  We have discussed the importance of smoking cessation.  There is a clear link between lung involvement with Langerhans' cell histiocytosis and cigarette use.  She is cutting back.  I have prescribed nicotine patches.  Hopefully with smoking cessation we will not need other systemic therapies.  I will plan to repeat CT of the chest in about 6 weeks for monitoring.    Neuropathy:  Continue Cymbalta and Lyrica.    Follow-up in May.                    Jolene Connell MD  [facility]    3/17/2021          CC:

## 2021-03-18 ENCOUNTER — HOSPITAL ENCOUNTER (OUTPATIENT)
Dept: RADIATION ONCOLOGY | Facility: HOSPITAL | Age: 46
Setting detail: RADIATION/ONCOLOGY SERIES
Discharge: HOME | End: 2021-03-18

## 2021-03-18 ENCOUNTER — OFFICE VISIT (OUTPATIENT)
Dept: RADIATION ONCOLOGY | Facility: HOSPITAL | Age: 46
End: 2021-03-18

## 2021-03-18 VITALS
BODY MASS INDEX: 31.26 KG/M2 | OXYGEN SATURATION: 97 % | DIASTOLIC BLOOD PRESSURE: 98 MMHG | WEIGHT: 194.5 LBS | SYSTOLIC BLOOD PRESSURE: 159 MMHG | RESPIRATION RATE: 16 BRPM | TEMPERATURE: 98.2 F | HEART RATE: 92 BPM | HEIGHT: 66 IN

## 2021-03-18 DIAGNOSIS — C50.811 MALIGNANT NEOPLASM OF OVERLAPPING SITES OF RIGHT FEMALE BREAST, UNSPECIFIED ESTROGEN RECEPTOR STATUS (HCC): Primary | ICD-10-CM

## 2021-03-18 DIAGNOSIS — C96.6: ICD-10-CM

## 2021-03-18 PROCEDURE — G0463 HOSPITAL OUTPT CLINIC VISIT: HCPCS | Performed by: RADIOLOGY

## 2021-03-18 NOTE — PROGRESS NOTES
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  I am seeing them in outpatient consultation regarding a diagnosis of Langerhans cell histiocytosis.     BRIEF HISTORY:  The patient is a very pleasant 46 y.o. female  with past medical history significant for locally advanced right-sided triple positive breast cancer.  She was diagnosed approximately 1 year ago in 2020 with a locally advanced right-sided breast cancer.  She underwent a biopsy which demonstrated a moderately differentiated invasive ductal carcinoma with strong staining for estrogen receptor and HER-2/michael.  She was treated with neoadjuvant chemotherapy receiving TCH+P and subsequently underwent a right-sided mastectomy in 2020, with final pathology demonstrating a 2 cm intermediate grade invasive ductal carcinoma with no evidence of lymph node involvement.  She was started on adjuvant Kadcyla but unfortunately, this had to be stopped in 2020 due to progressive neuropathy and she was switched back to Herceptin+Perjeta.  She has also been started on anastrozole, and has been recovering well, but recently she started developing symptoms of chest wall pain on the right side in January.  A CT scan of the chest was obtained in January which showed a pathologic, nondisplaced fracture of the right seventh rib as well as some subtle small nodules in the lungs.  A PET scan demonstrated abnormal hypermetabolism localized to the rib lesion, which was  subsequently biopsied and actually came back consistent with Langerhans' cell histiocytosis.  She has no other site of regional or distant disease, and is being referred to my clinic today for consideration of involved field radiation therapy.  She continues to describe pain which limits her activities throughout the day and also affects her ability to rest and sleep in the evenings.  She has not been taking any pain medications to address the pain.  She states her pain is constantly a 2-3 out of 10, and at its worst gets up to an 8 out of 10.    Allergy:   Allergies   Allergen Reactions   • Cashew Nut Hives and Itching   • Naproxen Hives   • Penicillins Hives     PT STATES CAN TOLERATE KELFEX   • Pistachio Nut Hives and Itching       Social History:   Social History     Socioeconomic History   • Marital status:      Spouse name: Not on file   • Number of children: Not on file   • Years of education: Not on file   • Highest education level: Not on file   Tobacco Use   • Smoking status: Current Every Day Smoker     Packs/day: 1.00     Years: 20.00     Pack years: 20.00     Types: Cigarettes   • Smokeless tobacco: Never Used   Substance and Sexual Activity   • Alcohol use: Yes     Comment: occasionally a couple times a month   • Drug use: Never   • Sexual activity: Defer       Past Medical History:   Past Medical History:   Diagnosis Date   • Anxiety    • Depression    • Diabetes mellitus (CMS/HCC)    • Hiatal hernia    • Hypertension    • Malignant neoplasm of overlapping sites of right breast in female, estrogen receptor positive (CMS/HCC) 1/21/2020   • PCOS (polycystic ovarian syndrome)    • Wears glasses        Family History: family history includes Breast cancer (age of onset: 64) in her mother; Heart disease in her mother; Hypertension in her father; Lung cancer in her father.     Surgical History:   Past Surgical History:   Procedure Laterality Date   • BONE BIOPSY     • BREAST BIOPSY Right 03/2019     "axillary lymph node bx/fna   • CHOLECYSTECTOMY  03/2010   • COLONOSCOPY  2016   • MASTECTOMY W/ SENTINEL NODE BIOPSY Right 6/23/2020    Procedure: MASTECTOMY WITH SENTINEL NODE BIOPSY RIGHT, PORT PLACEMENT;  Surgeon: Rachel Baker MD;  [facility]: [facility];  Service: General;  Laterality: Right;   • US GUIDED FINE NEEDLE ASPIRATION  1/15/2020   • VENOUS ACCESS DEVICE (PORT) INSERTION Left 01/31/2020        Review of Systems:   Review of Systems   Constitutional: Positive for fatigue.   Musculoskeletal:        Right rib intermittent pain   Neurological:        Neuropathy feet   All other systems reviewed and are negative.          Objective   VITAL SIGNS:   Vitals:    03/18/21 1457   BP: 159/98   Pulse: 92   Resp: 16   Temp: 98.2 °F (36.8 °C)   TempSrc: Temporal   SpO2: 97%  Comment: RA   Weight: 88.2 kg (194 lb 8 oz)   Height: 167.6 cm (65.98\")   PainSc: 0-No pain        Karnofsky score: 80      Physical Exam:   Physical Exam  Vitals and nursing note reviewed.   Constitutional:       General: She is not in acute distress.     Appearance: She is well-developed.   HENT:      Head: Normocephalic and atraumatic.   Eyes:      Conjunctiva/sclera: Conjunctivae normal.      Pupils: Pupils are equal, round, and reactive to light.   Cardiovascular:      Rate and Rhythm: Normal rate and regular rhythm.      Heart sounds: No murmur heard.   No friction rub.   Pulmonary:      Effort: Pulmonary effort is normal.      Breath sounds: Normal breath sounds. No wheezing.      Comments: Mild hidradenitis in the right axilla and left axilla.  Palpable discomfort is elicited in the right lateral chest wall along the mid axillary line corresponding to ribs 7 or 8.  Abdominal:      General: Bowel sounds are normal. There is no distension.      Palpations: Abdomen is soft. There is no mass.      Tenderness: There is no abdominal tenderness.   Musculoskeletal:         General: Normal range of motion.      Cervical back: Normal range " of motion and neck supple.   Lymphadenopathy:      Cervical: No cervical adenopathy.   Skin:     General: Skin is warm and dry.   Neurological:      Mental Status: She is alert and oriented to person, place, and time.   Psychiatric:         Behavior: Behavior normal.         Thought Content: Thought content normal.         Judgment: Judgment normal.     IMAGING  I have personally reviewed the relevant imaging studies, as follows:  DEXA Bone Density Axial    Result Date: 1/21/2021  Bone mineral density results are within normal limits.  The ten year fracture risk assessment was not calculated because all T-scores were at or above -1.0.  All the treatment decisions require clinical judgment and consideration of individual patient factors, including patient preferences, co-morbidities, previous drug use, risk factors not captured in the FRAX model (frailty, falls, vitamin D deficiency, increased bone turnover, interval significant decline in bone density) and possible under or over estimation of fracture risk by FRAX. Approaches to reduce osteoporosis related fracture risk include optimizing calcium and vitamin D status, appropriate weight bearing exercises and fall-prevention measurements. The National Osteoporosis Foundation recommends (http://www.nof.org/hcp/practice/practice-and-clinical-guidelines/clinic ans-guide) that FDA-approved medical therapies be considered in postmenopausal women and men aged equal or greater than 50 years with : a) hip or vertebral (clinical or morphometric) fracture; b) T-score of -2.5 or less at the spine or hip; c) Ten-year fracture probability by FRAX of greater than 3% for hip fracture of greater than 20% for major osteoporotic fracture.   Secondary causes of bone loss should be evaluated if clinically indicated since the etiology of low BMD cannot be determined by BMD measurement alone.  FOLLOWUP: Consider repeating the study in 2-3 years to reassess the patient's status or sooner  if there is some new clinical indication.  INTERVAL CHANGE:   There were no equivalent studies available for comparison.    At this facility, the least significant change in the BMD at the left hip with 95% confidence is 0.584738 gm/cm2 at the hip and 0.579600 g/cm2 at the lumbar spine.  This report was finalized on 1/21/2021 4:29 PM by Dr. Irish Hansen MD.      CT Angiogram Chest    Result Date: 1/20/2021  1. No evidence of pulmonary embolic disease. 2. Minimal right pleural effusion, and nonspecific patchy bibasilar interstitial disease, possibly just dependent atelectasis. There is some feature overlap with COVID-19 pneumonia, although degree of suspicion for this is relatively low. 3. Interval development of miliary disease pattern of the lungs, worrisome for micrometastatic disease. 4. Pathologic, nondisplaced right seventh rib fracture.  D:  01/19/2021 E:  01/20/2021  This report was finalized on 1/20/2021 10:14 PM by Dr. Tee Echeverria MD.      CT Needle Biopsy Bone Superficial    Result Date: 2/24/2021   CT-guided biopsy of expansile appearing lateral aspect of right side rib #7  Plan:  Specimen(s) sent for evaluation. _______________________________________________________________  PROCEDURE SUMMARY: - Percutaneous CT-guided rib biopsy - Additional procedure(s): None  PROCEDURE DETAILS:  Pre-procedure Reference imaging for biopsy target: CT chest 1/19/2021 - right seventh rib fracture. ? Pathologic PET/CT 1/28/2021 - moderate hypermetabolic activity involving patient's known right rib fractures/lesion. Consent: Informed consent for the procedure including risks, benefits and alternatives was obtained and time-out was performed prior to the procedure. Preparation: The site was prepared and draped using maximal sterile barrier technique including cutaneous antisepsis.  Anesthesia/sedation Level of anesthesia/sedation: Moderate sedation (conscious sedation) Anesthesia/sedation administered by: Independent  trained observer under attending supervision with continuous monitoring of the patient?s level of consciousness and physiologic status Total intra-service sedation time (minutes): 40  Imaging prior to biopsy The patient was positioned supine. Initial imaging was performed using CT. Biopsy target: - Maximal diameter (cm): 1.8 - Location: Lateral aspect of right side seventh rib Other findings: None  Biopsy Local anesthesia was administered. Under CT guidance, the biopsy needle was advanced to the target and biopsy was performed.  Bonopty Coaxial needle: 14-gauge Bonopty Penetration Set Core needle biopsy device: Bonopty Biopsy Set Core needle size: 15-gauge Number of core specimens: One sample placed in formalin  Argon Coaxial needle: None Core needle biopsy device: Argon T-Jason Bone Marrow Biopsy Needle Core needle size: 11-gauge Number of core specimens: One sample placed in formalin  2 total core samples placed in formalin  On-site biopsy touch preparation: No  Additional sampling recommendations: None Preliminary assessment of sample adequacy: Not applicable  Needle removal The biopsy needle was removed and a sterile dressing was applied. Tract embolization: None  Imaging following biopsy Immediate post-biopsy imaging was performed using CT fluoroscopy. Post-biopsy imaging findings: No immediate complication  Contrast Contrast agent: None Contrast volume (mL): 0  Radiation Dose CT dose length product (mGy-cm): 784  Additional Details Additional description of procedure: None Equipment details: None Specimens removed: Biopsy samples as detailed above Estimated blood loss (mL): Less than 10 Standardized report: BiopsyCT  Attestation I was present and scrubbed for the entire procedure. Imaging reviewed. Agree with final report as written.  This report was finalized on 2/24/2021 10:51 AM by Merrick Alvarado.      NM Pet Skull Base To Mid Thigh    Result Date: 1/31/2021  1. Moderately hypermetabolic activity in the  patient's known right rib lesion, otherwise negative exam. 2. The patient's pulmonary micronodules are probably too small to register hypermetabolic activity even if they represent active disease. Continued imaging surveillance is suggested.  D:  01/28/2021 E:  01/28/2021  This report was finalized on 1/31/2021 9:50 AM by Dr. Tee Echeverria MD.      Mammo Screening Modified With Tomosynthesis Left With CAD    Result Date: 1/13/2021  No findings suspicious for malignancy.  ACR BI-RADS CATEGORY:  1, NEGATIVE  RECOMMENDATION: Yearly mammogram, yearly clinical breast exam, and encourage self breast awareness.  CAD was used.  The standard false negative rate of mammography is between 10% and 25%. Complex patterns or increased breast density will markedly elevate the false negative rate of mammography.  A letter, in lay terminology, with the results of this exam will be mailed to the patient.  At our facility, a triangular marker is positioned over a palpable area of concern indicated by the patient. A [place] marker is placed over a visible skin lesion. A linear marker indicates a scar.   If there is a palpable area of concern, biopsy should be considered regardless of imaging findings.    This report was finalized on 1/13/2021 11:54 AM by Dr. Sarahy Orr MD.      PATHOLOGY  Right Rib Biopsy 2/24/2021:  RIGHT RIB, CT GUIDED BIOPSY:  Langerhans cell histiocytosis (see Comment).       The following portions of the patient's history were reviewed and updated as appropriate: allergies, current medications, past family history, past medical history, past social history, past surgical history and problem list.    Assessment:   Assessment  Ms. Rodriguez is a 46-year-old female with localized Langerhans' cell histiocytosis involving the right seventh rib.  I spent approximately 45 minutes today with the patient discussing the role of involved field radiation therapy.  She will benefit from a dose of 20 Gray in 10 fractions using  3-dimensional techniques targeting the site of involvement along the right lateral ribs.  After flocculation of the risks and benefits, she was agreeable and signed informed consent.  I will aim to have her return to my clinic in approximately 1 week we will perform a CT simulation and treatment planning session, and I will aim to have her ready begin shortly thereafter pending insurance authorization.  With regards to her breast cancer, she had a mastectomy and no evidence of involved nodes or a large primary tumor, so additional radiation to the chest wall and lymphatics is not indicated.    RECOMMENDATIONS:    Plan to treat the right seventh rib to a dose of 20 Gray in 10 fractions using 3-dimensional techniques    Follow Up:   Return in about 1 week (around 3/25/2021) for Radiation Simulation.  Diagnoses and all orders for this visit:    1. Malignant neoplasm of overlapping sites of right female breast, unspecified estrogen receptor status (CMS/HCC) (Primary)  -     Ambulatory Referral to ONC Social Work    2. Langerhans cell histiocytosis of extranodal or solid organ sites (CMS/HCC)    Thank you for allowing me to participate in the care of this individual.    Sincerely,       Jerad Mondragon MD

## 2021-03-19 DIAGNOSIS — T45.1X5A NEUROPATHY DUE TO CHEMOTHERAPEUTIC DRUG (HCC): ICD-10-CM

## 2021-03-19 DIAGNOSIS — G62.0 NEUROPATHY DUE TO CHEMOTHERAPEUTIC DRUG (HCC): ICD-10-CM

## 2021-03-19 DIAGNOSIS — C50.811 MALIGNANT NEOPLASM OF OVERLAPPING SITES OF RIGHT BREAST IN FEMALE, ESTROGEN RECEPTOR POSITIVE (HCC): ICD-10-CM

## 2021-03-19 DIAGNOSIS — Z17.0 MALIGNANT NEOPLASM OF OVERLAPPING SITES OF RIGHT BREAST IN FEMALE, ESTROGEN RECEPTOR POSITIVE (HCC): ICD-10-CM

## 2021-03-19 RX ORDER — DULOXETIN HYDROCHLORIDE 60 MG/1
CAPSULE, DELAYED RELEASE ORAL
Qty: 30 CAPSULE | Refills: 0 | Status: SHIPPED | OUTPATIENT
Start: 2021-03-19 | End: 2021-04-21

## 2021-03-26 ENCOUNTER — HOSPITAL ENCOUNTER (OUTPATIENT)
Dept: RADIATION ONCOLOGY | Facility: HOSPITAL | Age: 46
Discharge: HOME | End: 2021-03-26

## 2021-03-26 PROCEDURE — 77280 THER RAD SIMULAJ FIELD SMPL: CPT | Performed by: RADIOLOGY

## 2021-03-29 ENCOUNTER — TELEPHONE (OUTPATIENT)
Dept: SOCIAL WORK | Facility: HOSPITAL | Age: 46
End: 2021-03-29

## 2021-03-29 ENCOUNTER — HOSPITAL ENCOUNTER (OUTPATIENT)
Dept: ULTRASOUND IMAGING | Facility: HOSPITAL | Age: 46
Discharge: HOME | End: 2021-03-29
Admitting: INTERNAL MEDICINE

## 2021-03-29 DIAGNOSIS — R74.8 ELEVATED ALKALINE PHOSPHATASE LEVEL: ICD-10-CM

## 2021-03-29 PROCEDURE — 76705 ECHO EXAM OF ABDOMEN: CPT

## 2021-03-29 NOTE — TELEPHONE ENCOUNTER
SW called pt to address her recent distress screening.  LVM requesting a call back.  SW available for ongoing support and resource needs.

## 2021-03-30 PROCEDURE — 77307 TELETHX ISODOSE PLAN CPLX: CPT | Performed by: RADIOLOGY

## 2021-03-30 PROCEDURE — 77334 RADIATION TREATMENT AID(S): CPT | Performed by: RADIOLOGY

## 2021-04-01 ENCOUNTER — HOSPITAL ENCOUNTER (OUTPATIENT)
Dept: RADIATION ONCOLOGY | Facility: HOSPITAL | Age: 46
Setting detail: RADIATION/ONCOLOGY SERIES
Discharge: HOME | End: 2021-04-01

## 2021-04-05 ENCOUNTER — HOSPITAL ENCOUNTER (OUTPATIENT)
Dept: RADIATION ONCOLOGY | Facility: HOSPITAL | Age: 46
Discharge: HOME | End: 2021-04-05

## 2021-04-05 PROCEDURE — 77280 THER RAD SIMULAJ FIELD SMPL: CPT | Performed by: RADIOLOGY

## 2021-04-06 ENCOUNTER — HOSPITAL ENCOUNTER (OUTPATIENT)
Dept: RADIATION ONCOLOGY | Facility: HOSPITAL | Age: 46
Discharge: HOME | End: 2021-04-06

## 2021-04-06 VITALS — BODY MASS INDEX: 31.91 KG/M2 | WEIGHT: 197.6 LBS

## 2021-04-06 PROCEDURE — 77412 RADIATION TX DELIVERY LVL 3: CPT | Performed by: RADIOLOGY

## 2021-04-06 PROCEDURE — 77387 GUIDANCE FOR RADJ TX DLVR: CPT | Performed by: RADIOLOGY

## 2021-04-07 ENCOUNTER — HOSPITAL ENCOUNTER (OUTPATIENT)
Dept: RADIATION ONCOLOGY | Facility: HOSPITAL | Age: 46
Discharge: HOME | End: 2021-04-07

## 2021-04-07 PROCEDURE — 77412 RADIATION TX DELIVERY LVL 3: CPT | Performed by: RADIOLOGY

## 2021-04-07 PROCEDURE — 77387 GUIDANCE FOR RADJ TX DLVR: CPT | Performed by: RADIOLOGY

## 2021-04-08 ENCOUNTER — HOSPITAL ENCOUNTER (OUTPATIENT)
Dept: RADIATION ONCOLOGY | Facility: HOSPITAL | Age: 46
Discharge: HOME | End: 2021-04-08

## 2021-04-08 PROCEDURE — 77412 RADIATION TX DELIVERY LVL 3: CPT | Performed by: RADIOLOGY

## 2021-04-08 PROCEDURE — 77387 GUIDANCE FOR RADJ TX DLVR: CPT | Performed by: RADIOLOGY

## 2021-04-09 ENCOUNTER — HOSPITAL ENCOUNTER (OUTPATIENT)
Dept: RADIATION ONCOLOGY | Facility: HOSPITAL | Age: 46
Discharge: HOME | End: 2021-04-09

## 2021-04-09 PROCEDURE — 77387 GUIDANCE FOR RADJ TX DLVR: CPT | Performed by: RADIOLOGY

## 2021-04-09 PROCEDURE — 77336 RADIATION PHYSICS CONSULT: CPT | Performed by: RADIOLOGY

## 2021-04-09 PROCEDURE — 77412 RADIATION TX DELIVERY LVL 3: CPT | Performed by: RADIOLOGY

## 2021-04-12 ENCOUNTER — HOSPITAL ENCOUNTER (OUTPATIENT)
Dept: RADIATION ONCOLOGY | Facility: HOSPITAL | Age: 46
Discharge: HOME | End: 2021-04-12

## 2021-04-12 PROCEDURE — 77387 GUIDANCE FOR RADJ TX DLVR: CPT | Performed by: RADIOLOGY

## 2021-04-12 PROCEDURE — 77412 RADIATION TX DELIVERY LVL 3: CPT | Performed by: RADIOLOGY

## 2021-04-13 ENCOUNTER — HOSPITAL ENCOUNTER (OUTPATIENT)
Dept: RADIATION ONCOLOGY | Facility: HOSPITAL | Age: 46
Discharge: HOME | End: 2021-04-13

## 2021-04-13 VITALS — WEIGHT: 200.1 LBS | BODY MASS INDEX: 32.31 KG/M2

## 2021-04-13 PROCEDURE — 77412 RADIATION TX DELIVERY LVL 3: CPT | Performed by: RADIOLOGY

## 2021-04-13 PROCEDURE — 77387 GUIDANCE FOR RADJ TX DLVR: CPT | Performed by: RADIOLOGY

## 2021-04-14 ENCOUNTER — HOSPITAL ENCOUNTER (OUTPATIENT)
Dept: RADIATION ONCOLOGY | Facility: HOSPITAL | Age: 46
Discharge: HOME | End: 2021-04-14

## 2021-04-14 PROCEDURE — 77387 GUIDANCE FOR RADJ TX DLVR: CPT | Performed by: RADIOLOGY

## 2021-04-14 PROCEDURE — 77412 RADIATION TX DELIVERY LVL 3: CPT | Performed by: RADIOLOGY

## 2021-04-15 ENCOUNTER — HOSPITAL ENCOUNTER (OUTPATIENT)
Dept: RADIATION ONCOLOGY | Facility: HOSPITAL | Age: 46
Discharge: HOME | End: 2021-04-15

## 2021-04-15 PROCEDURE — 77387 GUIDANCE FOR RADJ TX DLVR: CPT | Performed by: RADIOLOGY

## 2021-04-15 PROCEDURE — 77412 RADIATION TX DELIVERY LVL 3: CPT | Performed by: RADIOLOGY

## 2021-04-16 ENCOUNTER — HOSPITAL ENCOUNTER (OUTPATIENT)
Dept: RADIATION ONCOLOGY | Facility: HOSPITAL | Age: 46
Discharge: HOME | End: 2021-04-16

## 2021-04-16 PROCEDURE — 77336 RADIATION PHYSICS CONSULT: CPT | Performed by: RADIOLOGY

## 2021-04-16 PROCEDURE — 77387 GUIDANCE FOR RADJ TX DLVR: CPT | Performed by: RADIOLOGY

## 2021-04-16 PROCEDURE — 77412 RADIATION TX DELIVERY LVL 3: CPT | Performed by: RADIOLOGY

## 2021-04-19 ENCOUNTER — HOSPITAL ENCOUNTER (OUTPATIENT)
Dept: RADIATION ONCOLOGY | Facility: HOSPITAL | Age: 46
Discharge: HOME | End: 2021-04-19

## 2021-04-19 DIAGNOSIS — C96.6: Primary | ICD-10-CM

## 2021-04-19 PROCEDURE — 77412 RADIATION TX DELIVERY LVL 3: CPT | Performed by: RADIOLOGY

## 2021-04-19 PROCEDURE — 77387 GUIDANCE FOR RADJ TX DLVR: CPT | Performed by: RADIOLOGY

## 2021-04-20 DIAGNOSIS — Z17.0 MALIGNANT NEOPLASM OF OVERLAPPING SITES OF RIGHT BREAST IN FEMALE, ESTROGEN RECEPTOR POSITIVE (HCC): ICD-10-CM

## 2021-04-20 DIAGNOSIS — C50.811 MALIGNANT NEOPLASM OF OVERLAPPING SITES OF RIGHT BREAST IN FEMALE, ESTROGEN RECEPTOR POSITIVE (HCC): ICD-10-CM

## 2021-04-20 DIAGNOSIS — G62.0 NEUROPATHY DUE TO CHEMOTHERAPEUTIC DRUG (HCC): ICD-10-CM

## 2021-04-20 DIAGNOSIS — T45.1X5A NEUROPATHY DUE TO CHEMOTHERAPEUTIC DRUG (HCC): ICD-10-CM

## 2021-04-21 RX ORDER — DULOXETIN HYDROCHLORIDE 60 MG/1
CAPSULE, DELAYED RELEASE ORAL
Qty: 30 CAPSULE | Refills: 5 | Status: SHIPPED | OUTPATIENT
Start: 2021-04-21 | End: 2021-12-02

## 2021-04-29 ENCOUNTER — TELEPHONE (OUTPATIENT)
Dept: ONCOLOGY | Facility: CLINIC | Age: 46
End: 2021-04-29

## 2021-04-29 NOTE — RADIATION COMPLETION NOTES
DATE OF COMPLETION: 4/19/2021  DIAGNOSIS: Langerhans Cell Histiocytosis, limited    REFERRING: Jolene Connell MD        Josefina Rolon completed radiation therapy today.      BACKGROUND: Josefina Rodriguez is a 46-year-old female who just recently completed treatment for a locally advanced breast cancer, and was unfortunately found to have an abnormal area of hypermetabolism corresponding to the right ribs that a biopsy is consistent with a Langerhans' cell histiocytosis.  This is a solitary site of disease, and she completed a course of definitive radiation therapy as detailed below:    Treatment Summary     Dates of Therapy: 4/6/2021-4/19/2021  Treatment Site: Right 7th rib  Dose: 20 Gy in 10 fractions of 2 Gy each  Technique: Oblique photon fields with 6 MV energy    Treatment Course and Tolerance: She tolerated radiation treatments very well.  Her pain resolved within the first 2 to 3 days of treatment, and she had no other radiation related toxicities.    The initial follow up visit will be in 1 month.    Josefina Rodriguez knows to call if any problems or concerns develop in the meantime.     Electronically signed by: Jerad Mondragon MD                    Cc: Nia Higuera MD

## 2021-04-29 NOTE — TELEPHONE ENCOUNTER
Stacy from CT called about pt's scans scheduled for Monday. She needs clarification on the order. Order says to do a high res CT but without contrast which, per Stacy, is two different things. She needs verification on which is to be done. She states we can call her to clarify or change it in Epic ourselves. She can be reached at

## 2021-04-29 NOTE — TELEPHONE ENCOUNTER
I talked with Dr. Connell and called Stacy back to tell her we wanted  High res CT scan of chest.  Stacy stated she would put it in as a verbal order from Dr Connell.

## 2021-05-03 ENCOUNTER — HOSPITAL ENCOUNTER (OUTPATIENT)
Dept: CT IMAGING | Facility: HOSPITAL | Age: 46
Discharge: HOME | End: 2021-05-03
Admitting: INTERNAL MEDICINE

## 2021-05-03 DIAGNOSIS — J84.82: ICD-10-CM

## 2021-05-03 PROCEDURE — 71250 CT THORAX DX C-: CPT

## 2021-05-05 ENCOUNTER — HOSPITAL ENCOUNTER (OUTPATIENT)
Dept: ONCOLOGY | Facility: HOSPITAL | Age: 46
Setting detail: INFUSION SERIES
Discharge: HOME | End: 2021-05-05

## 2021-05-05 ENCOUNTER — OFFICE VISIT (OUTPATIENT)
Dept: ONCOLOGY | Facility: CLINIC | Age: 46
End: 2021-05-05

## 2021-05-05 VITALS
BODY MASS INDEX: 32.95 KG/M2 | TEMPERATURE: 97.5 F | RESPIRATION RATE: 18 BRPM | OXYGEN SATURATION: 98 % | HEIGHT: 66 IN | HEART RATE: 79 BPM | SYSTOLIC BLOOD PRESSURE: 145 MMHG | WEIGHT: 205 LBS | DIASTOLIC BLOOD PRESSURE: 93 MMHG

## 2021-05-05 DIAGNOSIS — Z17.0 MALIGNANT NEOPLASM OF OVERLAPPING SITES OF RIGHT BREAST IN FEMALE, ESTROGEN RECEPTOR POSITIVE (HCC): Primary | ICD-10-CM

## 2021-05-05 DIAGNOSIS — C96.6: ICD-10-CM

## 2021-05-05 DIAGNOSIS — C50.811 MALIGNANT NEOPLASM OF OVERLAPPING SITES OF RIGHT BREAST IN FEMALE, ESTROGEN RECEPTOR POSITIVE (HCC): ICD-10-CM

## 2021-05-05 DIAGNOSIS — C50.811 MALIGNANT NEOPLASM OF OVERLAPPING SITES OF RIGHT BREAST IN FEMALE, ESTROGEN RECEPTOR POSITIVE (HCC): Primary | ICD-10-CM

## 2021-05-05 DIAGNOSIS — Z17.0 MALIGNANT NEOPLASM OF OVERLAPPING SITES OF RIGHT BREAST IN FEMALE, ESTROGEN RECEPTOR POSITIVE (HCC): ICD-10-CM

## 2021-05-05 DIAGNOSIS — G62.9 NEUROPATHY: ICD-10-CM

## 2021-05-05 DIAGNOSIS — C50.811 MALIGNANT NEOPLASM OF OVERLAPPING SITES OF RIGHT FEMALE BREAST, UNSPECIFIED ESTROGEN RECEPTOR STATUS (HCC): ICD-10-CM

## 2021-05-05 PROCEDURE — 25010000002 HEPARIN LOCK FLUSH PER 10 UNITS: Performed by: INTERNAL MEDICINE

## 2021-05-05 PROCEDURE — 96523 IRRIG DRUG DELIVERY DEVICE: CPT

## 2021-05-05 PROCEDURE — 99214 OFFICE O/P EST MOD 30 MIN: CPT | Performed by: INTERNAL MEDICINE

## 2021-05-05 RX ORDER — PREGABALIN 150 MG/1
150 CAPSULE ORAL 2 TIMES DAILY
Qty: 60 CAPSULE | Refills: 3 | Status: SHIPPED | OUTPATIENT
Start: 2021-05-05 | End: 2021-09-14

## 2021-05-05 RX ORDER — NICOTINE 21 MG/24HR
1 PATCH, TRANSDERMAL 24 HOURS TRANSDERMAL EVERY 24 HOURS
Qty: 28 EACH | Refills: 2 | Status: SHIPPED | OUTPATIENT
Start: 2021-05-05 | End: 2021-09-02

## 2021-05-05 RX ORDER — HEPARIN SODIUM (PORCINE) LOCK FLUSH IV SOLN 100 UNIT/ML 100 UNIT/ML
500 SOLUTION INTRAVENOUS AS NEEDED
Status: DISCONTINUED | OUTPATIENT
Start: 2021-05-05 | End: 2021-05-06 | Stop reason: HOSPADM

## 2021-05-05 RX ORDER — HEPARIN SODIUM (PORCINE) LOCK FLUSH IV SOLN 100 UNIT/ML 100 UNIT/ML
500 SOLUTION INTRAVENOUS AS NEEDED
Status: CANCELLED | OUTPATIENT
Start: 2021-05-05

## 2021-05-05 RX ADMIN — HEPARIN 500 UNITS: 100 SYRINGE at 11:54

## 2021-05-05 NOTE — PROGRESS NOTES
PROBLEM LIST:  1. [id number] ER+ VT+ Her2+ invasive ductal carcinoma of the right breast  A) presented with a mass on self-exam.  Mammogram 1/7/20 showed a 4.1 cm mass in the right breast, with 2 adjacent smaller masses.  1.9 cm right axillary lymph node.   FNA of lymph node negative.  Biopsy of right breast mass showed grade 2 IDC, Er 95%, VT 2%, Her2 3+.  B) neoadjuvant chemotherapy with TCHP started 2/5/2020  C) right mastectomy on 6/23/20.  Pathology showed a 2 x 1 x 0.6 cm intermediate grade IDC.  0/2 SLN involved.  [id number]  D) adjuvant Kadcyla x 1 year started 7/22/2020.   Kadcyla stopped October 14, 2020 and switched back to Herceptin and Perjeta due to progressive severe neuropathy.  E) anastrozole started 08/12/2020.   2. PCOS  3. Anxiety/depression  4. DM2  5. GERD  6. Hyperlipidemia  7. Hypertension  8.  Hidradenitis  9.  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  CT chest 1/19/2021 showed a pathologic nondisplaced fracture of the right seventh rib.  There were also subtle small nodules in the lungs, none larger than 4 mm.  PET/CT on 1/28/2021 showed SUV of 4.8 in the right rib fracture.  Pulmonary micronodules too small to register hypermetabolic activity.  Biopsy of the rib lesion 2/24/2021 showed Langerhans' cell histiocytosis.    Subjective     CHIEF COMPLAINT: breast cancer    HISTORY OF PRESENT ILLNESS:   Josefina Hogan Michael returns for follow-up.  Her last cigarette was over 3 weeks ago.  She is using the nicotine patch and feels ready to go down on the dose of this.  She keeps a pack of cigarettes on her desk which helps to remind her not to smoke.  She has not had any cough.  She has gained some weight.  She is drinking coffee and also eating sunflower seeds to help her with staying away from cigarettes.  She is not able to exercise as much as she would like because of the neuropathy.  The pain  "really bothers her by the end of the day and it also is affecting her hands.          Objective      /93   Pulse 79   Temp 97.5 °F (36.4 °C)   Resp 18   Ht 167.6 cm (66\")   Wt 93 kg (205 lb)   SpO2 98%   BMI 33.09 kg/m²    Vitals:    05/05/21 1013   PainSc: 3  Comment: Hands and feet x4 weeks               Performance Status: 0    General: well appearing female in no acute distress  Neuro: alert and oriented  HEENT: sclera anicteric  Pulmonary: Lungs are clear to auscultation bilaterally  Cardiovascular: Regular rate and rhythm no murmurs  Extremeties: no lower extremity edema  Skin: no rashes, lesions, bruising, or petechiae  Psych: mood and affect appropriate      RECENT LABS:  Lab Results   Component Value Date    WBC 11.19 (H) 02/24/2021    HGB 13.5 02/24/2021    HCT 39.7 02/24/2021    MCV 90.8 02/24/2021     (L) 02/24/2021     Lab Results   Component Value Date    GLUCOSE 119 (H) 01/28/2021    BUN 12 01/28/2021    CREATININE 0.54 (L) 01/28/2021    EGFRIFNONA 123 01/28/2021    BCR 22.2 01/28/2021    K 4.8 01/28/2021    CO2 28.0 01/28/2021    CALCIUM 9.8 01/28/2021    ALBUMIN 3.80 01/28/2021    AST 25 01/28/2021    ALT 24 01/28/2021           CT Chest Hi Resolution Diagnostic  Narrative: EXAMINATION: CT CHEST, HIGH RESOLUTION, DIAGNOSTIC-05/03/2021:      INDICATION: PULMONARY LANGERHANS; J84.82-Adult pulmonary Langerhans cell  histiocytosis, history of breast cancer.     TECHNIQUE: Multiple axial CT imaging was obtained of the chest without  the administration of intravenous contrast according to the  high-resolution protocol. Thin-section imaging was obtained at 1 mm  slices every 10 mm.     The radiation dose reduction device was turned on for each scan per the  ALARA (As Low as Reasonably Achievable) protocol.     COMPARISON: 01/19/2021.     FINDINGS: There has been improvement seen in the soft tissue abnormality  surrounding the right lateral seventh rib with healing and callus  formation " seen surrounding the previously noted fracture. There are mild  bronchiectatic changes identified centrally. The small nodules seen  diffusely throughout the lung fields are essentially stable and  unchanged when compared to the prior study. The markings at the lung  bases have improved with no pleural effusion identified on today's  examination. The patient again is status post right mastectomy. The  mediastinum is unremarkable. No pericardial effusion. No interseptal  thickening or chronic interstitial fibrotic lung disease. No bulky hilar  or axillary lymphadenopathy. The visualized upper abdomen is  unremarkable.     Impression: Interval improvement seen in the appearance of the right  lateral ribs and surrounding soft tissue. The tiny nodules are stable  with no superimposed infectious process. Bronchiectatic changes  centrally within the lung fields with the patient again status post  right mastectomy. No evidence of progression.     D:  05/03/2021  E:  05/03/2021           This report was finalized on 5/3/2021 4:31 PM by Dr. Irish Hansen MD.       I personally reviewed the imaging studies    Assessment/Plan   Josefina Rodriguez is a 46 y.o. year old female with stage IB ER+ Her2+ IDC of the right breast, as well as a diagnosis of Langerhans histiocytosis involving the lung as well as a single rib lesion.    Breast cancer: She has completed adjuvant therapy with Herceptin and Perjeta.  She will continue on anastrozole along with Lupron for ovarian suppression.    Langerhans histiocytosis: CT chest shows improvement in the rib lesion.  Stable small lung nodules.  We again discussed the importance of avoiding cigarettes.  I am very pleased with the progress that she has made so far.    Smoking cessation: New prescription for nicotine patch 14 mg sent.  We will work on gradually tapering this.    Neuropathy:  Continue Cymbalta.  We will try increasing her Lyrica to 150 mg twice daily.    We will  plan to repeat imaging again in about 3 months.  I will see her back at that time.                    Jolene Connell MD  [facility] Hematology and Oncology    5/5/2021          CC:

## 2021-05-18 DIAGNOSIS — Z17.0 MALIGNANT NEOPLASM OF OVERLAPPING SITES OF RIGHT BREAST IN FEMALE, ESTROGEN RECEPTOR POSITIVE (HCC): Primary | ICD-10-CM

## 2021-05-18 DIAGNOSIS — C50.811 MALIGNANT NEOPLASM OF OVERLAPPING SITES OF RIGHT BREAST IN FEMALE, ESTROGEN RECEPTOR POSITIVE (HCC): Primary | ICD-10-CM

## 2021-05-18 DIAGNOSIS — Z17.0 MALIGNANT NEOPLASM OF OVERLAPPING SITES OF RIGHT BREAST IN FEMALE, ESTROGEN RECEPTOR POSITIVE (HCC): ICD-10-CM

## 2021-05-18 DIAGNOSIS — C50.811 MALIGNANT NEOPLASM OF OVERLAPPING SITES OF RIGHT BREAST IN FEMALE, ESTROGEN RECEPTOR POSITIVE (HCC): ICD-10-CM

## 2021-05-19 ENCOUNTER — HOSPITAL ENCOUNTER (OUTPATIENT)
Dept: RADIATION ONCOLOGY | Facility: HOSPITAL | Age: 46
Setting detail: RADIATION/ONCOLOGY SERIES
Discharge: HOME | End: 2021-05-19

## 2021-05-19 ENCOUNTER — OFFICE VISIT (OUTPATIENT)
Dept: RADIATION ONCOLOGY | Facility: HOSPITAL | Age: 46
End: 2021-05-19

## 2021-05-19 ENCOUNTER — HOSPITAL ENCOUNTER (OUTPATIENT)
Dept: ONCOLOGY | Facility: HOSPITAL | Age: 46
Setting detail: INFUSION SERIES
Discharge: HOME | End: 2021-05-19

## 2021-05-19 VITALS
OXYGEN SATURATION: 98 % | DIASTOLIC BLOOD PRESSURE: 86 MMHG | HEIGHT: 66 IN | WEIGHT: 208 LBS | RESPIRATION RATE: 16 BRPM | TEMPERATURE: 98 F | SYSTOLIC BLOOD PRESSURE: 143 MMHG | BODY MASS INDEX: 33.43 KG/M2 | HEART RATE: 89 BPM

## 2021-05-19 VITALS
DIASTOLIC BLOOD PRESSURE: 93 MMHG | TEMPERATURE: 97.7 F | WEIGHT: 208 LBS | RESPIRATION RATE: 20 BRPM | HEIGHT: 66 IN | SYSTOLIC BLOOD PRESSURE: 135 MMHG | BODY MASS INDEX: 33.43 KG/M2 | HEART RATE: 94 BPM

## 2021-05-19 DIAGNOSIS — Z17.0 MALIGNANT NEOPLASM OF OVERLAPPING SITES OF RIGHT BREAST IN FEMALE, ESTROGEN RECEPTOR POSITIVE (HCC): Primary | ICD-10-CM

## 2021-05-19 DIAGNOSIS — Z17.0 MALIGNANT NEOPLASM OF OVERLAPPING SITES OF RIGHT BREAST IN FEMALE, ESTROGEN RECEPTOR POSITIVE (HCC): ICD-10-CM

## 2021-05-19 DIAGNOSIS — C50.811 MALIGNANT NEOPLASM OF OVERLAPPING SITES OF RIGHT BREAST IN FEMALE, ESTROGEN RECEPTOR POSITIVE (HCC): Primary | ICD-10-CM

## 2021-05-19 DIAGNOSIS — C50.811 MALIGNANT NEOPLASM OF OVERLAPPING SITES OF RIGHT BREAST IN FEMALE, ESTROGEN RECEPTOR POSITIVE (HCC): ICD-10-CM

## 2021-05-19 DIAGNOSIS — C96.6: Primary | ICD-10-CM

## 2021-05-19 PROCEDURE — 25010000002 LEUPROLIDE 22.5 MG KIT: Performed by: INTERNAL MEDICINE

## 2021-05-19 PROCEDURE — 96402 CHEMO HORMON ANTINEOPL SQ/IM: CPT

## 2021-05-19 RX ADMIN — LEUPROLIDE ACETATE 22.5 MG: KIT SUBCUTANEOUS at 14:59

## 2021-05-19 NOTE — PROGRESS NOTES
FOLLOW UP NOTE    PATIENT:                                                      Josefina Rodriguez  MEDICAL RECORD #:                        [id number]  :                                                          1975  COMPLETION DATE:   2021  DIAGNOSIS:     Localized Langerhan's Cell Histiocytosis     Malignant neoplasm of overlapping sites of right breast in female, estrogen receptor positive (CMS/HCC)  - Stage IB (cT2, cN0, cM0, G2, ER+, CO+, HER2+)      BRIEF HISTORY:  Josefina Rodriguez is a 46 y.o. female with past medical history significant for locally advanced triple positive IDC of the right breast, status post neoadjuvant chemotherapy, right-sided mastectomy, and completion of adjuvant therapy with Herceptin and Perjeta.  She continues on anastrozole along with Lupron for ovarian suppression.  She recently presented with right chest wall pain, and was found to have a pathologic nondisplaced fracture of the right seventh rib which was the solitary site of hypermetabolism on PET/CT scan.  There were also subtle pulmonary micronodules too small to register hypermetabolic activity.  She underwent biopsy of the right rib lesion, with pathology demonstrating Langerhan's cell histiocytosis.  She underwent definitive radiotherapy to the right seventh rib to a dose of 20 Gray in 10 fractions.  She tolerated treatment well.  She reports pain resolved within the first few days of treatment and she has had no further complaints of pain.  She did not develop significant fatigue beyond baseline.  She continues with some stable numbness of her right lateral chest wall which she relates to her previous mastectomy.  She otherwise denies shortness of air, cough, fever, chills, or new/progressive complaints today.  She is proud to report that she is 5 weeks without a cigarette.  She is utilizing nicotine patches and lozenges, and verbalizes continued motivation towards complete cessation.  Overall, she feels  "well.        MEDICATIONS: Medication reconciliation for the patient was reviewed and confirmed in the electronic medical record.    Review of Systems   Constitutional: Positive for fatigue.   Neurological: Positive for numbness (peripheral neuropathy 2/2 chemo, numbness right anteriolateral chest wall).   All other systems reviewed and are negative.      KPS 90%    Physical Exam  Vitals and nursing note reviewed.   Constitutional:       General: She is not in acute distress.     Appearance: Normal appearance. She is well-developed.   HENT:      Head: Normocephalic and atraumatic.   Eyes:      Conjunctiva/sclera: Conjunctivae normal.      Pupils: Pupils are equal, round, and reactive to light.   Cardiovascular:      Rate and Rhythm: Normal rate and regular rhythm.      Heart sounds: No murmur heard.   No friction rub.   Pulmonary:      Effort: Pulmonary effort is normal.      Breath sounds: Normal breath sounds. No wheezing.      Comments: Mild hidradenitis in the right axilla and left axilla.   Chest:      Comments: Right breast is surgically absent.   Abdominal:      General: Bowel sounds are normal. There is no distension.      Palpations: Abdomen is soft. There is no mass.      Tenderness: There is no abdominal tenderness.   Musculoskeletal:         General: Normal range of motion.      Cervical back: Normal range of motion and neck supple.      Comments: No focal bony tenderness upon palpation.   Lymphadenopathy:      Cervical: No cervical adenopathy.   Skin:     General: Skin is warm and dry.   Neurological:      Mental Status: She is alert and oriented to person, place, and time.   Psychiatric:         Behavior: Behavior normal.         Thought Content: Thought content normal.         Judgment: Judgment normal.         VITAL SIGNS:   Vitals:    05/19/21 1511   BP: 143/86   Pulse: 89   Resp: 16   Temp: 98 °F (36.7 °C)   SpO2: 98%  Comment: RA   Weight: 94.3 kg (208 lb)   Height: 167.6 cm (66\")   PainSc: 0-No " pain       IMAGING:  I have personally reviewed the following imaging study:    CT chest 5/3/2021:     FINDINGS: There has been improvement seen in the soft tissue abnormality  surrounding the right lateral seventh rib with healing and callus  formation seen surrounding the previously noted fracture. There are mild  bronchiectatic changes identified centrally. The small nodules seen  diffusely throughout the lung fields are essentially stable and  unchanged when compared to the prior study. The markings at the lung  bases have improved with no pleural effusion identified on today's  examination. The patient again is status post right mastectomy. The  mediastinum is unremarkable. No pericardial effusion. No interseptal  thickening or chronic interstitial fibrotic lung disease. No bulky hilar  or axillary lymphadenopathy. The visualized upper abdomen is  unremarkable.     IMPRESSION:  Interval improvement seen in the appearance of the right  lateral ribs and surrounding soft tissue. The tiny nodules are stable  with no superimposed infectious process. Bronchiectatic changes  centrally within the lung fields with the patient again status post  right mastectomy. No evidence of progression.       This report was finalized on 5/3/2021 4:31 PM by Dr. Irish Hansen MD.        The following portions of the patient's history were reviewed and updated as appropriate: allergies, current medications, past family history, past medical history, past social history, past surgical history and problem list.         Diagnoses and all orders for this visit:    1. Langerhans cell histiocytosis of extranodal or solid organ sites (CMS/HCC) (Primary)    2. Malignant neoplasm of overlapping sites of right breast in female, estrogen receptor positive (CMS/HCC)         IMPRESSION:  Ms. Rodriguez is a 46-year-old female with history of locally advanced right-sided triple positive breast cancer, and more recently a diagnosis of localized  Langerhan's cell histiocytosis involving the right seventh rib and no other site of regional or distant disease.  She is now 1 month status post involved field radiation therapy which she tolerated well.  She did not develop significant acute radiation-related toxicities.  She has had an excellent clinical response with complete palliation of pain.  On repeat CT scan of the chest, there is evidence of interval improvement within the right seventh rib and surrounding soft tissue.  The tiny lung nodules appear stable and unchanged bilaterally.  There is no evidence of new or progressive disease.  She is scheduled to return to Dr. Connell in 3 months with repeat imaging.  We reviewed follow-up intervals, surveillance with serial imaging and serum lab work, and continued expectations for response to treatment.  Continued efforts towards complete smoking cessation were discussed/congratulated.      RECOMMENDATIONS:  We will schedule her to return in 3 months with Dr. Mondragon, following her next CT scan of the chest.    Return in about 3 months (around 8/12/2021) for Office Visit.    Narciso Power APRN

## 2021-06-16 ENCOUNTER — HOSPITAL ENCOUNTER (OUTPATIENT)
Dept: ONCOLOGY | Facility: HOSPITAL | Age: 46
Setting detail: INFUSION SERIES
Discharge: HOME | End: 2021-06-16

## 2021-06-16 VITALS
WEIGHT: 215 LBS | RESPIRATION RATE: 18 BRPM | BODY MASS INDEX: 34.55 KG/M2 | TEMPERATURE: 97.5 F | SYSTOLIC BLOOD PRESSURE: 165 MMHG | DIASTOLIC BLOOD PRESSURE: 93 MMHG | HEART RATE: 88 BPM | HEIGHT: 66 IN

## 2021-06-16 DIAGNOSIS — C50.811 MALIGNANT NEOPLASM OF OVERLAPPING SITES OF RIGHT BREAST IN FEMALE, ESTROGEN RECEPTOR POSITIVE (HCC): Primary | ICD-10-CM

## 2021-06-16 DIAGNOSIS — Z17.0 MALIGNANT NEOPLASM OF OVERLAPPING SITES OF RIGHT BREAST IN FEMALE, ESTROGEN RECEPTOR POSITIVE (HCC): Primary | ICD-10-CM

## 2021-06-16 DIAGNOSIS — C50.811 MALIGNANT NEOPLASM OF OVERLAPPING SITES OF RIGHT FEMALE BREAST, UNSPECIFIED ESTROGEN RECEPTOR STATUS (HCC): ICD-10-CM

## 2021-06-16 PROCEDURE — 96523 IRRIG DRUG DELIVERY DEVICE: CPT

## 2021-06-16 PROCEDURE — 25010000002 HEPARIN LOCK FLUSH PER 10 UNITS: Performed by: INTERNAL MEDICINE

## 2021-06-16 RX ORDER — HEPARIN SODIUM (PORCINE) LOCK FLUSH IV SOLN 100 UNIT/ML 100 UNIT/ML
500 SOLUTION INTRAVENOUS AS NEEDED
Status: CANCELLED | OUTPATIENT
Start: 2021-06-16

## 2021-06-16 RX ORDER — HEPARIN SODIUM (PORCINE) LOCK FLUSH IV SOLN 100 UNIT/ML 100 UNIT/ML
500 SOLUTION INTRAVENOUS AS NEEDED
Status: DISCONTINUED | OUTPATIENT
Start: 2021-06-16 | End: 2021-06-17 | Stop reason: HOSPADM

## 2021-06-16 RX ADMIN — HEPARIN 500 UNITS: 100 SYRINGE at 10:36

## 2021-07-14 ENCOUNTER — HOSPITAL ENCOUNTER (OUTPATIENT)
Dept: ONCOLOGY | Facility: HOSPITAL | Age: 46
Setting detail: INFUSION SERIES
Discharge: HOME | End: 2021-07-14

## 2021-07-14 VITALS
WEIGHT: 217 LBS | SYSTOLIC BLOOD PRESSURE: 168 MMHG | HEART RATE: 90 BPM | BODY MASS INDEX: 34.87 KG/M2 | RESPIRATION RATE: 16 BRPM | DIASTOLIC BLOOD PRESSURE: 95 MMHG | HEIGHT: 66 IN | TEMPERATURE: 97.4 F

## 2021-07-14 DIAGNOSIS — C50.811 MALIGNANT NEOPLASM OF OVERLAPPING SITES OF RIGHT BREAST IN FEMALE, ESTROGEN RECEPTOR POSITIVE (HCC): Primary | ICD-10-CM

## 2021-07-14 DIAGNOSIS — Z17.0 MALIGNANT NEOPLASM OF OVERLAPPING SITES OF RIGHT BREAST IN FEMALE, ESTROGEN RECEPTOR POSITIVE (HCC): Primary | ICD-10-CM

## 2021-07-14 PROCEDURE — 96523 IRRIG DRUG DELIVERY DEVICE: CPT

## 2021-07-14 PROCEDURE — 25010000002 HEPARIN LOCK FLUSH PER 10 UNITS: Performed by: INTERNAL MEDICINE

## 2021-07-14 PROCEDURE — 36591 DRAW BLOOD OFF VENOUS DEVICE: CPT

## 2021-07-14 RX ORDER — HEPARIN SODIUM (PORCINE) LOCK FLUSH IV SOLN 100 UNIT/ML 100 UNIT/ML
500 SOLUTION INTRAVENOUS AS NEEDED
Status: DISCONTINUED | OUTPATIENT
Start: 2021-07-14 | End: 2021-07-15 | Stop reason: HOSPADM

## 2021-07-14 RX ORDER — HEPARIN SODIUM (PORCINE) LOCK FLUSH IV SOLN 100 UNIT/ML 100 UNIT/ML
500 SOLUTION INTRAVENOUS AS NEEDED
Status: CANCELLED | OUTPATIENT
Start: 2021-07-14

## 2021-07-14 RX ADMIN — HEPARIN 500 UNITS: 100 SYRINGE at 10:37

## 2021-07-26 ENCOUNTER — HOSPITAL ENCOUNTER (OUTPATIENT)
Dept: CT IMAGING | Facility: HOSPITAL | Age: 46
Discharge: HOME | End: 2021-07-26
Admitting: INTERNAL MEDICINE

## 2021-07-26 DIAGNOSIS — C96.6: ICD-10-CM

## 2021-07-26 PROCEDURE — 71250 CT THORAX DX C-: CPT

## 2021-08-11 ENCOUNTER — TELEPHONE (OUTPATIENT)
Dept: ONCOLOGY | Facility: CLINIC | Age: 46
End: 2021-08-11

## 2021-08-11 NOTE — TELEPHONE ENCOUNTER
Caller: PT    Relationship to patient: SELF    Best call back number: [phone] OK TO LEAVE A  MSG     Chief complaint: HAS COVID    Type of visit: F/U     Requested date: AT LEAST TWO WEEKS OUT     If rescheduling, when is the original appointment: 8/12     Additional notes: PT WAS DIAGNOSED WITH COVID YESTERDAY NEEDS APPT MOVED TWO WEEKS OUT

## 2021-08-12 ENCOUNTER — APPOINTMENT (OUTPATIENT)
Dept: ONCOLOGY | Facility: HOSPITAL | Age: 46
End: 2021-08-12

## 2021-08-27 ENCOUNTER — HOSPITAL ENCOUNTER (OUTPATIENT)
Dept: RADIATION ONCOLOGY | Facility: HOSPITAL | Age: 46
Setting detail: RADIATION/ONCOLOGY SERIES
Discharge: HOME | End: 2021-08-27

## 2021-08-30 ENCOUNTER — HOSPITAL ENCOUNTER (OUTPATIENT)
Dept: GENERAL RADIOLOGY | Facility: HOSPITAL | Age: 46
Discharge: HOME | End: 2021-08-30
Admitting: PHYSICIAN ASSISTANT

## 2021-08-30 ENCOUNTER — TRANSCRIBE ORDERS (OUTPATIENT)
Dept: ADMINISTRATIVE | Facility: HOSPITAL | Age: 46
End: 2021-08-30

## 2021-08-30 DIAGNOSIS — R06.02 INCREASING SHORTNESS OF BREATH: Primary | ICD-10-CM

## 2021-08-30 DIAGNOSIS — R06.02 INCREASING SHORTNESS OF BREATH: ICD-10-CM

## 2021-08-30 PROCEDURE — 71046 X-RAY EXAM CHEST 2 VIEWS: CPT

## 2021-08-31 ENCOUNTER — HOSPITAL ENCOUNTER (OUTPATIENT)
Dept: CT IMAGING | Facility: HOSPITAL | Age: 46
Discharge: HOME | End: 2021-08-31

## 2021-08-31 ENCOUNTER — TRANSCRIBE ORDERS (OUTPATIENT)
Dept: ADMINISTRATIVE | Facility: HOSPITAL | Age: 46
End: 2021-08-31

## 2021-08-31 ENCOUNTER — APPOINTMENT (OUTPATIENT)
Dept: CT IMAGING | Facility: HOSPITAL | Age: 46
End: 2021-08-31

## 2021-08-31 ENCOUNTER — HOSPITAL ENCOUNTER (OUTPATIENT)
Dept: CARDIOLOGY | Facility: HOSPITAL | Age: 46
Discharge: HOME | End: 2021-08-31

## 2021-08-31 DIAGNOSIS — R79.89 POSITIVE D-DIMER: Primary | ICD-10-CM

## 2021-08-31 DIAGNOSIS — R79.89 POSITIVE D-DIMER: ICD-10-CM

## 2021-08-31 LAB
BH CV LOWER VASCULAR LEFT COMMON FEMORAL AUGMENT: NORMAL
BH CV LOWER VASCULAR LEFT COMMON FEMORAL COMPRESS: NORMAL
BH CV LOWER VASCULAR LEFT COMMON FEMORAL PHASIC: NORMAL
BH CV LOWER VASCULAR LEFT COMMON FEMORAL SPONT: NORMAL
BH CV LOWER VASCULAR LEFT DISTAL FEMORAL AUGMENT: NORMAL
BH CV LOWER VASCULAR LEFT DISTAL FEMORAL COMPRESS: NORMAL
BH CV LOWER VASCULAR LEFT DISTAL FEMORAL PHASIC: NORMAL
BH CV LOWER VASCULAR LEFT DISTAL FEMORAL SPONT: NORMAL
BH CV LOWER VASCULAR LEFT GASTRONEMIUS COMPRESS: NORMAL
BH CV LOWER VASCULAR LEFT GREATER SAPH AK COMPRESS: NORMAL
BH CV LOWER VASCULAR LEFT GREATER SAPH BK COMPRESS: NORMAL
BH CV LOWER VASCULAR LEFT MID FEMORAL AUGMENT: NORMAL
BH CV LOWER VASCULAR LEFT MID FEMORAL COMPRESS: NORMAL
BH CV LOWER VASCULAR LEFT MID FEMORAL PHASIC: NORMAL
BH CV LOWER VASCULAR LEFT MID FEMORAL SPONT: NORMAL
BH CV LOWER VASCULAR LEFT PERONEAL COMPRESS: NORMAL
BH CV LOWER VASCULAR LEFT POPLITEAL AUGMENT: NORMAL
BH CV LOWER VASCULAR LEFT POPLITEAL COMPRESS: NORMAL
BH CV LOWER VASCULAR LEFT POPLITEAL PHASIC: NORMAL
BH CV LOWER VASCULAR LEFT POPLITEAL SPONT: NORMAL
BH CV LOWER VASCULAR LEFT POSTERIOR TIBIAL COMPRESS: NORMAL
BH CV LOWER VASCULAR LEFT PROFUNDA FEMORAL AUGMENT: NORMAL
BH CV LOWER VASCULAR LEFT PROFUNDA FEMORAL COMPRESS: NORMAL
BH CV LOWER VASCULAR LEFT PROFUNDA FEMORAL PHASIC: NORMAL
BH CV LOWER VASCULAR LEFT PROFUNDA FEMORAL SPONT: NORMAL
BH CV LOWER VASCULAR LEFT PROXIMAL FEMORAL AUGMENT: NORMAL
BH CV LOWER VASCULAR LEFT PROXIMAL FEMORAL COMPRESS: NORMAL
BH CV LOWER VASCULAR LEFT PROXIMAL FEMORAL PHASIC: NORMAL
BH CV LOWER VASCULAR LEFT PROXIMAL FEMORAL SPONT: NORMAL
BH CV LOWER VASCULAR LEFT SAPHENOFEMORAL JUNCTION AUGMENT: NORMAL
BH CV LOWER VASCULAR LEFT SAPHENOFEMORAL JUNCTION COMPRESS: NORMAL
BH CV LOWER VASCULAR LEFT SAPHENOFEMORAL JUNCTION PHASIC: NORMAL
BH CV LOWER VASCULAR LEFT SAPHENOFEMORAL JUNCTION SPONT: NORMAL
BH CV LOWER VASCULAR RIGHT COMMON FEMORAL AUGMENT: NORMAL
BH CV LOWER VASCULAR RIGHT COMMON FEMORAL COMPRESS: NORMAL
BH CV LOWER VASCULAR RIGHT COMMON FEMORAL PHASIC: NORMAL
BH CV LOWER VASCULAR RIGHT COMMON FEMORAL SPONT: NORMAL
BH CV LOWER VASCULAR RIGHT DISTAL FEMORAL AUGMENT: NORMAL
BH CV LOWER VASCULAR RIGHT DISTAL FEMORAL COMPRESS: NORMAL
BH CV LOWER VASCULAR RIGHT DISTAL FEMORAL PHASIC: NORMAL
BH CV LOWER VASCULAR RIGHT DISTAL FEMORAL SPONT: NORMAL
BH CV LOWER VASCULAR RIGHT GREATER SAPH BK COMPRESS: NORMAL
BH CV LOWER VASCULAR RIGHT LESSER SAPH COMPRESS: NORMAL
BH CV LOWER VASCULAR RIGHT MID FEMORAL AUGMENT: NORMAL
BH CV LOWER VASCULAR RIGHT MID FEMORAL COMPRESS: NORMAL
BH CV LOWER VASCULAR RIGHT MID FEMORAL PHASIC: NORMAL
BH CV LOWER VASCULAR RIGHT MID FEMORAL SPONT: NORMAL
BH CV LOWER VASCULAR RIGHT PERONEAL COMPRESS: NORMAL
BH CV LOWER VASCULAR RIGHT POPLITEAL AUGMENT: NORMAL
BH CV LOWER VASCULAR RIGHT POPLITEAL COMPRESS: NORMAL
BH CV LOWER VASCULAR RIGHT POPLITEAL PHASIC: NORMAL
BH CV LOWER VASCULAR RIGHT POPLITEAL SPONT: NORMAL
BH CV LOWER VASCULAR RIGHT POSTERIOR TIBIAL COMPRESS: NORMAL
BH CV LOWER VASCULAR RIGHT PROFUNDA FEMORAL AUGMENT: NORMAL
BH CV LOWER VASCULAR RIGHT PROFUNDA FEMORAL COMPRESS: NORMAL
BH CV LOWER VASCULAR RIGHT PROFUNDA FEMORAL PHASIC: NORMAL
BH CV LOWER VASCULAR RIGHT PROFUNDA FEMORAL SPONT: NORMAL
BH CV LOWER VASCULAR RIGHT PROXIMAL FEMORAL AUGMENT: NORMAL
BH CV LOWER VASCULAR RIGHT PROXIMAL FEMORAL COMPRESS: NORMAL
BH CV LOWER VASCULAR RIGHT PROXIMAL FEMORAL PHASIC: NORMAL
BH CV LOWER VASCULAR RIGHT PROXIMAL FEMORAL SPONT: NORMAL
BH CV LOWER VASCULAR RIGHT SAPHENOFEMORAL JUNCTION AUGMENT: NORMAL
BH CV LOWER VASCULAR RIGHT SAPHENOFEMORAL JUNCTION COMPRESS: NORMAL
BH CV LOWER VASCULAR RIGHT SAPHENOFEMORAL JUNCTION PHASIC: NORMAL
BH CV LOWER VASCULAR RIGHT SAPHENOFEMORAL JUNCTION SPONT: NORMAL
MAXIMAL PREDICTED HEART RATE: 176 BPM
STRESS TARGET HR: 150 BPM

## 2021-08-31 PROCEDURE — 82565 ASSAY OF CREATININE: CPT

## 2021-08-31 PROCEDURE — 71275 CT ANGIOGRAPHY CHEST: CPT

## 2021-08-31 PROCEDURE — 0 IOPAMIDOL PER 1 ML: Performed by: INTERNAL MEDICINE

## 2021-08-31 PROCEDURE — 93970 EXTREMITY STUDY: CPT | Performed by: INTERNAL MEDICINE

## 2021-08-31 PROCEDURE — 93970 EXTREMITY STUDY: CPT

## 2021-08-31 RX ADMIN — IOPAMIDOL 100 ML: 755 INJECTION, SOLUTION INTRAVENOUS at 16:19

## 2021-09-02 ENCOUNTER — HOSPITAL ENCOUNTER (OUTPATIENT)
Dept: ONCOLOGY | Facility: HOSPITAL | Age: 46
Setting detail: INFUSION SERIES
Discharge: HOME | End: 2021-09-02

## 2021-09-02 ENCOUNTER — OFFICE VISIT (OUTPATIENT)
Dept: ONCOLOGY | Facility: CLINIC | Age: 46
End: 2021-09-02

## 2021-09-02 VITALS
RESPIRATION RATE: 16 BRPM | HEIGHT: 66 IN | SYSTOLIC BLOOD PRESSURE: 138 MMHG | TEMPERATURE: 98 F | BODY MASS INDEX: 35.36 KG/M2 | WEIGHT: 220 LBS | DIASTOLIC BLOOD PRESSURE: 104 MMHG | OXYGEN SATURATION: 95 % | HEART RATE: 111 BPM

## 2021-09-02 DIAGNOSIS — Z45.2 ENCOUNTER FOR CENTRAL LINE CARE: ICD-10-CM

## 2021-09-02 DIAGNOSIS — Z17.0 MALIGNANT NEOPLASM OF OVERLAPPING SITES OF RIGHT BREAST IN FEMALE, ESTROGEN RECEPTOR POSITIVE (HCC): Primary | ICD-10-CM

## 2021-09-02 DIAGNOSIS — Z95.828 PORT-A-CATH IN PLACE: ICD-10-CM

## 2021-09-02 DIAGNOSIS — C50.811 MALIGNANT NEOPLASM OF OVERLAPPING SITES OF RIGHT BREAST IN FEMALE, ESTROGEN RECEPTOR POSITIVE (HCC): Primary | ICD-10-CM

## 2021-09-02 DIAGNOSIS — C96.6: ICD-10-CM

## 2021-09-02 LAB — CREAT BLDA-MCNC: 0.6 MG/DL (ref 0.6–1.3)

## 2021-09-02 PROCEDURE — 96402 CHEMO HORMON ANTINEOPL SQ/IM: CPT

## 2021-09-02 PROCEDURE — 99215 OFFICE O/P EST HI 40 MIN: CPT | Performed by: INTERNAL MEDICINE

## 2021-09-02 PROCEDURE — 96372 THER/PROPH/DIAG INJ SC/IM: CPT

## 2021-09-02 PROCEDURE — 96523 IRRIG DRUG DELIVERY DEVICE: CPT

## 2021-09-02 PROCEDURE — 25010000002 LEUPROLIDE 22.5 MG KIT: Performed by: INTERNAL MEDICINE

## 2021-09-02 PROCEDURE — 25010000002 HEPARIN LOCK FLUSH PER 10 UNITS: Performed by: INTERNAL MEDICINE

## 2021-09-02 RX ORDER — CHLORHEXIDINE GLUCONATE 0.12 MG/ML
RINSE ORAL
COMMUNITY
Start: 2021-08-24 | End: 2021-12-02

## 2021-09-02 RX ORDER — HEPARIN SODIUM (PORCINE) LOCK FLUSH IV SOLN 100 UNIT/ML 100 UNIT/ML
500 SOLUTION INTRAVENOUS AS NEEDED
Status: DISCONTINUED | OUTPATIENT
Start: 2021-09-02 | End: 2021-09-03 | Stop reason: HOSPADM

## 2021-09-02 RX ORDER — LISINOPRIL 40 MG/1
40 TABLET ORAL DAILY
COMMUNITY
Start: 2021-06-11

## 2021-09-02 RX ORDER — SODIUM CHLORIDE 0.9 % (FLUSH) 0.9 %
10 SYRINGE (ML) INJECTION AS NEEDED
Status: CANCELLED | OUTPATIENT
Start: 2021-09-02

## 2021-09-02 RX ORDER — HEPARIN SODIUM (PORCINE) LOCK FLUSH IV SOLN 100 UNIT/ML 100 UNIT/ML
500 SOLUTION INTRAVENOUS AS NEEDED
Status: CANCELLED | OUTPATIENT
Start: 2021-09-02

## 2021-09-02 RX ORDER — SODIUM CHLORIDE 0.9 % (FLUSH) 0.9 %
20 SYRINGE (ML) INJECTION AS NEEDED
Status: CANCELLED | OUTPATIENT
Start: 2021-09-02

## 2021-09-02 RX ORDER — FLUCONAZOLE 150 MG/1
TABLET ORAL
COMMUNITY
Start: 2021-08-25 | End: 2022-11-03

## 2021-09-02 RX ORDER — ESCITALOPRAM OXALATE 10 MG/1
TABLET ORAL
COMMUNITY
Start: 2021-08-12 | End: 2022-08-11

## 2021-09-02 RX ORDER — PREDNISONE 10 MG/1
TABLET ORAL
COMMUNITY
Start: 2021-08-17 | End: 2021-09-02

## 2021-09-02 RX ORDER — POLYETHYLENE GLYCOL 3350 17 G
2 POWDER IN PACKET (EA) ORAL AS NEEDED
Qty: 168 EACH | Refills: 3 | Status: SHIPPED | OUTPATIENT
Start: 2021-09-02 | End: 2022-02-24 | Stop reason: SDUPTHER

## 2021-09-02 RX ORDER — FUROSEMIDE 20 MG/1
TABLET ORAL
COMMUNITY
Start: 2021-08-31 | End: 2022-08-11

## 2021-09-02 RX ORDER — PROMETHAZINE HYDROCHLORIDE AND CODEINE PHOSPHATE 6.25; 1 MG/5ML; MG/5ML
SOLUTION ORAL
COMMUNITY
Start: 2021-08-17 | End: 2021-12-02

## 2021-09-02 RX ORDER — LEVOFLOXACIN 750 MG/1
TABLET ORAL
COMMUNITY
Start: 2021-08-30 | End: 2021-12-02

## 2021-09-02 RX ADMIN — HEPARIN 500 UNITS: 100 SYRINGE at 14:45

## 2021-09-02 RX ADMIN — LEUPROLIDE ACETATE 22.5 MG: KIT SUBCUTANEOUS at 14:39

## 2021-09-02 NOTE — PROGRESS NOTES
PROBLEM LIST:  1. [id number] ER+ DE+ Her2+ invasive ductal carcinoma of the right breast  A) presented with a mass on self-exam.  Mammogram 1/7/20 showed a 4.1 cm mass in the right breast, with 2 adjacent smaller masses.  1.9 cm right axillary lymph node.   FNA of lymph node negative.  Biopsy of right breast mass showed grade 2 IDC, Er 95%, DE 2%, Her2 3+.  B) neoadjuvant chemotherapy with TCHP started 2/5/2020  C) right mastectomy on 6/23/20.  Pathology showed a 2 x 1 x 0.6 cm intermediate grade IDC.  0/2 SLN involved.  [id number]  D) adjuvant Kadcyla x 1 year started 7/22/2020.   Kadcyla stopped October 14, 2020 and switched back to Herceptin and Perjeta due to progressive severe neuropathy.  E) anastrozole started 08/12/2020.   2. PCOS  3. Anxiety/depression  4. DM2  5. GERD  6. Hyperlipidemia  7. Hypertension  8.  Hidradenitis  9.  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  CT chest 1/19/2021 showed a pathologic nondisplaced fracture of the right seventh rib.  There were also subtle small nodules in the lungs, none larger than 4 mm.  PET/CT on 1/28/2021 showed SUV of 4.8 in the right rib fracture.  Pulmonary micronodules too small to register hypermetabolic activity.  Biopsy of the rib lesion 2/24/2021 showed Langerhans' cell histiocytosis.  Smoking cessation strongly recommended and she did stop smoking in February 2021.    Subjective     CHIEF COMPLAINT: breast cancer    HISTORY OF PRESENT ILLNESS:   Josefina Rodriguez returns for follow-up.  She was diagnosed with COVID19 in early August.    She had a CT PE protocol done 2 days ago for shortness of breath and positive ddimer.  This showed some interstitial pulmonary edema, , no PE.  Bilateral LE duplex also normal.    She says that she is recovering but still has some shortness of breath.  She started a fluid pill yesterday for fluid on her lungs.  She reports  "that her 2 brothers and one of the brothers girlfriends all became infected with Covid.  The 2 of them that were unvaccinated were hospitalized.    She has continued to avoid cigarettes.  She has stopped using the nicotine patch but still uses about 8 lozenges a day.    She denies any new bone pain.          Objective      BP (!) 138/104   Pulse 111   Temp 98 °F (36.7 °C) (Temporal)   Resp 16   Ht 167.6 cm (65.98\")   Wt 99.8 kg (220 lb)   SpO2 95%   BMI 35.53 kg/m²    Vitals:    09/02/21 1342   PainSc: 0-No pain               Performance Status: 0    General: well appearing female in no acute distress  Neuro: alert and oriented  HEENT: sclera anicteric  Pulmonary: Lungs are clear to auscultation bilaterally  Cardiovascular: Regular rate and rhythm no murmurs  Extremeties: no lower extremity edema  Skin: no rashes, lesions, bruising, or petechiae  Psych: mood and affect appropriate      RECENT LABS:  Lab Results   Component Value Date    WBC 11.19 (H) 02/24/2021    HGB 13.5 02/24/2021    HCT 39.7 02/24/2021    MCV 90.8 02/24/2021     (L) 02/24/2021     Lab Results   Component Value Date    GLUCOSE 119 (H) 01/28/2021    BUN 12 01/28/2021    CREATININE 0.54 (L) 01/28/2021    EGFRIFNONA 123 01/28/2021    BCR 22.2 01/28/2021    K 4.8 01/28/2021    CO2 28.0 01/28/2021    CALCIUM 9.8 01/28/2021    ALBUMIN 3.80 01/28/2021    AST 25 01/28/2021    ALT 24 01/28/2021           XR Chest PA & Lateral  Narrative: EXAMINATION: XR CHEST, PA AND LATERAL-08/30/2021:      INDICATION: INCREASING SHORTNESS OF BREATH; R06.02-Shortness of breath.      COMPARISON: 09/16/2020.     FINDINGS: PA and lateral views of the chest reveal cardiac and  mediastinal silhouettes within normal limits. The lung fields are clear.  Port-A-Catheter identified on the left with tip in the SVC. Degenerative  changes seen within the spine. No pleural effusion or pneumothorax. No  focal parenchymal opacification present. Pulmonary vascularity is " within  normal limits.     Impression: No acute cardiopulmonary disease.     D:  08/30/2021  E:  08/31/2021     This report was finalized on 9/1/2021 5:02 PM by Dr. Irish Hansen MD.     CT Angiogram Chest With & Without Contrast  Narrative: EXAMINATION: CT ANGIOGRAM CHEST W WO CONTRAST-      INDICATION: Positive D-dimer; R79.89-Other specified abnormal findings  of blood chemistry.      TECHNIQUE: CT angiogram chest with and without intravenous contrast  administration. Two-D reconstructions performed.     The radiation dose reduction device was turned on for each scan per the  ALARA (As Low as Reasonably Achievable) protocol.     COMPARISON: CT chest high-resolution protocol 07/26/2021.     FINDINGS: Thyroid is homogeneous in attenuation. No bulky mediastinal  adenopathy. Central airways are patent. Esophagus seen in normal course  and caliber. Patent nonaneurysmal thoracic aorta with patent great  vessel origins anatomic variance of the left vertebral arising directly  from the aortic arch noted. Central pulmonary arteries with appropriate  opacification and contrast bolus timing for evaluation without filling  defect to suggest pulmonary embolus. Cardiac size within normal limits  without pericardial effusion. Extended lung windows reveal intralobular  septal thickening lower lobe predominant interstitial pulmonary edema  pattern without evidence for decompensation. Subsegmental atelectasis  without pleural effusion. Degenerative changes of the thoracic spine  without aggressive osseous lesion. No soft tissue body wall findings of  acuity status post right mastectomy. Visualized portions of the upper  abdomen reveal prior cholecystectomy.     Impression: 1. No PE.  2. No acute airspace disease or consolidation with interstitial  predominance or intralobular septal thickening most consistent with  interstitial pulmonary edema pattern, however no evidence for  decompensation as there is no pleural  effusion.     D:  08/31/2021  E:  09/01/2021       I personally reviewed the imaging studies    Assessment/Plan   Josefina Rodriguez is a 46 y.o. year old female with stage IB ER+ Her2+ IDC of the right breast, as well as a diagnosis of Langerhans histiocytosis involving the lung as well as a single rib lesion.    Breast cancer: She has completed adjuvant therapy with Herceptin and Perjeta.  She will continue on anastrozole along with Lupron for ovarian suppression.    Langerhans histiocytosis: No evidence of progression on recent imaging.    Smoking cessation: She is doing extremely well with this.  Prescription for 2 mg nicotine lozenges sent.    Neuropathy:  Continue Cymbalta as well as Lyrica.    We will plan to repeat imaging again in about 3 months.  I will see her back at that time.    Recent Covid19 infection: I would recommend a booster vaccine once she has recovered fully from her infection.    Total time of patient care on day of service including time prior to, face to face with patient, and following visit spent in reviewing records, lab results, imaging studies, discussion with patient, and documentation/charting was > 40 minutes.          Jolene Connell MD  [facility] Hematology and Oncology    9/2/2021          CC:

## 2021-09-14 DIAGNOSIS — G62.9 NEUROPATHY: ICD-10-CM

## 2021-09-14 RX ORDER — PREGABALIN 150 MG/1
150 CAPSULE ORAL 2 TIMES DAILY
Qty: 60 CAPSULE | Refills: 3 | Status: SHIPPED | OUTPATIENT
Start: 2021-09-14 | End: 2022-02-24 | Stop reason: SDUPTHER

## 2021-09-17 ENCOUNTER — TRANSCRIBE ORDERS (OUTPATIENT)
Dept: ADMINISTRATIVE | Facility: HOSPITAL | Age: 46
End: 2021-09-17

## 2021-09-17 ENCOUNTER — HOSPITAL ENCOUNTER (OUTPATIENT)
Dept: GENERAL RADIOLOGY | Facility: HOSPITAL | Age: 46
Discharge: HOME | End: 2021-09-17
Admitting: INTERNAL MEDICINE

## 2021-09-17 DIAGNOSIS — M17.10 PATELLOFEMORAL ARTHRITIS: Primary | ICD-10-CM

## 2021-09-17 PROCEDURE — 73560 X-RAY EXAM OF KNEE 1 OR 2: CPT

## 2021-09-21 DIAGNOSIS — C50.811 MALIGNANT NEOPLASM OF OVERLAPPING SITES OF RIGHT BREAST IN FEMALE, ESTROGEN RECEPTOR POSITIVE (HCC): ICD-10-CM

## 2021-09-21 DIAGNOSIS — Z17.0 MALIGNANT NEOPLASM OF OVERLAPPING SITES OF RIGHT BREAST IN FEMALE, ESTROGEN RECEPTOR POSITIVE (HCC): ICD-10-CM

## 2021-09-21 RX ORDER — ANASTROZOLE 1 MG/1
1 TABLET ORAL DAILY
Qty: 30 TABLET | Refills: 11 | Status: SHIPPED | OUTPATIENT
Start: 2021-09-21 | End: 2022-08-11 | Stop reason: SDUPTHER

## 2021-09-21 NOTE — TELEPHONE ENCOUNTER
Caller:  CHRIST    Relationship: SELF      Medication requested (name and dosage): ANASTROZOLE 1MG    [facility] should be sent:     [facility] [address] [phone] [place] [phone]   [phone]    Additional details provided by patient: PT IS COMPLETELY OUT OF MEDS    Best call back number: [phone]    Does the patient have less than a 3 day supply:  [x] Yes  [] No    Selena Reyes Rep   09/21/21 12:23 EDT

## 2021-11-19 ENCOUNTER — TELEPHONE (OUTPATIENT)
Dept: ONCOLOGY | Facility: CLINIC | Age: 46
End: 2021-11-19

## 2021-11-19 NOTE — TELEPHONE ENCOUNTER
I called Gladis back and told her that Dr Connell had ordered the high res chest ct and not ct with and without contrast.  Gladis verbalized understanding.

## 2021-11-19 NOTE — TELEPHONE ENCOUNTER
Caller: ALBERTINA    Relationship: [facility] [employer]    Best call back number: [phone]    What was the call regarding: ALBERTINA CALLED TO REGARDING CHRIST'S CT SCAN THAT IS SCHEDULED. SHE IS SCHEDULED FOR BOTH CT CHEST HI RES AND WITH/WITHOUT CONTRAST. THERE IS AN ORDER FROM DR. NORMAN FOR THE HIGH RES. ALBERTINA WANTS TO KNOW IF DR NORMAN WANTS THE WITH/WITHOUT CONTRAST AS WELL. IF SO, AN ORDER NEEDS TO BE PLACED.    Do you require a callback: YES

## 2021-11-29 ENCOUNTER — HOSPITAL ENCOUNTER (OUTPATIENT)
Dept: CT IMAGING | Facility: HOSPITAL | Age: 46
Discharge: HOME | End: 2021-11-29
Admitting: INTERNAL MEDICINE

## 2021-11-29 DIAGNOSIS — R79.89 POSITIVE D-DIMER: ICD-10-CM

## 2021-11-29 DIAGNOSIS — C96.6: ICD-10-CM

## 2021-11-29 LAB — CREAT BLDA-MCNC: 0.6 MG/DL (ref 0.6–1.3)

## 2021-11-29 PROCEDURE — 25010000002 IOPAMIDOL 61 % SOLUTION: Performed by: INTERNAL MEDICINE

## 2021-11-29 PROCEDURE — 82565 ASSAY OF CREATININE: CPT

## 2021-11-29 PROCEDURE — 71250 CT THORAX DX C-: CPT

## 2021-11-29 PROCEDURE — 71270 CT THORAX DX C-/C+: CPT

## 2021-11-29 RX ADMIN — IOPAMIDOL 80 ML: 612 INJECTION, SOLUTION INTRAVENOUS at 14:27

## 2021-12-02 ENCOUNTER — HOSPITAL ENCOUNTER (OUTPATIENT)
Dept: ONCOLOGY | Facility: HOSPITAL | Age: 46
Setting detail: INFUSION SERIES
Discharge: HOME | End: 2021-12-02

## 2021-12-02 ENCOUNTER — OFFICE VISIT (OUTPATIENT)
Dept: ONCOLOGY | Facility: CLINIC | Age: 46
End: 2021-12-02

## 2021-12-02 VITALS
HEIGHT: 66 IN | DIASTOLIC BLOOD PRESSURE: 78 MMHG | RESPIRATION RATE: 16 BRPM | TEMPERATURE: 98.4 F | SYSTOLIC BLOOD PRESSURE: 124 MMHG | HEART RATE: 92 BPM | OXYGEN SATURATION: 98 % | BODY MASS INDEX: 37.12 KG/M2 | WEIGHT: 231 LBS

## 2021-12-02 DIAGNOSIS — Z95.828 PORT-A-CATH IN PLACE: ICD-10-CM

## 2021-12-02 DIAGNOSIS — C50.811 MALIGNANT NEOPLASM OF OVERLAPPING SITES OF RIGHT BREAST IN FEMALE, ESTROGEN RECEPTOR POSITIVE (HCC): ICD-10-CM

## 2021-12-02 DIAGNOSIS — T45.1X5A NEUROPATHY DUE TO CHEMOTHERAPEUTIC DRUG (HCC): ICD-10-CM

## 2021-12-02 DIAGNOSIS — Z17.0 MALIGNANT NEOPLASM OF OVERLAPPING SITES OF RIGHT BREAST IN FEMALE, ESTROGEN RECEPTOR POSITIVE (HCC): Primary | ICD-10-CM

## 2021-12-02 DIAGNOSIS — C50.811 MALIGNANT NEOPLASM OF OVERLAPPING SITES OF RIGHT BREAST IN FEMALE, ESTROGEN RECEPTOR POSITIVE (HCC): Primary | ICD-10-CM

## 2021-12-02 DIAGNOSIS — F09 ACQUIRED COGNITIVE DYSFUNCTION: ICD-10-CM

## 2021-12-02 DIAGNOSIS — G62.0 NEUROPATHY DUE TO CHEMOTHERAPEUTIC DRUG (HCC): ICD-10-CM

## 2021-12-02 DIAGNOSIS — Z12.31 ENCOUNTER FOR SCREENING MAMMOGRAM FOR MALIGNANT NEOPLASM OF BREAST: ICD-10-CM

## 2021-12-02 DIAGNOSIS — Z45.2 ENCOUNTER FOR CENTRAL LINE CARE: Primary | ICD-10-CM

## 2021-12-02 DIAGNOSIS — Z17.0 MALIGNANT NEOPLASM OF OVERLAPPING SITES OF RIGHT BREAST IN FEMALE, ESTROGEN RECEPTOR POSITIVE (HCC): ICD-10-CM

## 2021-12-02 LAB
ALBUMIN SERPL-MCNC: 3.9 G/DL (ref 3.5–5.2)
ALBUMIN/GLOB SERPL: 1.1 G/DL
ALP SERPL-CCNC: 172 U/L (ref 39–117)
ALT SERPL W P-5'-P-CCNC: 32 U/L (ref 1–33)
ANION GAP SERPL CALCULATED.3IONS-SCNC: 12 MMOL/L (ref 5–15)
AST SERPL-CCNC: 27 U/L (ref 1–32)
BILIRUB SERPL-MCNC: 0.3 MG/DL (ref 0–1.2)
BUN SERPL-MCNC: 15 MG/DL (ref 6–20)
BUN/CREAT SERPL: 24.2 (ref 7–25)
CALCIUM SPEC-SCNC: 9.7 MG/DL (ref 8.6–10.5)
CHLORIDE SERPL-SCNC: 101 MMOL/L (ref 98–107)
CO2 SERPL-SCNC: 24 MMOL/L (ref 22–29)
CREAT SERPL-MCNC: 0.62 MG/DL (ref 0.57–1)
ERYTHROCYTE [DISTWIDTH] IN BLOOD BY AUTOMATED COUNT: 13.6 % (ref 12.3–15.4)
GFR SERPL CREATININE-BSD FRML MDRD: 105 ML/MIN/1.73
GLOBULIN UR ELPH-MCNC: 3.7 GM/DL
GLUCOSE SERPL-MCNC: 213 MG/DL (ref 65–99)
HCT VFR BLD AUTO: 43.3 % (ref 34–46.6)
HGB BLD-MCNC: 14.3 G/DL (ref 12–15.9)
LYMPHOCYTES # BLD AUTO: 3 10*3/MM3 (ref 0.7–3.1)
LYMPHOCYTES NFR BLD AUTO: 27.7 % (ref 19.6–45.3)
MCH RBC QN AUTO: 29.9 PG (ref 26.6–33)
MCHC RBC AUTO-ENTMCNC: 33 G/DL (ref 31.5–35.7)
MCV RBC AUTO: 90.6 FL (ref 79–97)
MONOCYTES # BLD AUTO: 0.3 10*3/MM3 (ref 0.1–0.9)
MONOCYTES NFR BLD AUTO: 3.2 % (ref 5–12)
NEUTROPHILS NFR BLD AUTO: 69.1 % (ref 42.7–76)
NEUTROPHILS NFR BLD AUTO: 7.4 10*3/MM3 (ref 1.7–7)
PLATELET # BLD AUTO: 307 10*3/MM3 (ref 140–450)
PMV BLD AUTO: 7.9 FL (ref 6–12)
POTASSIUM SERPL-SCNC: 3.9 MMOL/L (ref 3.5–5.2)
PROT SERPL-MCNC: 7.6 G/DL (ref 6–8.5)
RBC # BLD AUTO: 4.78 10*6/MM3 (ref 3.77–5.28)
SODIUM SERPL-SCNC: 137 MMOL/L (ref 136–145)
WBC NRBC COR # BLD: 10.7 10*3/MM3 (ref 3.4–10.8)

## 2021-12-02 PROCEDURE — 96402 CHEMO HORMON ANTINEOPL SQ/IM: CPT

## 2021-12-02 PROCEDURE — 85025 COMPLETE CBC W/AUTO DIFF WBC: CPT | Performed by: INTERNAL MEDICINE

## 2021-12-02 PROCEDURE — 96372 THER/PROPH/DIAG INJ SC/IM: CPT

## 2021-12-02 PROCEDURE — 80053 COMPREHEN METABOLIC PANEL: CPT | Performed by: INTERNAL MEDICINE

## 2021-12-02 PROCEDURE — 36415 COLL VENOUS BLD VENIPUNCTURE: CPT

## 2021-12-02 PROCEDURE — 25010000002 HEPARIN LOCK FLUSH PER 10 UNITS: Performed by: INTERNAL MEDICINE

## 2021-12-02 PROCEDURE — 99215 OFFICE O/P EST HI 40 MIN: CPT | Performed by: INTERNAL MEDICINE

## 2021-12-02 PROCEDURE — 25010000002 LEUPROLIDE 22.5 MG KIT: Performed by: INTERNAL MEDICINE

## 2021-12-02 RX ORDER — DULOXETIN HYDROCHLORIDE 60 MG/1
CAPSULE, DELAYED RELEASE ORAL
Qty: 30 CAPSULE | Refills: 5 | Status: SHIPPED | OUTPATIENT
Start: 2021-12-02 | End: 2022-08-22

## 2021-12-02 RX ORDER — HYDROCHLOROTHIAZIDE 25 MG/1
25 TABLET ORAL DAILY
COMMUNITY
Start: 2021-09-17

## 2021-12-02 RX ORDER — HEPARIN SODIUM (PORCINE) LOCK FLUSH IV SOLN 100 UNIT/ML 100 UNIT/ML
500 SOLUTION INTRAVENOUS AS NEEDED
Status: DISCONTINUED | OUTPATIENT
Start: 2021-12-02 | End: 2021-12-03 | Stop reason: HOSPADM

## 2021-12-02 RX ORDER — SODIUM CHLORIDE 0.9 % (FLUSH) 0.9 %
20 SYRINGE (ML) INJECTION AS NEEDED
Status: CANCELLED | OUTPATIENT
Start: 2021-12-02

## 2021-12-02 RX ORDER — PEN NEEDLE, DIABETIC 32GX 5/32"
NEEDLE, DISPOSABLE MISCELLANEOUS
COMMUNITY
Start: 2021-09-08

## 2021-12-02 RX ORDER — HEPARIN SODIUM (PORCINE) LOCK FLUSH IV SOLN 100 UNIT/ML 100 UNIT/ML
500 SOLUTION INTRAVENOUS AS NEEDED
Status: CANCELLED | OUTPATIENT
Start: 2021-12-02

## 2021-12-02 RX ORDER — AZITHROMYCIN 250 MG/1
TABLET, FILM COATED ORAL
COMMUNITY
Start: 2021-10-09 | End: 2021-12-02

## 2021-12-02 RX ORDER — SODIUM CHLORIDE 0.9 % (FLUSH) 0.9 %
10 SYRINGE (ML) INJECTION AS NEEDED
Status: CANCELLED | OUTPATIENT
Start: 2021-12-02

## 2021-12-02 RX ADMIN — LEUPROLIDE ACETATE 22.5 MG: KIT SUBCUTANEOUS at 15:17

## 2021-12-02 RX ADMIN — HEPARIN 500 UNITS: 100 SYRINGE at 14:53

## 2021-12-02 NOTE — PROGRESS NOTES
PROBLEM LIST:  1. [id number] ER+ MO+ Her2+ invasive ductal carcinoma of the right breast  A) presented with a mass on self-exam.  Mammogram 1/7/20 showed a 4.1 cm mass in the right breast, with 2 adjacent smaller masses.  1.9 cm right axillary lymph node.   FNA of lymph node negative.  Biopsy of right breast mass showed grade 2 IDC, Er 95%, MO 2%, Her2 3+.  B) neoadjuvant chemotherapy with TCHP started 2/5/2020  C) right mastectomy on 6/23/20.  Pathology showed a 2 x 1 x 0.6 cm intermediate grade IDC.  0/2 SLN involved.  [id number]  D) adjuvant Kadcyla x 1 year started 7/22/2020.   Kadcyla stopped October 14, 2020 and switched back to Herceptin and Perjeta due to progressive severe neuropathy.  E) anastrozole started 08/12/2020.   2. PCOS  3. Anxiety/depression  4. DM2  5. GERD  6. Hyperlipidemia  7. Hypertension  8.  Hidradenitis  9.  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  CT chest 1/19/2021 showed a pathologic nondisplaced fracture of the right seventh rib.  There were also subtle small nodules in the lungs, none larger than 4 mm.  PET/CT on 1/28/2021 showed SUV of 4.8 in the right rib fracture.  Pulmonary micronodules too small to register hypermetabolic activity.  Biopsy of the rib lesion 2/24/2021 showed Langerhans' cell histiocytosis.  Smoking cessation strongly recommended and she did stop smoking in February 2021.    Subjective     CHIEF COMPLAINT: breast cancer    HISTORY OF PRESENT ILLNESS:   Josefina Rodriguez returns for follow-up.  She says she is doing pretty well.  She is having bilateral elbow pain that hurts when she tries to lift anything that weighs more than a few pounds.  Otherwise she continues to take anastrozole.  She has continued to avoid cigarettes.  She has had multiple family members hospitalized or passed away from COVID-19.          Objective      /78   Pulse 92   Temp 98.4 °F  "(36.9 °C) (Temporal)   Resp 16   Ht 167.6 cm (65.98\")   Wt 105 kg (231 lb)   SpO2 98%   BMI 37.30 kg/m²    Vitals:    12/02/21 1355   PainSc: 0-No pain               Performance Status: 0    General: well appearing female in no acute distress  Neuro: alert and oriented  HEENT: sclera anicteric  Pulmonary: Lungs are clear to auscultation bilaterally  Cardiovascular: Regular rate and rhythm no murmurs  Extremeties: no lower extremity edema  Skin: no rashes, lesions, bruising, or petechiae  Psych: mood and affect appropriate      RECENT LABS:  Lab Results   Component Value Date    WBC 11.19 (H) 02/24/2021    HGB 13.5 02/24/2021    HCT 39.7 02/24/2021    MCV 90.8 02/24/2021     (L) 02/24/2021     Lab Results   Component Value Date    GLUCOSE 119 (H) 01/28/2021    BUN 12 01/28/2021    CREATININE 0.60 11/29/2021    EGFRIFNONA 123 01/28/2021    BCR 22.2 01/28/2021    K 4.8 01/28/2021    CO2 28.0 01/28/2021    CALCIUM 9.8 01/28/2021    ALBUMIN 3.80 01/28/2021    AST 25 01/28/2021    ALT 24 01/28/2021           CT Chest Hi Resolution Diagnostic  Narrative: EXAMINATION: CT CHEST HI RESOLUTION DIAGNOSTIC- 11/29/2021     INDICATION: C96.6-Unifocal Langerhans-cell histiocytosis     TECHNIQUE: Axial 1 mm images at 1 cm intervals through the lungs with  image reconstruction using edge enhancement algorithm. Patient was  scanned both supine and prone for this study.     The radiation dose reduction device was turned on for each scan per the  ALARA (As Low as Reasonably Achievable) protocol.     COMPARISON: Concurrent spiral chest CT scan. Previous angiographic chest  CT scan of 08/31/2021 and high-resolution scan of 07/26/2021.     FINDINGS: By report, the 07/26/2021 exam report showed bronchiectasis,  but no significant pulmonary interstitial disease.     Today's study shows relatively mild diffuse cylindrical bronchiectasis  throughout the lungs with no definite interval change compared to the  07/26/2021 exam. There " is no evidence of significant pulmonary  interstitial disease, in particular, no evidence of groundglass opacity,  reticular or nodular disease, peribronchovascular thickening, or  intralobular interstitial thickening is identified. No lung  consolidation is seen. Today's study incidentally includes a small  calcified granuloma of the left lower lobe unchanged. There is no  pleural effusion.     Limited mediastinal images show no gross evidence of adenopathy or  pericardial effusion. Included images of the upper abdomen show fatty  liver change and previous cholecystectomy.     Impression: Relatively mild diffuse cylindrical bronchiectasis of the  lungs, similar to prior 07/26/2021 exam. No evidence of superimposed  pulmonary interstitial disease is appreciated.     D: 11/30/2021  E: 11/30/2021        This report was finalized on 12/1/2021 2:28 PM by Dr. Tee Echeverria MD.     CT Chest With & Without Contrast Diagnostic  Narrative: EXAMINATION: CT CHEST W WO CONTRAST DIAGNOSTIC- 11/29/2021     INDICATION: Positive D-Dimer; R79.89-Other specified abnormal findings  of blood chemistry     TECHNIQUE: Pre and postcontrast 5 mm axial images through the chest and  upper abdomen     The radiation dose reduction device was turned on for each scan per the  ALARA (As Low as Reasonably Achievable) protocol.     COMPARISON: 08/31/2021 angiographic chest CT scan     FINDINGS: History indicates positive D-dimer, history of Langerhans  histiocytosis. Prior study report from 08/31/2021 indicates a mild  diffuse interstitial disease pattern most likely pulmonary interstitial  edema.     Today's study is not a pulmonary embolus protocol scan, but no gross  evidence of embolic disease is seen. Thoracic aorta appears grossly  normal. No mediastinal mass, adenopathy, pericardial or pleural effusion  is seen. Trace fat stranding in the anterior mediastinal fat may  represent some thymic tissue, but this is nonmasslike and unchanged  from  prior exam.     Lung window images show the previously noted pattern of interstitial  disease has resolved. There is trace linear scarring in the medial left  lower lobe unchanged. Lungs otherwise appear clear.     Included images of the upper abdomen show diffuse fatty liver change.  Clips are noted in the gallbladder fossa. Multiple granulomatous  calcifications are seen in the spleen. Included pancreatic tail, adrenal  glands, and upper renal poles appear within normal limits. Bony  structures appear to be intact. Incidental note is made of previous  right mastectomy.     Impression: No evidence of active chest disease. Previously noted mild  pulmonary interstitial disease has resolved. No new chest pathology is  appreciated.     D: 11/30/2021  E: 11/30/2021     This report was finalized on 12/1/2021 2:27 PM by Dr. Tee Echeverria MD.       I personally reviewed the imaging studies    Assessment/Plan   Josefina Rodriguez is a 46 y.o. year old female with stage IB ER+ Her2+ IDC of the right breast, as well as a diagnosis of Langerhans histiocytosis involving the lung as well as a single rib lesion.    Breast cancer:   She will continue on anastrozole along with Lupron for ovarian suppression.  We discussed that anastrozole can be associated with some arthralgias.  For her elbow pain she can try exercise, but if this is not effective, then she can try ice and NSAIDs.    Langerhans histiocytosis: Recent imaging shows resolution of interstitial disease.  Continue smoking cessation.  We will plan to repeat imaging in 6 months.    Neuropathy:  Continue Cymbalta as well as Lyrica.    Follow-up in 3 months, repeat imaging with high-resolution CT chest in 6 months.    Total time of patient care on day of service including time prior to, face to face with patient, and following visit spent in reviewing lab results, imaging studies, discussion with patient, and documentation/charting was > 42  minutes.          Jolene Connell MD  [facility]    12/2/2021          CC:

## 2021-12-14 ENCOUNTER — HOSPITAL ENCOUNTER (OUTPATIENT)
Dept: SPEECH THERAPY | Facility: HOSPITAL | Age: 46
Setting detail: THERAPIES SERIES
Discharge: HOME | End: 2021-12-14

## 2021-12-14 DIAGNOSIS — F09 ACQUIRED COGNITIVE DYSFUNCTION: ICD-10-CM

## 2021-12-14 PROCEDURE — 92523 SPEECH SOUND LANG COMPREHEN: CPT

## 2021-12-14 NOTE — THERAPY EVALUATION
Outpatient Speech Language Pathology   Adult Speech Language Cognitive Initial Evaluation  [facility]     Patient Name: Josefina Rodriguez  : 1975  MRN: [id number]  Today's Date: 2021        Visit Date: 2021   Patient Active Problem List   Diagnosis   • Malignant neoplasm of overlapping sites of right breast in female, estrogen receptor positive (HCC)   • Chemotherapy induced diarrhea   • Chemotherapy-induced nausea   • Vaginal bleeding   • Cysts of both ovaries   • Family history of breast cancer   • Malignant neoplasm of overlapping sites of right female breast (HCC)   • Port-A-Cath in place   • Epigastric pain   • Thrush   • Langerhans cell histiocytosis of extranodal or solid organ sites (HCC)   • Encounter for central line care        Past Medical History:   Diagnosis Date   • Anxiety    • Depression    • Diabetes mellitus (HCC)    • Hiatal hernia    • History of radiation therapy 2021    right 7th rib   • Hypertension    • Malignant neoplasm of overlapping sites of right breast in female, estrogen receptor positive (HCC) 2020   • PCOS (polycystic ovarian syndrome)    • Wears glasses         Past Surgical History:   Procedure Laterality Date   • BONE BIOPSY     • BREAST BIOPSY Right 2019    axillary lymph node bx/fna   • CHOLECYSTECTOMY  2010   • COLONOSCOPY  2016   • MASTECTOMY W/ SENTINEL NODE BIOPSY Right 2020    Procedure: MASTECTOMY WITH SENTINEL NODE BIOPSY RIGHT, PORT PLACEMENT;  Surgeon: Rachel Baker MD;  Location: [facility];  Service: General;  Laterality: Right;   • US GUIDED FINE NEEDLE ASPIRATION  1/15/2020   • VENOUS ACCESS DEVICE (PORT) INSERTION Left 2020         Visit Dx:    ICD-10-CM ICD-9-CM   1. Acquired cognitive dysfunction  F09 294.9            OP SLP Assessment/Plan - 21 1500        SLP Assessment    Functional Problems Speech Language- Adult/Cognition  -    Impact on Function: Adult Speech Language/Cognition Trouble  learning or remembering new information; Poor attention to task  -HG    Clinical Impression: Speech Language-Adult/Congnition Minimal:; Mild:; Cognitive Communication Impairment  -HG    Functional Problems Comment Pt notes difficulty with word finding and recalling if tasks have been completed.  -HG    Clinical Impression Comments RBANS Total Score of 106 places pt in the Average range.  -HG    Please refer to paper survey for additional self-reported information Yes  -HG    Please refer to items scanned into chart for additional diagnostic informaiton and handouts as provided by clinician Yes  -HG    SLP Diagnosis Mild Cognitive communication impairment  -HG    Prognosis Excellent (comment)  -HG    Patient/caregiver participated in establishment of treatment plan and goals Yes  -HG    Patient would benefit from skilled therapy intervention Yes  -HG       SLP Plan    Frequency 1x/week  -HG    Duration 12 weeks  -HG    Planned CPT's? SLP SPEECH & LANGUAGE EVAL: 31360; SLP INDIVIDUAL SPEECH THERAPY: 26989  -HG    Expected Duration of Therapy Session (SLP Eval) 60  -HG    Plan Comments Initiate Cog/Comm tx.  -HG          User Key  (r) = Recorded By, (t) = Taken By, (c) = Cosigned By    Initials Name Provider Type     Daisy Capps MS CCC-SLP Speech and Language Pathologist                 SLP SLC Evaluation - 12/14/21 1500        Communication Assessment/Intervention    Document Type evaluation  -HG    Subjective Information no complaints  -HG    Patient Observations alert; cooperative; agree to therapy  -HG    Patient/Family/Caregiver Comments/Observations No family present  -HG    Patient Effort excellent  -HG    Symptoms Noted During/After Treatment none  -HG       General Information    Patient Profile Reviewed yes  -HG    Pertinent History Of Current Problem Pt is a 46 year old female with hx of breast cancer of the Right breast in 2020. Pt continues on an oral chemo medication. Pt notes most recent memory  changes in the last 6-7 months.  -HG    Precautions/Limitations, Vision WFL; for purposes of eval  -HG    Precautions/Limitations, Hearing WFL; for purposes of eval  -HG    Patient Level of Education Bachelor's  -HG    Prior Level of Function-Communication WFL  -HG    Plans/Goals Discussed with patient  -HG    Barriers to Rehab none identified  -HG    Patient's Goals for Discharge functional cognition  -HG    Standardized Assessment Used RBANS  -HG       Auditory Comprehension Assessment/Intervention    Auditory Comprehension (Communication) WFL  -HG       Reading Comprehension Assessment/Intervention    Reading Comprehension (Communication) WFL  -HG    Reading Comprehension, Comment To be further assessed.  -HG       Verbal Expression Assessment/Intervention    Verbal Expression WFL  -HG       Graphic Expression Assessment/Intervention    Graphic Expression, Comment To be assessed  -HG       Oral Motor Structure and Function    Oral Motor Structure and Function WFL  -HG       Oral Musculature and Cranial Nerve Assessment    Oral Motor General Assessment WFL  -HG       Cursory Voice Assessment/Intervention    Quality and Resonance (Voice) WFL  -HG       Cognitive Assessment Intervention- SLP    Cognitive Function (Cognition) WFL  -HG    Orientation Status (Cognition) WFL  -HG    Memory (Cognitive) immediate; delayed; unrelated; WFL  -HG    Attention (Cognitive) sustained; alternating; mild impairment  -HG    Thought Organization (Cognitive) concrete divergent; WFL  -HG    Cognition, Comment Reasoning, problem solving to be assessed.  -HG       RBANS- Repeatable Battery for the Assessment of Neuropsychological Status    Immediate Memory Index Score 117  -HG    Immediate Memory Percentile 87 %  -HG    Immediate Memory Qualitative Description high average  -HG    Visuospatial Index Score 100  -HG    Visuospatial Percentile 50 %  -HG    Visuospatial Qualitative Description average  -HG    Language Index Score 100  -HG     Language Percentile 50 %  -HG    Language Qualitative Description average  -HG    Attention Index Score 91  -HG    Attention Percentile 27 %  -HG    Attention Qualitative Description average  -HG    Delayed Memory Index Score 118  -HG    Delayed Memory Percentile 88 %  -HG    Delayed Memory Qualitative Description high average  -HG    Total Index Score 526  -HG    Total Percentile 66 %  -HG    Total Qualitative Description average  -HG       SLP Evaluation Clinical Impressions    SLP Diagnosis Fxnl memory skills with mild attention impairment  -HG    Rehab Potential/Prognosis excellent  -          User Key  (r) = Recorded By, (t) = Taken By, (c) = Cosigned By    Initials Name Provider Type     Daisy Capps MS CCC-SLP Speech and Language Pathologist                                OP SLP Education     Row Name 12/14/21 1500       Education    Barriers to Learning No barriers identified  -HG    Education Provided Patient demonstrated recommended strategies; Patient requires further education on strategies, risks  -    Assessed Learning needs; Learning motivation; Learning preferences; Learning readiness  -    Learning Motivation Strong  -    Learning Method Explanation; Demonstration; Teach back; Written materials  -    Teaching Response Verbalized understanding; Demonstrated understanding; Reinforcement needed  -    Education Comments Education for use of internal and external strategies; handout provided.  -          User Key  (r) = Recorded By, (t) = Taken By, (c) = Cosigned By    Initials Name Effective Dates     Daisy Capps MS CCC-SLP 06/16/21 -                SLP OP Goals     Row Name 12/14/21 1500          Goal Type Needed    Goal Type Needed Memory; Attention/Orientation; Other Adult Goals  -            Subjective Comments    Subjective Comments Pt alert, cooperative, concerned about word finding and memory skills.  -            Memory Goals    Patient will be able to  remember information needed to return to work and function on work-related tasks 100%:; with cues  -HG     Status: Patient will be able to remember information needed to return to work and function on work-related tasks New  -HG     Patient will demonstrate improved ability to recall information by immediately recalling a series of words 90%:; unrelated; after delay; with cues  -HG     Status: Patient will demonstrate improved ability to recall information by immediately recalling a series of words New  -HG     Patient’s memory skills will be enhanced as reported by patient by utilizing internal memory strategies to recall up to 3 pieces of information after a 5- minute delay 90%:; with cues  -HG     Status: Patient’s memory skills will be enhanced as reported by patient by utilizing internal memory strategies to recall up to 3 pieces of information after a 5- minute delay New  -HG     Patient’s memory skills will be enhanced as reported by patient by using external memory aides 90%:; with cues  -HG     Status: Patient’s memory skills will be enhanced as reported by patient by using external memory aides New  -HG            Attention/Orientation Goals    Patient will be able to use high level cognitive skills to allow patient to return to work Independently  -HG     Status: Patient will be able to use high level cognitive skills to allow patient to return to work New  -HG     Patient will improve attention skills by sustaining focus and participation to conversation/task with cues; 5 minute task  -HG     Status: Patient will improve attention skills by sustaining focus and participation to conversation/task New  -HG     Patient will improve attention skills by sustaining consistent behavioral response during continuous and repetitive activity (e.g., listening for target words, auditory/reading comprehension tasks) with cues; 5 minute task  -HG     Status: Patient will improve attention skills by sustaining  consistent behavioral response during continuous and repetitive activity (e.g., listening for target words, auditory/reading comprehension tasks) New  -HG     Patient will improve attention skills by sustaining focus in order to actively hold and manipulate information provided (e.g., sequencing auditorily presented number series in ascending or descending order) 80%:; with cues  -HG     Status: Patient will improve attention skills by sustaining focus in order to actively hold and manipulate information provided (e.g., sequencing auditorily presented number series in ascending or descending order) New  -HG     Patient will improve attention skills by focusing to selective target/task when presented with competing stimuli or in a distracting environment in order to complete task 80%:; with cues  -HG     Status: Patient will improve attention skills by focusing to selective target/task when presented with competing stimuli or in a distracting environment in order to complete task New  -HG     Patient will improve attention skills by alternating or shifting focus between two different tasks in order to complete both tasks 80%:; with cues  -HG     Status: Patient will improve attention skills by alternating or shifting focus between two different tasks in order to complete both tasks New  -HG     Patient will improve attention skills by dividing focus and responding simultaneously to multiple tasks or in order to complete task 80%:; with cues  -HG     Status: Patient will improve attention skills by dividing focus and responding simultaneously to multiple tasks or in order to complete task New  -HG     Patient will improve attention skills by modifying surrounding environment 80%:; with cues  -HG     Status: Patient will improve attention skills by modifying surrounding environment New  -HG            Other Goals    Other Adult Goal- 1 Pt will complete thought organization tasks with 90% Accuracy.  -HG     Status:  Other Adult Goal- 1 New  -HG     Other Adult Goal- 2 Pt will complete reasoning and problem solving assessment with recs to follow as indicated.  -HG     Status: Other Adult Goal- 2 New  -HG     Other Adult Goal- 3 Pt will improve RBANS Total score to a 110 placing pt in the High Average range.  -HG     Status: Other Adult Goal- 3 New  -HG            SLP Time Calculation    SLP Goal Re-Cert Due Date 01/13/22  -HG           User Key  (r) = Recorded By, (t) = Taken By, (c) = Cosigned By    Initials Name Provider Type    Daisy Graves MS CCC-SLP Speech and Language Pathologist                     Time Calculation:   SLP Start Time: 1500  Untimed Charges  88350-FQ Eval Speech and Production w/ Language Minutes: 75  Total Minutes  Untimed Charges Total Minutes: 75   Total Minutes: 75    Therapy Charges for Today     Code Description Service Date Service Provider Modifiers Qty    [id number] HC ST EVAL SPEECH AND PROD W LANG  5 12/14/2021 Daisy Capps MS CCC-SLP GN 1                   Daisy Capps MS CCC-SLP  12/14/2021

## 2021-12-23 ENCOUNTER — HOSPITAL ENCOUNTER (OUTPATIENT)
Dept: SPEECH THERAPY | Facility: HOSPITAL | Age: 46
Setting detail: THERAPIES SERIES
Discharge: HOME | End: 2021-12-23

## 2021-12-23 DIAGNOSIS — F09 ACQUIRED COGNITIVE DYSFUNCTION: Primary | ICD-10-CM

## 2021-12-23 PROCEDURE — 92507 TX SP LANG VOICE COMM INDIV: CPT

## 2021-12-23 NOTE — THERAPY TREATMENT NOTE
Outpatient Speech Language Pathology   Adult Speech Language Cognitive Treatment Note  [facility]     Patient Name: Josefina Rodriguez  : 1975  MRN: [id number]  Today's Date: 2021         Visit Date: 2021   Patient Active Problem List   Diagnosis   • Malignant neoplasm of overlapping sites of right breast in female, estrogen receptor positive (HCC)   • Chemotherapy induced diarrhea   • Chemotherapy-induced nausea   • Vaginal bleeding   • Cysts of both ovaries   • Family history of breast cancer   • Malignant neoplasm of overlapping sites of right female breast (HCC)   • Port-A-Cath in place   • Epigastric pain   • Thrush   • 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 Acquired cognitive dysfunction  F09 294.9        OP SLP Assessment/Plan - 21 08        SLP Plan    Plan Comments Cont with Cog/Comm tx.  -HG          User Key  (r) = Recorded By, (t) = Taken By, (c) = Cosigned By    Initials Name Provider Type     Daisy Capps MS CCC-SLP Speech and Language Pathologist                                   SLP OP Goals     Row Name 21          Goal Type Needed    Goal Type Needed Memory; Attention/Orientation; Other Adult Goals  -            Subjective Comments    Subjective Comments Pt alert, cooperative, returned with  -            Memory Goals    Patient will be able to remember information needed to return to work and function on work-related tasks 100%:; with cues  -HG     Status: Patient will be able to remember information needed to return to work and function on work-related tasks New  -HG     Patient will demonstrate improved ability to recall information by immediately recalling a series of words 90%:; unrelated; after delay; with cues  -HG     Status: Patient will demonstrate improved ability to recall information by immediately recalling a series  of words Progressing as expected  -HG     Comments: Patient will demonstrate improved ability to recall information by immediately recalling a series of words 12/23/21: Recall of picture scene: pt was 100% accurate. Word association and pt was 100% accurat.e  -HG     Patient’s memory skills will be enhanced as reported by patient by utilizing internal memory strategies to recall up to 3 pieces of information after a 5- minute delay 90%:; with cues  -HG     Status: Patient’s memory skills will be enhanced as reported by patient by utilizing internal memory strategies to recall up to 3 pieces of information after a 5- minute delay Progressing as expected  -HG     Comments: Patient’s memory skills will be enhanced as reported by patient by utilizing internal memory strategies to recall up to 3 pieces of information after a 5- minute delay 12/23/21: Delayed recall of 4 un-related words: pt was 4/4. Progressed to 5/5 x 3.  -HG     Patient’s memory skills will be enhanced as reported by patient by using external memory aides 90%:; with cues  -HG     Status: Patient’s memory skills will be enhanced as reported by patient by using external memory aides Progressing as expected  -HG     Comments: Patient’s memory skills will be enhanced as reported by patient by using external memory aides 12/23/21: Pt stated she plans to get a journal.  -HG            Attention/Orientation Goals    Patient will be able to use high level cognitive skills to allow patient to return to work Independently  -HG     Status: Patient will be able to use high level cognitive skills to allow patient to return to work New  -HG     Patient will improve attention skills by sustaining focus and participation to conversation/task with cues; 5 minute task  -HG     Status: Patient will improve attention skills by sustaining focus and participation to conversation/task New  -HG     Patient will improve attention skills by sustaining consistent behavioral  response during continuous and repetitive activity (e.g., listening for target words, auditory/reading comprehension tasks) with cues; 5 minute task  -HG     Status: Patient will improve attention skills by sustaining consistent behavioral response during continuous and repetitive activity (e.g., listening for target words, auditory/reading comprehension tasks) Progressing as expected  -HG     Comments: Patient will improve attention skills by sustaining consistent behavioral response during continuous and repetitive activity (e.g., listening for target words, auditory/reading comprehension tasks) 12/23/21: Card ID in a field of 10, 12, 16 and pt was able to recall up to 4 cards.  -HG     Patient will improve attention skills by sustaining focus in order to actively hold and manipulate information provided (e.g., sequencing auditorily presented number series in ascending or descending order) 80%:; with cues  -HG     Status: Patient will improve attention skills by sustaining focus in order to actively hold and manipulate information provided (e.g., sequencing auditorily presented number series in ascending or descending order) Progressing as expected  -HG     Comments: Patient will improve attention skills by sustaining focus in order to actively hold and manipulate information provided (e.g., sequencing auditorily presented number series in ascending or descending order) 12/23/21: Digit span and pt was 80-90% accurate for up to 8 digits.  -HG     Patient will improve attention skills by focusing to selective target/task when presented with competing stimuli or in a distracting environment in order to complete task 80%:; with cues  -HG     Status: Patient will improve attention skills by focusing to selective target/task when presented with competing stimuli or in a distracting environment in order to complete task New  -HG     Patient will improve attention skills by alternating or shifting focus between two  different tasks in order to complete both tasks 80%:; with cues  -HG     Status: Patient will improve attention skills by alternating or shifting focus between two different tasks in order to complete both tasks New  -HG     Patient will improve attention skills by dividing focus and responding simultaneously to multiple tasks or in order to complete task 80%:; with cues  -HG     Status: Patient will improve attention skills by dividing focus and responding simultaneously to multiple tasks or in order to complete task New  -HG     Patient will improve attention skills by modifying surrounding environment 80%:; with cues  -HG     Status: Patient will improve attention skills by modifying surrounding environment New  -HG            Other Goals    Other Adult Goal- 1 Pt will complete thought organization tasks with 90% Accuracy.  -HG     Status: Other Adult Goal- 1 Progressing as expected  -HG     Comments: Other Adult Goal- 1 12/23/21: Similarities and Differences: pt was 100% accurate.  -HG     Other Adult Goal- 2 Pt will complete reasoning and problem solving assessment with recs to follow as indicated.  -HG     Status: Other Adult Goal- 2 Progressing as expected  -HG     Comments: Other Adult Goal- 2 12/23/21: [place] word scramble and pt was 90% accurate.  -HG     Other Adult Goal- 3 Pt will improve RBANS Total score to a 110 placing pt in the High Average range.  -HG     Status: Other Adult Goal- 3 New  -HG            SLP Time Calculation    SLP Goal Re-Cert Due Date 01/13/22  -HG           User Key  (r) = Recorded By, (t) = Taken By, (c) = Cosigned By    Initials Name Provider Type    Daisy Graves MS CCC-SLP Speech and Language Pathologist               OP SLP Education     Row Name 12/23/21 0800       Education    Education Comments Education for journaling and 6 related words.  -HG          User Key  (r) = Recorded By, (t) = Taken By, (c) = Cosigned By    Initials Name Effective Dates    HG Jefry,  Daisy MARQUEZ MS CCC-SLP 06/16/21 -                      Time Calculation:   SLP Start Time: 0800  Untimed Charges  04331-AE Treatment/ST Modification Prosth Aug Alter : 60  Total Minutes  Untimed Charges Total Minutes: 60   Total Minutes: 60    Therapy Charges for Today     Code Description Service Date Service Provider Modifiers Qty    22430712042 HC ST TREATMENT SPEECH 4 12/23/2021 Daisy Capps MS CCC-SLP GN 1                   Daisy Capps MS CCC-SLP  12/23/2021

## 2022-01-07 ENCOUNTER — APPOINTMENT (OUTPATIENT)
Dept: SPEECH THERAPY | Facility: HOSPITAL | Age: 47
End: 2022-01-07

## 2022-01-13 ENCOUNTER — HOSPITAL ENCOUNTER (OUTPATIENT)
Dept: ONCOLOGY | Facility: HOSPITAL | Age: 47
Setting detail: INFUSION SERIES
Discharge: HOME | End: 2022-01-13

## 2022-01-13 VITALS
HEART RATE: 79 BPM | SYSTOLIC BLOOD PRESSURE: 143 MMHG | DIASTOLIC BLOOD PRESSURE: 90 MMHG | WEIGHT: 230.7 LBS | RESPIRATION RATE: 14 BRPM | TEMPERATURE: 97.4 F | BODY MASS INDEX: 37.25 KG/M2

## 2022-01-13 DIAGNOSIS — Z45.2 ENCOUNTER FOR CENTRAL LINE CARE: Primary | ICD-10-CM

## 2022-01-13 DIAGNOSIS — Z95.828 PORT-A-CATH IN PLACE: ICD-10-CM

## 2022-01-13 PROCEDURE — 25010000002 HEPARIN LOCK FLUSH PER 10 UNITS: Performed by: INTERNAL MEDICINE

## 2022-01-13 PROCEDURE — 96523 IRRIG DRUG DELIVERY DEVICE: CPT

## 2022-01-13 RX ORDER — HEPARIN SODIUM (PORCINE) LOCK FLUSH IV SOLN 100 UNIT/ML 100 UNIT/ML
500 SOLUTION INTRAVENOUS AS NEEDED
Status: DISCONTINUED | OUTPATIENT
Start: 2022-01-13 | End: 2022-01-14 | Stop reason: HOSPADM

## 2022-01-13 RX ORDER — SODIUM CHLORIDE 0.9 % (FLUSH) 0.9 %
20 SYRINGE (ML) INJECTION AS NEEDED
Status: CANCELLED | OUTPATIENT
Start: 2022-01-13

## 2022-01-13 RX ORDER — SODIUM CHLORIDE 0.9 % (FLUSH) 0.9 %
10 SYRINGE (ML) INJECTION AS NEEDED
Status: CANCELLED | OUTPATIENT
Start: 2022-01-13

## 2022-01-13 RX ORDER — HEPARIN SODIUM (PORCINE) LOCK FLUSH IV SOLN 100 UNIT/ML 100 UNIT/ML
500 SOLUTION INTRAVENOUS AS NEEDED
Status: CANCELLED | OUTPATIENT
Start: 2022-01-13

## 2022-01-13 RX ADMIN — Medication 500 UNITS: at 16:10

## 2022-01-14 ENCOUNTER — HOSPITAL ENCOUNTER (OUTPATIENT)
Dept: SPEECH THERAPY | Facility: HOSPITAL | Age: 47
Setting detail: THERAPIES SERIES
Discharge: HOME | End: 2022-01-14

## 2022-01-14 DIAGNOSIS — F09 ACQUIRED COGNITIVE DYSFUNCTION: Primary | ICD-10-CM

## 2022-01-14 PROCEDURE — 92507 TX SP LANG VOICE COMM INDIV: CPT

## 2022-01-14 NOTE — THERAPY PROGRESS REPORT/RE-CERT
Outpatient Speech Language Pathology   Adult Speech Language Cognitive Progress Note  [facility]     Patient Name: Josefina Rodriguez  : 1975  MRN: [id number]  Today's Date: 2022         Visit Date: 2022   Patient Active Problem List   Diagnosis   • Malignant neoplasm of overlapping sites of right breast in female, estrogen receptor positive (HCC)   • Chemotherapy induced diarrhea   • Chemotherapy-induced nausea   • Vaginal bleeding   • Cysts of both ovaries   • Family history of breast cancer   • Malignant neoplasm of overlapping sites of right female breast (HCC)   • Port-A-Cath in place   • Epigastric pain   • Thrush   • 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 Acquired cognitive dysfunction  F09 294.9        OP SLP Assessment/Plan - 22 1500        SLP Plan    Plan Comments Cont with Cog tx.  -HG          User Key  (r) = Recorded By, (t) = Taken By, (c) = Cosigned By    Initials Name Provider Type     Daisy Capps MS CCC-SLP Speech and Language Pathologist                                   SLP OP Goals     Row Name 22 1500          Goal Type Needed    Goal Type Needed Memory; Attention/Orientation; Other Adult Goals  -            Subjective Comments    Subjective Comments Pt alert, cooperative, returned with homework.  -HG            Memory Goals    Patient will be able to remember information needed to return to work and function on work-related tasks 100%:; with cues  -HG     Status: Patient will be able to remember information needed to return to work and function on work-related tasks New  -HG     Patient will demonstrate improved ability to recall information by immediately recalling a series of words 90%:; unrelated; after delay; with cues  -HG     Status: Patient will demonstrate improved ability to recall information by immediately recalling a  series of words Progressing as expected  -HG     Comments: Patient will demonstrate improved ability to recall information by immediately recalling a series of words 1/14/22: Immediate recall for new card game and pt was 90% accurate. 12/23/21: Recall of picture scene: pt was 100% accurate. Word association and pt was 100% accurat.e  -HG     Patient’s memory skills will be enhanced as reported by patient by utilizing internal memory strategies to recall up to 3 pieces of information after a 5- minute delay 90%:; with cues  -HG     Status: Patient’s memory skills will be enhanced as reported by patient by utilizing internal memory strategies to recall up to 3 pieces of information after a 5- minute delay Progressing as expected  -HG     Comments: Patient’s memory skills will be enhanced as reported by patient by utilizing internal memory strategies to recall up to 3 pieces of information after a 5- minute delay 1/14/22: Delayed recall of 6 related words: pt was 4/6. After review, pt was 6/6 x 3.  12/23/21: Delayed recall of 4 un-related words: pt was 4/4. Progressed to 5/5 x 3.  -HG     Patient’s memory skills will be enhanced as reported by patient by using external memory aides 90%:; with cues  -HG     Status: Patient’s memory skills will be enhanced as reported by patient by using external memory aides Progressing as expected  -HG     Comments: Patient’s memory skills will be enhanced as reported by patient by using external memory aides 1/14/22: Pt has not started journaling, plans to get journal this date. 12/23/21: Pt stated she plans to get a journal.  -HG            Attention/Orientation Goals    Patient will be able to use high level cognitive skills to allow patient to return to work Independently  -HG     Status: Patient will be able to use high level cognitive skills to allow patient to return to work New  -HG     Patient will improve attention skills by sustaining focus and participation to  conversation/task with cues; 5 minute task  -HG     Status: Patient will improve attention skills by sustaining focus and participation to conversation/task New  -HG     Patient will improve attention skills by sustaining consistent behavioral response during continuous and repetitive activity (e.g., listening for target words, auditory/reading comprehension tasks) with cues; 5 minute task  -HG     Status: Patient will improve attention skills by sustaining consistent behavioral response during continuous and repetitive activity (e.g., listening for target words, auditory/reading comprehension tasks) Progressing as expected  -HG     Comments: Patient will improve attention skills by sustaining consistent behavioral response during continuous and repetitive activity (e.g., listening for target words, auditory/reading comprehension tasks) 1/14/22: APT Test for Sustained and Complex Sustained attention and pt was above average. 12/23/21: Card ID in a field of 10, 12, 16 and pt was able to recall up to 4 cards.  -HG     Patient will improve attention skills by sustaining focus in order to actively hold and manipulate information provided (e.g., sequencing auditorily presented number series in ascending or descending order) 80%:; with cues  -HG     Status: Patient will improve attention skills by sustaining focus in order to actively hold and manipulate information provided (e.g., sequencing auditorily presented number series in ascending or descending order) Progressing as expected  -HG     Comments: Patient will improve attention skills by sustaining focus in order to actively hold and manipulate information provided (e.g., sequencing auditorily presented number series in ascending or descending order) 12/23/21: Digit span and pt was 80-90% accurate for up to 8 digits.  -HG     Patient will improve attention skills by focusing to selective target/task when presented with competing stimuli or in a distracting  environment in order to complete task 80%:; with cues  -HG     Status: Patient will improve attention skills by focusing to selective target/task when presented with competing stimuli or in a distracting environment in order to complete task Progressing as expected  -HG     Comments: Patient will improve attention skills by focusing to selective target/task when presented with competing stimuli or in a distracting environment in order to complete task 1/14/22: APT Test for Selective Attention and pt was 28/30- Above average for age.  -HG     Patient will improve attention skills by alternating or shifting focus between two different tasks in order to complete both tasks 80%:; with cues  -HG     Status: Patient will improve attention skills by alternating or shifting focus between two different tasks in order to complete both tasks Progressing as expected  -HG     Comments: Patient will improve attention skills by alternating or shifting focus between two different tasks in order to complete both tasks 1/14/22: The APT test for Alternating attention and pt was 20/24 which was above average. The card game of Speed  and pt was 90% accurate.  -HG     Patient will improve attention skills by dividing focus and responding simultaneously to multiple tasks or in order to complete task 80%:; with cues  -HG     Status: Patient will improve attention skills by dividing focus and responding simultaneously to multiple tasks or in order to complete task Progressing as expected  -HG     Comments: Patient will improve attention skills by dividing focus and responding simultaneously to multiple tasks or in order to complete task 1/14/22: APT test for divided attention and pt was 27/30 and fell slightly below average.  -HG     Patient will improve attention skills by modifying surrounding environment 80%:; with cues  -HG     Status: Patient will improve attention skills by modifying surrounding environment New  -HG            Other  Goals    Other Adult Goal- 1 Pt will complete thought organization tasks with 90% Accuracy.  -HG     Status: Other Adult Goal- 1 Progressing as expected  -HG     Comments: Other Adult Goal- 1 12/23/21: Similarities and Differences: pt was 100% accurate.  -HG     Other Adult Goal- 2 Pt will complete reasoning and problem solving assessment with recs to follow as indicated.  -HG     Status: Other Adult Goal- 2 Progressing as expected  -HG     Comments: Other Adult Goal- 2 12/23/21: [place] word scramble and pt was 90% accurate.  -HG     Other Adult Goal- 3 Pt will improve RBANS Total score to a 110 placing pt in the High Average range.  -HG     Status: Other Adult Goal- 3 New  -HG            SLP Time Calculation    SLP Goal Re-Cert Due Date 03/13/22  -HG           User Key  (r) = Recorded By, (t) = Taken By, (c) = Cosigned By    Initials Name Provider Type    Daisy Graves MS CCC-SLP Speech and Language Pathologist               OP SLP Education     Row Name 01/14/22 1500       Education    Education Comments Hmwk for 7 related words and journaling and the game of Speed with dtr.  -HG          User Key  (r) = Recorded By, (t) = Taken By, (c) = Cosigned By    Initials Name Effective Dates    Daisy Graves MS CCC-SLP 06/16/21 -                      Time Calculation:   SLP Start Time: 1500  Untimed Charges  39326-CZ Treatment/ST Modification Prosth Aug Alter : 75  Total Minutes  Untimed Charges Total Minutes: 75   Total Minutes: 75    Therapy Charges for Today     Code Description Service Date Service Provider Modifiers Qty    95436394951 HC ST TREATMENT SPEECH 5 1/14/2022 Daisy Capps MS CCC-SLP GN 1                   Daisy Capps MS CCC-SLP  1/14/2022

## 2022-01-19 ENCOUNTER — HOSPITAL ENCOUNTER (OUTPATIENT)
Dept: MAMMOGRAPHY | Facility: HOSPITAL | Age: 47
Discharge: HOME | End: 2022-01-19
Admitting: INTERNAL MEDICINE

## 2022-01-19 DIAGNOSIS — Z12.31 ENCOUNTER FOR SCREENING MAMMOGRAM FOR MALIGNANT NEOPLASM OF BREAST: ICD-10-CM

## 2022-01-19 DIAGNOSIS — Z17.0 MALIGNANT NEOPLASM OF OVERLAPPING SITES OF RIGHT BREAST IN FEMALE, ESTROGEN RECEPTOR POSITIVE: ICD-10-CM

## 2022-01-19 DIAGNOSIS — C50.811 MALIGNANT NEOPLASM OF OVERLAPPING SITES OF RIGHT BREAST IN FEMALE, ESTROGEN RECEPTOR POSITIVE: ICD-10-CM

## 2022-01-19 PROCEDURE — 77063 BREAST TOMOSYNTHESIS BI: CPT | Performed by: RADIOLOGY

## 2022-01-19 PROCEDURE — 77067 SCR MAMMO BI INCL CAD: CPT

## 2022-01-19 PROCEDURE — 77067 SCR MAMMO BI INCL CAD: CPT | Performed by: RADIOLOGY

## 2022-01-19 PROCEDURE — 77063 BREAST TOMOSYNTHESIS BI: CPT

## 2022-01-21 ENCOUNTER — APPOINTMENT (OUTPATIENT)
Dept: SPEECH THERAPY | Facility: HOSPITAL | Age: 47
End: 2022-01-21

## 2022-01-28 ENCOUNTER — APPOINTMENT (OUTPATIENT)
Dept: SPEECH THERAPY | Facility: HOSPITAL | Age: 47
End: 2022-01-28

## 2022-02-04 ENCOUNTER — APPOINTMENT (OUTPATIENT)
Dept: SPEECH THERAPY | Facility: HOSPITAL | Age: 47
End: 2022-02-04

## 2022-02-11 ENCOUNTER — HOSPITAL ENCOUNTER (OUTPATIENT)
Dept: SPEECH THERAPY | Facility: HOSPITAL | Age: 47
Setting detail: THERAPIES SERIES
Discharge: HOME | End: 2022-02-11

## 2022-02-11 DIAGNOSIS — F09 ACQUIRED COGNITIVE DYSFUNCTION: Primary | ICD-10-CM

## 2022-02-11 PROCEDURE — 92507 TX SP LANG VOICE COMM INDIV: CPT

## 2022-02-11 NOTE — THERAPY PROGRESS REPORT/RE-CERT
Outpatient Speech Language Pathology   Adult Speech Language Cognitive Progress Note  [facility]     Patient Name: Josefina Rodriguez  : 1975  MRN: [id number]  Today's Date: 2022         Visit Date: 2022   Patient Active Problem List   Diagnosis   • Malignant neoplasm of overlapping sites of right breast in female, estrogen receptor positive (HCC)   • Chemotherapy induced diarrhea   • Chemotherapy-induced nausea   • Vaginal bleeding   • Cysts of both ovaries   • Family history of breast cancer   • Malignant neoplasm of overlapping sites of right female breast (HCC)   • Port-A-Cath in place   • Epigastric pain   • Thrush   • 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 Acquired cognitive dysfunction  F09 294.9        OP SLP Assessment/Plan - 22 1500        SLP Assessment    Functional Problems Speech Language- Adult/Cognition  -HG    Impact on Function: Adult Speech Language/Cognition Trouble learning or remembering new information; Poor attention to task  -HG    Clinical Impression: Speech Language-Adult/Congnition Minimal:; Mild:; Cognitive Communication Impairment  -HG    Functional Problems Comment Pt continues to require breaks and cues in order to improve attention and memory.  -HG    Clinical Impression Comments APT Scores were WNL for sustained, complex sustained and selective attention. Divided and Alternating attention were lowest for pt.  -HG    Please refer to paper survey for additional self-reported information Yes  -HG    Please refer to items scanned into chart for additional diagnostic informaiton and handouts as provided by clinician Yes  -HG    SLP Diagnosis Min to Mild cognitive impairment  -HG    Prognosis Excellent (comment)  -HG    Patient/caregiver participated in establishment of treatment plan and goals Yes  -HG    Patient would benefit from skilled  therapy intervention Yes  -HG       SLP Plan    Frequency 1x/week  -HG    Duration 8 weeks  -HG    Planned CPT's? SLP INDIVIDUAL SPEECH THERAPY: 09543  -HG    Expected Duration of Therapy Session (SLP Turner) 60  -HG    Plan Comments Cont with Cog tx.  -HG          User Key  (r) = Recorded By, (t) = Taken By, (c) = Cosigned By    Initials Name Provider Type    HG Jefry Daisy L, MS CCC-SLP Speech and Language Pathologist                                   SLP OP Goals     Row Name 02/11/22 1500          Goal Type Needed    Goal Type Needed Memory; Attention/Orientation; Other Adult Goals  -HG            Subjective Comments    Subjective Comments Pt alert, cooperative.  -HG            Memory Goals    Patient will be able to remember information needed to return to work and function on work-related tasks 100%:; with cues  -HG     Status: Patient will be able to remember information needed to return to work and function on work-related tasks Progressing as expected  -HG     Comments: Patient will be able to remember information needed to return to work and function on work-related tasks 2/11/22: Pt continues to require cues and breaks.  -HG     Patient will demonstrate improved ability to recall information by immediately recalling a series of words 90%:; unrelated; after delay; with cues  -HG     Status: Patient will demonstrate improved ability to recall information by immediately recalling a series of words Progressing as expected  -HG     Comments: Patient will demonstrate improved ability to recall information by immediately recalling a series of words 2/11/22: Immediate recall for card game and pt was 90% accurate. 1/14/22: Immediate recall for new card game and pt was 90% accurate. 12/23/21: Recall of picture scene: pt was 100% accurate. Word association and pt was 100% accurat.e  -HG     Patient’s memory skills will be enhanced as reported by patient by utilizing internal memory strategies to recall up to 3  "pieces of information after a 5- minute delay 90%:; with cues  -HG     Status: Patient’s memory skills will be enhanced as reported by patient by utilizing internal memory strategies to recall up to 3 pieces of information after a 5- minute delay Progressing as expected  -HG     Comments: Patient’s memory skills will be enhanced as reported by patient by utilizing internal memory strategies to recall up to 3 pieces of information after a 5- minute delay 2/11/22:  Delayed recall of 7 related words and pt was 7/7x 2. 1/14/22: Delayed recall of 6 related words: pt was 4/6. After review, pt was 6/6 x 3.  12/23/21: Delayed recall of 4 un-related words: pt was 4/4. Progressed to 5/5 x 3.  -HG     Patient’s memory skills will be enhanced as reported by patient by using external memory aides 90%:; with cues  -HG     Status: Patient’s memory skills will be enhanced as reported by patient by using external memory aides Progressing as expected  -HG     Comments: Patient’s memory skills will be enhanced as reported by patient by using external memory aides 2/11/22: Pt states that she has journaled but only one day. 1/14/22: Pt has not started journaling, plans to get journal this date. 12/23/21: Pt stated she plans to get a journal.  -HG            Attention/Orientation Goals    Patient will be able to use high level cognitive skills to allow patient to return to work Independently  -HG     Status: Patient will be able to use high level cognitive skills to allow patient to return to work Progressing as expected  -HG     Comments: Patient will be able to use high level cognitive skills to allow patient to return to work 2/11/22: Pt continues to \"lose focus\" and SLP discussed giving breaks and pt does do that.  -HG     Patient will improve attention skills by sustaining focus and participation to conversation/task with cues; 30 minute task  -HG     Status: Patient will improve attention skills by sustaining focus and " participation to conversation/task Progressing as expected; Revised  -HG     Comments: Patient will improve attention skills by sustaining focus and participation to conversation/task 2/11/22: Pt able to sustain attention during 5 min conversation, goal adjusted.  -HG     Patient will improve attention skills by sustaining consistent behavioral response during continuous and repetitive activity (e.g., listening for target words, auditory/reading comprehension tasks) with cues; 5 minute task  -HG     Status: Patient will improve attention skills by sustaining consistent behavioral response during continuous and repetitive activity (e.g., listening for target words, auditory/reading comprehension tasks) Progressing as expected  -HG     Comments: Patient will improve attention skills by sustaining consistent behavioral response during continuous and repetitive activity (e.g., listening for target words, auditory/reading comprehension tasks) 2/11/22: Card sorting into deck and the letter N and pt was 75% accurate. 1/14/22: APT Test for Sustained and Complex Sustained attention and pt was above average. 12/23/21: Card ID in a field of 10, 12, 16 and pt was able to recall up to 4 cards.  -HG     Patient will improve attention skills by sustaining focus in order to actively hold and manipulate information provided (e.g., sequencing auditorily presented number series in ascending or descending order) with cues; 90%:  -HG     Status: Patient will improve attention skills by sustaining focus in order to actively hold and manipulate information provided (e.g., sequencing auditorily presented number series in ascending or descending order) Progressing as expected; Revised  -HG     Comments: Patient will improve attention skills by sustaining focus in order to actively hold and manipulate information provided (e.g., sequencing auditorily presented number series in ascending or descending order) 12/23/21: Digit span and pt was  80-90% accurate for up to 8 digits.  -HG     Patient will improve attention skills by focusing to selective target/task when presented with competing stimuli or in a distracting environment in order to complete task with cues; 90%:  -HG     Status: Patient will improve attention skills by focusing to selective target/task when presented with competing stimuli or in a distracting environment in order to complete task Progressing as expected; Revised  -HG     Comments: Patient will improve attention skills by focusing to selective target/task when presented with competing stimuli or in a distracting environment in order to complete task 1/14/22: APT Test for Selective Attention and pt was 28/30- Above average for age.  -HG     Patient will improve attention skills by alternating or shifting focus between two different tasks in order to complete both tasks with cues; 90%:  -HG     Status: Patient will improve attention skills by alternating or shifting focus between two different tasks in order to complete both tasks Progressing as expected; Revised  -HG     Comments: Patient will improve attention skills by alternating or shifting focus between two different tasks in order to complete both tasks 2/11/22: Alternating attention task by sorting cards by red and black and pt was 80% accurate. 1/14/22: The APT test for Alternating attention and pt was 20/24 which was above average. The card game of Speed  and pt was 90% accurate.  -HG     Patient will improve attention skills by dividing focus and responding simultaneously to multiple tasks or in order to complete task with cues; 90%:  -HG     Status: Patient will improve attention skills by dividing focus and responding simultaneously to multiple tasks or in order to complete task Progressing as expected; Revised  -HG     Comments: Patient will improve attention skills by dividing focus and responding simultaneously to multiple tasks or in order to complete task 2/11/22:  Divided attention for paragraph listening and sorting and pt was 90% accurate. 1/14/22: APT test for divided attention and pt was 27/30 and fell slightly below average.  -HG     Patient will improve attention skills by modifying surrounding environment 80%:; with cues  -HG     Status: Patient will improve attention skills by modifying surrounding environment Progressing as expected  -HG     Comments: Patient will improve attention skills by modifying surrounding environment 1/14/22: Pt states that she takes break as needed during work.  -HG            Other Goals    Other Adult Goal- 1 Pt will complete thought organization tasks with 90% Accuracy.  -HG     Status: Other Adult Goal- 1 Progressing as expected  -HG     Comments: Other Adult Goal- 1 12/23/21: Similarities and Differences: pt was 100% accurate.  -HG     Other Adult Goal- 2 Pt will complete reasoning and problem solving assessment with recs to follow as indicated.  -HG     Status: Other Adult Goal- 2 Progressing as expected  -HG     Comments: Other Adult Goal- 2 12/23/21: Rebel word scramble and pt was 90% accurate.  -HG     Other Adult Goal- 3 Pt will improve RBANS Total score to a 110 placing pt in the High Average range.  -HG     Status: Other Adult Goal- 3 New  -HG            SLP Time Calculation    SLP Goal Re-Cert Due Date 03/13/22  -HG           User Key  (r) = Recorded By, (t) = Taken By, (c) = Cosigned By    Initials Name Provider Type    Daisy Graves MS CCC-SLP Speech and Language Pathologist               OP SLP Education     Row Name 02/11/22 1500       Education    Education Comments Education regarding APT scores and the lower areas being divided attention and selective attention. Hmwk for 8 related words.  -HG          User Key  (r) = Recorded By, (t) = Taken By, (c) = Cosigned By    Initials Name Effective Dates    Daisy Graves MS CCC-SLP 06/16/21 -                      Time Calculation:   SLP Start Time:  1500  Untimed Charges  01713-[place] Treatment/ST Modification Prosth Aug Alter : 75  Total Minutes  Untimed Charges Total Minutes: 75   Total Minutes: 75    Therapy Charges for Today     Code Description Service Date Service Provider Modifiers Qty    54145006786 HC ST TREATMENT SPEECH 5 2/11/2022 Daisy Capps, MS CCC-SLP GN 1                   Daisy Capps MS CCC-SLP  2/11/2022

## 2022-02-18 ENCOUNTER — APPOINTMENT (OUTPATIENT)
Dept: SPEECH THERAPY | Facility: HOSPITAL | Age: 47
End: 2022-02-18

## 2022-02-24 ENCOUNTER — OFFICE VISIT (OUTPATIENT)
Dept: ONCOLOGY | Facility: CLINIC | Age: 47
End: 2022-02-24

## 2022-02-24 ENCOUNTER — HOSPITAL ENCOUNTER (OUTPATIENT)
Dept: ONCOLOGY | Facility: HOSPITAL | Age: 47
Setting detail: INFUSION SERIES
Discharge: HOME | End: 2022-02-24

## 2022-02-24 VITALS
HEART RATE: 92 BPM | DIASTOLIC BLOOD PRESSURE: 88 MMHG | SYSTOLIC BLOOD PRESSURE: 134 MMHG | WEIGHT: 234 LBS | HEIGHT: 66 IN | TEMPERATURE: 98.4 F | OXYGEN SATURATION: 98 % | BODY MASS INDEX: 37.61 KG/M2 | RESPIRATION RATE: 18 BRPM

## 2022-02-24 DIAGNOSIS — Z17.0 MALIGNANT NEOPLASM OF OVERLAPPING SITES OF RIGHT BREAST IN FEMALE, ESTROGEN RECEPTOR POSITIVE: Primary | ICD-10-CM

## 2022-02-24 DIAGNOSIS — Z17.0 MALIGNANT NEOPLASM OF OVERLAPPING SITES OF RIGHT BREAST IN FEMALE, ESTROGEN RECEPTOR POSITIVE: ICD-10-CM

## 2022-02-24 DIAGNOSIS — G62.9 NEUROPATHY: ICD-10-CM

## 2022-02-24 DIAGNOSIS — Z95.828 PORT-A-CATH IN PLACE: ICD-10-CM

## 2022-02-24 DIAGNOSIS — G62.0 POLYNEUROPATHY DUE TO DRUG: ICD-10-CM

## 2022-02-24 DIAGNOSIS — C50.811 MALIGNANT NEOPLASM OF OVERLAPPING SITES OF RIGHT BREAST IN FEMALE, ESTROGEN RECEPTOR POSITIVE: Primary | ICD-10-CM

## 2022-02-24 DIAGNOSIS — C96.6: Primary | ICD-10-CM

## 2022-02-24 DIAGNOSIS — C50.811 MALIGNANT NEOPLASM OF OVERLAPPING SITES OF RIGHT BREAST IN FEMALE, ESTROGEN RECEPTOR POSITIVE: ICD-10-CM

## 2022-02-24 DIAGNOSIS — Z45.2 ENCOUNTER FOR CENTRAL LINE CARE: ICD-10-CM

## 2022-02-24 PROCEDURE — 96372 THER/PROPH/DIAG INJ SC/IM: CPT

## 2022-02-24 PROCEDURE — 25010000002 HEPARIN LOCK FLUSH PER 10 UNITS: Performed by: INTERNAL MEDICINE

## 2022-02-24 PROCEDURE — 25010000002 LEUPROLIDE 22.5 MG KIT: Performed by: INTERNAL MEDICINE

## 2022-02-24 PROCEDURE — 99215 OFFICE O/P EST HI 40 MIN: CPT | Performed by: NURSE PRACTITIONER

## 2022-02-24 PROCEDURE — 96402 CHEMO HORMON ANTINEOPL SQ/IM: CPT

## 2022-02-24 PROCEDURE — 96523 IRRIG DRUG DELIVERY DEVICE: CPT

## 2022-02-24 RX ORDER — PREGABALIN 150 MG/1
150 CAPSULE ORAL 2 TIMES DAILY
Qty: 60 CAPSULE | Refills: 3 | Status: SHIPPED | OUTPATIENT
Start: 2022-02-24 | End: 2022-08-11 | Stop reason: SDUPTHER

## 2022-02-24 RX ORDER — POLYETHYLENE GLYCOL 3350 17 G
2 POWDER IN PACKET (EA) ORAL AS NEEDED
Qty: 168 EACH | Refills: 3 | Status: SHIPPED | OUTPATIENT
Start: 2022-02-24 | End: 2022-02-25 | Stop reason: SDUPTHER

## 2022-02-24 RX ORDER — SODIUM CHLORIDE 0.9 % (FLUSH) 0.9 %
10 SYRINGE (ML) INJECTION AS NEEDED
Status: CANCELLED | OUTPATIENT
Start: 2022-02-24

## 2022-02-24 RX ORDER — SODIUM CHLORIDE 0.9 % (FLUSH) 0.9 %
20 SYRINGE (ML) INJECTION AS NEEDED
Status: CANCELLED | OUTPATIENT
Start: 2022-02-24

## 2022-02-24 RX ORDER — HEPARIN SODIUM (PORCINE) LOCK FLUSH IV SOLN 100 UNIT/ML 100 UNIT/ML
500 SOLUTION INTRAVENOUS AS NEEDED
Status: DISCONTINUED | OUTPATIENT
Start: 2022-02-24 | End: 2022-02-25 | Stop reason: HOSPADM

## 2022-02-24 RX ORDER — HEPARIN SODIUM (PORCINE) LOCK FLUSH IV SOLN 100 UNIT/ML 100 UNIT/ML
500 SOLUTION INTRAVENOUS AS NEEDED
Status: CANCELLED | OUTPATIENT
Start: 2022-02-24

## 2022-02-24 RX ORDER — SODIUM CHLORIDE 0.9 % (FLUSH) 0.9 %
10 SYRINGE (ML) INJECTION AS NEEDED
Status: DISCONTINUED | OUTPATIENT
Start: 2022-02-24 | End: 2022-02-25 | Stop reason: HOSPADM

## 2022-02-24 RX ADMIN — HEPARIN SODIUM (PORCINE) LOCK FLUSH IV SOLN 100 UNIT/ML 500 UNITS: 100 SOLUTION at 14:16

## 2022-02-24 RX ADMIN — LEUPROLIDE ACETATE 22.5 MG: KIT SUBCUTANEOUS at 14:17

## 2022-02-24 RX ADMIN — Medication 10 ML: at 14:16

## 2022-02-24 NOTE — PROGRESS NOTES
PROBLEM LIST:  1. [id number] ER+ HI+ Her2+ invasive ductal carcinoma of the right breast  A) presented with a mass on self-exam.  Mammogram 1/7/20 showed a 4.1 cm mass in the right breast, with 2 adjacent smaller masses.  1.9 cm right axillary lymph node.   FNA of lymph node negative.  Biopsy of right breast mass showed grade 2 IDC, Er 95%, HI 2%, Her2 3+.  B) neoadjuvant chemotherapy with TCHP started 2/5/2020  C) right mastectomy on 6/23/20.  Pathology showed a 2 x 1 x 0.6 cm intermediate grade IDC.  0/2 SLN involved.  [id number]  D) adjuvant Kadcyla x 1 year started 7/22/2020.   Kadcyla stopped October 14, 2020 and switched back to Herceptin and Perjeta due to progressive severe neuropathy.  E) anastrozole started 08/12/2020.   2. PCOS  3. Anxiety/depression  4. DM2  5. GERD  6. Hyperlipidemia  7. Hypertension  8.  Hidradenitis  9.  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  CT chest 1/19/2021 showed a pathologic nondisplaced fracture of the right seventh rib.  There were also subtle small nodules in the lungs, none larger than 4 mm.  PET/CT on 1/28/2021 showed SUV of 4.8 in the right rib fracture.  Pulmonary micronodules too small to register hypermetabolic activity.  Biopsy of the rib lesion 2/24/2021 showed Langerhans' cell histiocytosis.  Smoking cessation strongly recommended and she did stop smoking in February 2021.    Subjective     CHIEF COMPLAINT: breast cancer    HISTORY OF PRESENT ILLNESS:   Josefina Rodriguez returns for follow-up.  She continues on anastrozole and Lupron injections.  She does have frequent hot flashes and night sweats.  She has elbow pain bilaterally that improves with Voltaren gel.  Her knee pain improved after going to physical therapy.  She has continue to avoid cigarettes.  She continues to use Nicotine lozenges. She has persistent peripheral neuropathy which is controlled with  "Cymbalta and Lyrica.          Objective      /88   Pulse 92   Temp 98.4 °F (36.9 °C)   Resp 18   Ht 167.6 cm (66\")   Wt 106 kg (234 lb)   SpO2 98%   BMI 37.77 kg/m²    Vitals:    02/24/22 1308   PainSc: 0-No pain               Performance Status: 0    General: well appearing female in no acute distress  Neuro: alert and oriented  HEENT: sclera anicteric  Pulmonary: Lungs are clear to auscultation bilaterally  Cardiovascular: Regular rate and rhythm no murmurs  Breast: Right mastectomy incision well-healed no masses or skin change.  Left breast not examined  Extremeties: no lower extremity edema  Skin: no rashes, lesions, bruising, or petechiae  Psych: mood and affect appropriate      RECENT LABS:  Lab Results   Component Value Date    WBC 10.70 12/02/2021    HGB 14.3 12/02/2021    HCT 43.3 12/02/2021    MCV 90.6 12/02/2021     12/02/2021     Lab Results   Component Value Date    GLUCOSE 213 (H) 12/02/2021    BUN 15 12/02/2021    CREATININE 0.62 12/02/2021    EGFRIFNONA 105 12/02/2021    BCR 24.2 12/02/2021    K 3.9 12/02/2021    CO2 24.0 12/02/2021    CALCIUM 9.7 12/02/2021    ALBUMIN 3.90 12/02/2021    AST 27 12/02/2021    ALT 32 12/02/2021           Mammo Screening Modified With Tomosynthesis Left With CAD  Narrative: ROUTINE SCREENING MAMMOGRAM WITH TOMOSYNTHESIS     HISTORY: 46-year-old female for screening left mammogram. History of  right mastectomy.     IMAGE COMPARISON:  Prior exams, most recently dated 1/8/2021     TECHNIQUE: Low dose full field digital breast tomosynthesis imaging was  performed with 2D and 3D acquisitions consisting of left CC and MLO  views.     FINDINGS: There are scattered areas of fibroglandular density. There is  no worrisome mass, group of calcifications, or architectural distortion  to suggest malignancy.     Impression: No findings suspicious for malignancy.      BI-RADS CATEGORY:  1, NEGATIVE     RECOMMENDATION: Yearly mammogram, yearly clinical breast exam, " and  encourage self breast awareness.     CAD was used.     The standard false negative rate of mammography is between 10% and 25%.   Complex patterns or increased breast density will markedly elevate the  false negative rate of mammography.     A letter, in lay terminology, with the results of this exam will be  mailed to the patient.     This report was finalized on 1/24/2022 7:45 AM by Dr. Paulina Ortiz MD.         Assessment/Plan   Josefina Rodriguez is a 46 y.o. year old female with stage IB ER+ Her2+ IDC of the right breast, as well as a diagnosis of Langerhans histiocytosis involving the lung as well as a single rib lesion.    Breast cancer: She continues on anastrozole along with Lupron for ovarian suppression.  Clinically she is doing well with no evidence of disease recurrence at this time.  Left breast screening mammogram completed 1/19/2022.  She will be due for repeat screening mammogram January 2023.    Langerhans histiocytosis: Imaging from 11/29/2021 shows resolution of interstitial disease.  Continue smoking cessation.  Refill sent for nicotine lozenges.  We will repeat CT chest high-resolution in 3 months prior to return.    Peripheral neuropathy: Continue Cymbalta and Lyrica.  Refill for Lyrica sent to pharmacy today.    Bone density January 21, 2021 was normal.  We will repeat bone density January 2023.    Follow-up in 3 months with high resolution CT scan of chest and labs prior to return.        I spent 40 minutes caring for Josefina on this date of service. This time includes time spent by me in the following activities: preparing for the visit, reviewing tests, obtaining and/or reviewing a separately obtained history, performing a medically appropriate examination and/or evaluation, counseling and educating the patient/family/caregiver, ordering medications, tests, or procedures and documenting information in the medical record        Aimee Johnson, LISA  [facility] Hematology  [facility] Oncology    2/24/2022          CC:

## 2022-02-25 ENCOUNTER — HOSPITAL ENCOUNTER (OUTPATIENT)
Dept: SPEECH THERAPY | Facility: HOSPITAL | Age: 47
Setting detail: THERAPIES SERIES
Discharge: HOME | End: 2022-02-25

## 2022-02-25 DIAGNOSIS — F09 ACQUIRED COGNITIVE DYSFUNCTION: Primary | ICD-10-CM

## 2022-02-25 PROBLEM — G62.0 POLYNEUROPATHY DUE TO DRUG: Status: ACTIVE | Noted: 2022-02-25

## 2022-02-25 PROCEDURE — 92507 TX SP LANG VOICE COMM INDIV: CPT

## 2022-02-25 RX ORDER — POLYETHYLENE GLYCOL 3350 17 G
2 POWDER IN PACKET (EA) ORAL AS NEEDED
Qty: 168 EACH | Refills: 3 | Status: SHIPPED | OUTPATIENT
Start: 2022-02-25 | End: 2022-04-19

## 2022-02-25 RX ORDER — POLYETHYLENE GLYCOL 3350 17 G
2 POWDER IN PACKET (EA) ORAL AS NEEDED
Qty: 144 LOZENGE | OUTPATIENT
Start: 2022-02-25

## 2022-02-28 RX ORDER — POLYETHYLENE GLYCOL 3350 17 G
2 POWDER IN PACKET (EA) ORAL AS NEEDED
Qty: 144 LOZENGE | OUTPATIENT
Start: 2022-02-28

## 2022-03-04 ENCOUNTER — HOSPITAL ENCOUNTER (OUTPATIENT)
Dept: SPEECH THERAPY | Facility: HOSPITAL | Age: 47
Setting detail: THERAPIES SERIES
Discharge: HOME | End: 2022-03-04

## 2022-03-04 DIAGNOSIS — F09 ACQUIRED COGNITIVE DYSFUNCTION: Primary | ICD-10-CM

## 2022-03-04 PROCEDURE — 92507 TX SP LANG VOICE COMM INDIV: CPT

## 2022-03-04 NOTE — THERAPY PROGRESS REPORT/RE-CERT
"Outpatient Speech Language Pathology   Adult Speech Language Cognitive Progress Note   [facility]     Patient Name: Josefina Rodriguez  : 1975  MRN: [id number]  Today's Date: 3/4/2022         Visit Date: 2022   Patient Active Problem List   Diagnosis   • Malignant neoplasm of overlapping sites of right breast in female, estrogen receptor positive (HCC)   • Chemotherapy induced diarrhea   • Chemotherapy-induced nausea   • Vaginal bleeding   • Cysts of both ovaries   • Family history of breast cancer   • Malignant neoplasm of overlapping sites of right female breast (HCC)   • Port-A-Cath in place   • Epigastric pain   • Thrush   • 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 Acquired cognitive dysfunction  F09 294.9        OP SLP Assessment/Plan - 22 1457        SLP Assessment    Functional Problems Speech Language- Adult/Cognition  -HG    Impact on Function: Adult Speech Language/Cognition Trouble learning or remembering new information; Poor attention to task  -HG    Clinical Impression: Speech Language-Adult/Congnition Minimal:; Cognitive Communication Impairment  -HG    Functional Problems Comment Pt felt that her scores would be higher, \"in at least one area\".  -HG    Clinical Impression Comments RBANS Total score of 118 places pt in the High Average Range.  -HG    Please refer to paper survey for additional self-reported information Yes  -HG    Please refer to items scanned into chart for additional diagnostic informaiton and handouts as provided by clinician Yes  -HG    SLP Diagnosis Minimal Cog Comm Impairment  -HG    Prognosis Excellent (comment)  -HG    Patient/caregiver participated in establishment of treatment plan and goals Yes  -HG    Patient would benefit from skilled therapy intervention Yes  -HG       SLP Plan    Frequency 1x/week  " -HG    Duration 4-8 weeks  -HG    Planned CPT's? SLP INDIVIDUAL SPEECH THERAPY: 20670  -HG    Expected Duration of Therapy Session (SLP Eval) 60  -HG    Plan Comments Cont with Cog tx.  -HG          User Key  (r) = Recorded By, (t) = Taken By, (c) = Cosigned By    Initials Name Provider Type    HG Daisy Capps JIMMY, MS CCC-SLP Speech and Language Pathologist                                   SLP OP Goals     Row Name 03/04/22 9607          Goal Type Needed    Goal Type Needed Attention/Orientation; Other Adult Goals  -HG            Subjective Comments    Subjective Comments Pt alert, cooperative, returned with homework.  -HG            Memory Goals    Patient will be able to remember information needed to return to work and function on work-related tasks 100%:; with cues  -HG     Status: Patient will be able to remember information needed to return to work and function on work-related tasks Progressing as expected  -HG     Comments: Patient will be able to remember information needed to return to work and function on work-related tasks 2/11/22: Pt continues to require cues and breaks.  -HG     Patient will demonstrate improved ability to recall information by immediately recalling a series of words 90%:; unrelated; after delay; without cues  -HG     Status: Patient will demonstrate improved ability to recall information by immediately recalling a series of words Progressing as expected; Revised  -HG     Comments: Patient will demonstrate improved ability to recall information by immediately recalling a series of words 3/4/22: RBANS Immediate recall score remains in the High Average range. Immediate recall of a complex picture scene: pt was 85% accurate.  2/25/22: Immediate recall of picture scene and pt was 100% accurate. 2/11/22: Immediate recall for card game and pt was 90% accurate. 1/14/22: Immediate recall for new card game and pt was 90% accurate. 12/23/21: Recall of picture scene: pt was 100% accurate. Word  association and pt was 100% accurat.e  -HG     Patient’s memory skills will be enhanced as reported by patient by utilizing internal memory strategies to recall up to 3 pieces of information after a 5- minute delay 90%:; without cues  -HG     Status: Patient’s memory skills will be enhanced as reported by patient by utilizing internal memory strategies to recall up to 3 pieces of information after a 5- minute delay Progressing as expected; Revised  -HG     Comments: Patient’s memory skills will be enhanced as reported by patient by utilizing internal memory strategies to recall up to 3 pieces of information after a 5- minute delay 3/4/22: RBANS Delayed recall score of 122 improved to a Superior range. 2/25/22: Delayed recall of 8 related words and pt was 8/8 x 3. 2/11/22:  Delayed recall of 7 related words and pt was 7/7x 2. 1/14/22: Delayed recall of 6 related words: pt was 4/6. After review, pt was 6/6 x 3.  12/23/21: Delayed recall of 4 un-related words: pt was 4/4. Progressed to 5/5 x 3.  -HG     Patient’s memory skills will be enhanced as reported by patient by using external memory aides 90%:; without cues  -HG     Status: Patient’s memory skills will be enhanced as reported by patient by using external memory aides Progressing as expected; Revised  -HG     Comments: Patient’s memory skills will be enhanced as reported by patient by using external memory aides 2/11/22: Pt states that she has journaled but only one day. 1/14/22: Pt has not started journaling, plans to get journal this date. 12/23/21: Pt stated she plans to get a journal.  -HG            Attention/Orientation Goals    Patient will be able to use high level cognitive skills to allow patient to return to work Independently  -HG     Status: Patient will be able to use high level cognitive skills to allow patient to return to work Progressing as expected  -HG     Comments: Patient will be able to use high level cognitive skills to allow patient to  "return to work 3/4/22: RBANS Attention score improved to a 94 from a 91 2/11/22: Pt continues to \"lose focus\" and SLP discussed giving breaks and pt does do that.  -HG     Patient will improve attention skills by sustaining focus and participation to conversation/task with cues; 30 minute task  -HG     Status: Patient will improve attention skills by sustaining focus and participation to conversation/task Progressing as expected  -HG     Comments: Patient will improve attention skills by sustaining focus and participation to conversation/task 2/11/22: Pt able to sustain attention during 5 min conversation, goal adjusted.  -HG     Patient will improve attention skills by sustaining consistent behavioral response during continuous and repetitive activity (e.g., listening for target words, auditory/reading comprehension tasks) with cues; 20 minute task  -HG     Status: Patient will improve attention skills by sustaining consistent behavioral response during continuous and repetitive activity (e.g., listening for target words, auditory/reading comprehension tasks) Progressing as expected; Revised  -HG     Comments: Patient will improve attention skills by sustaining consistent behavioral response during continuous and repetitive activity (e.g., listening for target words, auditory/reading comprehension tasks) 2/11/22: Card sorting into deck and the letter N and pt was 75% accurate. 1/14/22: APT Test for Sustained and Complex Sustained attention and pt was above average. 12/23/21: Card ID in a field of 10, 12, 16 and pt was able to recall up to 4 cards.  -HG     Patient will improve attention skills by sustaining focus in order to actively hold and manipulate information provided (e.g., sequencing auditorily presented number series in ascending or descending order) 90%:; without cues  -HG     Status: Patient will improve attention skills by sustaining focus in order to actively hold and manipulate information provided " (e.g., sequencing auditorily presented number series in ascending or descending order) Progressing as expected; Revised  -HG     Comments: Patient will improve attention skills by sustaining focus in order to actively hold and manipulate information provided (e.g., sequencing auditorily presented number series in ascending or descending order) 12/23/21: Digit span and pt was 80-90% accurate for up to 8 digits.  -HG     Patient will improve attention skills by focusing to selective target/task when presented with competing stimuli or in a distracting environment in order to complete task 90%:; without cues  -HG     Status: Patient will improve attention skills by focusing to selective target/task when presented with competing stimuli or in a distracting environment in order to complete task Progressing as expected; Revised  -HG     Comments: Patient will improve attention skills by focusing to selective target/task when presented with competing stimuli or in a distracting environment in order to complete task 1/14/22: APT Test for Selective Attention and pt was 28/30- Above average for age.  -HG     Patient will improve attention skills by alternating or shifting focus between two different tasks in order to complete both tasks 90%:; without cues  -HG     Status: Patient will improve attention skills by alternating or shifting focus between two different tasks in order to complete both tasks Progressing as expected; Revised  -HG     Comments: Patient will improve attention skills by alternating or shifting focus between two different tasks in order to complete both tasks 2/11/22: Alternating attention task by sorting cards by red and black and pt was 80% accurate. 1/14/22: The APT test for Alternating attention and pt was 20/24 which was above average. The card game of Speed  and pt was 90% accurate.  -HG     Patient will improve attention skills by dividing focus and responding simultaneously to multiple tasks or in  order to complete task 90%:; without cues  -HG     Status: Patient will improve attention skills by dividing focus and responding simultaneously to multiple tasks or in order to complete task Progressing as expected; Revised  -HG     Comments: Patient will improve attention skills by dividing focus and responding simultaneously to multiple tasks or in order to complete task 2/11/22: Divided attention for paragraph listening and sorting and pt was 90% accurate. 1/14/22: APT test for divided attention and pt was 27/30 and fell slightly below average.  -HG     Patient will improve attention skills by modifying surrounding environment 80%:; without cues  -HG     Status: Patient will improve attention skills by modifying surrounding environment Progressing as expected; Revised  -HG     Comments: Patient will improve attention skills by modifying surrounding environment 2/25/22: During an exec fxn task, pt used notetaking strategies and referencing strategies in order to increase her accuracy. 1/14/22: Pt states that she takes break as needed during work.  -HG            Other Goals    Other Adult Goal- 1 Pt will complete thought organization tasks with 90% Accuracy.  -HG     Status: Other Adult Goal- 1 Achieved  -HG     Comments: Other Adult Goal- 1 3/4/22: RBANS Language score of 110 is up from 100. 2/25/22: Answering general informations and sequencing sentences and pt was 100% accurate. 12/23/21: Similarities and Differences: pt was 100% accurate.  -HG     Other Adult Goal- 2 Pt will complete reasoning and problem solving assessment with recs to follow as indicated.  -HG     Status: Other Adult Goal- 2 Progressing as expected  -HG     Comments: Other Adult Goal- 2 2/25/22: WORDLE and pt required min cues and completed with accuracy. 12/23/21: Rebel word scramble and pt was 90% accurate.  -HG     Other Adult Goal- 3 Pt will improve RBANS Total score to a 121 placing pt in the Superior range.  -HG     Status: Other  Adult Goal- 3 Progressing as expected; Revised  -     Comments: Other Adult Goal- 3 3/4/22: RBANS Total score of 118 is up from a previous 106.  -            SLP Time Calculation    SLP Goal Re-Cert Due Date 06/02/22  -           User Key  (r) = Recorded By, (t) = Taken By, (c) = Cosigned By    Initials Name Provider Type    ELIZABETH Daisy Capps MS CCC-SLP Speech and Language Pathologist               OP SLP Education     Row Name 03/04/22 1457       Education    Barriers to Learning No barriers identified  -    Education Provided Described results of evaluation; Patient expressed understanding of evaluation; Patient participated in establishing goals and treatment plan; Patient demonstrated recommended strategies; Patient requires further education on strategies, risks  -    Assessed Learning needs; Learning motivation; Learning preferences; Learning readiness  -    Learning Motivation Strong  -    Learning Method Explanation; Demonstration; Written materials; Teach back  -    Teaching Response Verbalized understanding; Demonstrated understanding; Reinforcement needed  -    Education Comments Reviewed RBANS scores and adjusted goals. Hmwk for 5 un-related words.  -          User Key  (r) = Recorded By, (t) = Taken By, (c) = Cosigned By    Initials Name Effective Dates     Daisy Capps MS CCC-SLP 06/16/21 -                      Time Calculation:   SLP Start Time: 1457  Untimed Charges  53862-LX Treatment/ST Modification Prosth Aug Alter : 75  Total Minutes  Untimed Charges Total Minutes: 75   Total Minutes: 75    Therapy Charges for Today     Code Description Service Date Service Provider Modifiers Qty    [id number] HC ST TREATMENT SPEECH 5 3/4/2022 Daisy Capps MS CCC-SLP GN 1                   Daisy Capps MS CCC-SLP  3/4/2022

## 2022-03-09 RX ORDER — POLYETHYLENE GLYCOL 3350 17 G
2 POWDER IN PACKET (EA) ORAL AS NEEDED
Qty: 144 LOZENGE | OUTPATIENT
Start: 2022-03-09

## 2022-03-11 ENCOUNTER — HOSPITAL ENCOUNTER (OUTPATIENT)
Dept: CT IMAGING | Facility: HOSPITAL | Age: 47
Discharge: HOME | End: 2022-03-11
Admitting: NURSE PRACTITIONER

## 2022-03-11 DIAGNOSIS — C96.6: ICD-10-CM

## 2022-03-11 PROCEDURE — 71250 CT THORAX DX C-: CPT

## 2022-03-21 ENCOUNTER — HOSPITAL ENCOUNTER (OUTPATIENT)
Dept: SPEECH THERAPY | Facility: HOSPITAL | Age: 47
Setting detail: THERAPIES SERIES
Discharge: HOME | End: 2022-03-21

## 2022-03-21 DIAGNOSIS — F09 ACQUIRED COGNITIVE DYSFUNCTION: Primary | ICD-10-CM

## 2022-03-21 PROCEDURE — 92507 TX SP LANG VOICE COMM INDIV: CPT

## 2022-03-21 NOTE — THERAPY PROGRESS REPORT/RE-CERT
Outpatient Speech Language Pathology   Adult Speech Language Cognitive Progress Note  [facility]     Patient Name: Josefina Rodriguez  : 1975  MRN: [id number]  Today's Date: 3/21/2022         Visit Date: 2022   Patient Active Problem List   Diagnosis   • Malignant neoplasm of overlapping sites of right breast in female, estrogen receptor positive (HCC)   • Chemotherapy induced diarrhea   • Chemotherapy-induced nausea   • Vaginal bleeding   • Cysts of both ovaries   • Family history of breast cancer   • Malignant neoplasm of overlapping sites of right female breast (HCC)   • Port-A-Cath in place   • Epigastric pain   • Thrush   • 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 Acquired cognitive dysfunction  F09 294.9        OP SLP Assessment/Plan - 22 0900        SLP Plan    Plan Comments Cont with Cog tx.  -HG          User Key  (r) = Recorded By, (t) = Taken By, (c) = Cosigned By    Initials Name Provider Type     Daisy Capps MS CCC-SLP Speech and Language Pathologist                                   SLP OP Goals     Row Name 22 0900          Goal Type Needed    Goal Type Needed Memory;Attention/Orientation;Other Adult Goals  -HG            Subjective Comments    Subjective Comments Pt alert, cooperative,  -            Memory Goals    Patient will be able to remember information needed to return to work and function on work-related tasks 100%:;with cues  -HG     Status: Patient will be able to remember information needed to return to work and function on work-related tasks Progressing as expected  -     Comments: Patient will be able to remember information needed to return to work and function on work-related tasks 22: Pt continues to require cues and breaks.  -HG     Patient will demonstrate improved ability to recall  "information by immediately recalling a series of words 90%:;unrelated;after delay;without cues  -HG     Status: Patient will demonstrate improved ability to recall information by immediately recalling a series of words Progressing as expected;Revised  -     Comments: Patient will demonstrate improved ability to recall information by immediately recalling a series of words 3/21/22: Picture scene recall and pt was 80% accurate.   Paired words and pt was 100% accurate.  Pt stated, \"Immediate memory kills me sometimes sometimes.\" Pt reports chunking 10 digits, 6 and 4 and is able to recall complete addresses.  3/4/22: RBANS Immediate recall score remains in the High Average range. Immediate recall of a complex picture scene: pt was 85% accurate.  2/25/22: Immediate recall of picture scene and pt was 100% accurate. 2/11/22: Immediate recall for card game and pt was 90% accurate. 1/14/22: Immediate recall for new card game and pt was 90% accurate. 12/23/21: Recall of picture scene: pt was 100% accurate. Word association and pt was 100% accurat.e  -HG     Patient’s memory skills will be enhanced as reported by patient by utilizing internal memory strategies to recall up to 3 pieces of information after a 5- minute delay 90%:;without cues  -HG     Status: Patient’s memory skills will be enhanced as reported by patient by utilizing internal memory strategies to recall up to 3 pieces of information after a 5- minute delay Progressing as expected;Revised  -     Comments: Patient’s memory skills will be enhanced as reported by patient by utilizing internal memory strategies to recall up to 3 pieces of information after a 5- minute delay 3/21/22: Delayed recall of 5 un-related words and pt was 4/5.   3/4/22: RBANS Delayed recall score of 122 improved to a Superior range. 2/25/22: Delayed recall of 8 related words and pt was 8/8 x 3. 2/11/22:  Delayed recall of 7 related words and pt was 7/7x 2. 1/14/22: Delayed recall of 6 " "related words: pt was 4/6. After review, pt was 6/6 x 3.  12/23/21: Delayed recall of 4 un-related words: pt was 4/4. Progressed to 5/5 x 3.  -HG     Patient’s memory skills will be enhanced as reported by patient by using external memory aides 90%:;without cues  -HG     Status: Patient’s memory skills will be enhanced as reported by patient by using external memory aides Progressing as expected;Revised  -HG     Comments: Patient’s memory skills will be enhanced as reported by patient by using external memory aides 3/21/22: Pt states that her daughter lost her journal so she took one from SLP. 2/11/22: Pt states that she has journaled but only one day. 1/14/22: Pt has not started journaling, plans to get journal this date. 12/23/21: Pt stated she plans to get a journal.  -HG            Attention/Orientation Goals    Patient will be able to use high level cognitive skills to allow patient to return to work Independently  -HG     Status: Patient will be able to use high level cognitive skills to allow patient to return to work Progressing as expected  -HG     Comments: Patient will be able to use high level cognitive skills to allow patient to return to work 3/4/22: RBANS Attention score improved to a 94 from a 91 2/11/22: Pt continues to \"lose focus\" and SLP discussed giving breaks and pt does do that.  -HG     Patient will improve attention skills by sustaining focus and participation to conversation/task with cues;30 minute task  -HG     Status: Patient will improve attention skills by sustaining focus and participation to conversation/task Progressing as expected  -HG     Comments: Patient will improve attention skills by sustaining focus and participation to conversation/task 2/11/22: Pt able to sustain attention during 5 min conversation, goal adjusted.  -HG     Patient will improve attention skills by sustaining consistent behavioral response during continuous and repetitive activity (e.g., listening for target " words, auditory/reading comprehension tasks) with cues;20 minute task  -HG     Status: Patient will improve attention skills by sustaining consistent behavioral response during continuous and repetitive activity (e.g., listening for target words, auditory/reading comprehension tasks) Progressing as expected;Revised  -HG     Comments: Patient will improve attention skills by sustaining consistent behavioral response during continuous and repetitive activity (e.g., listening for target words, auditory/reading comprehension tasks) 3/21/22: Card sorting by deck and the letter N and pt was 95% accurate.  2/11/22: Card sorting into deck and the letter N and pt was 75% accurate. 1/14/22: APT Test for Sustained and Complex Sustained attention and pt was above average. 12/23/21: Card ID in a field of 10, 12, 16 and pt was able to recall up to 4 cards.  -HG     Patient will improve attention skills by sustaining focus in order to actively hold and manipulate information provided (e.g., sequencing auditorily presented number series in ascending or descending order) 90%:;without cues  -HG     Status: Patient will improve attention skills by sustaining focus in order to actively hold and manipulate information provided (e.g., sequencing auditorily presented number series in ascending or descending order) Progressing as expected;Revised  -HG     Comments: Patient will improve attention skills by sustaining focus in order to actively hold and manipulate information provided (e.g., sequencing auditorily presented number series in ascending or descending order) 12/23/21: Digit span and pt was 80-90% accurate for up to 8 digits.  -HG     Patient will improve attention skills by focusing to selective target/task when presented with competing stimuli or in a distracting environment in order to complete task 90%:;without cues  -HG     Status: Patient will improve attention skills by focusing to selective target/task when presented with  competing stimuli or in a distracting environment in order to complete task Progressing as expected;Revised  -HG     Comments: Patient will improve attention skills by focusing to selective target/task when presented with competing stimuli or in a distracting environment in order to complete task 1/14/22: APT Test for Selective Attention and pt was 28/30- Above average for age.  -HG     Patient will improve attention skills by alternating or shifting focus between two different tasks in order to complete both tasks 90%:;without cues  -HG     Status: Patient will improve attention skills by alternating or shifting focus between two different tasks in order to complete both tasks Progressing as expected;Revised  -HG     Comments: Patient will improve attention skills by alternating or shifting focus between two different tasks in order to complete both tasks 3/21/22: Alternating attention for adding and subtracting two cards and pt was 90% accurate. 2/11/22: Alternating attention task by sorting cards by red and black and pt was 80% accurate. 1/14/22: The APT test for Alternating attention and pt was 20/24 which was above average. The card game of Speed  and pt was 90% accurate.  -HG     Patient will improve attention skills by dividing focus and responding simultaneously to multiple tasks or in order to complete task 90%:;without cues  -HG     Status: Patient will improve attention skills by dividing focus and responding simultaneously to multiple tasks or in order to complete task Progressing as expected;Revised  -     Comments: Patient will improve attention skills by dividing focus and responding simultaneously to multiple tasks or in order to complete task 3/21/22: [place] in the [place] and pt was 85% accurate. 2/11/22: Divided attention for paragraph listening and sorting and pt was 90% accurate. 1/14/22: APT test for divided attention and pt was 27/30 and fell slightly below average.  -HG     Patient will  improve attention skills by modifying surrounding environment 80%:;without cues  -HG     Status: Patient will improve attention skills by modifying surrounding environment Progressing as expected;Revised  -HG     Comments: Patient will improve attention skills by modifying surrounding environment 2/25/22: During an exec fxn task, pt used notetaking strategies and referencing strategies in order to increase her accuracy. 1/14/22: Pt states that she takes break as needed during work.  -HG            Other Goals    Other Adult Goal- 1 Pt will complete thought organization tasks with 90% Accuracy.  -HG     Status: Other Adult Goal- 1 Achieved  -HG     Comments: Other Adult Goal- 1 3/4/22: RBANS Language score of 110 is up from 100. 2/25/22: Answering general informations and sequencing sentences and pt was 100% accurate. 12/23/21: Similarities and Differences: pt was 100% accurate.  -HG     Other Adult Goal- 2 Pt will complete reasoning and problem solving assessment with recs to follow as indicated.  -HG     Status: Other Adult Goal- 2 Progressing as expected  -HG     Comments: Other Adult Goal- 2 2/25/22: WORDLE and pt required min cues and completed with accuracy. 12/23/21: [place] word scramble and pt was 90% accurate.  -HG     Other Adult Goal- 3 Pt will improve RBANS Total score to a 121 placing pt in the Superior range.  -HG     Status: Other Adult Goal- 3 Progressing as expected;Revised  -HG     Comments: Other Adult Goal- 3 3/4/22: RBANS Total score of 118 is up from a previous 106.  -HG            SLP Time Calculation    SLP Goal Re-Cert Due Date 06/02/22  -HG           User Key  (r) = Recorded By, (t) = Taken By, (c) = Cosigned By    Initials Name Provider Type    Daisy Graves MS CCC-SLP Speech and Language Pathologist                       Time Calculation:   SLP Start Time: 0900  Untimed Charges  [id number] Treatment/ST Modification Prosth Aug Alter : 60  Total Minutes  Untimed Charges Total  Minutes: 60   Total Minutes: 60    Therapy Charges for Today     Code Description Service Date Service Provider Modifiers Qty    [id number]  ST TREATMENT SPEECH 4 3/21/2022 Daisy Capps, MS CCC-SLP GN 1                   Daisy Capps, MS CCC-SLP  3/21/2022

## 2022-03-31 ENCOUNTER — HOSPITAL ENCOUNTER (OUTPATIENT)
Dept: ONCOLOGY | Facility: HOSPITAL | Age: 47
Setting detail: INFUSION SERIES
Discharge: HOME | End: 2022-03-31

## 2022-03-31 VITALS
SYSTOLIC BLOOD PRESSURE: 131 MMHG | RESPIRATION RATE: 18 BRPM | TEMPERATURE: 97.8 F | DIASTOLIC BLOOD PRESSURE: 80 MMHG | HEART RATE: 95 BPM | HEIGHT: 66 IN | WEIGHT: 234 LBS | BODY MASS INDEX: 37.61 KG/M2

## 2022-03-31 DIAGNOSIS — Z45.2 ENCOUNTER FOR CENTRAL LINE CARE: Primary | ICD-10-CM

## 2022-03-31 DIAGNOSIS — Z95.828 PORT-A-CATH IN PLACE: ICD-10-CM

## 2022-03-31 PROCEDURE — 96523 IRRIG DRUG DELIVERY DEVICE: CPT

## 2022-03-31 PROCEDURE — 25010000002 HEPARIN LOCK FLUSH PER 10 UNITS: Performed by: INTERNAL MEDICINE

## 2022-03-31 RX ORDER — HEPARIN SODIUM (PORCINE) LOCK FLUSH IV SOLN 100 UNIT/ML 100 UNIT/ML
500 SOLUTION INTRAVENOUS AS NEEDED
Status: DISCONTINUED | OUTPATIENT
Start: 2022-03-31 | End: 2022-04-01 | Stop reason: HOSPADM

## 2022-03-31 RX ORDER — SODIUM CHLORIDE 0.9 % (FLUSH) 0.9 %
10 SYRINGE (ML) INJECTION AS NEEDED
Status: CANCELLED | OUTPATIENT
Start: 2022-03-31

## 2022-03-31 RX ORDER — HEPARIN SODIUM (PORCINE) LOCK FLUSH IV SOLN 100 UNIT/ML 100 UNIT/ML
500 SOLUTION INTRAVENOUS AS NEEDED
Status: CANCELLED | OUTPATIENT
Start: 2022-03-31

## 2022-03-31 RX ORDER — SODIUM CHLORIDE 0.9 % (FLUSH) 0.9 %
20 SYRINGE (ML) INJECTION AS NEEDED
Status: CANCELLED | OUTPATIENT
Start: 2022-03-31

## 2022-03-31 RX ADMIN — Medication 500 UNITS: at 14:41

## 2022-04-08 ENCOUNTER — APPOINTMENT (OUTPATIENT)
Dept: SPEECH THERAPY | Facility: HOSPITAL | Age: 47
End: 2022-04-08

## 2022-04-15 ENCOUNTER — HOSPITAL ENCOUNTER (OUTPATIENT)
Dept: SPEECH THERAPY | Facility: HOSPITAL | Age: 47
Setting detail: THERAPIES SERIES
Discharge: HOME | End: 2022-04-15

## 2022-04-15 DIAGNOSIS — F09 ACQUIRED COGNITIVE DYSFUNCTION: Primary | ICD-10-CM

## 2022-04-15 PROCEDURE — 92507 TX SP LANG VOICE COMM INDIV: CPT

## 2022-04-15 NOTE — THERAPY PROGRESS REPORT/RE-CERT
Outpatient Speech Language Pathology   Adult Speech Language Cognitive Progress Note  [facility]     Patient Name: Josefina Rodriguez  : 1975  MRN: [id number]  Today's Date: 4/15/2022         Visit Date: 04/15/2022   Patient Active Problem List   Diagnosis   • Malignant neoplasm of overlapping sites of right breast in female, estrogen receptor positive (HCC)   • Chemotherapy induced diarrhea   • Chemotherapy-induced nausea   • Vaginal bleeding   • Cysts of both ovaries   • Family history of breast cancer   • Malignant neoplasm of overlapping sites of right female breast (HCC)   • Port-A-Cath in place   • Epigastric pain   • Thrush   • 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 Acquired cognitive dysfunction  F09 294.9        OP SLP Assessment/Plan - 04/15/22 1500        SLP Assessment    Functional Problems Speech Language- Adult/Cognition  -HG    Impact on Function: Adult Speech Language/Cognition Trouble learning or remembering new information;Poor attention to task  -HG    Clinical Impression: Speech Language-Adult/Congnition Minimal:;Cognitive Communication Impairment  -HG    Functional Problems Comment No re-assessment given since only 1 tx session between last assessment and this date.  -HG    SLP Diagnosis Minimal Cog impmairment  -HG    Prognosis Excellent (comment)  -HG    Patient/caregiver participated in establishment of treatment plan and goals Yes  -HG    Patient would benefit from skilled therapy intervention Yes  -HG       SLP Plan    Frequency 1x/week  -HG    Duration 4-8 weeks  -HG    Planned CPT's? SLP INDIVIDUAL SPEECH THERAPY: 17667  -HG    Expected Duration of Therapy Session (SLP Eval) 60  -HG    Plan Comments Cont with Cog tx.  -HG          User Key  (r) = Recorded By, (t) = Taken By, (c) = Cosigned By    Initials Name Provider Type  "   HG Daisy Capps, MS CCC-SLP Speech and Language Pathologist                                   SLP OP Goals     Row Name 04/15/22 1500          Goal Type Needed    Goal Type Needed Memory;Attention/Orientation;Other Adult Goals  -HG            Subjective Comments    Subjective Comments Pt alert, cooperative, returned admitting that she misplaced the list.  -HG            Memory Goals    Patient will be able to remember information needed to return to work and function on work-related tasks 100%:;with cues  -HG     Status: Patient will be able to remember information needed to return to work and function on work-related tasks Progressing as expected  -HG     Comments: Patient will be able to remember information needed to return to work and function on work-related tasks 4/15/22: Pt continues to use breaks including nap breaks in order to help focus and complete her work.   2/11/22: Pt continues to require cues and breaks.  -HG     Patient will demonstrate improved ability to recall information by immediately recalling a series of words 90%:;unrelated;after delay;without cues  -HG     Status: Patient will demonstrate improved ability to recall information by immediately recalling a series of words Progressing as expected;Revised  -HG     Comments: Patient will demonstrate improved ability to recall information by immediately recalling a series of words 4/15/22: Immediate recall of ABC game and pt was 100% accurate. 3/21/22: Picture scene recall and pt was 80% accurate.   Paired words and pt was 100% accurate.  Pt stated, \"Immediate memory kills me sometimes sometimes.\" Pt reports chunking 10 digits, 6 and 4 and is able to recall complete addresses.  3/4/22: RBANS Immediate recall score remains in the High Average range. Immediate recall of a complex picture scene: pt was 85% accurate.  2/25/22: Immediate recall of picture scene and pt was 100% accurate. 2/11/22: Immediate recall for card game and pt was 90% " accurate. 1/14/22: Immediate recall for new card game and pt was 90% accurate. 12/23/21: Recall of picture scene: pt was 100% accurate. Word association and pt was 100% accurat.e  -HG     Patient’s memory skills will be enhanced as reported by patient by utilizing internal memory strategies to recall up to 3 pieces of information after a 5- minute delay 90%:;without cues  -HG     Status: Patient’s memory skills will be enhanced as reported by patient by utilizing internal memory strategies to recall up to 3 pieces of information after a 5- minute delay Progressing as expected;Revised  -     Comments: Patient’s memory skills will be enhanced as reported by patient by utilizing internal memory strategies to recall up to 3 pieces of information after a 5- minute delay 4/15/22: Delayed recall of 6 un-related words and pt was 4/6 and improved to 6/6.   3/21/22: Delayed recall of 5 un-related words and pt was 4/5.   3/4/22: RBANS Delayed recall score of 122 improved to a Superior range. 2/25/22: Delayed recall of 8 related words and pt was 8/8 x 3. 2/11/22:  Delayed recall of 7 related words and pt was 7/7x 2. 1/14/22: Delayed recall of 6 related words: pt was 4/6. After review, pt was 6/6 x 3.  12/23/21: Delayed recall of 4 un-related words: pt was 4/4. Progressed to 5/5 x 3.  -HG     Patient’s memory skills will be enhanced as reported by patient by using external memory aides 90%:;without cues  -HG     Status: Patient’s memory skills will be enhanced as reported by patient by using external memory aides Progressing as expected;Revised  -     Comments: Patient’s memory skills will be enhanced as reported by patient by using external memory aides 4/15/22: Pt continues to journal for memory and counseling notes. 3/21/22: Pt states that her daughter lost her journal so she took one from SLP. 2/11/22: Pt states that she has journaled but only one day. 1/14/22: Pt has not started journaling, plans to get journal this  "date. 12/23/21: Pt stated she plans to get a journal.  -HG            Attention/Orientation Goals    Patient will be able to use high level cognitive skills to allow patient to return to work Independently  -HG     Status: Patient will be able to use high level cognitive skills to allow patient to return to work Progressing as expected  -HG     Comments: Patient will be able to use high level cognitive skills to allow patient to return to work 3/4/22: RBANS Attention score improved to a 94 from a 91 2/11/22: Pt continues to \"lose focus\" and SLP discussed giving breaks and pt does do that.  -HG     Patient will improve attention skills by sustaining focus and participation to conversation/task with cues;30 minute task  -HG     Status: Patient will improve attention skills by sustaining focus and participation to conversation/task Progressing as expected  -HG     Comments: Patient will improve attention skills by sustaining focus and participation to conversation/task 2/11/22: Pt able to sustain attention during 5 min conversation, goal adjusted.  -HG     Patient will improve attention skills by sustaining consistent behavioral response during continuous and repetitive activity (e.g., listening for target words, auditory/reading comprehension tasks) with cues;20 minute task  -HG     Status: Patient will improve attention skills by sustaining consistent behavioral response during continuous and repetitive activity (e.g., listening for target words, auditory/reading comprehension tasks) Progressing as expected;Revised  -HG     Comments: Patient will improve attention skills by sustaining consistent behavioral response during continuous and repetitive activity (e.g., listening for target words, auditory/reading comprehension tasks) 3/21/22: Card sorting by deck and the letter N and pt was 95% accurate.  2/11/22: Card sorting into deck and the letter N and pt was 75% accurate. 1/14/22: APT Test for Sustained and Complex " Sustained attention and pt was above average. 12/23/21: Card ID in a field of 10, 12, 16 and pt was able to recall up to 4 cards.  -HG     Patient will improve attention skills by sustaining focus in order to actively hold and manipulate information provided (e.g., sequencing auditorily presented number series in ascending or descending order) 90%:;without cues  -HG     Status: Patient will improve attention skills by sustaining focus in order to actively hold and manipulate information provided (e.g., sequencing auditorily presented number series in ascending or descending order) Progressing as expected;Revised  -HG     Comments: Patient will improve attention skills by sustaining focus in order to actively hold and manipulate information provided (e.g., sequencing auditorily presented number series in ascending or descending order) 4/15/22: Opposites with a delay: Pt was 100% accurate. 12/23/21: Digit span and pt was 80-90% accurate for up to 8 digits.  -HG     Patient will improve attention skills by focusing to selective target/task when presented with competing stimuli or in a distracting environment in order to complete task 90%:;without cues  -HG     Status: Patient will improve attention skills by focusing to selective target/task when presented with competing stimuli or in a distracting environment in order to complete task Progressing as expected;Revised  -HG     Comments: Patient will improve attention skills by focusing to selective target/task when presented with competing stimuli or in a distracting environment in order to complete task 1/14/22: APT Test for Selective Attention and pt was 28/30- Above average for age.  -HG     Patient will improve attention skills by alternating or shifting focus between two different tasks in order to complete both tasks 90%:;without cues  -HG     Status: Patient will improve attention skills by alternating or shifting focus between two different tasks in order to  complete both tasks Progressing as expected;Revised  -     Comments: Patient will improve attention skills by alternating or shifting focus between two different tasks in order to complete both tasks 4/15/22: Alternating attention for visual scanning two different letter marking and pt was 100% accurate.  3/21/22: Alternating attention for adding and subtracting two cards and pt was 90% accurate. 2/11/22: Alternating attention task by sorting cards by red and black and pt was 80% accurate. 1/14/22: The APT test for Alternating attention and pt was 20/24 which was above average. The card game of Speed  and pt was 90% accurate.  -HG     Patient will improve attention skills by dividing focus and responding simultaneously to multiple tasks or in order to complete task 90%:;without cues  -HG     Status: Patient will improve attention skills by dividing focus and responding simultaneously to multiple tasks or in order to complete task Progressing as expected;Revised  -     Comments: Patient will improve attention skills by dividing focus and responding simultaneously to multiple tasks or in order to complete task 3/21/22: [place] in the Corner and pt was 85% accurate. 2/11/22: Divided attention for paragraph listening and sorting and pt was 90% accurate. 1/14/22: APT test for divided attention and pt was 27/30 and fell slightly below average.  -HG     Patient will improve attention skills by modifying surrounding environment 80%:;without cues  -HG     Status: Patient will improve attention skills by modifying surrounding environment Progressing as expected;Revised  -     Comments: Patient will improve attention skills by modifying surrounding environment 2/25/22: During an exec fxn task, pt used notetaking strategies and referencing strategies in order to increase her accuracy. 1/14/22: Pt states that she takes break as needed during work.  -HG            Other Goals    Other Adult Goal- 1 Pt will complete thought  organization tasks with 90% Accuracy.  -HG     Status: Other Adult Goal- 1 Achieved  -HG     Comments: Other Adult Goal- 1 3/4/22: RBANS Language score of 110 is up from 100. 2/25/22: Answering general informations and sequencing sentences and pt was 100% accurate. 12/23/21: Similarities and Differences: pt was 100% accurate.  -HG     Other Adult Goal- 2 Pt will complete reasoning and problem solving assessment with recs to follow as indicated.  -HG     Status: Other Adult Goal- 2 Progressing as expected  -HG     Comments: Other Adult Goal- 2 4/15/22: WORDLE and pt was able to solve without assistance.  2/25/22: WORDLE and pt required min cues and completed with accuracy. 12/23/21: Rebel word scramble and pt was 90% accurate.  -HG     Other Adult Goal- 3 Pt will improve RBANS Total score to a 121 placing pt in the Superior range.  -HG     Status: Other Adult Goal- 3 Progressing as expected;Revised  -HG     Comments: Other Adult Goal- 3 3/4/22: RBANS Total score of 118 is up from a previous 106.  -HG            SLP Time Calculation    SLP Goal Re-Cert Due Date 06/06/22  -HG           User Key  (r) = Recorded By, (t) = Taken By, (c) = Cosigned By    Initials Name Provider Type    Daisy Graves MS CCC-SLP Speech and Language Pathologist               OP SLP Education     Row Name 04/15/22 1500       Education    Education Comments Education for use of visual prompts at home if needed, other than journal, in order to recall activities that pt wants to complete.  -HG          User Key  (r) = Recorded By, (t) = Taken By, (c) = Cosigned By    Initials Name Effective Dates    Daisy Graves MS CCC-SLP 06/16/21 -                      Time Calculation:   SLP Start Time: 1500  Untimed Charges  57274-AH Treatment/ST Modification Prosth Aug Alter : 75  Total Minutes  Untimed Charges Total Minutes: 75   Total Minutes: 75    Therapy Charges for Today     Code Description Service Date Service Provider Modifiers  Qty    [id number]  ST TREATMENT SPEECH 5 4/15/2022 Daisy Capps, MS CCC-SLP GN 1                   Daisy Capps MS CCC-SLP  4/15/2022

## 2022-04-19 RX ORDER — POLYETHYLENE GLYCOL 3350 17 G
2 POWDER IN PACKET (EA) ORAL AS NEEDED
Qty: 144 LOZENGE | Refills: 0 | Status: SHIPPED | OUTPATIENT
Start: 2022-04-19 | End: 2022-04-21 | Stop reason: SDUPTHER

## 2022-04-21 ENCOUNTER — HOSPITAL ENCOUNTER (OUTPATIENT)
Dept: SPEECH THERAPY | Facility: HOSPITAL | Age: 47
Setting detail: THERAPIES SERIES
Discharge: HOME | End: 2022-04-21

## 2022-04-21 DIAGNOSIS — F09 ACQUIRED COGNITIVE DYSFUNCTION: Primary | ICD-10-CM

## 2022-04-21 PROCEDURE — 92507 TX SP LANG VOICE COMM INDIV: CPT

## 2022-04-21 RX ORDER — POLYETHYLENE GLYCOL 3350 17 G
2 POWDER IN PACKET (EA) ORAL AS NEEDED
Qty: 168 LOZENGE | Refills: 0 | Status: SHIPPED | OUTPATIENT
Start: 2022-04-21 | End: 2022-07-11

## 2022-04-21 NOTE — THERAPY TREATMENT NOTE
Outpatient Speech Language Pathology   Adult Speech Language Cognitive Treatment Note   [place]     Patient Name: Josefina Rodriguez  : 1975  MRN: [id number]  Today's Date: 2022         Visit Date: 2022   Patient Active Problem List   Diagnosis   • Malignant neoplasm of overlapping sites of right breast in female, estrogen receptor positive (HCC)   • Chemotherapy induced diarrhea   • Chemotherapy-induced nausea   • Vaginal bleeding   • Cysts of both ovaries   • Family history of breast cancer   • Malignant neoplasm of overlapping sites of right female breast (HCC)   • Port-A-Cath in place   • Epigastric pain   • Thrush   • 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 Acquired cognitive dysfunction  F09 294.9        OP SLP Assessment/Plan - 22 1400        SLP Plan    [place] [facility] [place]  [place]          User Key  (r) = Recorded By, (t) = Taken By, (c) = Cosigned By    Initials Name Provider Type     Daisy Capps MS CCC-SLP Speech and Language Pathologist                                   SLP OP Goals     Row Name 22 1400          Goal Type Needed    Goal Type Needed Memory;Attention/Orientation;Other Adult Goals  -            Subjective Comments    Subjective Comments Pt alert, cooperative, returned with folder.  -            Memory Goals    Patient will be able to remember information needed to return to work and function on work-related tasks 100%:;with cues  -HG     Status: Patient will be able to remember information needed to return to work and function on work-related tasks Progressing as expected  -     Comments: Patient will be able to remember information needed to return to work and function on work-related tasks 4/15/22: Pt continues to use breaks including nap breaks in order to help focus and  "complete her work.   2/11/22: Pt continues to require cues and breaks.  -HG     Patient will demonstrate improved ability to recall information by immediately recalling a series of words 90%:;unrelated;after delay;without cues  -HG     Status: Patient will demonstrate improved ability to recall information by immediately recalling a series of words Progressing as expected;Revised  -     Comments: Patient will demonstrate improved ability to recall information by immediately recalling a series of words 4/15/22: Immediate recall of ABC game and pt was 100% accurate. 3/21/22: Picture scene recall and pt was 80% accurate.   Paired words and pt was 100% accurate.  Pt stated, \"Immediate memory kills me sometimes sometimes.\" Pt reports chunking 10 digits, 6 and 4 and is able to recall complete addresses.  3/4/22: RBANS Immediate recall score remains in the High Average range. Immediate recall of a complex picture scene: pt was 85% accurate.  2/25/22: Immediate recall of picture scene and pt was 100% accurate. 2/11/22: Immediate recall for card game and pt was 90% accurate. 1/14/22: Immediate recall for new card game and pt was 90% accurate. 12/23/21: Recall of picture scene: pt was 100% accurate. Word association and pt was 100% accurat.e  -HG     Patient’s memory skills will be enhanced as reported by patient by utilizing internal memory strategies to recall up to 3 pieces of information after a 5- minute delay 90%:;without cues  -HG     Status: Patient’s memory skills will be enhanced as reported by patient by utilizing internal memory strategies to recall up to 3 pieces of information after a 5- minute delay Progressing as expected;Revised  -     Comments: Patient’s memory skills will be enhanced as reported by patient by utilizing internal memory strategies to recall up to 3 pieces of information after a 5- minute delay 4/21/22: Delayed recall of 7 un-related words and pt was 7/7 x 3, ABC game from previous session " "and pt was 95% accurate.  4/15/22: Delayed recall of 6 un-related words and pt was 4/6 and improved to 6/6.   3/21/22: Delayed recall of 5 un-related words and pt was 4/5.   3/4/22: RBANS Delayed recall score of 122 improved to a Superior range. 2/25/22: Delayed recall of 8 related words and pt was 8/8 x 3. 2/11/22:  Delayed recall of 7 related words and pt was 7/7x 2. 1/14/22: Delayed recall of 6 related words: pt was 4/6. After review, pt was 6/6 x 3.  12/23/21: Delayed recall of 4 un-related words: pt was 4/4. Progressed to 5/5 x 3.  -HG     Patient’s memory skills will be enhanced as reported by patient by using external memory aides 90%:;without cues  -HG     Status: Patient’s memory skills will be enhanced as reported by patient by using external memory aides Progressing as expected;Revised  -HG     Comments: Patient’s memory skills will be enhanced as reported by patient by using external memory aides 4/15/22: Pt continues to journal for memory and counseling notes. 3/21/22: Pt states that her daughter lost her journal so she took one from SLP. 2/11/22: Pt states that she has journaled but only one day. 1/14/22: Pt has not started journaling, plans to get journal this date. 12/23/21: Pt stated she plans to get a journal.  -HG            Attention/Orientation Goals    Patient will be able to use high level cognitive skills to allow patient to return to work Independently  -HG     Status: Patient will be able to use high level cognitive skills to allow patient to return to work Progressing as expected  -HG     Comments: Patient will be able to use high level cognitive skills to allow patient to return to work 3/4/22: RBANS Attention score improved to a 94 from a 91 2/11/22: Pt continues to \"lose focus\" and SLP discussed giving breaks and pt does do that.  -HG     Patient will improve attention skills by sustaining focus and participation to conversation/task with cues;30 minute task  -HG     Status: Patient " will improve attention skills by sustaining focus and participation to conversation/task Progressing as expected  -HG     Comments: Patient will improve attention skills by sustaining focus and participation to conversation/task 4/21/22: Sustained focus for Scattergories and pt was 100% accurate.  2/11/22: Pt able to sustain attention during 5 min conversation, goal adjusted.  -HG     Patient will improve attention skills by sustaining consistent behavioral response during continuous and repetitive activity (e.g., listening for target words, auditory/reading comprehension tasks) with cues;20 minute task  -HG     Status: Patient will improve attention skills by sustaining consistent behavioral response during continuous and repetitive activity (e.g., listening for target words, auditory/reading comprehension tasks) Progressing as expected;Revised  -HG     Comments: Patient will improve attention skills by sustaining consistent behavioral response during continuous and repetitive activity (e.g., listening for target words, auditory/reading comprehension tasks) 3/21/22: Card sorting by deck and the letter N and pt was 95% accurate.  2/11/22: Card sorting into deck and the letter N and pt was 75% accurate. 1/14/22: APT Test for Sustained and Complex Sustained attention and pt was above average. 12/23/21: Card ID in a field of 10, 12, 16 and pt was able to recall up to 4 cards.  -HG     Patient will improve attention skills by sustaining focus in order to actively hold and manipulate information provided (e.g., sequencing auditorily presented number series in ascending or descending order) 90%:;without cues  -HG     Status: Patient will improve attention skills by sustaining focus in order to actively hold and manipulate information provided (e.g., sequencing auditorily presented number series in ascending or descending order) Progressing as expected;Revised  -HG     Comments: Patient will improve attention skills by  sustaining focus in order to actively hold and manipulate information provided (e.g., sequencing auditorily presented number series in ascending or descending order) 4/15/22: Opposites with a delay: Pt was 100% accurate. 12/23/21: Digit span and pt was 80-90% accurate for up to 8 digits.  -HG     Patient will improve attention skills by focusing to selective target/task when presented with competing stimuli or in a distracting environment in order to complete task 90%:;without cues  -HG     Status: Patient will improve attention skills by focusing to selective target/task when presented with competing stimuli or in a distracting environment in order to complete task Progressing as expected;Revised  -HG     Comments: Patient will improve attention skills by focusing to selective target/task when presented with competing stimuli or in a distracting environment in order to complete task 1/14/22: APT Test for Selective Attention and pt was 28/30- Above average for age.  -HG     Patient will improve attention skills by alternating or shifting focus between two different tasks in order to complete both tasks 90%:;without cues  -HG     Status: Patient will improve attention skills by alternating or shifting focus between two different tasks in order to complete both tasks Progressing as expected;Revised  -HG     Comments: Patient will improve attention skills by alternating or shifting focus between two different tasks in order to complete both tasks 4/21/22: Alternating between cards face up and face down and then switching task and pt was 100% accurate. 4/15/22: Alternating attention for visual scanning two different letter marking and pt was 100% accurate.  3/21/22: Alternating attention for adding and subtracting two cards and pt was 90% accurate. 2/11/22: Alternating attention task by sorting cards by red and black and pt was 80% accurate. 1/14/22: The APT test for Alternating attention and pt was 20/24 which was  above average. The card game of Speed  and pt was 90% accurate.  -HG     Patient will improve attention skills by dividing focus and responding simultaneously to multiple tasks or in order to complete task 90%:;without cues  -HG     Status: Patient will improve attention skills by dividing focus and responding simultaneously to multiple tasks or in order to complete task Progressing as expected;Revised  -HG     Comments: Patient will improve attention skills by dividing focus and responding simultaneously to multiple tasks or in order to complete task 4/21/22: Sorting cards by suit and in numerical order and pt was 90% accurate.  3/21/22: [place] and pt was 85% accurate. 2/11/22: Divided attention for paragraph listening and sorting and pt was 90% accurate. 1/14/22: APT test for divided attention and pt was 27/30 and fell slightly below average.  -HG     Patient will improve attention skills by modifying surrounding environment 80%:;without cues  -HG     Status: Patient will improve attention skills by modifying surrounding environment Progressing as expected;Revised  -HG     Comments: Patient will improve attention skills by modifying surrounding environment 2/25/22: During an exec fxn task, pt used notetaking strategies and referencing strategies in order to increase her accuracy. 1/14/22: Pt states that she takes break as needed during work.  -HG            Other Goals    Other Adult Goal- 1 Pt will complete thought organization tasks with 90% Accuracy.  -HG     Status: Other Adult Goal- 1 Achieved  -HG     Comments: Other Adult Goal- 1 3/4/22: RBANS Language score of 110 is up from 100. 2/25/22: Answering general informations and sequencing sentences and pt was 100% accurate. 12/23/21: Similarities and Differences: pt was 100% accurate.  -HG     Other Adult Goal- 2 Pt will complete reasoning and problem solving assessment with recs to follow as indicated.  -HG     Status: Other Adult Goal- 2  Progressing as expected  -     Comments: Other Adult Goal- 2 4/15/22: WORDLE and pt was able to solve without assistance.  2/25/22: WORDLE and pt required min cues and completed with accuracy. 12/23/21: Rebel word scramble and pt was 90% accurate.  -HG     Other Adult Goal- 3 Pt will improve RBANS Total score to a 121 placing pt in the Superior range.  -HG     Status: Other Adult Goal- 3 Progressing as expected;Revised  -     Comments: Other Adult Goal- 3 3/4/22: RBANS Total score of 118 is up from a previous 106.  -HG            SLP Time Calculation    SLP Goal Re-Cert Due Date 06/06/22  -           User Key  (r) = Recorded By, (t) = Taken By, (c) = Cosigned By    Initials Name Provider Type    Daisy Graves MS CCC-SLP Speech and Language Pathologist               OP SLP Education     Row Name 04/21/22 1400       Education    Education Comments Hmwk for 8 un-related words.  -HG          User Key  (r) = Recorded By, (t) = Taken By, (c) = Cosigned By    Initials Name Effective Dates    Daisy Graves MS CCC-SLP 06/16/21 -                      Time Calculation:   SLP Start Time: 1400  Untimed Charges  16296-MP Treatment/ST Modification Prosth Aug Alter : 60  Total Minutes  Untimed Charges Total Minutes: 60   Total Minutes: 60    Therapy Charges for Today     Code Description Service Date Service Provider Modifiers Qty    [id number] HC ST TREATMENT SPEECH 4 4/21/2022 Daisy Capps MS CCC-SLP GN 1                   Daisy Capps MS CCC-SLP  4/21/2022

## 2022-04-28 ENCOUNTER — HOSPITAL ENCOUNTER (OUTPATIENT)
Dept: SPEECH THERAPY | Facility: HOSPITAL | Age: 47
Setting detail: THERAPIES SERIES
Discharge: HOME | End: 2022-04-28

## 2022-04-28 DIAGNOSIS — F09 ACQUIRED COGNITIVE DYSFUNCTION: Primary | ICD-10-CM

## 2022-04-28 PROCEDURE — 92507 TX SP LANG VOICE COMM INDIV: CPT

## 2022-04-28 NOTE — THERAPY TREATMENT NOTE
Outpatient Speech Language Pathology   Adult Speech Language Cognitive Treatment Note  [facility]     Patient Name: Josefina Rodriguez  : 1975  MRN: [id number]  Today's Date: 2022         Visit Date: 2022   Patient Active Problem List   Diagnosis   • Malignant neoplasm of overlapping sites of right breast in female, estrogen receptor positive (HCC)   • Chemotherapy induced diarrhea   • Chemotherapy-induced nausea   • Vaginal bleeding   • Cysts of both ovaries   • Family history of breast cancer   • Malignant neoplasm of overlapping sites of right female breast (HCC)   • Port-A-Cath in place   • Epigastric pain   • Thrush   • 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 Acquired cognitive dysfunction  F09 294.9        OP SLP Assessment/Plan - 22 1400        SLP Plan    Plan Comments Cont with Cog tx.  -HG          User Key  (r) = Recorded By, (t) = Taken By, (c) = Cosigned By    Initials Name Provider Type     Daisy Capps MS CCC-SLP Speech and Language Pathologist                                   SLP OP Goals     Row Name 22 1400          Goal Type Needed    Goal Type Needed Memory;Attention/Orientation;Other Adult Goals  -            Subjective Comments    Subjective Comments Pt alert, cooperative, returned with homework.  -HG            Memory Goals    Patient will be able to remember information needed to return to work and function on work-related tasks 100%:;with cues  -HG     Status: Patient will be able to remember information needed to return to work and function on work-related tasks Progressing as expected  -     Comments: Patient will be able to remember information needed to return to work and function on work-related tasks 4/15/22: Pt continues to use breaks including nap breaks in order to help focus and  "complete her work.   2/11/22: Pt continues to require cues and breaks.  -HG     Patient will demonstrate improved ability to recall information by immediately recalling a series of words 90%:;unrelated;after delay;without cues  -HG     Status: Patient will demonstrate improved ability to recall information by immediately recalling a series of words Progressing as expected;Revised  -     Comments: Patient will demonstrate improved ability to recall information by immediately recalling a series of words 4/28/22: Name/face association and pt was 4/5. 4/15/22: Immediate recall of ABC game and pt was 100% accurate. 3/21/22: Picture scene recall and pt was 80% accurate.   Paired words and pt was 100% accurate.  Pt stated, \"Immediate memory kills me sometimes sometimes.\" Pt reports chunking 10 digits, 6 and 4 and is able to recall complete addresses.  3/4/22: RBANS Immediate recall score remains in the High Average range. Immediate recall of a complex picture scene: pt was 85% accurate.  2/25/22: Immediate recall of picture scene and pt was 100% accurate. 2/11/22: Immediate recall for card game and pt was 90% accurate. 1/14/22: Immediate recall for new card game and pt was 90% accurate. 12/23/21: Recall of picture scene: pt was 100% accurate. Word association and pt was 100% accurat.e  -HG     Patient’s memory skills will be enhanced as reported by patient by utilizing internal memory strategies to recall up to 3 pieces of information after a 5- minute delay 90%:;without cues  -HG     Status: Patient’s memory skills will be enhanced as reported by patient by utilizing internal memory strategies to recall up to 3 pieces of information after a 5- minute delay Progressing as expected;Revised  -     Comments: Patient’s memory skills will be enhanced as reported by patient by utilizing internal memory strategies to recall up to 3 pieces of information after a 5- minute delay 4/28/22: Delayed recall of 8 un-related words and " "pt was 8/8.  Fxnl messges and pt was 3/3 x 2.   4/21/22: Delayed recall of 7 un-related words and pt was 7/7 x 3, ABC game from previous session and pt was 95% accurate.  4/15/22: Delayed recall of 6 un-related words and pt was 4/6 and improved to 6/6.   3/21/22: Delayed recall of 5 un-related words and pt was 4/5.   3/4/22: RBANS Delayed recall score of 122 improved to a Superior range. 2/25/22: Delayed recall of 8 related words and pt was 8/8 x 3. 2/11/22:  Delayed recall of 7 related words and pt was 7/7x 2. 1/14/22: Delayed recall of 6 related words: pt was 4/6. After review, pt was 6/6 x 3.  12/23/21: Delayed recall of 4 un-related words: pt was 4/4. Progressed to 5/5 x 3.  -HG     Patient’s memory skills will be enhanced as reported by patient by using external memory aides 90%:;without cues  -HG     Status: Patient’s memory skills will be enhanced as reported by patient by using external memory aides Progressing as expected;Revised  -HG     Comments: Patient’s memory skills will be enhanced as reported by patient by using external memory aides 4/15/22: Pt continues to journal for memory and counseling notes. 3/21/22: Pt states that her daughter lost her journal so she took one from SLP. 2/11/22: Pt states that she has journaled but only one day. 1/14/22: Pt has not started journaling, plans to get journal this date. 12/23/21: Pt stated she plans to get a journal.  -HG            Attention/Orientation Goals    Patient will be able to use high level cognitive skills to allow patient to return to work Independently  -HG     Status: Patient will be able to use high level cognitive skills to allow patient to return to work Progressing as expected  -HG     Comments: Patient will be able to use high level cognitive skills to allow patient to return to work 3/4/22: RBANS Attention score improved to a 94 from a 91 2/11/22: Pt continues to \"lose focus\" and SLP discussed giving breaks and pt does do that.  -HG     " Patient will improve attention skills by sustaining focus and participation to conversation/task with cues;30 minute task  -HG     Status: Patient will improve attention skills by sustaining focus and participation to conversation/task Progressing as expected  -HG     Comments: Patient will improve attention skills by sustaining focus and participation to conversation/task 4/21/22: Sustained focus for Scattergories and pt was 100% accurate.  2/11/22: Pt able to sustain attention during 5 min conversation, goal adjusted.  -HG     Patient will improve attention skills by sustaining consistent behavioral response during continuous and repetitive activity (e.g., listening for target words, auditory/reading comprehension tasks) with cues;20 minute task  -HG     Status: Patient will improve attention skills by sustaining consistent behavioral response during continuous and repetitive activity (e.g., listening for target words, auditory/reading comprehension tasks) Progressing as expected;Revised  -HG     Comments: Patient will improve attention skills by sustaining consistent behavioral response during continuous and repetitive activity (e.g., listening for target words, auditory/reading comprehension tasks) 3/21/22: Card sorting by deck and the letter N and pt was 95% accurate.  2/11/22: Card sorting into deck and the letter N and pt was 75% accurate. 1/14/22: APT Test for Sustained and Complex Sustained attention and pt was above average. 12/23/21: Card ID in a field of 10, 12, 16 and pt was able to recall up to 4 cards.  -HG     Patient will improve attention skills by sustaining focus in order to actively hold and manipulate information provided (e.g., sequencing auditorily presented number series in ascending or descending order) 90%:;without cues  -HG     Status: Patient will improve attention skills by sustaining focus in order to actively hold and manipulate information provided (e.g., sequencing auditorily  presented number series in ascending or descending order) Progressing as expected;Revised  -HG     Comments: Patient will improve attention skills by sustaining focus in order to actively hold and manipulate information provided (e.g., sequencing auditorily presented number series in ascending or descending order) 4/15/22: Opposites with a delay: Pt was 100% accurate. 12/23/21: Digit span and pt was 80-90% accurate for up to 8 digits.  -HG     Patient will improve attention skills by focusing to selective target/task when presented with competing stimuli or in a distracting environment in order to complete task 90%:;without cues  -HG     Status: Patient will improve attention skills by focusing to selective target/task when presented with competing stimuli or in a distracting environment in order to complete task Progressing as expected;Revised  -HG     Comments: Patient will improve attention skills by focusing to selective target/task when presented with competing stimuli or in a distracting environment in order to complete task 1/14/22: APT Test for Selective Attention and pt was 28/30- Above average for age.  -HG     Patient will improve attention skills by alternating or shifting focus between two different tasks in order to complete both tasks 90%:;without cues  -HG     Status: Patient will improve attention skills by alternating or shifting focus between two different tasks in order to complete both tasks Progressing as expected;Revised  -HG     Comments: Patient will improve attention skills by alternating or shifting focus between two different tasks in order to complete both tasks 4/21/22: Alternating between cards face up and face down and then switching task and pt was 100% accurate. 4/15/22: Alternating attention for visual scanning two different letter marking and pt was 100% accurate.  3/21/22: Alternating attention for adding and subtracting two cards and pt was 90% accurate. 2/11/22: Alternating  attention task by sorting cards by red and black and pt was 80% accurate. 1/14/22: The APT test for Alternating attention and pt was 20/24 which was above average. The card game of Speed  and pt was 90% accurate.  -HG     Patient will improve attention skills by dividing focus and responding simultaneously to multiple tasks or in order to complete task 90%:;without cues  -HG     Status: Patient will improve attention skills by dividing focus and responding simultaneously to multiple tasks or in order to complete task Progressing as expected;Revised  -HG     Comments: Patient will improve attention skills by dividing focus and responding simultaneously to multiple tasks or in order to complete task 4/21/22: Sorting cards by suit and in numerical order and pt was 90% accurate.  3/21/22: [place] in the Corner and pt was 85% accurate. 2/11/22: Divided attention for paragraph listening and sorting and pt was 90% accurate. 1/14/22: APT test for divided attention and pt was 27/30 and fell slightly below average.  -HG     Patient will improve attention skills by modifying surrounding environment 80%:;without cues  -HG     Status: Patient will improve attention skills by modifying surrounding environment Progressing as expected;Revised  -HG     Comments: Patient will improve attention skills by modifying surrounding environment 4/28/22: Exec fxn task for attention and pt was 80% accurate.  2/25/22: During an exec fxn task, pt used notetaking strategies and referencing strategies in order to increase her accuracy. 1/14/22: Pt states that she takes break as needed during work.  -HG            Other Goals    Other Adult Goal- 1 Pt will complete thought organization tasks with 90% Accuracy.  -HG     Status: Other Adult Goal- 1 Achieved  -HG     Comments: Other Adult Goal- 1 3/4/22: RBANS Language score of 110 is up from 100. 2/25/22: Answering general informations and sequencing sentences and pt was 100% accurate. 12/23/21:  Similarities and Differences: pt was 100% accurate.  -HG     Other Adult Goal- 2 Pt will complete reasoning and problem solving assessment with recs to follow as indicated.  -HG     Status: Other Adult Goal- 2 Progressing as expected  -HG     Comments: Other Adult Goal- 2 4/28/22: Pt reports completing WORDLE with prompts in place. 4/15/22: WORDLE and pt was able to solve without assistance.  2/25/22: WORDLE and pt required min cues and completed with accuracy. 12/23/21: [place] word scramble and pt was 90% accurate.  -HG     Other Adult Goal- 3 Pt will improve RBANS Total score to a 121 placing pt in the Superior range.  -HG     Status: Other Adult Goal- 3 Progressing as expected;Revised  -HG     Comments: Other Adult Goal- 3 3/4/22: RBANS Total score of 118 is up from a previous 106.  -HG            SLP Time Calculation    SLP Goal Re-Cert Due Date 06/06/22  -HG           User Key  (r) = Recorded By, (t) = Taken By, (c) = Cosigned By    Initials Name Provider Type    Daisy Graves MS CCC-SLP Speech and Language Pathologist               OP SLP Education     Row Name 04/28/22 1400       Education    Education Comments Hmwk for 10 un-related pictures.  -HG          User Key  (r) = Recorded By, (t) = Taken By, (c) = Cosigned By    Initials Name Effective Dates    Daisy Graves MS CCC-SLP 06/16/21 -                      Time Calculation:   SLP Start Time: 1400  Untimed Charges  99380-EY Treatment/ST Modification Prosth Aug Alter : 60  Total Minutes  Untimed Charges Total Minutes: 60   Total Minutes: 60    Therapy Charges for Today     Code Description Service Date Service Provider Modifiers Qty    [id number]  ST TREATMENT SPEECH 4 4/28/2022 Daisy Capps MS CCC-SLP GN 1                   Daisy Capps MS CCC-SLP  4/28/2022

## 2022-05-05 ENCOUNTER — APPOINTMENT (OUTPATIENT)
Dept: SPEECH THERAPY | Facility: HOSPITAL | Age: 47
End: 2022-05-05

## 2022-05-19 ENCOUNTER — OFFICE VISIT (OUTPATIENT)
Dept: ONCOLOGY | Facility: CLINIC | Age: 47
End: 2022-05-19

## 2022-05-19 ENCOUNTER — HOSPITAL ENCOUNTER (OUTPATIENT)
Dept: ONCOLOGY | Facility: HOSPITAL | Age: 47
Setting detail: INFUSION SERIES
Discharge: HOME | End: 2022-05-19

## 2022-05-19 ENCOUNTER — HOSPITAL ENCOUNTER (OUTPATIENT)
Dept: SPEECH THERAPY | Facility: HOSPITAL | Age: 47
Setting detail: THERAPIES SERIES
Discharge: HOME | End: 2022-05-19

## 2022-05-19 VITALS
HEIGHT: 66 IN | SYSTOLIC BLOOD PRESSURE: 137 MMHG | TEMPERATURE: 97.3 F | OXYGEN SATURATION: 93 % | WEIGHT: 232 LBS | DIASTOLIC BLOOD PRESSURE: 85 MMHG | RESPIRATION RATE: 18 BRPM | BODY MASS INDEX: 37.28 KG/M2 | HEART RATE: 78 BPM

## 2022-05-19 DIAGNOSIS — F09 ACQUIRED COGNITIVE DYSFUNCTION: Primary | ICD-10-CM

## 2022-05-19 DIAGNOSIS — C50.811 MALIGNANT NEOPLASM OF OVERLAPPING SITES OF RIGHT BREAST IN FEMALE, ESTROGEN RECEPTOR POSITIVE: Primary | ICD-10-CM

## 2022-05-19 DIAGNOSIS — Z17.0 MALIGNANT NEOPLASM OF OVERLAPPING SITES OF RIGHT BREAST IN FEMALE, ESTROGEN RECEPTOR POSITIVE: Primary | ICD-10-CM

## 2022-05-19 DIAGNOSIS — Z45.2 ENCOUNTER FOR CENTRAL LINE CARE: ICD-10-CM

## 2022-05-19 DIAGNOSIS — Z95.828 PORT-A-CATH IN PLACE: ICD-10-CM

## 2022-05-19 LAB
ALBUMIN SERPL-MCNC: 3.9 G/DL (ref 3.5–5.2)
ALBUMIN/GLOB SERPL: 1.1 G/DL
ALP SERPL-CCNC: 135 U/L (ref 39–117)
ALT SERPL W P-5'-P-CCNC: 25 U/L (ref 1–33)
ANION GAP SERPL CALCULATED.3IONS-SCNC: 11 MMOL/L (ref 5–15)
AST SERPL-CCNC: 22 U/L (ref 1–32)
BILIRUB SERPL-MCNC: 0.2 MG/DL (ref 0–1.2)
BUN SERPL-MCNC: 9 MG/DL (ref 6–20)
BUN/CREAT SERPL: 14.5 (ref 7–25)
CALCIUM SPEC-SCNC: 9.6 MG/DL (ref 8.6–10.5)
CHLORIDE SERPL-SCNC: 101 MMOL/L (ref 98–107)
CO2 SERPL-SCNC: 25 MMOL/L (ref 22–29)
CREAT SERPL-MCNC: 0.62 MG/DL (ref 0.57–1)
EGFRCR SERPLBLD CKD-EPI 2021: 112.1 ML/MIN/1.73
ERYTHROCYTE [DISTWIDTH] IN BLOOD BY AUTOMATED COUNT: 13.6 % (ref 12.3–15.4)
GLOBULIN UR ELPH-MCNC: 3.6 GM/DL
GLUCOSE SERPL-MCNC: 307 MG/DL (ref 65–99)
HCT VFR BLD AUTO: 41.4 % (ref 34–46.6)
HGB BLD-MCNC: 13 G/DL (ref 12–15.9)
LYMPHOCYTES # BLD AUTO: 2.4 10*3/MM3 (ref 0.7–3.1)
LYMPHOCYTES NFR BLD AUTO: 26.2 % (ref 19.6–45.3)
MCH RBC QN AUTO: 27.5 PG (ref 26.6–33)
MCHC RBC AUTO-ENTMCNC: 31.4 G/DL (ref 31.5–35.7)
MCV RBC AUTO: 87.7 FL (ref 79–97)
MONOCYTES # BLD AUTO: 0.2 10*3/MM3 (ref 0.1–0.9)
MONOCYTES NFR BLD AUTO: 2.5 % (ref 5–12)
NEUTROPHILS NFR BLD AUTO: 6.6 10*3/MM3 (ref 1.7–7)
NEUTROPHILS NFR BLD AUTO: 71.3 % (ref 42.7–76)
PLATELET # BLD AUTO: 243 10*3/MM3 (ref 140–450)
PMV BLD AUTO: 8.1 FL (ref 6–12)
POTASSIUM SERPL-SCNC: 3.7 MMOL/L (ref 3.5–5.2)
PROT SERPL-MCNC: 7.5 G/DL (ref 6–8.5)
RBC # BLD AUTO: 4.72 10*6/MM3 (ref 3.77–5.28)
SODIUM SERPL-SCNC: 137 MMOL/L (ref 136–145)
WBC NRBC COR # BLD: 9.2 10*3/MM3 (ref 3.4–10.8)

## 2022-05-19 PROCEDURE — 96402 CHEMO HORMON ANTINEOPL SQ/IM: CPT

## 2022-05-19 PROCEDURE — 96372 THER/PROPH/DIAG INJ SC/IM: CPT

## 2022-05-19 PROCEDURE — 80053 COMPREHEN METABOLIC PANEL: CPT | Performed by: INTERNAL MEDICINE

## 2022-05-19 PROCEDURE — 25010000002 LEUPROLIDE 22.5 MG KIT: Performed by: NURSE PRACTITIONER

## 2022-05-19 PROCEDURE — 25010000002 HEPARIN LOCK FLUSH PER 10 UNITS: Performed by: INTERNAL MEDICINE

## 2022-05-19 PROCEDURE — 99214 OFFICE O/P EST MOD 30 MIN: CPT | Performed by: INTERNAL MEDICINE

## 2022-05-19 PROCEDURE — 96523 IRRIG DRUG DELIVERY DEVICE: CPT

## 2022-05-19 PROCEDURE — 36415 COLL VENOUS BLD VENIPUNCTURE: CPT

## 2022-05-19 PROCEDURE — 85025 COMPLETE CBC W/AUTO DIFF WBC: CPT | Performed by: INTERNAL MEDICINE

## 2022-05-19 RX ORDER — HEPARIN SODIUM (PORCINE) LOCK FLUSH IV SOLN 100 UNIT/ML 100 UNIT/ML
500 SOLUTION INTRAVENOUS AS NEEDED
Status: CANCELLED | OUTPATIENT
Start: 2022-05-19

## 2022-05-19 RX ORDER — DESVENLAFAXINE SUCCINATE 50 MG/1
TABLET, EXTENDED RELEASE ORAL
COMMUNITY
Start: 2022-05-06 | End: 2022-11-03

## 2022-05-19 RX ORDER — SODIUM CHLORIDE 0.9 % (FLUSH) 0.9 %
20 SYRINGE (ML) INJECTION AS NEEDED
Status: CANCELLED | OUTPATIENT
Start: 2022-05-19

## 2022-05-19 RX ORDER — SODIUM CHLORIDE 0.9 % (FLUSH) 0.9 %
10 SYRINGE (ML) INJECTION AS NEEDED
Status: CANCELLED | OUTPATIENT
Start: 2022-05-19

## 2022-05-19 RX ORDER — HEPARIN SODIUM (PORCINE) LOCK FLUSH IV SOLN 100 UNIT/ML 100 UNIT/ML
500 SOLUTION INTRAVENOUS AS NEEDED
Status: DISCONTINUED | OUTPATIENT
Start: 2022-05-19 | End: 2022-05-20 | Stop reason: HOSPADM

## 2022-05-19 RX ADMIN — LEUPROLIDE ACETATE 22.5 MG: KIT SUBCUTANEOUS at 10:35

## 2022-05-19 RX ADMIN — HEPARIN SODIUM (PORCINE) LOCK FLUSH IV SOLN 100 UNIT/ML 500 UNITS: 100 SOLUTION at 10:34

## 2022-05-19 NOTE — PROGRESS NOTES
"      PROBLEM LIST:  1. [id number] ER+ PA+ Her2+ invasive ductal carcinoma of the right breast  A) presented with a mass on self-exam.  Mammogram 1/7/20 showed a 4.1 cm mass in the right breast, with 2 adjacent smaller masses.  1.9 cm right axillary lymph node.   FNA of lymph node negative.  Biopsy of right breast mass showed grade 2 IDC, Er 95%, PA 2%, Her2 3+.  B) neoadjuvant chemotherapy with TCHP started 2/5/2020  C) right mastectomy on 6/23/20.  Pathology showed a 2 x 1 x 0.6 cm intermediate grade IDC.  0/2 SLN involved.  [id number]  D) adjuvant Kadcyla started 7/22/2020.   Kadcyla stopped October 14, 2020 and switched back to Herceptin and Perjeta due to progressive severe neuropathy.  E) anastrozole started 08/12/2020.   2. PCOS  3. Anxiety/depression  4. DM2  5. GERD  6. Hyperlipidemia  7. Hypertension  8.  Hidradenitis  9.  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  CT chest 1/19/2021 showed a pathologic nondisplaced fracture of the right seventh rib.  There were also subtle small nodules in the lungs, none larger than 4 mm.  PET/CT on 1/28/2021 showed SUV of 4.8 in the right rib fracture.  Pulmonary micronodules too small to register hypermetabolic activity.  Biopsy of the rib lesion 2/24/2021 showed Langerhans' cell histiocytosis.  Smoking cessation strongly recommended and she did stop smoking in February 2021.    Subjective     CHIEF COMPLAINT: breast cancer    HISTORY OF PRESENT ILLNESS:   Josefina Rodriguez returns for follow-up.  She continues on anastrozole and Lupron injections.    She is having hot flashes but has found that peppermint oil helps.  She saw Dr. Baker yesterday and reports that he has ordered an ultrasound to look at her right chest wall area      Objective      /85   Pulse 78   Temp 97.3 °F (36.3 °C) (Temporal)   Resp 18   Ht 167.6 cm (65.98\")   Wt 105 kg (232 lb)   SpO2 93% "   BMI 37.46 kg/m²    Vitals:    05/19/22 0939   PainSc: 0-No pain               Performance Status: 0    General: well appearing female in no acute distress  Neuro: alert and oriented  HEENT: sclera anicteric  Pulmonary: Lungs are clear to auscultation bilaterally  Cardiovascular: Regular rate and rhythm no murmurs  Breast: Right mastectomy incision well-healed no masses or skin change.  In the right axilla there is an indistinct nodule approximately 1 x 2 cm, linear shape  Extremeties: no lower extremity edema  Skin: no rashes, lesions, bruising, or petechiae  Psych: mood and affect appropriate      RECENT LABS:  Lab Results   Component Value Date    WBC 10.70 12/02/2021    HGB 14.3 12/02/2021    HCT 43.3 12/02/2021    MCV 90.6 12/02/2021     12/02/2021     Lab Results   Component Value Date    GLUCOSE 213 (H) 12/02/2021    BUN 15 12/02/2021    CREATININE 0.62 12/02/2021    EGFRIFNONA 105 12/02/2021    BCR 24.2 12/02/2021    K 3.9 12/02/2021    CO2 24.0 12/02/2021    CALCIUM 9.7 12/02/2021    ALBUMIN 3.90 12/02/2021    AST 27 12/02/2021    ALT 32 12/02/2021           CT Chest Hi Resolution Diagnostic  Narrative: DATE OF EXAM: 3/11/2022 12:50 PM     PROCEDURE: CT CHEST HI RESOLUTION DIAGNOSTIC-     INDICATIONS: langerhans histiocytosis; C96.6-Unifocal Langerhans-cell  histiocytosis     COMPARISON: 11/29/2021     TECHNIQUE: Routine transaxial slices were obtained through the chest  without the administration of intravenous contrast. The study was  performed utilizing our high resolution CT protocol which includes  supine inspiratory and expiratory imaging as well as prone inspiratory  imaging. Reconstructed coronal and sagittal images were also obtained.  Automated exposure control and iterative reconstruction methods were  used.     The radiation dose reduction device was turned on for each scan per the  ALARA (As Low as Reasonably Achievable) protocol.     FINDINGS:     Mediastinum: No mediastinal  adenopathy. Thoracic aorta normal in  caliber. No coronary artery calcification. No pericardial effusion     Lungs/pleura: There is no nodular or reticular interstitial lung  disease. There is no axial interstitial thickening. There is no  groundglass or airspace disease. There are no air cysts. There is no  bronchial wall thickening or bronchiectasis. There is no pleural  effusion     Upper abdomen: Unremarkable     Bones/soft tissues: No suspicious bony abnormality demonstrated. Status  post right mastectomy     Impression: IMPRESSION :   Negative. No nodular or cystic lung disease to indicate pulmonary  involvement of Langerhans' cell histiocytosis     This report was finalized on 3/11/2022 1:40 PM by Miguel Holt.     I personally reviewed the imaging studies.  Specifically in the axilla there are some small lymph nodes bilaterally but none that are pathologically enlarged    Assessment & Plan   Josefina Rodriguez is a 47 y.o. year old female with stage IB ER+ Her2+ IDC of the right breast, as well as a diagnosis of Langerhans histiocytosis involving the lung as well as a single rib lesion.    Breast cancer: She continues on anastrozole along with Lupron for ovarian suppression.  She will have a ultrasound to evaluate possible right axillary nodule.  Left breast screening mammogram completed 1/19/2022.  She will be due for repeat screening left mammogram January 2023.    Langerhans histiocytosis: CT chest showed no evidence of abnormality.  She continues to avoid cigarettes.  We will hold off on further imaging unless she has a change in symptoms.    Peripheral neuropathy: Continue Lyrica.      Bone density January 21, 2021 was normal.  We will repeat bone density January 2023.    Follow-up in 3 months.      I spent 32 minutes caring for Josefina on this date of service. This time includes time spent by me in the following activities: preparing for the visit, reviewing tests, performing a medically  appropriate examination and/or evaluation, counseling and educating the patient/family/caregiver, ordering medications, tests, or procedures, documenting information in the medical record and independently interpreting results and communicating that information with the patient/family/caregiver        Jolene Connell MD  [facility]    5/19/2022          CC:

## 2022-05-19 NOTE — THERAPY PROGRESS REPORT/RE-CERT
Outpatient Speech Language Pathology   Adult Speech Language Cognitive Progress Note  [facility]     Patient Name: Josefina Rodriguez  : 1975  MRN: [id number]  Today's Date: 2022         Visit Date: 2022   Patient Active Problem List   Diagnosis   • Malignant neoplasm of overlapping sites of right breast in female, estrogen receptor positive (HCC)   • Chemotherapy induced diarrhea   • Chemotherapy-induced nausea   • Vaginal bleeding   • Cysts of both ovaries   • Family history of breast cancer   • Malignant neoplasm of overlapping sites of right female breast (HCC)   • Port-A-Cath in place   • Epigastric pain   • Thrush   • 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 Acquired cognitive dysfunction  F09 294.9        OP SLP Assessment/Plan - 22 1300        SLP Assessment    Functional Problems Speech Language- Adult/Cognition  -HG    Impact on Function: Adult Speech Language/Cognition Trouble learning or remembering new information  -HG    Clinical Impression: Speech Language-Adult/Congnition Minimal:;Cognitive Communication Impairment  -HG    Functional Problems Comment Improvement in immediate and decline in delayed.  -HG    Clinical Impression Comments RBANS Total score is 112.  -HG    Please refer to paper survey for additional self-reported information Yes  -HG    Please refer to items scanned into chart for additional diagnostic informaiton and handouts as provided by clinician Yes  -HG    SLP Diagnosis Minimal Cog Comm Impairment  -HG    Prognosis Excellent (comment)  -HG    Patient/caregiver participated in establishment of treatment plan and goals Yes  -HG    Patient would benefit from skilled therapy intervention Yes  -HG       SLP Plan    Frequency 1x/week  -HG    Duration 4-8 weeks  -HG    Planned CPT's? SLP INDIVIDUAL SPEECH  "THERAPY: 74542  -    Expected Duration of Therapy Session (SLP Eval) 60  -HG    Plan Comments Cont with Cog tx.  -HG          User Key  (r) = Recorded By, (t) = Taken By, (c) = Cosigned By    Initials Name Provider Type    ELIZABETH Jefry Daisy L, MS CCC-SLP Speech and Language Pathologist                                   SLP OP Goals     Row Name 05/19/22 1300          Goal Type Needed    Goal Type Needed Memory;Attention/Orientation;Other Adult Goals  -            Subjective Comments    Subjective Comments Pt alert, cooperative, feeling kind of \"blah\".  -HG            Memory Goals    Patient will be able to remember information needed to return to work and function on work-related tasks 100%:;with cues  -HG     Status: Patient will be able to remember information needed to return to work and function on work-related tasks Progressing as expected  -HG     Comments: Patient will be able to remember information needed to return to work and function on work-related tasks 4/15/22: Pt continues to use breaks including nap breaks in order to help focus and complete her work.   2/11/22: Pt continues to require cues and breaks.  -HG     Patient will demonstrate improved ability to recall information by immediately recalling a series of words 90%:;unrelated;after delay;without cues  -HG     Status: Patient will demonstrate improved ability to recall information by immediately recalling a series of words Revised  -HG     Comments: Patient will demonstrate improved ability to recall information by immediately recalling a series of words 5/19/22: RBANS Immediate recall score of 123 is up from 114. 4/28/22: Name/face association and pt was 4/5. 4/15/22: Immediate recall of ABC game and pt was 100% accurate. 3/21/22: Picture scene recall and pt was 80% accurate.   Paired words and pt was 100% accurate.  Pt stated, \"Immediate memory kills me sometimes sometimes.\" Pt reports chunking 10 digits, 6 and 4 and is able to recall " complete addresses.  3/4/22: RBANS Immediate recall score remains in the High Average range. Immediate recall of a complex picture scene: pt was 85% accurate.  2/25/22: Immediate recall of picture scene and pt was 100% accurate. 2/11/22: Immediate recall for card game and pt was 90% accurate. 1/14/22: Immediate recall for new card game and pt was 90% accurate. 12/23/21: Recall of picture scene: pt was 100% accurate. Word association and pt was 100% accurat.e  -HG     Patient’s memory skills will be enhanced as reported by patient by utilizing internal memory strategies to recall up to 3 pieces of information after a 5- minute delay 90%:;without cues  -HG     Status: Patient’s memory skills will be enhanced as reported by patient by utilizing internal memory strategies to recall up to 3 pieces of information after a 5- minute delay Revised  -HG     Comments: Patient’s memory skills will be enhanced as reported by patient by utilizing internal memory strategies to recall up to 3 pieces of information after a 5- minute delay 5/19/22: RBANS Delayed recall of 118 is down slightly from 122.  5/12/22: Delayed recall of 10 unrelated words, using a story for recall with 100% acc. 4/28/22: Delayed recall of 8 un-related words and pt was 8/8.  Fxnl messges and pt was 3/3 x 2.   4/21/22: Delayed recall of 7 un-related words and pt was 7/7 x 3, ABC game from previous session and pt was 95% accurate.  4/15/22: Delayed recall of 6 un-related words and pt was 4/6 and improved to 6/6.   3/21/22: Delayed recall of 5 un-related words and pt was 4/5.   3/4/22: RBANS Delayed recall score of 122 improved to a Superior range. 2/25/22: Delayed recall of 8 related words and pt was 8/8 x 3. 2/11/22:  Delayed recall of 7 related words and pt was 7/7x 2. 1/14/22: Delayed recall of 6 related words: pt was 4/6. After review, pt was 6/6 x 3.  12/23/21: Delayed recall of 4 un-related words: pt was 4/4. Progressed to 5/5 x 3.  -HG     Patient’s  "memory skills will be enhanced as reported by patient by using external memory aides 90%:;without cues  -HG     Status: Patient’s memory skills will be enhanced as reported by patient by using external memory aides Achieved  -HG     Comments: Patient’s memory skills will be enhanced as reported by patient by using external memory aides 5/19/22: Pt continues to use external strategies to assist with recall. 5/12/22: Patient reported improvement in recall with use of journaling 4/15/22: Pt continues to journal for memory and counseling notes. 3/21/22: Pt states that her daughter lost her journal so she took one from SLP. 2/11/22: Pt states that she has journaled but only one day. 1/14/22: Pt has not started journaling, plans to get journal this date. 12/23/21: Pt stated she plans to get a journal.  -HG            Attention/Orientation Goals    Patient will be able to use high level cognitive skills to allow patient to return to work Independently  -HG     Status: Patient will be able to use high level cognitive skills to allow patient to return to work Progressing as expected  -HG     Comments: Patient will be able to use high level cognitive skills to allow patient to return to work 5/19/22: RBANS Attention score of 88 is down from previous 94. 3/4/22: RBANS Attention score improved to a 94 from a 91 2/11/22: Pt continues to \"lose focus\" and SLP discussed giving breaks and pt does do that.  -HG     Patient will improve attention skills by sustaining focus and participation to conversation/task 30 minute task;with intermittent cues  -HG     Status: Patient will improve attention skills by sustaining focus and participation to conversation/task Progressing as expected;Revised  -HG     Comments: Patient will improve attention skills by sustaining focus and participation to conversation/task 5/12/22: Sustained focus for conersational task with 90% acc. 4/21/22: Sustained focus for Scattergories and pt was 100% accurate.  " 2/11/22: Pt able to sustain attention during 5 min conversation, goal adjusted.  -HG     Patient will improve attention skills by sustaining consistent behavioral response during continuous and repetitive activity (e.g., listening for target words, auditory/reading comprehension tasks) with cues;30 minute task  -HG     Status: Patient will improve attention skills by sustaining consistent behavioral response during continuous and repetitive activity (e.g., listening for target words, auditory/reading comprehension tasks) Progressing as expected;Revised  -HG     Comments: Patient will improve attention skills by sustaining consistent behavioral response during continuous and repetitive activity (e.g., listening for target words, auditory/reading comprehension tasks) 3/21/22: Card sorting by deck and the letter N and pt was 95% accurate.  2/11/22: Card sorting into deck and the letter N and pt was 75% accurate. 1/14/22: APT Test for Sustained and Complex Sustained attention and pt was above average. 12/23/21: Card ID in a field of 10, 12, 16 and pt was able to recall up to 4 cards.  -HG     Patient will improve attention skills by sustaining focus in order to actively hold and manipulate information provided (e.g., sequencing auditorily presented number series in ascending or descending order) 100%:;without cues  -HG     Status: Patient will improve attention skills by sustaining focus in order to actively hold and manipulate information provided (e.g., sequencing auditorily presented number series in ascending or descending order) Progressing as expected;Revised  -HG     Comments: Patient will improve attention skills by sustaining focus in order to actively hold and manipulate information provided (e.g., sequencing auditorily presented number series in ascending or descending order) 4/15/22: Opposites with a delay: Pt was 100% accurate. 12/23/21: Digit span and pt was 80-90% accurate for up to 8 digits.  -HG      Patient will improve attention skills by focusing to selective target/task when presented with competing stimuli or in a distracting environment in order to complete task 90%:;without cues  -HG     Status: Patient will improve attention skills by focusing to selective target/task when presented with competing stimuli or in a distracting environment in order to complete task Progressing as expected;Revised  -HG     Comments: Patient will improve attention skills by focusing to selective target/task when presented with competing stimuli or in a distracting environment in order to complete task 1/14/22: APT Test for Selective Attention and pt was 28/30- Above average for age.  -HG     Patient will improve attention skills by alternating or shifting focus between two different tasks in order to complete both tasks 90%:;without cues  -HG     Status: Patient will improve attention skills by alternating or shifting focus between two different tasks in order to complete both tasks Progressing as expected;Revised  -HG     Comments: Patient will improve attention skills by alternating or shifting focus between two different tasks in order to complete both tasks 4/21/22: Alternating between cards face up and face down and then switching task and pt was 100% accurate. 4/15/22: Alternating attention for visual scanning two different letter marking and pt was 100% accurate.  3/21/22: Alternating attention for adding and subtracting two cards and pt was 90% accurate. 2/11/22: Alternating attention task by sorting cards by red and black and pt was 80% accurate. 1/14/22: The APT test for Alternating attention and pt was 20/24 which was above average. The card game of Speed  and pt was 90% accurate.  -HG     Patient will improve attention skills by dividing focus and responding simultaneously to multiple tasks or in order to complete task 90%:;without cues  -HG     Status: Patient will improve attention skills by dividing focus and  responding simultaneously to multiple tasks or in order to complete task Progressing as expected;Revised  -HG     Comments: Patient will improve attention skills by dividing focus and responding simultaneously to multiple tasks or in order to complete task 4/21/22: Sorting cards by suit and in numerical order and pt was 90% accurate.  3/21/22: [place] and pt was 85% accurate. 2/11/22: Divided attention for paragraph listening and sorting and pt was 90% accurate. 1/14/22: APT test for divided attention and pt was 27/30 and fell slightly below average.  -HG     Patient will improve attention skills by modifying surrounding environment 90%:;with cues  -HG     Status: Patient will improve attention skills by modifying surrounding environment Progressing as expected;Revised  -HG     Comments: Patient will improve attention skills by modifying surrounding environment 4/28/22: Exec fxn task for attention and pt was 80% accurate.  2/25/22: During an exec fxn task, pt used notetaking strategies and referencing strategies in order to increase her accuracy. 1/14/22: Pt states that she takes break as needed during work.  -HG            Other Goals    Other Adult Goal- 1 Pt will complete thought organization tasks with 90% Accuracy.  -HG     Status: Other Adult Goal- 1 Achieved  -HG     Comments: Other Adult Goal- 1 5/19/22: RBANS Language score of 106 is down from previous 110. 3/4/22: RBANS Language score of 110 is up from 100. 2/25/22: Answering general informations and sequencing sentences and pt was 100% accurate. 12/23/21: Similarities and Differences: pt was 100% accurate.  -HG     Other Adult Goal- 2 Pt will complete reasoning and problem solving assessment with recs to follow as indicated.  -HG     Status: Other Adult Goal- 2 Achieved  -HG     Comments: Other Adult Goal- 2 4/28/22: Pt reports completing WORDLE with prompts in place. 4/15/22: WORDLE and pt was able to solve without assistance.  2/25/22:  WORDLE and pt required min cues and completed with accuracy. 12/23/21: Rebel word scramble and pt was 90% accurate.  -HG     Other Adult Goal- 3 Pt will improve RBANS Total score to a 121 placing pt in the Superior range.  -HG     Status: Other Adult Goal- 3 Progressing as expected;Revised  -HG     Comments: Other Adult Goal- 3 5/19/22: RBANS Total score of 112 is down slightly from previous 118. 3/4/22: RBANS Total score of 118 is up from a previous 106.  -HG     Other Adult Goal- 4 Pt will complete high level reasoning tasks with 90% accuracy.  -HG     Status: Other Adult Goal- 4 New  -HG            SLP Time Calculation    SLP Goal Re-Cert Due Date 06/06/22  -HG           User Key  (r) = Recorded By, (t) = Taken By, (c) = Cosigned By    Initials Name Provider Type    Daisy Graves MS CCC-SLP Speech and Language Pathologist               OP SLP Education     Row Name 05/19/22 1300       Education    Barriers to Learning No barriers identified  -          User Key  (r) = Recorded By, (t) = Taken By, (c) = Cosigned By    Initials Name Effective Dates    Daisy Graves MS CCC-SLP 06/16/21 -                      Time Calculation:   SLP Start Time: 1300  Untimed Charges  33194-TP Treatment/ST Modification Prosth Aug Alter : 60  Total Minutes  Untimed Charges Total Minutes: 60   Total Minutes: 60                 Daisy Capps MS CCC-SLP  5/19/2022

## 2022-05-20 ENCOUNTER — TRANSCRIBE ORDERS (OUTPATIENT)
Dept: MAMMOGRAPHY | Facility: HOSPITAL | Age: 47
End: 2022-05-20

## 2022-05-20 DIAGNOSIS — C50.811 MALIGNANT NEOPLASM OF OVERLAPPING SITES OF RIGHT FEMALE BREAST, UNSPECIFIED ESTROGEN RECEPTOR STATUS: Primary | ICD-10-CM

## 2022-05-25 ENCOUNTER — HOSPITAL ENCOUNTER (OUTPATIENT)
Dept: ULTRASOUND IMAGING | Facility: HOSPITAL | Age: 47
Discharge: HOME | End: 2022-05-25
Admitting: SURGERY

## 2022-05-25 DIAGNOSIS — C50.811 MALIGNANT NEOPLASM OF OVERLAPPING SITES OF RIGHT FEMALE BREAST, UNSPECIFIED ESTROGEN RECEPTOR STATUS: ICD-10-CM

## 2022-05-25 PROCEDURE — 76642 ULTRASOUND BREAST LIMITED: CPT | Performed by: RADIOLOGY

## 2022-05-25 PROCEDURE — 76642 ULTRASOUND BREAST LIMITED: CPT

## 2022-06-09 DIAGNOSIS — Z17.0 MALIGNANT NEOPLASM OF OVERLAPPING SITES OF RIGHT BREAST IN FEMALE, ESTROGEN RECEPTOR POSITIVE: Primary | ICD-10-CM

## 2022-06-09 DIAGNOSIS — C96.6: ICD-10-CM

## 2022-06-09 DIAGNOSIS — C50.811 MALIGNANT NEOPLASM OF OVERLAPPING SITES OF RIGHT BREAST IN FEMALE, ESTROGEN RECEPTOR POSITIVE: Primary | ICD-10-CM

## 2022-06-10 ENCOUNTER — TRANSCRIBE ORDERS (OUTPATIENT)
Dept: ADMINISTRATIVE | Facility: HOSPITAL | Age: 47
End: 2022-06-10

## 2022-06-10 DIAGNOSIS — Z11.59 SPECIAL SCREENING EXAMINATION FOR VIRAL DISEASE: Primary | ICD-10-CM

## 2022-06-20 ENCOUNTER — HOSPITAL ENCOUNTER (OUTPATIENT)
Dept: SPEECH THERAPY | Facility: HOSPITAL | Age: 47
Setting detail: THERAPIES SERIES
Discharge: HOME | End: 2022-06-20

## 2022-06-20 DIAGNOSIS — F09 ACQUIRED COGNITIVE DYSFUNCTION: Primary | ICD-10-CM

## 2022-06-20 PROCEDURE — 92507 TX SP LANG VOICE COMM INDIV: CPT

## 2022-06-20 NOTE — THERAPY PROGRESS REPORT/RE-CERT
"Outpatient Speech Language Pathology   Adult Speech Language Cognitive Progress Note   [place]     Patient Name: Josefina Rodriguez  : 1975  MRN: [id number]  Today's Date: 2022         Visit Date: 2022   Patient Active Problem List   Diagnosis   • Malignant neoplasm of overlapping sites of right breast in female, estrogen receptor positive (HCC)   • Chemotherapy induced diarrhea   • Chemotherapy-induced nausea   • Vaginal bleeding   • Cysts of both ovaries   • Family history of breast cancer   • Malignant neoplasm of overlapping sites of right female breast (HCC)   • Port-A-Cath in place   • Epigastric pain   • Thrush   • 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 Acquired cognitive dysfunction  F09 294.9        OP SLP Assessment/Plan - 22 1500        SLP Assessment    Functional Problems Speech Language- Adult/Cognition  -HG    SLP Diagnosis Minimal Cog Comm impairments  -HG       SLP Plan    Plan Comments Cont with Cog tx.  -          User Key  (r) = Recorded By, (t) = Taken By, (c) = Cosigned By    Initials Name Provider Type     Daisy Capps MS CCC-SLP Speech and Language Pathologist                                   SLP OP Goals     Row Name 22 1500          Goal Type Needed    Goal Type Needed Memory;Attention/Orientation;Other Adult Goals  -            Subjective Comments    Subjective Comments Pt alert, cooperative, feeling, states, \"that it seems to be getting better.\"  -HG            Memory Goals    Patient will be able to remember information needed to return to work and function on work-related tasks 100%:;with cues  -HG     Status: Patient will be able to remember information needed to return to work and function on work-related tasks Progressing as expected  -     Comments: Patient will be able to " "remember information needed to return to work and function on work-related tasks 4/15/22: Pt continues to use breaks including nap breaks in order to help focus and complete her work.   2/11/22: Pt continues to require cues and breaks.  -HG     Patient will demonstrate improved ability to recall information by immediately recalling a series of words 90%:;unrelated;after delay;without cues  -HG     Status: Patient will demonstrate improved ability to recall information by immediately recalling a series of words Revised  -HG     Comments: Patient will demonstrate improved ability to recall information by immediately recalling a series of words 6/20/22: Immediate recall of names and faces: pt was 8/10. 5/19/22: RBANS Immediate recall score of 123 is up from 114. 4/28/22: Name/face association and pt was 4/5. 4/15/22: Immediate recall of ABC game and pt was 100% accurate. 3/21/22: Picture scene recall and pt was 80% accurate.   Paired words and pt was 100% accurate.  Pt stated, \"Immediate memory kills me sometimes sometimes.\" Pt reports chunking 10 digits, 6 and 4 and is able to recall complete addresses.  3/4/22: RBANS Immediate recall score remains in the High Average range. Immediate recall of a complex picture scene: pt was 85% accurate.  2/25/22: Immediate recall of picture scene and pt was 100% accurate. 2/11/22: Immediate recall for card game and pt was 90% accurate. 1/14/22: Immediate recall for new card game and pt was 90% accurate. 12/23/21: Recall of picture scene: pt was 100% accurate. Word association and pt was 100% accurat.e  -HG     Patient’s memory skills will be enhanced as reported by patient by utilizing internal memory strategies to recall up to 3 pieces of information after a 5- minute delay 90%:;without cues  -HG     Status: Patient’s memory skills will be enhanced as reported by patient by utilizing internal memory strategies to recall up to 3 pieces of information after a 5- minute delay Revised "  -HG     Comments: Patient’s memory skills will be enhanced as reported by patient by utilizing internal memory strategies to recall up to 3 pieces of information after a 5- minute delay 6/20/22: Delayed recall of 5 figures: pt was 5/5, names and faces delayed and pt was 5/5.  5/19/22:RBANS Delayed recall of 118 is down slightly from 122.  5/12/22: Delayed recall of 10 unrelated words, using a story for recall with 100% acc. 4/28/22: Delayed recall of 8 un-related words and pt was 8/8.  Fxnl messges and pt was 3/3 x 2.   4/21/22: Delayed recall of 7 un-related words and pt was 7/7 x 3, ABC game from previous session and pt was 95% accurate.  4/15/22: Delayed recall of 6 un-related words and pt was 4/6 and improved to 6/6.   3/21/22: Delayed recall of 5 un-related words and pt was 4/5.   3/4/22: RBANS Delayed recall score of 122 improved to a Superior range. 2/25/22: Delayed recall of 8 related words and pt was 8/8 x 3. 2/11/22:  Delayed recall of 7 related words and pt was 7/7x 2. 1/14/22: Delayed recall of 6 related words: pt was 4/6. After review, pt was 6/6 x 3.  12/23/21: Delayed recall of 4 un-related words: pt was 4/4. Progressed to 5/5 x 3.  -HG     Patient’s memory skills will be enhanced as reported by patient by using external memory aides 90%:;without cues  -HG     Status: Patient’s memory skills will be enhanced as reported by patient by using external memory aides Achieved  -HG     Comments: Patient’s memory skills will be enhanced as reported by patient by using external memory aides 5/19/22: Pt continues to use external strategies to assist with recall. 5/12/22: Patient reported improvement in recall with use of journaling 4/15/22: Pt continues to journal for memory and counseling notes. 3/21/22: Pt states that her daughter lost her journal so she took one from SLP. 2/11/22: Pt states that she has journaled but only one day. 1/14/22: Pt has not started journaling, plans to get journal this date.  "12/23/21: Pt stated she plans to get a journal.  -HG            Attention/Orientation Goals    Patient will be able to use high level cognitive skills to allow patient to return to work Independently  -HG     Status: Patient will be able to use high level cognitive skills to allow patient to return to work Progressing as expected  -HG     Comments: Patient will be able to use high level cognitive skills to allow patient to return to work 5/19/22: RBANS Attention score of 88 is down from previous 94. 3/4/22: RBANS Attention score improved to a 94 from a 91 2/11/22: Pt continues to \"lose focus\" and SLP discussed giving breaks and pt does do that.  -HG     Patient will improve attention skills by sustaining focus and participation to conversation/task 30 minute task;with intermittent cues  -HG     Status: Patient will improve attention skills by sustaining focus and participation to conversation/task Progressing as expected;Revised  -HG     Comments: Patient will improve attention skills by sustaining focus and participation to conversation/task 5/12/22: Sustained focus for conersational task with 90% acc. 4/21/22: Sustained focus for Scattergories and pt was 100% accurate.  2/11/22: Pt able to sustain attention during 5 min conversation, goal adjusted.  -HG     Patient will improve attention skills by sustaining consistent behavioral response during continuous and repetitive activity (e.g., listening for target words, auditory/reading comprehension tasks) with cues;30 minute task  -HG     Status: Patient will improve attention skills by sustaining consistent behavioral response during continuous and repetitive activity (e.g., listening for target words, auditory/reading comprehension tasks) Progressing as expected;Revised  -HG     Comments: Patient will improve attention skills by sustaining consistent behavioral response during continuous and repetitive activity (e.g., listening for target words, auditory/reading " comprehension tasks) 3/21/22: Card sorting by deck and the letter N and pt was 95% accurate.  2/11/22: Card sorting into deck and the letter N and pt was 75% accurate. 1/14/22: APT Test for Sustained and Complex Sustained attention and pt was above average. 12/23/21: Card ID in a field of 10, 12, 16 and pt was able to recall up to 4 cards.  -HG     Patient will improve attention skills by sustaining focus in order to actively hold and manipulate information provided (e.g., sequencing auditorily presented number series in ascending or descending order) 100%:;without cues  -HG     Status: Patient will improve attention skills by sustaining focus in order to actively hold and manipulate information provided (e.g., sequencing auditorily presented number series in ascending or descending order) Progressing as expected;Revised  -HG     Comments: Patient will improve attention skills by sustaining focus in order to actively hold and manipulate information provided (e.g., sequencing auditorily presented number series in ascending or descending order) 4/15/22: Opposites with a delay: Pt was 100% accurate. 12/23/21: Digit span and pt was 80-90% accurate for up to 8 digits.  -HG     Patient will improve attention skills by focusing to selective target/task when presented with competing stimuli or in a distracting environment in order to complete task 90%:;without cues  -HG     Status: Patient will improve attention skills by focusing to selective target/task when presented with competing stimuli or in a distracting environment in order to complete task Progressing as expected;Revised  -     Comments: Patient will improve attention skills by focusing to selective target/task when presented with competing stimuli or in a distracting environment in order to complete task 1/14/22: APT Test for Selective Attention and pt was 28/30- Above average for age.  -HG     Patient will improve attention skills by alternating or shifting  focus between two different tasks in order to complete both tasks 90%:;without cues  -HG     Status: Patient will improve attention skills by alternating or shifting focus between two different tasks in order to complete both tasks Progressing as expected;Revised  -HG     Comments: Patient will improve attention skills by alternating or shifting focus between two different tasks in order to complete both tasks 6/20/22: Alternating between red and black and pt was accurate with consistent verbal cues.  4/21/22: Alternating between cards face up and face down and then switching task and pt was 100% accurate. 4/15/22: Alternating attention for visual scanning two different letter marking and pt was 100% accurate.  3/21/22: Alternating attention for adding and subtracting two cards and pt was 90% accurate. 2/11/22: Alternating attention task by sorting cards by red and black and pt was 80% accurate. 1/14/22: The APT test for Alternating attention and pt was 20/24 which was above average. The card game of Speed  and pt was 90% accurate.  -HG     Patient will improve attention skills by dividing focus and responding simultaneously to multiple tasks or in order to complete task 90%:;without cues  -HG     Status: Patient will improve attention skills by dividing focus and responding simultaneously to multiple tasks or in order to complete task Progressing as expected;Revised  -     Comments: Patient will improve attention skills by dividing focus and responding simultaneously to multiple tasks or in order to complete task 4/21/22: Sorting cards by suit and in numerical order and pt was 90% accurate.  3/21/22: [place] and pt was 85% accurate. 2/11/22: Divided attention for paragraph listening and sorting and pt was 90% accurate. 1/14/22: APT test for divided attention and pt was 27/30 and fell slightly below average.  -HG     Patient will improve attention skills by modifying surrounding environment 90%:;with  cues  -HG     Status: Patient will improve attention skills by modifying surrounding environment Progressing as expected;Revised  -HG     Comments: Patient will improve attention skills by modifying surrounding environment 4/28/22: Exec fxn task for attention and pt was 80% accurate.  2/25/22: During an exec fxn task, pt used notetaking strategies and referencing strategies in order to increase her accuracy. 1/14/22: Pt states that she takes break as needed during work.  -HG            Other Goals    Other Adult Goal- 1 Pt will complete thought organization tasks with 90% Accuracy.  -HG     Status: Other Adult Goal- 1 Achieved  -HG     Comments: Other Adult Goal- 1 5/19/22: RBANS Language score of 106 is down from previous 110. 3/4/22: RBANS Language score of 110 is up from 100. 2/25/22: Answering general informations and sequencing sentences and pt was 100% accurate. 12/23/21: Similarities and Differences: pt was 100% accurate.  -HG     Other Adult Goal- 2 Pt will complete reasoning and problem solving assessment with recs to follow as indicated.  -HG     Status: Other Adult Goal- 2 Achieved  -HG     Comments: Other Adult Goal- 2 4/28/22: Pt reports completing WORDLE with prompts in place. 4/15/22: WORDLE and pt was able to solve without assistance.  2/25/22: WORDLE and pt required min cues and completed with accuracy. 12/23/21: [place] word scramble and pt was 90% accurate.  -HG     Other Adult Goal- 3 Pt will improve RBANS Total score to a 121 placing pt in the Superior range.  -HG     Status: Other Adult Goal- 3 Progressing as expected;Revised  -HG     Comments: Other Adult Goal- 3 5/19/22: RBANS Total score of 112 is down slightly from previous 118. 3/4/22: RBANS Total score of 118 is up from a previous 106.  -HG     Other Adult Goal- 4 Pt will complete high level reasoning tasks with 90% accuracy.  -HG     Status: Other Adult Goal- 4 Progressing as expected  -HG     Comments: Other Adult Goal- 4 6/20/22:  Game of Heads up and pt was 85% accurate. Deduction reasoning and pt was 100% accurate with extra time provided.  -            SLP Time Calculation    SLP Goal Re-Cert Due Date 09/18/22  -           User Key  (r) = Recorded By, (t) = Taken By, (c) = Cosigned By    Initials Name Provider Type    Daisy Graves MS CCC-SLP Speech and Language Pathologist               OP SLP Education     Row Name 06/20/22 1500       Education    Education Comments Hmwk for 4 word order manipulation. .  -          User Key  (r) = Recorded By, (t) = Taken By, (c) = Cosigned By    Initials Name Effective Dates    Daisy Graves MS CCC-SLP 06/16/21 -                      Time Calculation:   SLP Start Time: 1500  Untimed Charges  41230-HK Treatment/ST Modification Prosth Aug Alter : 60  Total Minutes  Untimed Charges Total Minutes: 60   Total Minutes: 60    Therapy Charges for Today     Code Description Service Date Service Provider Modifiers Qty    [id number] HC ST TREATMENT SPEECH 4 6/20/2022 Daisy Capps MS CCC-SLP GN 1                   Daisy Capps MS CCC-SLP  6/20/2022

## 2022-06-27 ENCOUNTER — HOSPITAL ENCOUNTER (OUTPATIENT)
Dept: SPEECH THERAPY | Facility: HOSPITAL | Age: 47
Setting detail: THERAPIES SERIES
Discharge: HOME | End: 2022-06-27

## 2022-06-27 DIAGNOSIS — F09 ACQUIRED COGNITIVE DYSFUNCTION: Primary | ICD-10-CM

## 2022-06-27 PROCEDURE — 92507 TX SP LANG VOICE COMM INDIV: CPT

## 2022-06-27 NOTE — THERAPY TREATMENT NOTE
Outpatient Speech Language Pathology   Adult Speech Language Cognitive Treatment Note  [facility]     Patient Name: Josefina Rodriguez  : 1975  MRN: [id number]  Today's Date: 2022         Visit Date: 2022   Patient Active Problem List   Diagnosis   • Malignant neoplasm of overlapping sites of right breast in female, estrogen receptor positive (HCC)   • Chemotherapy induced diarrhea   • Chemotherapy-induced nausea   • Vaginal bleeding   • Cysts of both ovaries   • Family history of breast cancer   • Malignant neoplasm of overlapping sites of right female breast (HCC)   • Port-A-Cath in place   • Epigastric pain   • Thrush   • 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 Acquired cognitive dysfunction  F09 294.9        OP SLP Assessment/Plan - 22 1400        SLP Plan    Plan Comments Cont with Cog tx.  -HG          User Key  (r) = Recorded By, (t) = Taken By, (c) = Cosigned By    Initials Name Provider Type     Daisy Capps MS CCC-SLP Speech and Language Pathologist                                   SLP OP Goals     Row Name 22 1400          Goal Type Needed    Goal Type Needed Memory;Attention/Orientation;Other Adult Goals  -            Subjective Comments    Subjective Comments Pt alert, cooperative, returned with homework.  -HG            Memory Goals    Patient will be able to remember information needed to return to work and function on work-related tasks 100%:;with cues  -HG     Status: Patient will be able to remember information needed to return to work and function on work-related tasks Progressing as expected  -     Comments: Patient will be able to remember information needed to return to work and function on work-related tasks 4/15/22: Pt continues to use breaks including nap breaks in order to help focus  "and complete her work.   2/11/22: Pt continues to require cues and breaks.  -HG     Patient will demonstrate improved ability to recall information by immediately recalling a series of words 90%:;unrelated;after delay;without cues  -HG     Status: Patient will demonstrate improved ability to recall information by immediately recalling a series of words Revised  -     Comments: Patient will demonstrate improved ability to recall information by immediately recalling a series of words 6/27/22: Immediate recall of complex figure and pt was 100% accurate. 6/20/22: Immediate recall of names and faces: pt was 8/10. 5/19/22: RBANS Immediate recall score of 123 is up from 114. 4/28/22: Name/face association and pt was 4/5. 4/15/22: Immediate recall of ABC game and pt was 100% accurate. 3/21/22: Picture scene recall and pt was 80% accurate.   Paired words and pt was 100% accurate.  Pt stated, \"Immediate memory kills me sometimes sometimes.\" Pt reports chunking 10 digits, 6 and 4 and is able to recall complete addresses.  3/4/22: RBANS Immediate recall score remains in the High Average range. Immediate recall of a complex picture scene: pt was 85% accurate.  2/25/22: Immediate recall of picture scene and pt was 100% accurate. 2/11/22: Immediate recall for card game and pt was 90% accurate. 1/14/22: Immediate recall for new card game and pt was 90% accurate. 12/23/21: Recall of picture scene: pt was 100% accurate. Word association and pt was 100% accurat.e  -HG     Patient’s memory skills will be enhanced as reported by patient by utilizing internal memory strategies to recall up to 3 pieces of information after a 5- minute delay 90%:;without cues  -HG     Status: Patient’s memory skills will be enhanced as reported by patient by utilizing internal memory strategies to recall up to 3 pieces of information after a 5- minute delay Revised  -     Comments: Patient’s memory skills will be enhanced as reported by patient by " utilizing internal memory strategies to recall up to 3 pieces of information after a 5- minute delay 6/27/22: Delayed recall of complex figure and pt was 100% accurate.  6/20/22: Delayed recall of 5 figures: pt was 5/5, names and faces delayed and pt was 5/5.  5/19/22:RBANS Delayed recall of 118 is down slightly from 122.  5/12/22: Delayed recall of 10 unrelated words, using a story for recall with 100% acc. 4/28/22: Delayed recall of 8 un-related words and pt was 8/8.  Fxnl messges and pt was 3/3 x 2.   4/21/22: Delayed recall of 7 un-related words and pt was 7/7 x 3, ABC game from previous session and pt was 95% accurate.  4/15/22: Delayed recall of 6 un-related words and pt was 4/6 and improved to 6/6.   3/21/22: Delayed recall of 5 un-related words and pt was 4/5.   3/4/22: RBANS Delayed recall score of 122 improved to a Superior range. 2/25/22: Delayed recall of 8 related words and pt was 8/8 x 3. 2/11/22:  Delayed recall of 7 related words and pt was 7/7x 2. 1/14/22: Delayed recall of 6 related words: pt was 4/6. After review, pt was 6/6 x 3.  12/23/21: Delayed recall of 4 un-related words: pt was 4/4. Progressed to 5/5 x 3.  -HG     Patient’s memory skills will be enhanced as reported by patient by using external memory aides 90%:;without cues  -HG     Status: Patient’s memory skills will be enhanced as reported by patient by using external memory aides Achieved  -HG     Comments: Patient’s memory skills will be enhanced as reported by patient by using external memory aides 5/19/22: Pt continues to use external strategies to assist with recall. 5/12/22: Patient reported improvement in recall with use of journaling 4/15/22: Pt continues to journal for memory and counseling notes. 3/21/22: Pt states that her daughter lost her journal so she took one from SLP. 2/11/22: Pt states that she has journaled but only one day. 1/14/22: Pt has not started journaling, plans to get journal this date. 12/23/21: Pt stated  "she plans to get a journal.  -HG            Attention/Orientation Goals    Patient will be able to use high level cognitive skills to allow patient to return to work Independently  -HG     Status: Patient will be able to use high level cognitive skills to allow patient to return to work Progressing as expected  -HG     Comments: Patient will be able to use high level cognitive skills to allow patient to return to work 5/19/22: RBANS Attention score of 88 is down from previous 94. 3/4/22: RBANS Attention score improved to a 94 from a 91 2/11/22: Pt continues to \"lose focus\" and SLP discussed giving breaks and pt does do that.  -HG     Patient will improve attention skills by sustaining focus and participation to conversation/task 30 minute task;with intermittent cues  -HG     Status: Patient will improve attention skills by sustaining focus and participation to conversation/task Progressing as expected;Revised  -HG     Comments: Patient will improve attention skills by sustaining focus and participation to conversation/task 5/12/22: Sustained focus for conversational task with 90% acc. 4/21/22: Sustained focus for Scattergories and pt was 100% accurate.  2/11/22: Pt able to sustain attention during 5 min conversation, goal adjusted.  -HG     Patient will improve attention skills by sustaining consistent behavioral response during continuous and repetitive activity (e.g., listening for target words, auditory/reading comprehension tasks) with cues;30 minute task  -HG     Status: Patient will improve attention skills by sustaining consistent behavioral response during continuous and repetitive activity (e.g., listening for target words, auditory/reading comprehension tasks) Progressing as expected;Revised  -HG     Comments: Patient will improve attention skills by sustaining consistent behavioral response during continuous and repetitive activity (e.g., listening for target words, auditory/reading comprehension tasks) " 3/21/22: Card sorting by deck and the letter N and pt was 95% accurate.  2/11/22: Card sorting into deck and the letter N and pt was 75% accurate. 1/14/22: APT Test for Sustained and Complex Sustained attention and pt was above average. 12/23/21: Card ID in a field of 10, 12, 16 and pt was able to recall up to 4 cards.  -HG     Patient will improve attention skills by sustaining focus in order to actively hold and manipulate information provided (e.g., sequencing auditorily presented number series in ascending or descending order) 100%:;without cues  -HG     Status: Patient will improve attention skills by sustaining focus in order to actively hold and manipulate information provided (e.g., sequencing auditorily presented number series in ascending or descending order) Progressing as expected;Revised  -     Comments: Patient will improve attention skills by sustaining focus in order to actively hold and manipulate information provided (e.g., sequencing auditorily presented number series in ascending or descending order) 6/27/22: Pt reporting completing with daughter using 4 word manipulation. 4/15/22: Opposites with a delay: Pt was 100% accurate. 12/23/21: Digit span and pt was 80-90% accurate for up to 8 digits.  -HG     Patient will improve attention skills by focusing to selective target/task when presented with competing stimuli or in a distracting environment in order to complete task 90%:;without cues  -HG     Status: Patient will improve attention skills by focusing to selective target/task when presented with competing stimuli or in a distracting environment in order to complete task Progressing as expected;Revised  -     Comments: Patient will improve attention skills by focusing to selective target/task when presented with competing stimuli or in a distracting environment in order to complete task 1/14/22: APT Test for Selective Attention and pt was 28/30- Above average for age.  -HG     Patient will  improve attention skills by alternating or shifting focus between two different tasks in order to complete both tasks 90%:;without cues  -HG     Status: Patient will improve attention skills by alternating or shifting focus between two different tasks in order to complete both tasks Progressing as expected;Revised  -HG     Comments: Patient will improve attention skills by alternating or shifting focus between two different tasks in order to complete both tasks 6/20/22: Alternating between red and black and pt was accurate with consistent verbal cues.  4/21/22: Alternating between cards face up and face down and then switching task and pt was 100% accurate. 4/15/22: Alternating attention for visual scanning two different letter marking and pt was 100% accurate.  3/21/22: Alternating attention for adding and subtracting two cards and pt was 90% accurate. 2/11/22: Alternating attention task by sorting cards by red and black and pt was 80% accurate. 1/14/22: The APT test for Alternating attention and pt was 20/24 which was above average. The card game of Speed  and pt was 90% accurate.  -HG     Patient will improve attention skills by dividing focus and responding simultaneously to multiple tasks or in order to complete task 90%:;without cues  -HG     Status: Patient will improve attention skills by dividing focus and responding simultaneously to multiple tasks or in order to complete task Progressing as expected;Revised  -     Comments: Patient will improve attention skills by dividing focus and responding simultaneously to multiple tasks or in order to complete task 6/27/22: [place] in the Corner and pt was 85% accurate this date.    4/21/22: Sorting cards by suit and in numerical order and pt was 90% accurate.  3/21/22: [place] in the Corner and pt was 85% accurate. 2/11/22: Divided attention for paragraph listening and sorting and pt was 90% accurate. 1/14/22: APT test for divided attention and pt was 27/30 and fell  slightly below average.  -HG     Patient will improve attention skills by modifying surrounding environment 90%:;with cues  -HG     Status: Patient will improve attention skills by modifying surrounding environment Progressing as expected;Revised  -HG     Comments: Patient will improve attention skills by modifying surrounding environment 4/28/22: Exec fxn task for attention and pt was 80% accurate.  2/25/22: During an exec fxn task, pt used notetaking strategies and referencing strategies in order to increase her accuracy. 1/14/22: Pt states that she takes break as needed during work.  -HG            Other Goals    Other Adult Goal- 1 Pt will complete thought organization tasks with 90% Accuracy.  -HG     Status: Other Adult Goal- 1 Achieved  -HG     Comments: Other Adult Goal- 1 5/19/22: RBANS Language score of 106 is down from previous 110. 3/4/22: RBANS Language score of 110 is up from 100. 2/25/22: Answering general informations and sequencing sentences and pt was 100% accurate. 12/23/21: Similarities and Differences: pt was 100% accurate.  -HG     Other Adult Goal- 2 Pt will complete reasoning and problem solving assessment with recs to follow as indicated.  -HG     Status: Other Adult Goal- 2 Achieved  -HG     Comments: Other Adult Goal- 2 4/28/22: Pt reports completing WORDLE with prompts in place. 4/15/22: WORDLE and pt was able to solve without assistance.  2/25/22: WORDLE and pt required min cues and completed with accuracy. 12/23/21: Rebel word scramble and pt was 90% accurate.  -HG     Other Adult Goal- 3 Pt will improve RBANS Total score to a 121 placing pt in the Superior range.  -HG     Status: Other Adult Goal- 3 Progressing as expected;Revised  -HG     Comments: Other Adult Goal- 3 5/19/22: RBANS Total score of 112 is down slightly from previous 118. 3/4/22: RBANS Total score of 118 is up from a previous 106.  -HG     Other Adult Goal- 4 Pt will complete high level reasoning tasks with 90%  accuracy.  -HG     Status: Other Adult Goal- 4 Progressing as expected  -HG     Comments: Other Adult Goal- 4 6/20/22: Game of Heads up and pt was 85% accurate. Deduction reasoning and pt was 100% accurate with extra time provided.  -HG            SLP Time Calculation    SLP Goal Re-Cert Due Date 09/18/22  -HG           User Key  (r) = Recorded By, (t) = Taken By, (c) = Cosigned By    Initials Name Provider Type    Daisy Graves MS CCC-SLP Speech and Language Pathologist               OP SLP Education     Row Name 06/27/22 1400       Education    Education Comments Hmwk for use of attention strategies.  -HG          User Key  (r) = Recorded By, (t) = Taken By, (c) = Cosigned By    Initials Name Effective Dates    Daisy Graves MS CCC-SLP 06/16/21 -                      Time Calculation:   SLP Start Time: 1400  Untimed Charges  87802-RN Treatment/ST Modification Prosth Aug Alter : 60  Total Minutes  Untimed Charges Total Minutes: 60   Total Minutes: 60    Therapy Charges for Today     Code Description Service Date Service Provider Modifiers Qty    [id number] HC ST TREATMENT SPEECH 4 6/27/2022 Daisy Capps MS CCC-SLP GN 1                   Daisy Capps MS CCC-SLP  6/27/2022

## 2022-06-30 ENCOUNTER — HOSPITAL ENCOUNTER (OUTPATIENT)
Dept: ONCOLOGY | Facility: HOSPITAL | Age: 47
Setting detail: INFUSION SERIES
Discharge: HOME | End: 2022-06-30

## 2022-06-30 VITALS
HEIGHT: 66 IN | SYSTOLIC BLOOD PRESSURE: 134 MMHG | DIASTOLIC BLOOD PRESSURE: 92 MMHG | HEART RATE: 98 BPM | WEIGHT: 235 LBS | RESPIRATION RATE: 16 BRPM | BODY MASS INDEX: 37.77 KG/M2 | TEMPERATURE: 97.5 F

## 2022-06-30 DIAGNOSIS — Z95.828 PORT-A-CATH IN PLACE: ICD-10-CM

## 2022-06-30 DIAGNOSIS — Z45.2 ENCOUNTER FOR CENTRAL LINE CARE: Primary | ICD-10-CM

## 2022-06-30 PROCEDURE — 25010000002 HEPARIN LOCK FLUSH PER 10 UNITS: Performed by: INTERNAL MEDICINE

## 2022-06-30 PROCEDURE — 96523 IRRIG DRUG DELIVERY DEVICE: CPT

## 2022-06-30 RX ORDER — HEPARIN SODIUM (PORCINE) LOCK FLUSH IV SOLN 100 UNIT/ML 100 UNIT/ML
500 SOLUTION INTRAVENOUS AS NEEDED
Status: CANCELLED | OUTPATIENT
Start: 2022-06-30

## 2022-06-30 RX ORDER — SODIUM CHLORIDE 0.9 % (FLUSH) 0.9 %
10 SYRINGE (ML) INJECTION AS NEEDED
Status: CANCELLED | OUTPATIENT
Start: 2022-06-30

## 2022-06-30 RX ORDER — HEPARIN SODIUM (PORCINE) LOCK FLUSH IV SOLN 100 UNIT/ML 100 UNIT/ML
500 SOLUTION INTRAVENOUS AS NEEDED
Status: DISCONTINUED | OUTPATIENT
Start: 2022-06-30 | End: 2022-07-01 | Stop reason: HOSPADM

## 2022-06-30 RX ORDER — SODIUM CHLORIDE 0.9 % (FLUSH) 0.9 %
20 SYRINGE (ML) INJECTION AS NEEDED
Status: CANCELLED | OUTPATIENT
Start: 2022-06-30

## 2022-06-30 RX ADMIN — HEPARIN SODIUM (PORCINE) LOCK FLUSH IV SOLN 100 UNIT/ML 500 UNITS: 100 SOLUTION at 12:38

## 2022-07-05 ENCOUNTER — LAB (OUTPATIENT)
Dept: PREADMISSION TESTING | Facility: HOSPITAL | Age: 47
End: 2022-07-05

## 2022-07-05 PROCEDURE — U0004 COV-19 TEST NON-CDC HGH THRU: HCPCS | Performed by: SURGERY

## 2022-07-06 LAB — SARS-COV-2 RNA PNL SPEC NAA+PROBE: NOT DETECTED

## 2022-07-07 ENCOUNTER — HOSPITAL ENCOUNTER (OUTPATIENT)
Dept: SPEECH THERAPY | Facility: HOSPITAL | Age: 47
Setting detail: THERAPIES SERIES
Discharge: HOME | End: 2022-07-07

## 2022-07-07 DIAGNOSIS — F09 ACQUIRED COGNITIVE DYSFUNCTION: Primary | ICD-10-CM

## 2022-07-07 PROCEDURE — 92507 TX SP LANG VOICE COMM INDIV: CPT

## 2022-07-11 RX ORDER — POLYETHYLENE GLYCOL 3350 17 G
POWDER IN PACKET (EA) ORAL
Qty: 168 LOZENGE | Refills: 0 | Status: SHIPPED | OUTPATIENT
Start: 2022-07-11 | End: 2022-11-03

## 2022-07-14 ENCOUNTER — APPOINTMENT (OUTPATIENT)
Dept: SPEECH THERAPY | Facility: HOSPITAL | Age: 47
End: 2022-07-14

## 2022-07-28 ENCOUNTER — APPOINTMENT (OUTPATIENT)
Dept: SPEECH THERAPY | Facility: HOSPITAL | Age: 47
End: 2022-07-28

## 2022-08-04 ENCOUNTER — APPOINTMENT (OUTPATIENT)
Dept: SPEECH THERAPY | Facility: HOSPITAL | Age: 47
End: 2022-08-04

## 2022-08-11 ENCOUNTER — OFFICE VISIT (OUTPATIENT)
Dept: ONCOLOGY | Facility: CLINIC | Age: 47
End: 2022-08-11

## 2022-08-11 ENCOUNTER — HOSPITAL ENCOUNTER (OUTPATIENT)
Dept: ONCOLOGY | Facility: HOSPITAL | Age: 47
Setting detail: INFUSION SERIES
Discharge: HOME | End: 2022-08-11

## 2022-08-11 VITALS
DIASTOLIC BLOOD PRESSURE: 82 MMHG | OXYGEN SATURATION: 94 % | BODY MASS INDEX: 37.8 KG/M2 | SYSTOLIC BLOOD PRESSURE: 125 MMHG | RESPIRATION RATE: 18 BRPM | TEMPERATURE: 96.9 F | HEART RATE: 88 BPM | WEIGHT: 234.2 LBS

## 2022-08-11 DIAGNOSIS — Z12.31 SCREENING MAMMOGRAM FOR BREAST CANCER: Primary | ICD-10-CM

## 2022-08-11 DIAGNOSIS — Z17.0 MALIGNANT NEOPLASM OF OVERLAPPING SITES OF RIGHT BREAST IN FEMALE, ESTROGEN RECEPTOR POSITIVE: Primary | ICD-10-CM

## 2022-08-11 DIAGNOSIS — Z79.811 LONG TERM CURRENT USE OF AROMATASE INHIBITOR: ICD-10-CM

## 2022-08-11 DIAGNOSIS — C50.811 MALIGNANT NEOPLASM OF OVERLAPPING SITES OF RIGHT BREAST IN FEMALE, ESTROGEN RECEPTOR POSITIVE: ICD-10-CM

## 2022-08-11 DIAGNOSIS — Z17.0 MALIGNANT NEOPLASM OF OVERLAPPING SITES OF RIGHT BREAST IN FEMALE, ESTROGEN RECEPTOR POSITIVE: ICD-10-CM

## 2022-08-11 DIAGNOSIS — C50.811 MALIGNANT NEOPLASM OF OVERLAPPING SITES OF RIGHT BREAST IN FEMALE, ESTROGEN RECEPTOR POSITIVE: Primary | ICD-10-CM

## 2022-08-11 DIAGNOSIS — G62.9 NEUROPATHY: ICD-10-CM

## 2022-08-11 DIAGNOSIS — Z45.2 ENCOUNTER FOR CENTRAL LINE CARE: ICD-10-CM

## 2022-08-11 DIAGNOSIS — Z95.828 PORT-A-CATH IN PLACE: ICD-10-CM

## 2022-08-11 LAB
ALBUMIN SERPL-MCNC: 3.9 G/DL (ref 3.5–5.2)
ALBUMIN/GLOB SERPL: 1 G/DL
ALP SERPL-CCNC: 164 U/L (ref 39–117)
ALT SERPL W P-5'-P-CCNC: 20 U/L (ref 1–33)
ANION GAP SERPL CALCULATED.3IONS-SCNC: 10 MMOL/L (ref 5–15)
AST SERPL-CCNC: 14 U/L (ref 1–32)
BILIRUB SERPL-MCNC: 0.3 MG/DL (ref 0–1.2)
BUN SERPL-MCNC: 16 MG/DL (ref 6–20)
BUN/CREAT SERPL: 26.2 (ref 7–25)
CALCIUM SPEC-SCNC: 9.9 MG/DL (ref 8.6–10.5)
CHLORIDE SERPL-SCNC: 98 MMOL/L (ref 98–107)
CO2 SERPL-SCNC: 29 MMOL/L (ref 22–29)
CREAT SERPL-MCNC: 0.61 MG/DL (ref 0.57–1)
EGFRCR SERPLBLD CKD-EPI 2021: 112.5 ML/MIN/1.73
ERYTHROCYTE [DISTWIDTH] IN BLOOD BY AUTOMATED COUNT: 14.2 % (ref 12.3–15.4)
GLOBULIN UR ELPH-MCNC: 3.8 GM/DL
GLUCOSE SERPL-MCNC: 270 MG/DL (ref 65–99)
HCT VFR BLD AUTO: 48.2 % (ref 34–46.6)
HGB BLD-MCNC: 15.2 G/DL (ref 12–15.9)
LYMPHOCYTES # BLD AUTO: 2.7 10*3/MM3 (ref 0.7–3.1)
LYMPHOCYTES NFR BLD AUTO: 19.2 % (ref 19.6–45.3)
MCH RBC QN AUTO: 28.1 PG (ref 26.6–33)
MCHC RBC AUTO-ENTMCNC: 31.6 G/DL (ref 31.5–35.7)
MCV RBC AUTO: 88.9 FL (ref 79–97)
MONOCYTES # BLD AUTO: 0.2 10*3/MM3 (ref 0.1–0.9)
MONOCYTES NFR BLD AUTO: 1.2 % (ref 5–12)
NEUTROPHILS NFR BLD AUTO: 11.3 10*3/MM3 (ref 1.7–7)
NEUTROPHILS NFR BLD AUTO: 79.6 % (ref 42.7–76)
PLATELET # BLD AUTO: 309 10*3/MM3 (ref 140–450)
PMV BLD AUTO: 7.8 FL (ref 6–12)
POTASSIUM SERPL-SCNC: 4.6 MMOL/L (ref 3.5–5.2)
PROT SERPL-MCNC: 7.7 G/DL (ref 6–8.5)
RBC # BLD AUTO: 5.42 10*6/MM3 (ref 3.77–5.28)
SODIUM SERPL-SCNC: 137 MMOL/L (ref 136–145)
WBC NRBC COR # BLD: 14.2 10*3/MM3 (ref 3.4–10.8)

## 2022-08-11 PROCEDURE — 96372 THER/PROPH/DIAG INJ SC/IM: CPT

## 2022-08-11 PROCEDURE — 25010000002 LEUPROLIDE 22.5 MG KIT: Performed by: INTERNAL MEDICINE

## 2022-08-11 PROCEDURE — 80053 COMPREHEN METABOLIC PANEL: CPT | Performed by: NURSE PRACTITIONER

## 2022-08-11 PROCEDURE — 36415 COLL VENOUS BLD VENIPUNCTURE: CPT

## 2022-08-11 PROCEDURE — 85025 COMPLETE CBC W/AUTO DIFF WBC: CPT | Performed by: NURSE PRACTITIONER

## 2022-08-11 PROCEDURE — 96402 CHEMO HORMON ANTINEOPL SQ/IM: CPT

## 2022-08-11 PROCEDURE — 99214 OFFICE O/P EST MOD 30 MIN: CPT | Performed by: NURSE PRACTITIONER

## 2022-08-11 RX ORDER — PREDNISONE 10 MG/1
TABLET ORAL
COMMUNITY
Start: 2022-08-05 | End: 2022-11-03

## 2022-08-11 RX ORDER — HEPARIN SODIUM (PORCINE) LOCK FLUSH IV SOLN 100 UNIT/ML 100 UNIT/ML
500 SOLUTION INTRAVENOUS AS NEEDED
Status: CANCELLED | OUTPATIENT
Start: 2022-08-11

## 2022-08-11 RX ORDER — ESCITALOPRAM OXALATE 20 MG/1
20 TABLET ORAL DAILY
COMMUNITY
Start: 2022-05-31

## 2022-08-11 RX ORDER — BUPROPION HYDROCHLORIDE 150 MG/1
300 TABLET ORAL DAILY
COMMUNITY
Start: 2022-08-05

## 2022-08-11 RX ORDER — HEPARIN SODIUM (PORCINE) LOCK FLUSH IV SOLN 100 UNIT/ML 100 UNIT/ML
500 SOLUTION INTRAVENOUS AS NEEDED
Status: DISCONTINUED | OUTPATIENT
Start: 2022-08-11 | End: 2022-08-12 | Stop reason: HOSPADM

## 2022-08-11 RX ORDER — ANASTROZOLE 1 MG/1
1 TABLET ORAL DAILY
Qty: 30 TABLET | Refills: 11 | Status: SHIPPED | OUTPATIENT
Start: 2022-08-11

## 2022-08-11 RX ORDER — SODIUM CHLORIDE 0.9 % (FLUSH) 0.9 %
20 SYRINGE (ML) INJECTION AS NEEDED
Status: CANCELLED | OUTPATIENT
Start: 2022-08-11

## 2022-08-11 RX ORDER — SODIUM CHLORIDE 0.9 % (FLUSH) 0.9 %
10 SYRINGE (ML) INJECTION AS NEEDED
Status: CANCELLED | OUTPATIENT
Start: 2022-08-11

## 2022-08-11 RX ORDER — PREGABALIN 150 MG/1
150 CAPSULE ORAL 2 TIMES DAILY
Qty: 60 CAPSULE | Refills: 3 | Status: SHIPPED | OUTPATIENT
Start: 2022-08-11 | End: 2023-01-20

## 2022-08-11 RX ADMIN — LEUPROLIDE ACETATE 22.5 MG: KIT SUBCUTANEOUS at 13:07
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
## 2022-08-22 DIAGNOSIS — Z17.0 MALIGNANT NEOPLASM OF OVERLAPPING SITES OF RIGHT BREAST IN FEMALE, ESTROGEN RECEPTOR POSITIVE: ICD-10-CM

## 2022-08-22 DIAGNOSIS — C50.811 MALIGNANT NEOPLASM OF OVERLAPPING SITES OF RIGHT BREAST IN FEMALE, ESTROGEN RECEPTOR POSITIVE: ICD-10-CM

## 2022-08-22 DIAGNOSIS — G62.0 NEUROPATHY DUE TO CHEMOTHERAPEUTIC DRUG: ICD-10-CM

## 2022-08-22 DIAGNOSIS — T45.1X5A NEUROPATHY DUE TO CHEMOTHERAPEUTIC DRUG: ICD-10-CM

## 2022-08-22 RX ORDER — DULOXETIN HYDROCHLORIDE 60 MG/1
CAPSULE, DELAYED RELEASE ORAL
Qty: 90 CAPSULE | Refills: 3 | Status: SHIPPED | OUTPATIENT
Start: 2022-08-22

## 2022-08-22 NOTE — TELEPHONE ENCOUNTER
I had a request to refill her Cymbalta. it looks like another provider has started her on several different medications for depression. Can you call her to discuss. I don't want to send cymbalta if she is being treated with other antidepressants.

## 2022-09-22 ENCOUNTER — APPOINTMENT (OUTPATIENT)
Dept: ONCOLOGY | Facility: HOSPITAL | Age: 47
End: 2022-09-22

## 2022-11-03 ENCOUNTER — OFFICE VISIT (OUTPATIENT)
Dept: ONCOLOGY | Facility: CLINIC | Age: 47
End: 2022-11-03

## 2022-11-03 ENCOUNTER — HOSPITAL ENCOUNTER (OUTPATIENT)
Dept: ONCOLOGY | Facility: HOSPITAL | Age: 47
Setting detail: INFUSION SERIES
Discharge: HOME | End: 2022-11-03

## 2022-11-03 VITALS
SYSTOLIC BLOOD PRESSURE: 130 MMHG | DIASTOLIC BLOOD PRESSURE: 74 MMHG | WEIGHT: 248 LBS | HEIGHT: 66 IN | HEART RATE: 90 BPM | BODY MASS INDEX: 39.86 KG/M2 | TEMPERATURE: 97.7 F | OXYGEN SATURATION: 96 % | RESPIRATION RATE: 18 BRPM

## 2022-11-03 DIAGNOSIS — E66.9 OBESITY WITHOUT SERIOUS COMORBIDITY, UNSPECIFIED CLASSIFICATION, UNSPECIFIED OBESITY TYPE: Primary | ICD-10-CM

## 2022-11-03 DIAGNOSIS — Z45.2 ENCOUNTER FOR CENTRAL LINE CARE: Primary | ICD-10-CM

## 2022-11-03 DIAGNOSIS — C50.811 MALIGNANT NEOPLASM OF OVERLAPPING SITES OF RIGHT BREAST IN FEMALE, ESTROGEN RECEPTOR POSITIVE: ICD-10-CM

## 2022-11-03 DIAGNOSIS — Z17.0 MALIGNANT NEOPLASM OF OVERLAPPING SITES OF RIGHT BREAST IN FEMALE, ESTROGEN RECEPTOR POSITIVE: ICD-10-CM

## 2022-11-03 DIAGNOSIS — Z95.828 PORT-A-CATH IN PLACE: ICD-10-CM

## 2022-11-03 LAB
ALBUMIN SERPL-MCNC: 3.7 G/DL (ref 3.5–5.2)
ALBUMIN/GLOB SERPL: 0.9 G/DL
ALP SERPL-CCNC: 169 U/L (ref 39–117)
ALT SERPL W P-5'-P-CCNC: 21 U/L (ref 1–33)
ANION GAP SERPL CALCULATED.3IONS-SCNC: 9 MMOL/L (ref 5–15)
AST SERPL-CCNC: 23 U/L (ref 1–32)
BASOPHILS # BLD AUTO: 0.02 10*3/MM3 (ref 0–0.2)
BASOPHILS NFR BLD AUTO: 0.2 % (ref 0–1.5)
BILIRUB SERPL-MCNC: 0.3 MG/DL (ref 0–1.2)
BUN SERPL-MCNC: 10 MG/DL (ref 6–20)
BUN/CREAT SERPL: 15.6 (ref 7–25)
CALCIUM SPEC-SCNC: 9.3 MG/DL (ref 8.6–10.5)
CHLORIDE SERPL-SCNC: 101 MMOL/L (ref 98–107)
CO2 SERPL-SCNC: 29 MMOL/L (ref 22–29)
CREAT SERPL-MCNC: 0.64 MG/DL (ref 0.57–1)
DEPRECATED RDW RBC AUTO: 41.6 FL (ref 37–54)
EGFRCR SERPLBLD CKD-EPI 2021: 111.2 ML/MIN/1.73
EOSINOPHIL # BLD AUTO: 0.23 10*3/MM3 (ref 0–0.4)
EOSINOPHIL NFR BLD AUTO: 2.6 % (ref 0.3–6.2)
ERYTHROCYTE [DISTWIDTH] IN BLOOD BY AUTOMATED COUNT: 12.8 % (ref 12.3–15.4)
GLOBULIN UR ELPH-MCNC: 3.9 GM/DL
GLUCOSE SERPL-MCNC: 101 MG/DL (ref 65–99)
HCT VFR BLD AUTO: 41 % (ref 34–46.6)
HGB BLD-MCNC: 13.5 G/DL (ref 12–15.9)
IMM GRANULOCYTES # BLD AUTO: 0.03 10*3/MM3 (ref 0–0.05)
IMM GRANULOCYTES NFR BLD AUTO: 0.3 % (ref 0–0.5)
LYMPHOCYTES # BLD AUTO: 2.49 10*3/MM3 (ref 0.7–3.1)
LYMPHOCYTES NFR BLD AUTO: 28.4 % (ref 19.6–45.3)
MCH RBC QN AUTO: 29.2 PG (ref 26.6–33)
MCHC RBC AUTO-ENTMCNC: 32.9 G/DL (ref 31.5–35.7)
MCV RBC AUTO: 88.7 FL (ref 79–97)
MONOCYTES # BLD AUTO: 0.53 10*3/MM3 (ref 0.1–0.9)
MONOCYTES NFR BLD AUTO: 6 % (ref 5–12)
NEUTROPHILS NFR BLD AUTO: 5.48 10*3/MM3 (ref 1.7–7)
NEUTROPHILS NFR BLD AUTO: 62.5 % (ref 42.7–76)
PLATELET # BLD AUTO: 295 10*3/MM3 (ref 140–450)
PMV BLD AUTO: 9.7 FL (ref 6–12)
POTASSIUM SERPL-SCNC: 3.9 MMOL/L (ref 3.5–5.2)
PROT SERPL-MCNC: 7.6 G/DL (ref 6–8.5)
RBC # BLD AUTO: 4.62 10*6/MM3 (ref 3.77–5.28)
SODIUM SERPL-SCNC: 139 MMOL/L (ref 136–145)
WBC NRBC COR # BLD: 8.78 10*3/MM3 (ref 3.4–10.8)

## 2022-11-03 PROCEDURE — 99214 OFFICE O/P EST MOD 30 MIN: CPT | Performed by: INTERNAL MEDICINE

## 2022-11-03 PROCEDURE — 80053 COMPREHEN METABOLIC PANEL: CPT | Performed by: NURSE PRACTITIONER

## 2022-11-03 PROCEDURE — 85025 COMPLETE CBC W/AUTO DIFF WBC: CPT | Performed by: NURSE PRACTITIONER

## 2022-11-03 PROCEDURE — 96402 CHEMO HORMON ANTINEOPL SQ/IM: CPT

## 2022-11-03 PROCEDURE — 36415 COLL VENOUS BLD VENIPUNCTURE: CPT

## 2022-11-03 PROCEDURE — 25010000002 LEUPROLIDE 22.5 MG KIT: Performed by: NURSE PRACTITIONER

## 2022-11-03 RX ORDER — HEPARIN SODIUM (PORCINE) LOCK FLUSH IV SOLN 100 UNIT/ML 100 UNIT/ML
500 SOLUTION INTRAVENOUS AS NEEDED
Status: DISCONTINUED | OUTPATIENT
Start: 2022-11-03 | End: 2022-11-03

## 2022-11-03 RX ADMIN — LEUPROLIDE ACETATE 22.5 MG: KIT SUBCUTANEOUS at 12:17

## 2022-11-03 NOTE — PROGRESS NOTES
PROBLEM LIST:  1. [id number] ER+ GA+ Her2+ invasive ductal carcinoma of the right breast  A) presented with a mass on self-exam.  Mammogram 1/7/20 showed a 4.1 cm mass in the right breast, with 2 adjacent smaller masses.  1.9 cm right axillary lymph node.   FNA of lymph node negative.  Biopsy of right breast mass showed grade 2 IDC, Er 95%, GA 2%, Her2 3+.  B) neoadjuvant chemotherapy with TCHP started 2/5/2020  C) right mastectomy on 6/23/20.  Pathology showed a 2 x 1 x 0.6 cm intermediate grade IDC.  0/2 SLN involved.  [id number]  D) adjuvant Kadcyla started 7/22/2020.   Kadcyla stopped October 14, 2020 and switched back to Herceptin and Perjeta due to progressive severe neuropathy.  E) anastrozole started 08/12/2020.   2. PCOS  3. Anxiety/depression  4. DM2  5. GERD  6. Hyperlipidemia  7. Hypertension  8.  Hidradenitis  9.  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  CT chest 1/19/2021 showed a pathologic nondisplaced fracture of the right seventh rib.  There were also subtle small nodules in the lungs, none larger than 4 mm.  PET/CT on 1/28/2021 showed SUV of 4.8 in the right rib fracture.  Pulmonary micronodules too small to register hypermetabolic activity.  Biopsy of the rib lesion 2/24/2021 showed Langerhans' cell histiocytosis.  Smoking cessation strongly recommended and she did stop smoking in February 2021.    Subjective     CHIEF COMPLAINT: breast cancer    HISTORY OF PRESENT ILLNESS:   Josefina Rodriguez returns for follow-up.  She continues on anastrozole and Lupron injections.       She is worried about her weight gain.  She is trying to get some exercise and does a lot of walking with her daughters activities and sporting events.    She has continued to avoid cigarettes.  She has also weaned off of the nicotine lozenges for her upcoming surgery.  She has her breast reconstruction scheduled for next  "week.          Objective      /74   Pulse 90   Temp 97.7 °F (36.5 °C) (Temporal)   Resp 18   Ht 167.6 cm (65.98\")   Wt 112 kg (248 lb)   SpO2 96%   BMI 40.05 kg/m²    Vitals:    11/03/22 1132   PainSc: 0-No pain               Performance Status: 0    General: well appearing female in no acute distress  Neuro: alert and oriented  HEENT: sclera anicteric  Pulmonary: Lungs are clear to auscultation bilaterally  Cardiovascular: Regular rate and rhythm no murmurs  Extremeties: no lower extremity edema  Skin: no rashes, lesions, bruising, or petechiae  Psych: mood and affect appropriate      RECENT LABS:  Lab Results   Component Value Date    WBC 14.20 (H) 08/11/2022    HGB 15.2 08/11/2022    HCT 48.2 (H) 08/11/2022    MCV 88.9 08/11/2022     08/11/2022     Lab Results   Component Value Date    GLUCOSE 270 (H) 08/11/2022    BUN 16 08/11/2022    CREATININE 0.61 08/11/2022    EGFRIFNONA 105 12/02/2021    BCR 26.2 (H) 08/11/2022    K 4.6 08/11/2022    CO2 29.0 08/11/2022    CALCIUM 9.9 08/11/2022    ALBUMIN 3.90 08/11/2022    AST 14 08/11/2022    ALT 20 08/11/2022              Assessment & Plan   Josefina Rodriguez is a 47 y.o. year old female with stage IB ER+ Her2+ IDC of the right breast, as well as a diagnosis of Langerhans histiocytosis involving the lung as well as a single rib lesion.    Breast cancer: She continues on anastrozole along with Lupron for ovarian suppression.  Left-sided small mammogram is scheduled.  She will have reconstruction surgery on the right next week.  We will see her back again in 3 months, and after that we will space out her office visits to every 6 months.  We will continue Lupron for the duration of her endocrine therapy treatment.    Langerhans histiocytosis: Imaging changes resolved with cessation of smoking.  She continues to avoid cigarettes.  We will hold off on further imaging unless she has a change in symptoms.    Peripheral neuropathy: Continue " Lyrica.     Bone density January 21, 2021 was normal.  We will repeat bone density January 2023.      Obesity: Referral to nutrition services.    Follow-up in 3 months.  After that she can be seen every 6 months.            Jolene Connell MD  [facility]    11/3/2022          CC:

## 2022-11-03 NOTE — THERAPY TREATMENT NOTE
Outpatient Speech Language Pathology   Adult Speech Language Cognitive Treatment Note  [facility]     Patient Name: Josefina Rodriguez  : 1975  MRN: [id number]  Today's Date: 2022         Visit Date: 2022   Patient Active Problem List   Diagnosis   • Malignant neoplasm of overlapping sites of right breast in female, estrogen receptor positive (HCC)   • Chemotherapy induced diarrhea   • Chemotherapy-induced nausea   • Vaginal bleeding   • Cysts of both ovaries   • Family history of breast cancer   • Malignant neoplasm of overlapping sites of right female breast (HCC)   • Port-A-Cath in place   • Epigastric pain   • Thrush   • 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 Acquired cognitive dysfunction  F09 294.9        OP SLP Assessment/Plan - 22 1500        SLP Plan    Plan Comments Cont with Cog tx.  -          User Key  (r) = Recorded By, (t) = Taken By, (c) = Cosigned By    Initials Name Provider Type     Daisy Capps MS CCC-SLP Speech and Language Pathologist                                   SLP OP Goals     Row Name 22 1500          Goal Type Needed    Goal Type Needed Memory; Attention/Orientation; Other Adult Goals  -            Subjective Comments    Subjective Comments Pt alert, cooperative, returned with homework.  -            Memory Goals    Patient will be able to remember information needed to return to work and function on work-related tasks 100%:; with cues  -HG     Status: Patient will be able to remember information needed to return to work and function on work-related tasks Progressing as expected  -     Comments: Patient will be able to remember information needed to return to work and function on work-related tasks 22: Pt continues to require cues and breaks.  -HG     Patient will demonstrate  improved ability to recall information by immediately recalling a series of words 90%:; unrelated; after delay; with cues  -HG     Status: Patient will demonstrate improved ability to recall information by immediately recalling a series of words Progressing as expected  -HG     Comments: Patient will demonstrate improved ability to recall information by immediately recalling a series of words 2/25/22: Immediate recall of picture scene and pt was 100% accurate. 2/11/22: Immediate recall for card game and pt was 90% accurate. 1/14/22: Immediate recall for new card game and pt was 90% accurate. 12/23/21: Recall of picture scene: pt was 100% accurate. Word association and pt was 100% accurat.e  -HG     Patient’s memory skills will be enhanced as reported by patient by utilizing internal memory strategies to recall up to 3 pieces of information after a 5- minute delay 90%:; with cues  -HG     Status: Patient’s memory skills will be enhanced as reported by patient by utilizing internal memory strategies to recall up to 3 pieces of information after a 5- minute delay Progressing as expected  -HG     Comments: Patient’s memory skills will be enhanced as reported by patient by utilizing internal memory strategies to recall up to 3 pieces of information after a 5- minute delay 2/25/22: Delayed recall of 8 related words and pt was 8/8 x 3. 2/11/22:  Delayed recall of 7 related words and pt was 7/7x 2. 1/14/22: Delayed recall of 6 related words: pt was 4/6. After review, pt was 6/6 x 3.  12/23/21: Delayed recall of 4 un-related words: pt was 4/4. Progressed to 5/5 x 3.  -HG     Patient’s memory skills will be enhanced as reported by patient by using external memory aides 90%:; with cues  -HG     Status: Patient’s memory skills will be enhanced as reported by patient by using external memory aides Progressing as expected  -HG     Comments: Patient’s memory skills will be enhanced as reported by patient by using external memory  "aides 2/11/22: Pt states that she has journaled but only one day. 1/14/22: Pt has not started journaling, plans to get journal this date. 12/23/21: Pt stated she plans to get a journal.  -HG            Attention/Orientation Goals    Patient will be able to use high level cognitive skills to allow patient to return to work Independently  -HG     Status: Patient will be able to use high level cognitive skills to allow patient to return to work Progressing as expected  -HG     Comments: Patient will be able to use high level cognitive skills to allow patient to return to work 2/11/22: Pt continues to \"lose focus\" and SLP discussed giving breaks and pt does do that.  -HG     Patient will improve attention skills by sustaining focus and participation to conversation/task with cues; 30 minute task  -HG     Status: Patient will improve attention skills by sustaining focus and participation to conversation/task Progressing as expected; Revised  -HG     Comments: Patient will improve attention skills by sustaining focus and participation to conversation/task 2/11/22: Pt able to sustain attention during 5 min conversation, goal adjusted.  -HG     Patient will improve attention skills by sustaining consistent behavioral response during continuous and repetitive activity (e.g., listening for target words, auditory/reading comprehension tasks) with cues; 5 minute task  -HG     Status: Patient will improve attention skills by sustaining consistent behavioral response during continuous and repetitive activity (e.g., listening for target words, auditory/reading comprehension tasks) Progressing as expected  -HG     Comments: Patient will improve attention skills by sustaining consistent behavioral response during continuous and repetitive activity (e.g., listening for target words, auditory/reading comprehension tasks) 2/11/22: Card sorting into deck and the letter N and pt was 75% accurate. 1/14/22: APT Test for Sustained and " Complex Sustained attention and pt was above average. 12/23/21: Card ID in a field of 10, 12, 16 and pt was able to recall up to 4 cards.  -HG     Patient will improve attention skills by sustaining focus in order to actively hold and manipulate information provided (e.g., sequencing auditorily presented number series in ascending or descending order) with cues; 90%:  -HG     Status: Patient will improve attention skills by sustaining focus in order to actively hold and manipulate information provided (e.g., sequencing auditorily presented number series in ascending or descending order) Progressing as expected; Revised  -HG     Comments: Patient will improve attention skills by sustaining focus in order to actively hold and manipulate information provided (e.g., sequencing auditorily presented number series in ascending or descending order) 12/23/21: Digit span and pt was 80-90% accurate for up to 8 digits.  -HG     Patient will improve attention skills by focusing to selective target/task when presented with competing stimuli or in a distracting environment in order to complete task with cues; 90%:  -HG     Status: Patient will improve attention skills by focusing to selective target/task when presented with competing stimuli or in a distracting environment in order to complete task Progressing as expected; Revised  -HG     Comments: Patient will improve attention skills by focusing to selective target/task when presented with competing stimuli or in a distracting environment in order to complete task 1/14/22: APT Test for Selective Attention and pt was 28/30- Above average for age.  -HG     Patient will improve attention skills by alternating or shifting focus between two different tasks in order to complete both tasks with cues; 90%:  -HG     Status: Patient will improve attention skills by alternating or shifting focus between two different tasks in order to complete both tasks Progressing as expected; Revised   -HG     Comments: Patient will improve attention skills by alternating or shifting focus between two different tasks in order to complete both tasks 2/11/22: Alternating attention task by sorting cards by red and black and pt was 80% accurate. 1/14/22: The APT test for Alternating attention and pt was 20/24 which was above average. The card game of Speed  and pt was 90% accurate.  -HG     Patient will improve attention skills by dividing focus and responding simultaneously to multiple tasks or in order to complete task with cues; 90%:  -HG     Status: Patient will improve attention skills by dividing focus and responding simultaneously to multiple tasks or in order to complete task Progressing as expected; Revised  -HG     Comments: Patient will improve attention skills by dividing focus and responding simultaneously to multiple tasks or in order to complete task 2/11/22: Divided attention for paragraph listening and sorting and pt was 90% accurate. 1/14/22: APT test for divided attention and pt was 27/30 and fell slightly below average.  -HG     Patient will improve attention skills by modifying surrounding environment 80%:; with cues  -HG     Status: Patient will improve attention skills by modifying surrounding environment Progressing as expected  -HG     Comments: Patient will improve attention skills by modifying surrounding environment 2/25/22: During an exec fxn task, pt used notetaking strategies and referencing strategies in order to increase her accuracy. 1/14/22: Pt states that she takes break as needed during work.  -HG            Other Goals    Other Adult Goal- 1 Pt will complete thought organization tasks with 90% Accuracy.  -HG     Status: Other Adult Goal- 1 Progressing as expected  -HG     Comments: Other Adult Goal- 1 2/25/22: Answering general informations and sequencing sentences and pt was 100% accurate. 12/23/21: Similarities and Differences: pt was 100% accurate.  -HG     Other Adult Goal- 2  Pt will complete reasoning and problem solving assessment with recs to follow as indicated.  -HG     Status: Other Adult Goal- 2 Progressing as expected  -HG     Comments: Other Adult Goal- 2 2/25/22: WORDLE and pt required min cues and completed with accuracy. 12/23/21: Rebel word scramble and pt was 90% accurate.  -HG     Other Adult Goal- 3 Pt will improve RBANS Total score to a 110 placing pt in the High Average range.  -HG     Status: Other Adult Goal- 3 New  -HG            SLP Time Calculation    SLP Goal Re-Cert Due Date 03/13/22  -HG           User Key  (r) = Recorded By, (t) = Taken By, (c) = Cosigned By    Initials Name Provider Type    Daisy Graves MS CCC-SLP Speech and Language Pathologist               OP SLP Education     Row Name 02/25/22 1500       Education    Education Comments Education for use of external strategies at home and 4 un-related words for hmwk.  -HG          User Key  (r) = Recorded By, (t) = Taken By, (c) = Cosigned By    Initials Name Effective Dates    HG Daisy Capps MS CCC-SLP 06/16/21 -                      Time Calculation:   SLP Start Time: 1500  Untimed Charges  72708-VK Treatment/ST Modification Prosth Aug Alter : 60  Total Minutes  Untimed Charges Total Minutes: 60   Total Minutes: 60    Therapy Charges for Today     Code Description Service Date Service Provider Modifiers Qty    [id number]  ST TREATMENT SPEECH 4 2/25/2022 Daisy Capps MS CCC-SLP GN 1                   Daisy Capps MS CCC-SLP  2/25/2022   Taltz Pregnancy And Lactation Text: The risk during pregnancy and breastfeeding is uncertain with this medication.

## 2022-11-16 ENCOUNTER — APPOINTMENT (OUTPATIENT)
Dept: CT IMAGING | Facility: HOSPITAL | Age: 47
End: 2022-11-16

## 2022-11-16 ENCOUNTER — APPOINTMENT (OUTPATIENT)
Dept: GENERAL RADIOLOGY | Facility: HOSPITAL | Age: 47
End: 2022-11-16

## 2022-11-16 ENCOUNTER — HOSPITAL ENCOUNTER (OUTPATIENT)
Facility: HOSPITAL | Age: 47
Setting detail: OBSERVATION
Discharge: HOME | End: 2022-11-17
Attending: EMERGENCY MEDICINE | Admitting: INTERNAL MEDICINE

## 2022-11-16 DIAGNOSIS — G47.34 NOCTURNAL HYPOXIA: ICD-10-CM

## 2022-11-16 DIAGNOSIS — I26.99 ACUTE PULMONARY EMBOLISM WITHOUT ACUTE COR PULMONALE, UNSPECIFIED PULMONARY EMBOLISM TYPE: ICD-10-CM

## 2022-11-16 DIAGNOSIS — R06.02 SHORTNESS OF BREATH: Primary | ICD-10-CM

## 2022-11-16 PROBLEM — E87.6 HYPOKALEMIA: Status: ACTIVE | Noted: 2022-11-16

## 2022-11-16 PROBLEM — F41.1 GAD (GENERALIZED ANXIETY DISORDER): Status: ACTIVE | Noted: 2022-11-16

## 2022-11-16 PROBLEM — I10 ESSENTIAL HYPERTENSION: Status: ACTIVE | Noted: 2022-11-16

## 2022-11-16 PROBLEM — D72.829 LEUKOCYTOSIS: Status: ACTIVE | Noted: 2022-11-16

## 2022-11-16 PROBLEM — E11.9 TYPE 2 DIABETES MELLITUS: Status: ACTIVE | Noted: 2022-11-16

## 2022-11-16 LAB
ALBUMIN SERPL-MCNC: 3.3 G/DL (ref 3.5–5.2)
ALBUMIN/GLOB SERPL: 0.8 G/DL
ALP SERPL-CCNC: 164 U/L (ref 39–117)
ALT SERPL W P-5'-P-CCNC: 28 U/L (ref 1–33)
ANION GAP SERPL CALCULATED.3IONS-SCNC: 13 MMOL/L (ref 5–15)
APTT PPP: 30.8 SECONDS (ref 60–90)
AST SERPL-CCNC: 27 U/L (ref 1–32)
BASOPHILS # BLD AUTO: 0.02 10*3/MM3 (ref 0–0.2)
BASOPHILS NFR BLD AUTO: 0.2 % (ref 0–1.5)
BILIRUB SERPL-MCNC: 0.3 MG/DL (ref 0–1.2)
BUN SERPL-MCNC: 13 MG/DL (ref 6–20)
BUN/CREAT SERPL: 22.4 (ref 7–25)
CALCIUM SPEC-SCNC: 9 MG/DL (ref 8.6–10.5)
CHLORIDE SERPL-SCNC: 95 MMOL/L (ref 98–107)
CO2 SERPL-SCNC: 29 MMOL/L (ref 22–29)
CREAT SERPL-MCNC: 0.58 MG/DL (ref 0.57–1)
DEPRECATED RDW RBC AUTO: 42.5 FL (ref 37–54)
EGFRCR SERPLBLD CKD-EPI 2021: 113.9 ML/MIN/1.73
EOSINOPHIL # BLD AUTO: 0.17 10*3/MM3 (ref 0–0.4)
EOSINOPHIL NFR BLD AUTO: 1.4 % (ref 0.3–6.2)
ERYTHROCYTE [DISTWIDTH] IN BLOOD BY AUTOMATED COUNT: 13.2 % (ref 12.3–15.4)
FLUAV SUBTYP SPEC NAA+PROBE: NOT DETECTED
FLUBV RNA ISLT QL NAA+PROBE: NOT DETECTED
GLOBULIN UR ELPH-MCNC: 4 GM/DL
GLUCOSE SERPL-MCNC: 218 MG/DL (ref 65–99)
HBA1C MFR BLD: 7.9 % (ref 4.8–5.6)
HCT VFR BLD AUTO: 33.7 % (ref 34–46.6)
HGB BLD-MCNC: 10.9 G/DL (ref 12–15.9)
HOLD SPECIMEN: NORMAL
IMM GRANULOCYTES # BLD AUTO: 0.12 10*3/MM3 (ref 0–0.05)
IMM GRANULOCYTES NFR BLD AUTO: 1 % (ref 0–0.5)
INR PPP: 1.1 (ref 0.84–1.13)
LYMPHOCYTES # BLD AUTO: 1.96 10*3/MM3 (ref 0.7–3.1)
LYMPHOCYTES NFR BLD AUTO: 16.2 % (ref 19.6–45.3)
MCH RBC QN AUTO: 28.7 PG (ref 26.6–33)
MCHC RBC AUTO-ENTMCNC: 32.3 G/DL (ref 31.5–35.7)
MCV RBC AUTO: 88.7 FL (ref 79–97)
MONOCYTES # BLD AUTO: 0.55 10*3/MM3 (ref 0.1–0.9)
MONOCYTES NFR BLD AUTO: 4.5 % (ref 5–12)
NEUTROPHILS NFR BLD AUTO: 76.7 % (ref 42.7–76)
NEUTROPHILS NFR BLD AUTO: 9.31 10*3/MM3 (ref 1.7–7)
NRBC BLD AUTO-RTO: 0 /100 WBC (ref 0–0.2)
NT-PROBNP SERPL-MCNC: 101.2 PG/ML (ref 0–450)
PLATELET # BLD AUTO: 338 10*3/MM3 (ref 140–450)
PMV BLD AUTO: 9.2 FL (ref 6–12)
POTASSIUM SERPL-SCNC: 3.1 MMOL/L (ref 3.5–5.2)
PROT SERPL-MCNC: 7.3 G/DL (ref 6–8.5)
PROTHROMBIN TIME: 14.2 SECONDS (ref 11.4–14.4)
RBC # BLD AUTO: 3.8 10*6/MM3 (ref 3.77–5.28)
SARS-COV-2 RNA PNL SPEC NAA+PROBE: NOT DETECTED
SODIUM SERPL-SCNC: 137 MMOL/L (ref 136–145)
TROPONIN T SERPL-MCNC: <0.01 NG/ML (ref 0–0.03)
UFH PPP CHRO-ACNC: 0.1 IU/ML (ref 0.3–0.7)
WBC NRBC COR # BLD: 12.13 10*3/MM3 (ref 3.4–10.8)
WHOLE BLOOD HOLD COAG: NORMAL
WHOLE BLOOD HOLD SPECIMEN: NORMAL

## 2022-11-16 PROCEDURE — 99220 PR INITIAL OBSERVATION CARE/DAY 70 MINUTES: CPT | Performed by: INTERNAL MEDICINE

## 2022-11-16 PROCEDURE — 85730 THROMBOPLASTIN TIME PARTIAL: CPT | Performed by: PHYSICIAN ASSISTANT

## 2022-11-16 PROCEDURE — 0 IOPAMIDOL PER 1 ML: Performed by: EMERGENCY MEDICINE

## 2022-11-16 PROCEDURE — 99285 EMERGENCY DEPT VISIT HI MDM: CPT

## 2022-11-16 PROCEDURE — 96365 THER/PROPH/DIAG IV INF INIT: CPT

## 2022-11-16 PROCEDURE — G0378 HOSPITAL OBSERVATION PER HR: HCPCS

## 2022-11-16 PROCEDURE — 25010000002 HEPARIN (PORCINE) 25000-0.45 UT/250ML-% SOLUTION: Performed by: PHYSICIAN ASSISTANT

## 2022-11-16 PROCEDURE — 84484 ASSAY OF TROPONIN QUANT: CPT | Performed by: EMERGENCY MEDICINE

## 2022-11-16 PROCEDURE — 87636 SARSCOV2 & INF A&B AMP PRB: CPT | Performed by: PHYSICIAN ASSISTANT

## 2022-11-16 PROCEDURE — 96366 THER/PROPH/DIAG IV INF ADDON: CPT

## 2022-11-16 PROCEDURE — 93005 ELECTROCARDIOGRAM TRACING: CPT

## 2022-11-16 PROCEDURE — 83880 ASSAY OF NATRIURETIC PEPTIDE: CPT | Performed by: EMERGENCY MEDICINE

## 2022-11-16 PROCEDURE — 93005 ELECTROCARDIOGRAM TRACING: CPT | Performed by: EMERGENCY MEDICINE

## 2022-11-16 PROCEDURE — 80053 COMPREHEN METABOLIC PANEL: CPT | Performed by: EMERGENCY MEDICINE

## 2022-11-16 PROCEDURE — 85520 HEPARIN ASSAY: CPT | Performed by: PHYSICIAN ASSISTANT

## 2022-11-16 PROCEDURE — 85025 COMPLETE CBC W/AUTO DIFF WBC: CPT | Performed by: EMERGENCY MEDICINE

## 2022-11-16 PROCEDURE — 71275 CT ANGIOGRAPHY CHEST: CPT

## 2022-11-16 PROCEDURE — 83735 ASSAY OF MAGNESIUM: CPT | Performed by: INTERNAL MEDICINE

## 2022-11-16 PROCEDURE — 85610 PROTHROMBIN TIME: CPT | Performed by: PHYSICIAN ASSISTANT

## 2022-11-16 PROCEDURE — 25010000002 HEPARIN (PORCINE) PER 1000 UNITS: Performed by: PHYSICIAN ASSISTANT

## 2022-11-16 PROCEDURE — C9803 HOPD COVID-19 SPEC COLLECT: HCPCS

## 2022-11-16 PROCEDURE — 96376 TX/PRO/DX INJ SAME DRUG ADON: CPT

## 2022-11-16 PROCEDURE — 71045 X-RAY EXAM CHEST 1 VIEW: CPT

## 2022-11-16 PROCEDURE — 83036 HEMOGLOBIN GLYCOSYLATED A1C: CPT | Performed by: PHYSICIAN ASSISTANT

## 2022-11-16 RX ORDER — HEPARIN SODIUM 1000 [USP'U]/ML
2000 INJECTION, SOLUTION INTRAVENOUS; SUBCUTANEOUS AS NEEDED
Status: DISCONTINUED | OUTPATIENT
Start: 2022-11-16 | End: 2022-11-16

## 2022-11-16 RX ORDER — POTASSIUM CHLORIDE 750 MG/1
40 CAPSULE, EXTENDED RELEASE ORAL AS NEEDED
Status: DISCONTINUED | OUTPATIENT
Start: 2022-11-16 | End: 2022-11-17 | Stop reason: HOSPADM

## 2022-11-16 RX ORDER — HEPARIN SODIUM 1000 [USP'U]/ML
4000 INJECTION, SOLUTION INTRAVENOUS; SUBCUTANEOUS AS NEEDED
Status: DISCONTINUED | OUTPATIENT
Start: 2022-11-16 | End: 2022-11-16

## 2022-11-16 RX ORDER — POTASSIUM CHLORIDE 7.45 MG/ML
10 INJECTION INTRAVENOUS
Status: DISCONTINUED | OUTPATIENT
Start: 2022-11-16 | End: 2022-11-17 | Stop reason: HOSPADM

## 2022-11-16 RX ORDER — ASPIRIN 81 MG/1
81 TABLET ORAL DAILY
COMMUNITY
End: 2023-01-26

## 2022-11-16 RX ORDER — ESCITALOPRAM OXALATE 20 MG/1
20 TABLET ORAL DAILY
Status: CANCELLED | OUTPATIENT
Start: 2022-11-17

## 2022-11-16 RX ORDER — DULOXETIN HYDROCHLORIDE 60 MG/1
60 CAPSULE, DELAYED RELEASE ORAL DAILY
Status: CANCELLED | OUTPATIENT
Start: 2022-11-17

## 2022-11-16 RX ORDER — POTASSIUM CHLORIDE 1.5 G/1.77G
40 POWDER, FOR SOLUTION ORAL AS NEEDED
Status: DISCONTINUED | OUTPATIENT
Start: 2022-11-16 | End: 2022-11-17 | Stop reason: HOSPADM

## 2022-11-16 RX ORDER — ACETAMINOPHEN 500 MG
1000 TABLET ORAL ONCE
Status: COMPLETED | OUTPATIENT
Start: 2022-11-16 | End: 2022-11-16

## 2022-11-16 RX ORDER — INSULIN LISPRO 100 [IU]/ML
0-14 INJECTION, SOLUTION INTRAVENOUS; SUBCUTANEOUS
Status: DISCONTINUED | OUTPATIENT
Start: 2022-11-17 | End: 2022-11-17 | Stop reason: HOSPADM

## 2022-11-16 RX ORDER — SODIUM CHLORIDE, SODIUM LACTATE, POTASSIUM CHLORIDE, CALCIUM CHLORIDE 600; 310; 30; 20 MG/100ML; MG/100ML; MG/100ML; MG/100ML
75 INJECTION, SOLUTION INTRAVENOUS CONTINUOUS
Status: CANCELLED | OUTPATIENT
Start: 2022-11-16

## 2022-11-16 RX ORDER — NICOTINE POLACRILEX 4 MG
15 LOZENGE BUCCAL
Status: DISCONTINUED | OUTPATIENT
Start: 2022-11-16 | End: 2022-11-17 | Stop reason: HOSPADM

## 2022-11-16 RX ORDER — OXYCODONE HYDROCHLORIDE 5 MG/1
5 CAPSULE ORAL EVERY 4 HOURS PRN
COMMUNITY

## 2022-11-16 RX ORDER — SODIUM CHLORIDE 0.9 % (FLUSH) 0.9 %
10 SYRINGE (ML) INJECTION AS NEEDED
Status: DISCONTINUED | OUTPATIENT
Start: 2022-11-16 | End: 2022-11-17 | Stop reason: HOSPADM

## 2022-11-16 RX ORDER — ACETAMINOPHEN 325 MG/1
975 TABLET ORAL EVERY 8 HOURS PRN
COMMUNITY

## 2022-11-16 RX ORDER — IBUPROFEN 600 MG/1
600 TABLET ORAL EVERY 8 HOURS PRN
COMMUNITY

## 2022-11-16 RX ORDER — HEPARIN SODIUM 10000 [USP'U]/100ML
16 INJECTION, SOLUTION INTRAVENOUS
Status: DISCONTINUED | OUTPATIENT
Start: 2022-11-16 | End: 2022-11-17 | Stop reason: SDUPTHER

## 2022-11-16 RX ORDER — HEPARIN SODIUM 1000 [USP'U]/ML
80 INJECTION, SOLUTION INTRAVENOUS; SUBCUTANEOUS ONCE
Status: COMPLETED | OUTPATIENT
Start: 2022-11-16 | End: 2022-11-16

## 2022-11-16 RX ORDER — DEXTROSE MONOHYDRATE 25 G/50ML
25 INJECTION, SOLUTION INTRAVENOUS
Status: DISCONTINUED | OUTPATIENT
Start: 2022-11-16 | End: 2022-11-17 | Stop reason: HOSPADM

## 2022-11-16 RX ADMIN — HEPARIN SODIUM 9040 UNITS: 1000 INJECTION INTRAVENOUS; SUBCUTANEOUS at 21:49

## 2022-11-16 RX ADMIN — ACETAMINOPHEN 1000 MG: 500 TABLET ORAL at 23:26

## 2022-11-16 RX ADMIN — IOPAMIDOL 73 ML: 755 INJECTION, SOLUTION INTRAVENOUS at 20:00

## 2022-11-16 RX ADMIN — HEPARIN SODIUM 13 UNITS/KG/HR: 10000 INJECTION, SOLUTION INTRAVENOUS at 21:49

## 2022-11-17 ENCOUNTER — APPOINTMENT (OUTPATIENT)
Dept: CARDIOLOGY | Facility: HOSPITAL | Age: 47
End: 2022-11-17

## 2022-11-17 VITALS
HEART RATE: 91 BPM | OXYGEN SATURATION: 92 % | HEIGHT: 67 IN | RESPIRATION RATE: 18 BRPM | DIASTOLIC BLOOD PRESSURE: 61 MMHG | BODY MASS INDEX: 39.08 KG/M2 | SYSTOLIC BLOOD PRESSURE: 111 MMHG | WEIGHT: 249 LBS | TEMPERATURE: 99.1 F

## 2022-11-17 PROBLEM — E66.813 OBESITY, CLASS III, BMI 40-49.9 (MORBID OBESITY): Status: ACTIVE | Noted: 2022-11-17

## 2022-11-17 PROBLEM — R09.02 HYPOXIA: Status: ACTIVE | Noted: 2022-11-17

## 2022-11-17 PROBLEM — E66.01 OBESITY, CLASS III, BMI 40-49.9 (MORBID OBESITY): Status: ACTIVE | Noted: 2022-11-17

## 2022-11-17 LAB
ANION GAP SERPL CALCULATED.3IONS-SCNC: 12 MMOL/L (ref 5–15)
BASOPHILS # BLD AUTO: 0.04 10*3/MM3 (ref 0–0.2)
BASOPHILS NFR BLD AUTO: 0.3 % (ref 0–1.5)
BH CV ECHO MEAS - AO MAX PG: 6.7 MMHG
BH CV ECHO MEAS - AO MEAN PG: 3.8 MMHG
BH CV ECHO MEAS - AO ROOT DIAM: 3.4 CM
BH CV ECHO MEAS - AO V2 MAX: 129.4 CM/SEC
BH CV ECHO MEAS - AO V2 VTI: 21.6 CM
BH CV ECHO MEAS - AVA(I,D): 2.8 CM2
BH CV ECHO MEAS - EDV(CUBED): 65.3 ML
BH CV ECHO MEAS - EDV(MOD-SP2): 58.4 ML
BH CV ECHO MEAS - EDV(MOD-SP4): 65.2 ML
BH CV ECHO MEAS - EF(MOD-BP): 64.9 %
BH CV ECHO MEAS - EF(MOD-SP2): 56 %
BH CV ECHO MEAS - EF(MOD-SP4): 69 %
BH CV ECHO MEAS - ESV(CUBED): 18.8 ML
BH CV ECHO MEAS - ESV(MOD-SP2): 25.7 ML
BH CV ECHO MEAS - ESV(MOD-SP4): 20.2 ML
BH CV ECHO MEAS - FS: 34 %
BH CV ECHO MEAS - IVS/LVPW: 0.96 CM
BH CV ECHO MEAS - IVSD: 1.26 CM
BH CV ECHO MEAS - LA DIMENSION: 3.2 CM
BH CV ECHO MEAS - LAT PEAK E' VEL: 15.3 CM/SEC
BH CV ECHO MEAS - LV MASS(C)D: 186.3 GRAMS
BH CV ECHO MEAS - LV MAX PG: 3.1 MMHG
BH CV ECHO MEAS - LV MEAN PG: 1.74 MMHG
BH CV ECHO MEAS - LV V1 MAX: 86.3 CM/SEC
BH CV ECHO MEAS - LV V1 VTI: 15.3 CM
BH CV ECHO MEAS - LVIDD: 4 CM
BH CV ECHO MEAS - LVIDS: 2.7 CM
BH CV ECHO MEAS - LVOT AREA: 3.9 CM2
BH CV ECHO MEAS - LVOT DIAM: 2.23 CM
BH CV ECHO MEAS - LVPWD: 1.32 CM
BH CV ECHO MEAS - MED PEAK E' VEL: 7.2 CM/SEC
BH CV ECHO MEAS - MV A MAX VEL: 65.6 CM/SEC
BH CV ECHO MEAS - MV DEC SLOPE: 622.4 CM/SEC2
BH CV ECHO MEAS - MV DEC TIME: 0.18 MSEC
BH CV ECHO MEAS - MV E MAX VEL: 77.1 CM/SEC
BH CV ECHO MEAS - MV E/A: 1.17
BH CV ECHO MEAS - MV P1/2T: 51.4 MSEC
BH CV ECHO MEAS - MVA(P1/2T): 4.3 CM2
BH CV ECHO MEAS - PA ACC TIME: 0.11 SEC
BH CV ECHO MEAS - PA PR(ACCEL): 31.5 MMHG
BH CV ECHO MEAS - PA V2 MAX: 102.4 CM/SEC
BH CV ECHO MEAS - RVSP: 13 MMHG
BH CV ECHO MEAS - SV(LVOT): 59.8 ML
BH CV ECHO MEAS - SV(MOD-SP2): 32.7 ML
BH CV ECHO MEAS - SV(MOD-SP4): 45 ML
BH CV ECHO MEAS - TAPSE (>1.6): 1.72 CM
BH CV ECHO MEAS - TR MAX PG: 10 MMHG
BH CV ECHO MEAS - TR MAX VEL: 150.5 CM/SEC
BH CV ECHO MEASUREMENTS AVERAGE E/E' RATIO: 6.85
BH CV LOWER VASCULAR LEFT COMMON FEMORAL AUGMENT: NORMAL
BH CV LOWER VASCULAR LEFT COMMON FEMORAL COMPRESS: NORMAL
BH CV LOWER VASCULAR LEFT COMMON FEMORAL PHASIC: NORMAL
BH CV LOWER VASCULAR LEFT COMMON FEMORAL SPONT: NORMAL
BH CV LOWER VASCULAR LEFT DISTAL FEMORAL AUGMENT: NORMAL
BH CV LOWER VASCULAR LEFT DISTAL FEMORAL COMPRESS: NORMAL
BH CV LOWER VASCULAR LEFT DISTAL FEMORAL PHASIC: NORMAL
BH CV LOWER VASCULAR LEFT DISTAL FEMORAL SPONT: NORMAL
BH CV LOWER VASCULAR LEFT GASTRONEMIUS COMPRESS: NORMAL
BH CV LOWER VASCULAR LEFT GREATER SAPH AK COMPRESS: NORMAL
BH CV LOWER VASCULAR LEFT GREATER SAPH BK COMPRESS: NORMAL
BH CV LOWER VASCULAR LEFT LESSER SAPH COMPRESS: NORMAL
BH CV LOWER VASCULAR LEFT MID FEMORAL AUGMENT: NORMAL
BH CV LOWER VASCULAR LEFT MID FEMORAL COMPRESS: NORMAL
BH CV LOWER VASCULAR LEFT MID FEMORAL PHASIC: NORMAL
BH CV LOWER VASCULAR LEFT MID FEMORAL SPONT: NORMAL
BH CV LOWER VASCULAR LEFT PERONEAL COMPRESS: NORMAL
BH CV LOWER VASCULAR LEFT POPLITEAL AUGMENT: NORMAL
BH CV LOWER VASCULAR LEFT POPLITEAL COMPRESS: NORMAL
BH CV LOWER VASCULAR LEFT POPLITEAL PHASIC: NORMAL
BH CV LOWER VASCULAR LEFT POPLITEAL SPONT: NORMAL
BH CV LOWER VASCULAR LEFT POSTERIOR TIBIAL COMPRESS: NORMAL
BH CV LOWER VASCULAR LEFT PROFUNDA FEMORAL AUGMENT: NORMAL
BH CV LOWER VASCULAR LEFT PROFUNDA FEMORAL COMPRESS: NORMAL
BH CV LOWER VASCULAR LEFT PROFUNDA FEMORAL PHASIC: NORMAL
BH CV LOWER VASCULAR LEFT PROFUNDA FEMORAL SPONT: NORMAL
BH CV LOWER VASCULAR LEFT PROXIMAL FEMORAL AUGMENT: NORMAL
BH CV LOWER VASCULAR LEFT PROXIMAL FEMORAL COMPRESS: NORMAL
BH CV LOWER VASCULAR LEFT PROXIMAL FEMORAL PHASIC: NORMAL
BH CV LOWER VASCULAR LEFT PROXIMAL FEMORAL SPONT: NORMAL
BH CV LOWER VASCULAR LEFT SAPHENOFEMORAL JUNCTION AUGMENT: NORMAL
BH CV LOWER VASCULAR LEFT SAPHENOFEMORAL JUNCTION COMPRESS: NORMAL
BH CV LOWER VASCULAR LEFT SAPHENOFEMORAL JUNCTION PHASIC: NORMAL
BH CV LOWER VASCULAR LEFT SAPHENOFEMORAL JUNCTION SPONT: NORMAL
BH CV LOWER VASCULAR LEFT SOLEAL COMPRESS: NORMAL
BH CV LOWER VASCULAR RIGHT COMMON FEMORAL AUGMENT: NORMAL
BH CV LOWER VASCULAR RIGHT COMMON FEMORAL COMPRESS: NORMAL
BH CV LOWER VASCULAR RIGHT COMMON FEMORAL PHASIC: NORMAL
BH CV LOWER VASCULAR RIGHT COMMON FEMORAL SPONT: NORMAL
BH CV LOWER VASCULAR RIGHT DISTAL FEMORAL AUGMENT: NORMAL
BH CV LOWER VASCULAR RIGHT DISTAL FEMORAL COMPRESS: NORMAL
BH CV LOWER VASCULAR RIGHT DISTAL FEMORAL PHASIC: NORMAL
BH CV LOWER VASCULAR RIGHT DISTAL FEMORAL SPONT: NORMAL
BH CV LOWER VASCULAR RIGHT GASTRONEMIUS COMPRESS: NORMAL
BH CV LOWER VASCULAR RIGHT GREATER SAPH AK COMPRESS: NORMAL
BH CV LOWER VASCULAR RIGHT GREATER SAPH BK COMPRESS: NORMAL
BH CV LOWER VASCULAR RIGHT LESSER SAPH COMPRESS: NORMAL
BH CV LOWER VASCULAR RIGHT MID FEMORAL AUGMENT: NORMAL
BH CV LOWER VASCULAR RIGHT MID FEMORAL COMPRESS: NORMAL
BH CV LOWER VASCULAR RIGHT MID FEMORAL PHASIC: NORMAL
BH CV LOWER VASCULAR RIGHT MID FEMORAL SPONT: NORMAL
BH CV LOWER VASCULAR RIGHT PERONEAL COMPRESS: NORMAL
BH CV LOWER VASCULAR RIGHT POPLITEAL AUGMENT: NORMAL
BH CV LOWER VASCULAR RIGHT POPLITEAL COMPRESS: NORMAL
BH CV LOWER VASCULAR RIGHT POPLITEAL PHASIC: NORMAL
BH CV LOWER VASCULAR RIGHT POPLITEAL SPONT: NORMAL
BH CV LOWER VASCULAR RIGHT POSTERIOR TIBIAL COMPRESS: NORMAL
BH CV LOWER VASCULAR RIGHT PROFUNDA FEMORAL AUGMENT: NORMAL
BH CV LOWER VASCULAR RIGHT PROFUNDA FEMORAL COMPRESS: NORMAL
BH CV LOWER VASCULAR RIGHT PROFUNDA FEMORAL PHASIC: NORMAL
BH CV LOWER VASCULAR RIGHT PROFUNDA FEMORAL SPONT: NORMAL
BH CV LOWER VASCULAR RIGHT PROXIMAL FEMORAL AUGMENT: NORMAL
BH CV LOWER VASCULAR RIGHT PROXIMAL FEMORAL COMPRESS: NORMAL
BH CV LOWER VASCULAR RIGHT PROXIMAL FEMORAL PHASIC: NORMAL
BH CV LOWER VASCULAR RIGHT PROXIMAL FEMORAL SPONT: NORMAL
BH CV LOWER VASCULAR RIGHT SAPHENOFEMORAL JUNCTION AUGMENT: NORMAL
BH CV LOWER VASCULAR RIGHT SAPHENOFEMORAL JUNCTION COMPRESS: NORMAL
BH CV LOWER VASCULAR RIGHT SAPHENOFEMORAL JUNCTION PHASIC: NORMAL
BH CV LOWER VASCULAR RIGHT SAPHENOFEMORAL JUNCTION SPONT: NORMAL
BH CV LOWER VASCULAR RIGHT SOLEAL COMPRESS: NORMAL
BH CV XLRA - RV BASE: 3.4 CM
BH CV XLRA - RV LENGTH: 5.2 CM
BH CV XLRA - RV MID: 3 CM
BH CV XLRA - TDI S': 14.6 CM/SEC
BUN SERPL-MCNC: 11 MG/DL (ref 6–20)
BUN/CREAT SERPL: 25.6 (ref 7–25)
CALCIUM SPEC-SCNC: 8.6 MG/DL (ref 8.6–10.5)
CHLORIDE SERPL-SCNC: 98 MMOL/L (ref 98–107)
CO2 SERPL-SCNC: 26 MMOL/L (ref 22–29)
CREAT SERPL-MCNC: 0.43 MG/DL (ref 0.57–1)
DEPRECATED RDW RBC AUTO: 42.4 FL (ref 37–54)
EGFRCR SERPLBLD CKD-EPI 2021: 122.4 ML/MIN/1.73
EOSINOPHIL # BLD AUTO: 0.15 10*3/MM3 (ref 0–0.4)
EOSINOPHIL NFR BLD AUTO: 1.1 % (ref 0.3–6.2)
ERYTHROCYTE [DISTWIDTH] IN BLOOD BY AUTOMATED COUNT: 13.2 % (ref 12.3–15.4)
GLUCOSE BLDC GLUCOMTR-MCNC: 226 MG/DL (ref 70–130)
GLUCOSE BLDC GLUCOMTR-MCNC: 231 MG/DL (ref 70–130)
GLUCOSE BLDC GLUCOMTR-MCNC: 233 MG/DL (ref 70–130)
GLUCOSE SERPL-MCNC: 294 MG/DL (ref 65–99)
HCT VFR BLD AUTO: 30.4 % (ref 34–46.6)
HGB BLD-MCNC: 10 G/DL (ref 12–15.9)
IMM GRANULOCYTES # BLD AUTO: 0.12 10*3/MM3 (ref 0–0.05)
IMM GRANULOCYTES NFR BLD AUTO: 0.9 % (ref 0–0.5)
LEFT ATRIUM VOLUME INDEX: 18.5 ML/M2
LYMPHOCYTES # BLD AUTO: 2.69 10*3/MM3 (ref 0.7–3.1)
LYMPHOCYTES NFR BLD AUTO: 19.7 % (ref 19.6–45.3)
MAGNESIUM SERPL-MCNC: 1.6 MG/DL (ref 1.6–2.6)
MAGNESIUM SERPL-MCNC: 1.8 MG/DL (ref 1.6–2.6)
MAXIMAL PREDICTED HEART RATE: 175 BPM
MAXIMAL PREDICTED HEART RATE: 175 BPM
MCH RBC QN AUTO: 29 PG (ref 26.6–33)
MCHC RBC AUTO-ENTMCNC: 32.9 G/DL (ref 31.5–35.7)
MCV RBC AUTO: 88.1 FL (ref 79–97)
MONOCYTES # BLD AUTO: 0.78 10*3/MM3 (ref 0.1–0.9)
MONOCYTES NFR BLD AUTO: 5.7 % (ref 5–12)
NEUTROPHILS NFR BLD AUTO: 72.3 % (ref 42.7–76)
NEUTROPHILS NFR BLD AUTO: 9.86 10*3/MM3 (ref 1.7–7)
NRBC BLD AUTO-RTO: 0 /100 WBC (ref 0–0.2)
PLATELET # BLD AUTO: 310 10*3/MM3 (ref 140–450)
PMV BLD AUTO: 9.3 FL (ref 6–12)
POTASSIUM SERPL-SCNC: 3.4 MMOL/L (ref 3.5–5.2)
QT INTERVAL: 374 MS
QTC INTERVAL: 465 MS
RBC # BLD AUTO: 3.45 10*6/MM3 (ref 3.77–5.28)
SODIUM SERPL-SCNC: 136 MMOL/L (ref 136–145)
STRESS TARGET HR: 149 BPM
STRESS TARGET HR: 149 BPM
UFH PPP CHRO-ACNC: 0.17 IU/ML (ref 0.3–0.7)
WBC NRBC COR # BLD: 13.64 10*3/MM3 (ref 3.4–10.8)

## 2022-11-17 PROCEDURE — 99214 OFFICE O/P EST MOD 30 MIN: CPT | Performed by: INTERNAL MEDICINE

## 2022-11-17 PROCEDURE — G0378 HOSPITAL OBSERVATION PER HR: HCPCS

## 2022-11-17 PROCEDURE — 25010000002 HEPARIN (PORCINE) PER 1000 UNITS

## 2022-11-17 PROCEDURE — 83735 ASSAY OF MAGNESIUM: CPT | Performed by: INTERNAL MEDICINE

## 2022-11-17 PROCEDURE — 99217 PR OBSERVATION CARE DISCHARGE MANAGEMENT: CPT | Performed by: INTERNAL MEDICINE

## 2022-11-17 PROCEDURE — 96366 THER/PROPH/DIAG IV INF ADDON: CPT

## 2022-11-17 PROCEDURE — 97161 PT EVAL LOW COMPLEX 20 MIN: CPT

## 2022-11-17 PROCEDURE — 0 MAGNESIUM SULFATE 4 GM/100ML SOLUTION: Performed by: INTERNAL MEDICINE

## 2022-11-17 PROCEDURE — 82962 GLUCOSE BLOOD TEST: CPT

## 2022-11-17 PROCEDURE — 63710000001 INSULIN LISPRO (HUMAN) PER 5 UNITS: Performed by: PHYSICIAN ASSISTANT

## 2022-11-17 PROCEDURE — 97535 SELF CARE MNGMENT TRAINING: CPT

## 2022-11-17 PROCEDURE — 93306 TTE W/DOPPLER COMPLETE: CPT | Performed by: INTERNAL MEDICINE

## 2022-11-17 PROCEDURE — 97165 OT EVAL LOW COMPLEX 30 MIN: CPT

## 2022-11-17 PROCEDURE — 85520 HEPARIN ASSAY: CPT | Performed by: INTERNAL MEDICINE

## 2022-11-17 PROCEDURE — 93970 EXTREMITY STUDY: CPT | Performed by: INTERNAL MEDICINE

## 2022-11-17 PROCEDURE — 93306 TTE W/DOPPLER COMPLETE: CPT

## 2022-11-17 PROCEDURE — 63710000001 INSULIN DETEMIR PER 5 UNITS: Performed by: PHYSICIAN ASSISTANT

## 2022-11-17 PROCEDURE — 96376 TX/PRO/DX INJ SAME DRUG ADON: CPT

## 2022-11-17 PROCEDURE — 25010000002 HEPARIN (PORCINE) 25000-0.45 UT/250ML-% SOLUTION

## 2022-11-17 PROCEDURE — 93970 EXTREMITY STUDY: CPT

## 2022-11-17 PROCEDURE — 97530 THERAPEUTIC ACTIVITIES: CPT

## 2022-11-17 PROCEDURE — 85025 COMPLETE CBC W/AUTO DIFF WBC: CPT | Performed by: PHYSICIAN ASSISTANT

## 2022-11-17 PROCEDURE — 80048 BASIC METABOLIC PNL TOTAL CA: CPT | Performed by: PHYSICIAN ASSISTANT

## 2022-11-17 RX ORDER — LISINOPRIL 20 MG/1
40 TABLET ORAL
Status: DISCONTINUED | OUTPATIENT
Start: 2022-11-17 | End: 2022-11-17 | Stop reason: HOSPADM

## 2022-11-17 RX ORDER — SODIUM CHLORIDE 0.9 % (FLUSH) 0.9 %
10 SYRINGE (ML) INJECTION AS NEEDED
Status: DISCONTINUED | OUTPATIENT
Start: 2022-11-17 | End: 2022-11-17 | Stop reason: HOSPADM

## 2022-11-17 RX ORDER — OXYCODONE HYDROCHLORIDE 5 MG/1
5 TABLET ORAL EVERY 4 HOURS PRN
Status: DISCONTINUED | OUTPATIENT
Start: 2022-11-17 | End: 2022-11-17 | Stop reason: HOSPADM

## 2022-11-17 RX ORDER — ASPIRIN 81 MG/1
81 TABLET ORAL DAILY
Status: DISCONTINUED | OUTPATIENT
Start: 2022-11-17 | End: 2022-11-17 | Stop reason: HOSPADM

## 2022-11-17 RX ORDER — MAGNESIUM SULFATE HEPTAHYDRATE 40 MG/ML
4 INJECTION, SOLUTION INTRAVENOUS AS NEEDED
Status: DISCONTINUED | OUTPATIENT
Start: 2022-11-17 | End: 2022-11-17 | Stop reason: HOSPADM

## 2022-11-17 RX ORDER — ANASTROZOLE 1 MG/1
1 TABLET ORAL DAILY
Status: DISCONTINUED | OUTPATIENT
Start: 2022-11-17 | End: 2022-11-17 | Stop reason: HOSPADM

## 2022-11-17 RX ORDER — APIXABAN 5 MG (74)
5 KIT ORAL TAKE AS DIRECTED
Qty: 74 TABLET | Refills: 0 | Status: SHIPPED | OUTPATIENT
Start: 2022-11-17

## 2022-11-17 RX ORDER — PREGABALIN 150 MG/1
150 CAPSULE ORAL 2 TIMES DAILY
Status: DISCONTINUED | OUTPATIENT
Start: 2022-11-17 | End: 2022-11-17 | Stop reason: HOSPADM

## 2022-11-17 RX ORDER — ONDANSETRON 2 MG/ML
4 INJECTION INTRAMUSCULAR; INTRAVENOUS EVERY 6 HOURS PRN
Status: DISCONTINUED | OUTPATIENT
Start: 2022-11-17 | End: 2022-11-17 | Stop reason: HOSPADM

## 2022-11-17 RX ORDER — BUPROPION HYDROCHLORIDE 150 MG/1
300 TABLET ORAL DAILY
Status: DISCONTINUED | OUTPATIENT
Start: 2022-11-18 | End: 2022-11-17 | Stop reason: HOSPADM

## 2022-11-17 RX ORDER — MONTELUKAST SODIUM 10 MG/1
10 TABLET ORAL NIGHTLY
Status: DISCONTINUED | OUTPATIENT
Start: 2022-11-17 | End: 2022-11-17 | Stop reason: HOSPADM

## 2022-11-17 RX ORDER — ONDANSETRON 4 MG/1
4 TABLET, FILM COATED ORAL EVERY 6 HOURS PRN
Status: DISCONTINUED | OUTPATIENT
Start: 2022-11-17 | End: 2022-11-17 | Stop reason: HOSPADM

## 2022-11-17 RX ORDER — BUPROPION HYDROCHLORIDE 150 MG/1
150 TABLET ORAL DAILY
Status: DISCONTINUED | OUTPATIENT
Start: 2022-11-17 | End: 2022-11-17

## 2022-11-17 RX ORDER — HYDROCHLOROTHIAZIDE 25 MG/1
25 TABLET ORAL DAILY
Status: DISCONTINUED | OUTPATIENT
Start: 2022-11-17 | End: 2022-11-17 | Stop reason: HOSPADM

## 2022-11-17 RX ORDER — MAGNESIUM SULFATE HEPTAHYDRATE 40 MG/ML
2 INJECTION, SOLUTION INTRAVENOUS AS NEEDED
Status: DISCONTINUED | OUTPATIENT
Start: 2022-11-17 | End: 2022-11-17 | Stop reason: HOSPADM

## 2022-11-17 RX ORDER — LORAZEPAM 1 MG/1
1 TABLET ORAL NIGHTLY
COMMUNITY

## 2022-11-17 RX ORDER — HEPARIN SODIUM 1000 [USP'U]/ML
2000 INJECTION, SOLUTION INTRAVENOUS; SUBCUTANEOUS ONCE
Status: COMPLETED | OUTPATIENT
Start: 2022-11-17 | End: 2022-11-17

## 2022-11-17 RX ORDER — PANTOPRAZOLE SODIUM 40 MG/1
40 TABLET, DELAYED RELEASE ORAL DAILY
Status: DISCONTINUED | OUTPATIENT
Start: 2022-11-17 | End: 2022-11-17 | Stop reason: HOSPADM

## 2022-11-17 RX ORDER — BUPROPION HYDROCHLORIDE 150 MG/1
150 TABLET ORAL ONCE
Status: COMPLETED | OUTPATIENT
Start: 2022-11-17 | End: 2022-11-17

## 2022-11-17 RX ORDER — CHOLECALCIFEROL (VITAMIN D3) 125 MCG
5 CAPSULE ORAL NIGHTLY PRN
Status: DISCONTINUED | OUTPATIENT
Start: 2022-11-17 | End: 2022-11-17 | Stop reason: HOSPADM

## 2022-11-17 RX ORDER — SODIUM CHLORIDE 0.9 % (FLUSH) 0.9 %
10 SYRINGE (ML) INJECTION EVERY 12 HOURS SCHEDULED
Status: DISCONTINUED | OUTPATIENT
Start: 2022-11-17 | End: 2022-11-17 | Stop reason: HOSPADM

## 2022-11-17 RX ORDER — DULOXETIN HYDROCHLORIDE 60 MG/1
60 CAPSULE, DELAYED RELEASE ORAL DAILY
Status: DISCONTINUED | OUTPATIENT
Start: 2022-11-17 | End: 2022-11-17 | Stop reason: HOSPADM

## 2022-11-17 RX ORDER — ESCITALOPRAM OXALATE 20 MG/1
20 TABLET ORAL DAILY
Status: DISCONTINUED | OUTPATIENT
Start: 2022-11-17 | End: 2022-11-17 | Stop reason: HOSPADM

## 2022-11-17 RX ORDER — ACETAMINOPHEN 325 MG/1
650 TABLET ORAL EVERY 4 HOURS PRN
Status: DISCONTINUED | OUTPATIENT
Start: 2022-11-17 | End: 2022-11-17 | Stop reason: HOSPADM

## 2022-11-17 RX ADMIN — INSULIN LISPRO 5 UNITS: 100 INJECTION, SOLUTION INTRAVENOUS; SUBCUTANEOUS at 16:39

## 2022-11-17 RX ADMIN — INSULIN LISPRO 5 UNITS: 100 INJECTION, SOLUTION INTRAVENOUS; SUBCUTANEOUS at 08:31

## 2022-11-17 RX ADMIN — INSULIN LISPRO 5 UNITS: 100 INJECTION, SOLUTION INTRAVENOUS; SUBCUTANEOUS at 11:52

## 2022-11-17 RX ADMIN — PREGABALIN 150 MG: 150 CAPSULE ORAL at 09:32

## 2022-11-17 RX ADMIN — OXYCODONE 5 MG: 5 TABLET ORAL at 10:55

## 2022-11-17 RX ADMIN — HEPARIN SODIUM 16 UNITS/KG/HR: 10000 INJECTION, SOLUTION INTRAVENOUS at 11:52

## 2022-11-17 RX ADMIN — DULOXETINE HYDROCHLORIDE 60 MG: 60 CAPSULE, DELAYED RELEASE ORAL at 08:26

## 2022-11-17 RX ADMIN — ASPIRIN 81 MG: 81 TABLET, COATED ORAL at 08:23

## 2022-11-17 RX ADMIN — MAGNESIUM SULFATE HEPTAHYDRATE 4 G: 40 INJECTION, SOLUTION INTRAVENOUS at 10:57

## 2022-11-17 RX ADMIN — PANTOPRAZOLE SODIUM 40 MG: 40 TABLET, DELAYED RELEASE ORAL at 08:26

## 2022-11-17 RX ADMIN — APIXABAN 10 MG: 5 TABLET, FILM COATED ORAL at 16:39

## 2022-11-17 RX ADMIN — POTASSIUM CHLORIDE 40 MEQ: 750 CAPSULE, EXTENDED RELEASE ORAL at 14:25

## 2022-11-17 RX ADMIN — Medication 10 ML: at 08:32

## 2022-11-17 RX ADMIN — BUPROPION HYDROCHLORIDE 150 MG: 150 TABLET, FILM COATED, EXTENDED RELEASE ORAL at 08:25

## 2022-11-17 RX ADMIN — Medication 5 MG: at 01:31

## 2022-11-17 RX ADMIN — BUPROPION HYDROCHLORIDE 150 MG: 150 TABLET, FILM COATED, EXTENDED RELEASE ORAL at 10:55

## 2022-11-17 RX ADMIN — HEPARIN SODIUM 2000 UNITS: 1000 INJECTION INTRAVENOUS; SUBCUTANEOUS at 07:15

## 2022-11-17 RX ADMIN — POTASSIUM CHLORIDE 40 MEQ: 750 CAPSULE, EXTENDED RELEASE ORAL at 08:25

## 2022-11-17 RX ADMIN — INSULIN DETEMIR 20 UNITS: 100 INJECTION, SOLUTION SUBCUTANEOUS at 08:22

## 2022-11-17 RX ADMIN — ACETAMINOPHEN 650 MG: 325 TABLET ORAL at 06:34

## 2022-11-17 RX ADMIN — HYDROCHLOROTHIAZIDE 25 MG: 25 TABLET ORAL at 08:25

## 2022-11-17 RX ADMIN — LISINOPRIL 40 MG: 20 TABLET ORAL at 08:23

## 2022-11-17 RX ADMIN — ACETAMINOPHEN 650 MG: 325 TABLET ORAL at 15:56

## 2022-11-17 RX ADMIN — ESCITALOPRAM OXALATE 20 MG: 20 TABLET ORAL at 08:26

## 2022-11-17 RX ADMIN — ANASTROZOLE 1 MG: 1 TABLET ORAL at 08:26

## 2022-11-17 NOTE — PLAN OF CARE
Goal Outcome Evaluation:  Plan of Care Reviewed With: patient           Outcome Evaluation: Pt presents with pain and decreased activity tolerance limiting her functional mobility. Pt performed bed mobility mod A, STS CGA, and ambulated 48' CGA w/ a RW limited by pain and dizziness. IPPT warranted. Recommend dc home with assist.

## 2022-11-17 NOTE — THERAPY EVALUATION
Patient Name: Josefina Rodriguez  : 1975    MRN: [id number]                              Today's Date: 2022       Admit Date: 2022    Visit Dx:     ICD-10-CM ICD-9-CM   1. Shortness of breath  R06.02 786.05   2. 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)   • Encounter for central line care   • 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)   • Shortness of breath   • Bilateral pulmonary embolism (HCC)   • Essential hypertension   • Type 2 diabetes mellitus (HCC)   • ANIA (generalized anxiety disorder)   • Hypokalemia   • Leukocytosis   • Hypoxia   • Obesity, Class III, BMI 40-49.9 (morbid obesity) (HCC)     Past Medical History:   Diagnosis Date   • Anxiety    • Depression    • Diabetes mellitus (HCC)    • Drug therapy     still taking orally   • Hiatal hernia    • History of radiation therapy 2021    right 7th rib   • Hx of radiation therapy     right breast   • Hypertension    • Malignant neoplasm of overlapping sites of right breast in female, estrogen receptor positive (HCC) 2020   • PCOS (polycystic ovarian syndrome)    • Wears glasses      Past Surgical History:   Procedure Laterality Date   • ABDOMINOPLASTY     • BONE BIOPSY     • BREAST BIOPSY Right 2019    right axillary LN FNA   • BREAST BIOPSY Right 01/15/2020    US bx   • BREAST BIOPSY Right 01/15/2020    [facility]illary LN [facility]   • BREAST RECONSTRUCTION     •  CHOLECYSTECTOMY  03/2010   • COLONOSCOPY  2016   • MASTECTOMY Right 06/23/2020    [facility]   • MASTECTOMY W/ SENTINEL NODE BIOPSY Right 06/23/2020    Procedure: MASTECTOMY WITH SENTINEL NODE BIOPSY RIGHT, PORT PLACEMENT;  Surgeon: Rachel Baker MD;  Location: [facility];  Service: General;  Laterality: Right;   • US GUIDED FINE NEEDLE ASPIRATION  01/15/2020   • VENOUS ACCESS DEVICE (PORT) INSERTION Left 01/31/2020      General Information     Row Name 11/17/22 1055          Physical Therapy Time and Intention    Document Type evaluation  -FW     Mode of Treatment physical therapy  -FW     Row Name 11/17/22 1055          General Information    Patient Profile Reviewed yes  -FW     Prior Level of Function min assist:;all household mobility;community mobility;transfer;ADL's  ambulates w/ rollator  -FW     Existing Precautions/Restrictions fall;other (see comments)  Per RN pt is to avoid engagement of abdominal muscles; Sachin drain x3  -FW     Barriers to Rehab medically complex;previous functional deficit  -FW     Row Name 11/17/22 1055          Living Environment    People in Home significant other;child(alec), dependent  -FW     Row Name 11/17/22 1055          Home Main Entrance    Number of Stairs, Main Entrance none  -FW     Stair Railings, Main Entrance none  -FW     Row Name 11/17/22 1055          Stairs Within Home, Primary    Number of Stairs, Within Home, Primary none  -FW     Row Name 11/17/22 1055          Cognition    Orientation Status (Cognition) oriented x 4  -FW     Row Name 11/17/22 1055          Safety Issues, Functional Mobility    Impairments Affecting Function (Mobility) balance;pain;strength;range of motion (ROM);endurance/activity tolerance;shortness of breath  -FW           User Key  (r) = Recorded By, (t) = Taken By, (c) = Cosigned By    Initials Name Provider Type    FW Doni Burton, PT Physical Therapist               Mobility     Row Name 11/17/22 1056          Bed Mobility    Bed  Mobility supine-sit;sit-supine  -FW     Supine-Sit Garvin (Bed Mobility) minimum assist (75% patient effort);1 person assist;verbal cues  -FW     Sit-Supine Garvin (Bed Mobility) moderate assist (50% patient effort)  -FW     Comment, (Bed Mobility) vc for sequencing  -FW     Row Name 11/17/22 1056          Sit-Stand Transfer    Sit-Stand Garvin (Transfers) contact guard;verbal cues  -FW     Assistive Device (Sit-Stand Transfers) walker, front-wheeled  -FW     Row Name 11/17/22 1056          Gait/Stairs (Locomotion)    Garvin Level (Gait) contact guard  -FW     Assistive Device (Gait) walker, front-wheeled  -FW     Distance in Feet (Gait) 48  -FW     Deviations/Abnormal Patterns (Gait) gait speed decreased;stride length decreased  -FW     Bilateral Gait Deviations forward flexed posture  -FW     Comment, (Gait/Stairs) no LOB, buckling, or major deviations throughout- gait limited by pain and c/o dizziness  -FW           User Key  (r) = Recorded By, (t) = Taken By, (c) = Cosigned By    Initials Name Provider Type    FW Doni Burton PT Physical Therapist               Obj/Interventions     Row Name 11/17/22 1059          Range of Motion Comprehensive    General Range of Motion bilateral lower extremity ROM WFL  -FW     Row Name 11/17/22 1059          Strength Comprehensive (MMT)    General Manual Muscle Testing (MMT) Assessment other (see comments)  -FW     Comment, General Manual Muscle Testing (MMT) Assessment BLE 3/5 grossly- no overpressure applied due recent surgery  -FW     Row Name 11/17/22 1059          Balance    Balance Assessment sitting static balance;sitting dynamic balance;standing static balance;standing dynamic balance  -FW     Static Sitting Balance independent  -FW     Dynamic Sitting Balance independent  -FW     Position, Sitting Balance unsupported;sitting edge of bed  -FW     Static Standing Balance contact guard  -FW     Dynamic Standing Balance contact guard  -FW      Position/Device Used, Standing Balance supported;walker, front-wheeled  -FW     Row Name 11/17/22 1059          Sensory Assessment (Somatosensory)    Sensory Assessment (Somatosensory) LE sensation intact  -FW           User Key  (r) = Recorded By, (t) = Taken By, (c) = Cosigned By    Initials Name Provider Type    FW Doni Burton, PT Physical Therapist               Goals/Plan     Row Name 11/17/22 1103          Bed Mobility Goal 1 (PT)    Activity/Assistive Device (Bed Mobility Goal 1, PT) sit to supine/supine to sit;sidelying to sit/sit to sidelying;rolling to right  -FW     Hutchinson Level/Cues Needed (Bed Mobility Goal 1, PT) standby assist  -FW     Time Frame (Bed Mobility Goal 1, PT) long term goal (LTG);10 days  -FW     Row Name 11/17/22 1103          Transfer Goal 1 (PT)    Activity/Assistive Device (Transfer Goal 1, PT) sit-to-stand/stand-to-sit;bed-to-chair/chair-to-bed  -FW     [place]inson Level/Cues Needed (Transfer Goal 1, PT) standby assist  -FW     Time Frame (Transfer Goal 1, PT) long term goal (LTG);10 days  -FW     Row Name 11/17/22 1103          Gait Training Goal 1 (PT)    Activity/Assistive Device (Gait Training Goal 1, PT) gait (walking locomotion);forward stepping;assistive device use;improve balance and speed;walker, rolling  -FW     Hutchinson Level (Gait Training Goal 1, PT) standby assist  -FW     Distance (Gait Training Goal 1, PT) 150  -FW     Time Frame (Gait Training Goal 1, PT) long term goal (LTG);10 days  -FW     Row Name 11/17/22 1103          Therapy Assessment/Plan (PT)    Planned Therapy Interventions (PT) transfer training;balance training;bed mobility training;gait training;home exercise program;patient/family education;strengthening;stair training;stretching;ROM (range of motion);postural re-education  -FW           User Key  (r) = Recorded By, (t) = Taken By, (c) = Cosigned By    Initials Name Provider Type    FW Doni Burton, PT Physical Therapist                Clinical Impression     Row Name 11/17/22 1100          Pain    Pretreatment Pain Rating 8/10  -FW     Posttreatment Pain Rating 8/10  -FW     Pain Location - abdomen;back  -FW     Pre/Posttreatment Pain Comment RN managing  -FW     Pain Intervention(s) Repositioned;Ambulation/increased activity  -FW     Row Name 11/17/22 1100          Plan of Care Review    Plan of Care Reviewed With patient  -FW     Outcome Evaluation Pt presents with pain and decreased activity toelrance limiting her functional mobility. Pt performed bed mobility mod A, STS CGA, and ambulated 48' CGA w/ a RW limited by pain and dizziness. IPPT warranted. Recommend home with assist.  -FW     Row Name 11/17/22 1100          Therapy Assessment/Plan (PT)    Rehab Potential (PT) good, to achieve stated therapy goals  -FW     Criteria for Skilled Interventions Met (PT) yes;meets criteria;skilled treatment is necessary  -FW     Therapy Frequency (PT) daily  -FW     Row Name 11/17/22 1100          Vital Signs    Pre Systolic BP Rehab --  vss  -FW     O2 Delivery Pre Treatment room air  -FW     O2 Delivery Intra Treatment room air  -FW     O2 Delivery Post Treatment room air  -FW     Pre Patient Position Supine  -FW     Intra Patient Position Standing  -FW     Post Patient Position Supine  -FW     Row Name 11/17/22 1100          Positioning and Restraints    Pre-Treatment Position in bed  -FW     Post Treatment Position bed  -FW     In Bed notified nsg;supine;call light within reach;encouraged to call for assist;with family/caregiver;with nsg  -FW           User Key  (r) = Recorded By, (t) = Taken By, (c) = Cosigned By    Initials Name Provider Type    FW Doni Burton, PT Physical Therapist               Outcome Measures     Row Name 11/17/22 1104          How much help from another person do you currently need...    Turning from your back to your side while in flat bed without using bedrails? 3  -FW     Moving from lying on back to  sitting on the side of a flat bed without bedrails? 2  -FW     Moving to and from a bed to a chair (including a wheelchair)? 3  -FW     Standing up from a chair using your arms (e.g., wheelchair, bedside chair)? 3  -FW     Climbing 3-5 steps with a railing? 3  -FW     To walk in hospital room? 3  -FW     AM-PAC 6 Clicks Score (PT) 17  -FW     Highest level of mobility 5 --> Static standing  -     Row Name 11/17/22 1104 11/17/22 [facility]       Functional Assessment    Outcome Measure Options AM-PAC 6 Clicks Basic Mobility (PT)  -FW AM-PAC 6 Clicks Daily Activity (OT)  -          User Key  (r) = Recorded By, (t) = Taken By, (c) = Cosigned By    Initials Name Provider Type     Melissa Avendano OT Occupational Therapist    FW Doni Burton, PT Physical Therapist                             Physical Therapy Education     Title: PT OT SLP Therapies (In Progress)     Topic: Physical Therapy (In Progress)     Point: Mobility training (Done)     Learning Progress Summary           Patient Acceptance, E, VU by  at 11/17/2022 1105                   Point: Home exercise program (Not Started)     Learner Progress:  Not documented in this visit.          Point: Body mechanics (Done)     Learning Progress Summary           Patient Acceptance, E, VU by  at 11/17/2022 1105                   Point: Precautions (Done)     Learning Progress Summary           Patient Acceptance, E, VU by  at 11/17/2022 1105                               User Key     Initials Effective Dates Name Provider Type Discipline     05/05/22 -  Doni Burton, [facility] Physical Therapist PT              PT Recommendation and Plan  Planned Therapy Interventions (PT): transfer training, balance training, bed mobility training, gait training, home exercise program, patient/family education, strengthening, stair training, stretching, ROM (range of motion), postural re-education  Plan of Care Reviewed With: patient  Outcome Evaluation: Pt presents  with pain and decreased activity toelrance limiting her functional mobility. Pt performed bed mobility mod A, STS CGA, and ambulated 48' CGA w/ a RW limited by pain and dizziness. IPPT warranted. Recommend home with assist.     Time Calculation:    PT Charges     Row Name 11/17/22 1106             Time Calculation    Start Time 1034  -FW      PT Received On 11/17/22  -FW      PT Goal Re-Cert Due Date 11/27/22  -FW         Timed Charges    17820 - PT Therapeutic Activity Minutes 8  -FW         Untimed Charges    PT Eval/Re-eval Minutes 31  -FW         Total Minutes    Timed Charges Total Minutes 8  -FW      Untimed Charges Total Minutes 31  -FW       Total Minutes 39  -FW            User Key  (r) = Recorded By, (t) = Taken By, (c) = Cosigned By    Initials Name Provider Type    FW Doni Burton, [facility] Physical Therapist              Therapy Charges for Today     Code Description Service Date Service Provider Modifiers Qty    21490330018 HC PT THERAPEUTIC ACT EA 15 MIN 11/17/2022 Doni Burton, PT GP 1    33245797004 HC PT EVAL LOW COMPLEXITY 3 11/17/2022 Doni Burton, PT GP 1          PT G-Codes  Outcome Measure Options: AM-PAC 6 Clicks Basic Mobility (PT)  AM-PAC 6 Clicks Score (PT): 17  AM-PAC 6 Clicks Score (OT): 17  PT Discharge Summary  Anticipated Discharge Disposition (PT): home with assist    Doni Burton PT  11/17/2022

## 2022-11-17 NOTE — DISCHARGE SUMMARY
[facility] Medicine Services  DISCHARGE SUMMARY    Patient Name: Josefina Rodriguez  : 1975  MRN: [id number]    Date of Admission: 2022  6:54 PM  Date of Discharge: 2022  Primary Care Physician: Nia Higuera MD    Consults     No orders found for last 30 day(s).          Hospital Course     Presenting Problem:   Shortness of breath [R06.02]    Active Hospital Problems    Diagnosis  POA   • **Bilateral pulmonary embolism (HCC) [I26.99]  Yes   • Hypoxia [R09.02]  Unknown   • Obesity, Class III, BMI 40-49.9 (morbid obesity) (HCC) [E66.01]  Unknown   • Shortness of breath [R06.02]  Yes   • Essential hypertension [I10]  Yes   • Type 2 diabetes mellitus (HCC) [E11.9]  Yes   • ANIA (generalized anxiety disorder) [F41.1]  Yes   • Hypokalemia [E87.6]  Yes   • Leukocytosis [D72.829]  Unknown   • Malignant neoplasm of overlapping sites of right breast in female, estrogen receptor positive (HCC) [C50.811, Z17.0]  Not Applicable      Resolved Hospital Problems   No resolved problems to display.          Hospital Course:  Josefina Rodriguez is a 47 y.o. female with history of RT breast cancer s/p mastectomy and chemotherapy (currently on anastrozole) with recent breast reconstructive surgery and abdominoplasty 2022 at  who was discharged 3 days PTA.  She presented to our ED with complaints of shortness of breath and reported low home O2 saturations (high 80s).  Work-up in the ED demonstrated bilateral pulmonary emboli, with overall minimal clot burden.  She was admitted and started on heparin gtt, subsequent TTE was without evidence of RV strain and demonstrated preserved EF.  Recommend treatment as provoked PE in the setting of recent surgery, she is being transitioned to oral Eliquis tonight and discharged home with Eliquis starter pack. *Lower extremity venous duplex ultrasonography was ordered on admission, these images have been obtained with  results pending, as the patient is discharging today and this would not  of care I feel that these results can be followed up outpatient as needed.    Additionally patient was noted to have nocturnal hypoxia, there is concern for sleep apnea, I have placed an ambulatory referral to sleep medicine.      Discharge Follow Up Recommendations for outpatient labs/diagnostics:  PCP 1-2 weeks, post hospital for PE, continuation of oral anticoagulation  Keep all previously planned/scheduled appointments  Ambulatory referral for sleep medicine, eval for WARD    Day of Discharge     HPI:   Having a headache this afternoon, feels it is caffeine related as she usually drinks approximately 20 cups of coffee or equivalent on a daily basis and has not had any for 2+ days.  Otherwise not significantly short of breath and without chest pain    Review of Systems  Gen- No fevers, chills  CV- No chest pain, palpitations  Resp- No cough, dyspnea  GI- No N/V/D, abd pain    Vital Signs:   Temp:  [98.3 °F (36.8 °C)-99.2 °F (37.3 °C)] 99.1 °F (37.3 °C)  Heart Rate:  [80-93] 89  Resp:  [18-20] 18  BP: (110-134)/(61-94) 111/61      Physical Exam:  Constitutional: Awake, alert, laying in bed in a dark room in no acute distress  HENT: NCAT, mucous membranes moist  Respiratory: Clear to auscultation bilaterally, respiratory effort normal   Cardiovascular: RRR, no murmurs, rubs, or gallops, palpable radial pulses  Musculoskeletal: No bilateral ankle edema  Psychiatric: Appropriate affect, cooperative  Neurologic: Speech clear and fluent    Pertinent  and/or Most Recent Results     LAB RESULTS:      Lab 11/17/22  0336 11/16/22 1932 11/16/22 1931   WBC 13.64*  --  12.13*   HEMOGLOBIN 10.0*  --  10.9*   HEMATOCRIT 30.4*  --  33.7*   PLATELETS 310  --  338   NEUTROS ABS 9.86*  --  9.31*   IMMATURE GRANS (ABS) 0.12*  --  0.12*   LYMPHS ABS 2.69  --  1.96   MONOS ABS 0.78  --  0.55   EOS ABS 0.15  --  0.17   MCV 88.1  --  88.7    PROTIME  --  14.2  --    APTT  --  30.8*  --    HEPARIN ANTI-XA 0.17* 0.10*  --          Lab 11/17/22  0336 11/16/22 1931   SODIUM 136 137   POTASSIUM 3.4* 3.1*   CHLORIDE 98 95*   CO2 26.0 29.0   ANION GAP 12.0 13.0   BUN 11 13   CREATININE 0.43* 0.58   EGFR 122.4 113.9   GLUCOSE 294* 218*   CALCIUM 8.6 9.0   MAGNESIUM 1.8 1.6   HEMOGLOBIN A1C  --  7.90*         Lab 11/16/22 1931   TOTAL PROTEIN 7.3   ALBUMIN 3.30*   GLOBULIN 4.0   ALT (SGPT) 28   AST (SGOT) 27   BILIRUBIN 0.3   ALK PHOS 164*         Lab 11/16/22 1932 11/16/22 1931   PROBNP  --  101.2   TROPONIN T  --  <0.010   PROTIME 14.2  --    INR 1.10  --                  Brief Urine Lab Results     None        Microbiology Results (last 10 days)     Procedure Component Value - Date/Time    COVID PRE-OP / PRE-PROCEDURE SCREENING ORDER (NO ISOLATION) - Swab, Nasopharynx [[id number]]  (Normal) Collected: 11/16/22 2041    Lab Status: Final result Specimen: Swab from Nasopharynx Updated: 11/16/22 2111    Narrative:      The following orders were created for panel order COVID PRE-OP / PRE-PROCEDURE SCREENING ORDER (NO ISOLATION) - Swab, Nasopharynx.  Procedure                               Abnormality         Status                     ---------                               -----------         ------                     COVID-19 and FLU A/B PCR...[[id number]]  Normal              Final result                 Please view results for these tests on the individual orders.    COVID-19 and FLU A/B PCR - Swab, Nasopharynx [[id number]]  (Normal) Collected: 11/16/22 2041    Lab Status: Final result Specimen: Swab from Nasopharynx Updated: 11/16/22 2111     COVID19 Not Detected     Influenza A PCR Not Detected     Influenza B PCR Not Detected    Narrative:      Fact sheet for providers: https://www.fda.gov/media/512561/download    Fact sheet for patients: https://www.fda.gov/media/197112/download    Test performed by PCR.          Adult Transthoracic Echo Complete w/  Color, Spectral and Contrast if Necessary Per Protocol    Result Date: 11/17/2022  •  Left ventricular systolic function is normal. Left ventricular ejection fraction appears to be 61 - 65%. •  Left ventricular wall thickness is consistent with mild concentric hypertrophy. •  Left ventricular diastolic function was normal. •  Estimated right ventricular systolic pressure from tricuspid regurgitation is normal (<35 mmHg). Calculated right ventricular systolic pressure from tricuspid regurgitation is 13 mmHg. •  Normal cardiac valves.     XR Chest 1 View    Result Date: 11/16/2022  DATE OF EXAM: 11/16/2022 7:07 PM  PROCEDURE: XR CHEST 1 VW-  INDICATIONS: SOA triage protocol  COMPARISON: 08/30/2021  TECHNIQUE: Single radiographic view of the chest was obtained.  FINDINGS: No focal or diffuse pulmonary infiltrate is identified. No pleural effusion or pneumothorax is seen. There is mild elevation of the right diaphragm. The heart appears mildly enlarged. The mediastinal contour appears grossly normal.  Surgical clips are seen in the right axilla.      1.  The heart appears mildly enlarged which could be related to cardiomegaly or pericardial effusion. 2.  No radiographic findings of acute pulmonary abnormality.  This report was finalized on 11/16/2022 7:33 PM by Tommy Mcbride MD.      CT Angiogram Chest    Result Date: 11/16/2022  DATE OF EXAM: 11/16/2022 7:46 PM  PROCEDURE: CT ANGIOGRAM CHEST-  INDICATIONS: reported elevated d-dimer  COMPARISON: 03/11/2022  TECHNIQUE: Contiguous axial imaging was obtained from the thoracic inlet through the upper abdomen following the intravenous administration of iodinated contrast. Reconstructed coronal and sagittal images were also obtained. Automated exposure control and iterative reconstruction methods were used.  The radiation dose reduction device was turned on for each scan per the ALARA (As Low as Reasonably Achievable) protocol.  FINDINGS: There are areas of linear opacity in  the medial right lower lobe and in the posterior left lower lobe which were not present on the prior chest CT. In this location there does appear to be subtle central filling defect in a distal segmental/subsegmental right pulmonary artery on axial series 4 images 63-65, suspicious for pulmonary embolism. No other definite right lung pulmonary artery filling defect is identified. There is also suspicion for subtle filling defect in left lower lobe distal segmental/subsegmental pulmonary arteries in the area of linear opacity suggests on axial image 65. No other significant left pulmonary embolism is seen.  No pleural effusion or pneumothorax is seen. No other significant pulmonary infiltrate is seen.  Heart size appears within normal limits. No pericardial effusion. No suspicious lymphadenopathy is seen. Aorta appears normal in caliber. There are postsurgical changes involving the right breast with evidence of mastectomy and reconstruction. Small amount of soft tissue gas is seen at the inferior right chest wall.  Status post cholecystectomy. Calcified granulomas are noted in the spleen. No aggressive osseous lesion is identified.      1.  Suspected small distal segmental and subsegmental emboli in the bilateral lower lobes with new linear opacities in these locations which could indicate atelectasis or infarcts. 2.  No other significant central pulmonary artery filling defect is seen at this time. 3.  Status post right mastectomy and reconstruction with small amount of soft tissue gas in the inferior right chest wall, consistent with recent surgery.   Findings were called to the ordering provider by Dr. Mcbride 11/16/2022 8:44 PM  This report was finalized on 11/16/2022 8:45 PM by Tommy Mcbride MD.        Results for orders placed during the hospital encounter of 08/31/21    Duplex venous lower extremity bilateral CAR    Interpretation Summary  · The bilateral lower extremity venous duplex scan is negative for  evidence of a DVT and SVT.      Results for orders placed during the hospital encounter of 08/31/21    Duplex venous lower extremity bilateral CAR    Interpretation Summary  · The bilateral lower extremity venous duplex scan is negative for evidence of a DVT and SVT.      Results for orders placed during the hospital encounter of 11/16/22    Adult Transthoracic Echo Complete w/ Color, Spectral and Contrast if Necessary Per Protocol    Interpretation Summary  •  Left ventricular systolic function is normal. Left ventricular ejection fraction appears to be 61 - 65%.  •  Left ventricular wall thickness is consistent with mild concentric hypertrophy.  •  Left ventricular diastolic function was normal.  •  Estimated right ventricular systolic pressure from tricuspid regurgitation is normal (<35 mmHg). Calculated right ventricular systolic pressure from tricuspid regurgitation is 13 mmHg.  •  Normal cardiac valves.        Discharge Details        Discharge Medications      New Medications      Instructions Start Date   Eliquis DVT/PE Starter Pack tablet therapy pack  Generic drug: Apixaban Starter Pack   5 mg, Oral, Take As Directed         Continue These Medications      Instructions Start Date   acetaminophen 325 MG tablet  Commonly known as: TYLENOL   975 mg, Oral, Every 8 Hours PRN      anastrozole 1 MG tablet  Commonly known as: ARIMIDEX   1 mg, Oral, Daily      aspirin 81 MG EC tablet   81 mg, Oral, Daily      buPROPion  MG 24 hr tablet  Commonly known as: WELLBUTRIN XL   300 mg, Oral, Daily      Diclofenac Sodium 1 % gel gel  Commonly known as: VOLTAREN   3 Times Daily PRN      DULoxetine 60 MG capsule  Commonly known as: CYMBALTA   TAKE ONE CAPSULE BY MOUTH DAILY      escitalopram 20 MG tablet  Commonly known as: LEXAPRO   20 mg, Oral, Daily      fluticasone 50 MCG/ACT nasal spray  Commonly known as: FLONASE   1 spray, Nasal, Every 12 Hours Scheduled      FreeStyle Audrey 2 Sensor misc   No dose, route, or  frequency recorded.      hydroCHLOROthiazide 25 MG tablet  Commonly known as: HYDRODIURIL   25 mg, Oral, Daily      ibuprofen 600 MG tablet  Commonly known as: ADVIL,MOTRIN   600 mg, Oral, Every 8 Hours PRN      Insulin Lispro (1 Unit Dial) 100 UNIT/ML solution pen-injector  Commonly known as: HUMALOG   No dose, route, or frequency recorded.      [employer] Pen Needles 32G X 4 MM misc  Generic drug: Insulin Pen Needle   No dose, route, or frequency recorded.      Lantus SoloStar 100 UNIT/ML injection pen  Generic drug: Insulin Glargine   26 Units, Subcutaneous, In AM      lidocaine-prilocaine 2.5-2.5 % cream  Commonly known as: EMLA   Topical, As Needed      lisinopril 40 MG tablet  Commonly known as: PRINIVIL,ZESTRIL   40 mg, Oral, Daily      loratadine 10 MG tablet  Commonly known as: CLARITIN   10 mg, Oral, Daily      LORazepam 1 MG tablet  Commonly known as: ATIVAN   1 mg, Oral, As Needed      LORazepam 1 MG tablet  Commonly known as: ATIVAN   1 mg, Oral, Nightly      montelukast 10 MG tablet  Commonly known as: SINGULAIR   10 mg, Oral, Nightly      MULTIVITAMIN GUMMIES WOMENS PO   2 capsules, Oral, Daily      ondansetron 8 MG tablet  Commonly known as: ZOFRAN   TAKE ONE TABLET BY MOUTH THREE TIMES A DAY AS NEEDED FOR NAUSEA AND VOMITING      oxyCODONE 5 MG capsule  Commonly known as: OXY-IR   5 mg, Oral, Every 4 Hours PRN      pantoprazole 40 MG EC tablet  Commonly known as: PROTONIX   40 mg, Oral, Daily      pregabalin 150 MG capsule  Commonly known as: LYRICA   150 mg, Oral, 2 Times Daily      prochlorperazine 10 MG tablet  Commonly known as: COMPAZINE   TAKE ONE TABLET BY MOUTH EVERY 6 HOURS AS NEEDED FOR NAUSEA AND VOMITING      VITAMIN B COMPLEX PO   1 tablet, Oral, Daily      Vitamin B12 1000 MCG tablet controlled-release   No dose, route, or frequency recorded.      vitamin D3 125 MCG (5000 UT) capsule capsule   Oral, Daily             Allergies   Allergen Reactions   • Cashew Nut Hives and Itching   •  Cashew Nut (Anacardium Occidentale) Skin Test Hives and Itching   • Naproxen Hives   • Penicillins Hives     PT STATES CAN TOLERATE KELFEX   • Pistachio Nut Hives and Itching   • Pistachio Nut (Diagnostic) Hives and Itching         Discharge Disposition:  Home or Self Care    Diet:  Hospital:  Diet Order   Procedures   • Diet Regular Texture (IDDSI 7); Regular Consistency; Diabetic Diets; Consistent Carbohydrate            CODE STATUS:    Code Status and Medical Interventions:   Ordered at: 11/16/22 8437     Code Status (Patient has no pulse and is not breathing):    CPR (Attempt to Resuscitate)     Medical Interventions (Patient has pulse or is breathing):    Full Support       Future Appointments   Date Time Provider Department Center   11/28/2022  1:30 PM Tess Moore RD [facility]   1/26/2023 11:30 AM Aimee Johnson APRN [facility]   1/26/2023 12:30 PM CHAIR 21  [facility] OPI [facility]   1/27/2023  2:40 PM [facility] BEAU DEXA 1  [facility] DX BE [facility]   1/27/2023  3:00 PM [facility] BEAU MAMM 1  [facility] BR BE [place]       Additional Instructions for the Follow-ups that You Need to Schedule     Ambulatory Referral to Sleep Medicine   As directed      Nocturnal hypoxia while sleeping identified in the hospital, obesity, eval for WARD    Order Comments: Nocturnal hypoxia while sleeping identified in the hospital, obesity, eval for WARD     Follow-up needed: Yes         Discharge Follow-up with PCP   As directed       Currently Documented PCP:    Nia Higuera MD    PCP Phone Number:    [phone]     Follow Up Details: 1-2 weeks         Discharge Follow-up with Specialty: Keep all other previously planned follow up appointments   As directed      Specialty: Keep all other previously planned follow up appointments                     Charlie Caballero DO  11/17/22      Time Spent on Discharge:  I spent  25  minutes on this discharge activity which included: face-to-face encounter with the patient, reviewing the data in  the system, coordination of the care with the nursing staff as well as consultants, documentation, and entering orders.

## 2022-11-17 NOTE — CASE MANAGEMENT/SOCIAL WORK
Discharge Planning Assessment  [facility]     Patient Name: Josefina Rodriguez  MRN: [id number]  Today's Date: 11/17/2022    Admit Date: 11/16/2022    Plan: HOME   Discharge Needs Assessment     Row Name 11/17/22 1543       Living Environment    People in Home significant other;child(alec), dependent    Current Living Arrangements home    Primary Care Provided by self    Provides Primary Care For child(alec)    Family Caregiver if Needed significant other;friend(s)    Quality of Family Relationships helpful;involved;supportive       Transition Planning    Patient/Family Anticipates Transition to home with family    Patient/Family Anticipated Services at Transition     Transportation Anticipated family or friend will provide       Discharge Needs Assessment    Readmission Within the Last 30 Days no previous admission in last 30 days    Equipment Currently Used at Home rollator    Concerns to be Addressed discharge planning;no discharge needs identified;denies needs/concerns at this time    Equipment Needed After Discharge none    Current Discharge Risk chronically ill               Discharge Plan     Row Name 11/17/22 1548       Plan    Plan HOME    Patient/Family in Agreement with Plan yes    Plan Comments Met with pt at bedside for DCP.  She resides with her s/o and 14 y/o daughter in [facility].  She is independent with ADLs.  Currently/temporarily using a rollator s/p surgery.  No current HH services.  Confirmed she has [employer] insurance with Rx coverage.  Goal is to return home upon DC.  No immediate needs identified/voiced.  CM will cont to follow.    Final Discharge Disposition Code 01 - home or self-care              Continued Care and Services - Admitted Since 11/16/2022    Coordination has not been started for this encounter.       Expected Discharge Date and Time     Expected Discharge Date Expected Discharge Time    Nov 18, 2022          Demographic Summary     Row Name 11/17/22  1542       General Information    Admission Type observation    Referral Source admission list    Reason for Consult discharge planning    Preferred Language English       Contact Information    Permission Granted to Share Info With     Contact Information Obtained for                Functional Status     Row Name 11/17/22 1542       Functional Status    Usual Activity Tolerance good    Current Activity Tolerance good       Functional Status, IADL    Medications independent    Meal Preparation independent    Housekeeping independent    Laundry independent    Shopping independent               Psychosocial    No documentation.                Abuse/Neglect    No documentation.                Legal    No documentation.                Substance Abuse    No documentation.                Patient Forms    No documentation.                   Tamara Marr RN

## 2022-11-17 NOTE — NURSING NOTE
Woc consulted for abdominal tubes and drains.    Patient presents with a left hip and her right hip REJI drain as well as a under the right breast REJI drain.  These were placed at .  They are sutured in place.  The sites do have some yellow tissue around them not much drainage noted they are dry with redness around the insertion sites.  This is typical erythema, no induration no purulence no signs and symptoms of infection.    Woc just placed trach sponges around the insertion sites.  Patient educated that she can change these when they are 50% soiled or leave them on for 3 days if they there is no drainage.  She can rinse with water and pat dry for cleaning.  At this time all 3 sites are extremely tender to the touch but patient verbalized understanding.  Given patient's comorbidities Woc educated on RN on how to rescored the Luciano to more closely match patient's clinical condition.    Will place on pressure injury prevention orders as mild occasional turning heel elevation and a waffle mattress should be adequate for this patient.  If patient condition worsens or worsens please reconsult Woc.

## 2022-11-17 NOTE — PROGRESS NOTES
HEPARIN INFUSION  Josefina Rodriguez is a  47 y.o. female receiving heparin infusion.     Therapy for (VTE/Cardiac):   vte  Patient Weight: 113 kg  Initial Bolus (Y/N):   yes  Any Bolus (Y/N):   yes        Signs or Symptoms of Bleeding: no      Recommend Xa every 6 hours.   VTE (PE/DVT)   Initial Bolus: 80 units/kg (Max 10,000 units)  Initial rate: 18 units/kg/hr (Max 1,500 units/hr)    Anti Xa Rebolus Infusion Hold time Change infusion Dose (Units/kg/hr) Next Anti Xa Level Due   < 0.11 50 Units/kg  (4000 Units Max) None Increase by  4 Units/kg/hr 6 hours   0.11 - 0.19 25 Units/kg  (2000 Units Max) None Increase by  3 Units/kg/hr 6 hours   0.2 - 0.29 0 None Increase by  2 Units/kg/hr 6 hours   0.3 - 0.7 0 None No Change 6 hours (after 2 consecutive levels in range check qAM)   0.71 - 0.8 0 None Decrease by  1 Units/kg/hr 6 hours   0.81 - 0.9 0 None Decrease by  2 Units/kg/hr 6 hours   0.91 - 1 0 60 Minutes Decrease by  3 Units/kg/hr 6 hours   >1 0 Hold  After Anti Xa less than 0.7 decrease previous rate by  4 Units/kg/hr  Every 2 hours until Anti Xa is less than 0.7 then when infusion restarts in 6 hours     Results from last 7 days   Lab Units 11/17/22 0336 11/16/22  1932 11/16/22  1931 11/10/22  0745   INR   --  1.10  --   --    HEMOGLOBIN g/dL 10.0*  --  10.9* 10.5*   HEMATOCRIT % 30.4*  --  33.7* 32.2*   PLATELETS 10*3/mm3 310  --  338 223          Date   Time   Anti-Xa Current Rate (Unit/kg/hr) Bolus   (Units) Rate Change   (Unit/kg/hr) New Rate (Unit/kg/hr) Next   Anti-Xa Comments  Pump Check Daily   11/16 2200 0.1 New start 9040 + 13 13 0400 Spoke with rn; heparin  started @ 2200 11/17 0336 0.17 13 2000 +3 16 1300 Spoke with TARA Quseada,  PharmD  11/17/22  07:09  EST

## 2022-11-17 NOTE — PROGRESS NOTES
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  She is about 1 week out from her breast reconstruction surgery.  She says it was a 7-hour surgery.  She is feeling okay currently.  On room air.  She says that she is going to be worked up for sleep apnea.      REVIEW OF SYSTEMS:  A 14 point review of systems was performed and is negative except as noted above.      Objective     Vitals:    11/17/22 1000 11/17/22 1055 11/17/22 1100 11/17/22 1110   BP:  116/82  110/70   BP Location:    Left arm   Patient Position:    Lying   Pulse: 84 80 83 85   Resp:    18   Temp:    99.1 °F (37.3 °C)   TempSrc:    Oral   SpO2: 92%  94% 94%   Weight:       Height:                       Performance Status: 1  General: well appearing female in no acute distress  Neuro: alert and oriented  HEENT: sclerae anicteric, oropharynx clear  Lymphatics: no cervical, supraclavicular, or axillary adenopathy  Cardiovascular: regular rate and rhythm, no murmurs  Lungs: clear to auscultation bilaterally  Abdomen: Healing lower abdominal incision without drainage or  erythema  Extremities: no lower extremity edema  Skin: no rashes, lesions, bruising, or petechiae  Psych: mood and affect appropriate    I have reexamined the patient and the results are consistent with the previously documented exam. Jolene Connell MD      Labs:       Lab Results   Component Value Date    WBC 13.64 (H) 11/17/2022    HGB 10.0 (L) 11/17/2022    HCT 30.4 (L) 11/17/2022    MCV 88.1 11/17/2022     11/17/2022     Lab Results   Component Value Date    GLUCOSE 294 (H) 11/17/2022    BUN 11 11/17/2022    CREATININE 0.43 (L) 11/17/2022    EGFRIFNONA 105 12/02/2021    BCR 25.6 (H) 11/17/2022    K 3.4 (L) 11/17/2022    CO2 26.0 11/17/2022    CALCIUM 8.6 11/17/2022    ALBUMIN 3.30 (L) 11/16/2022    AST 27 11/16/2022    ALT 28 11/16/2022     CT Angiogram Chest  Narrative: DATE OF EXAM: 11/16/2022 7:46 PM     PROCEDURE: CT ANGIOGRAM CHEST-     INDICATIONS: reported elevated d-dimer     COMPARISON: 03/11/2022     TECHNIQUE: Contiguous axial imaging was obtained from the thoracic inlet  through the upper abdomen following the intravenous administration of  iodinated contrast. Reconstructed coronal and sagittal images were also  obtained. Automated exposure control and iterative reconstruction  methods were used.     The radiation dose reduction device was turned on for each scan per the  ALARA (As Low as Reasonably Achievable) protocol.     FINDINGS:  There are areas of linear opacity in the medial right lower lobe and in  the posterior left lower lobe which were not present on the prior chest  CT. In this location there does appear to be subtle central filling  defect in a distal segmental/subsegmental right pulmonary artery on  axial series 4 images 63-65, suspicious for pulmonary embolism. No other  definite right lung pulmonary artery filling defect is identified. There  is also suspicion for subtle filling defect in left lower lobe distal  segmental/subsegmental pulmonary arteries in the area of linear  opacity  suggests on axial image 65. No other significant left pulmonary embolism  is seen.     No pleural effusion or pneumothorax is seen. No other significant  pulmonary infiltrate is seen.     Heart size appears within normal limits. No pericardial effusion. No  suspicious lymphadenopathy is seen. Aorta appears normal in caliber.  There are postsurgical changes involving the right breast with evidence  of mastectomy and reconstruction. Small amount of soft tissue gas is  seen at the inferior right chest wall.     Status post cholecystectomy. Calcified granulomas are noted in the  spleen. No aggressive osseous lesion is identified.     Impression: 1.  Suspected small distal segmental and subsegmental emboli in the  bilateral lower lobes with new linear opacities in these locations which  could indicate atelectasis or infarcts.  2.  No other significant central pulmonary artery filling defect is seen  at this time.  3.  Status post right mastectomy and reconstruction with small amount of  soft tissue gas in the inferior right chest wall, consistent with recent  surgery.        Findings were called to the ordering provider by Dr. Mcbride 11/16/2022  8:44 PM     This report was finalized on 11/16/2022 8:45 PM by Tommy Mcbride MD.     XR Chest 1 View  Narrative: DATE OF EXAM:  11/16/2022 7:07 PM     PROCEDURE:  XR CHEST 1 VW-     INDICATIONS:  SOA triage protocol     COMPARISON:  08/30/2021     TECHNIQUE:   Single radiographic view of the chest was obtained.     FINDINGS:  No focal or diffuse pulmonary infiltrate is identified. No pleural  effusion or pneumothorax is seen. There is mild elevation of the right  diaphragm. The heart appears mildly enlarged. The mediastinal contour  appears grossly normal.  Surgical clips are seen in the right axilla.     Impression: 1.  The heart appears mildly enlarged which could be related to  cardiomegaly or pericardial effusion.  2.  No radiographic findings of acute pulmonary  abnormality.     This report was finalized on 11/16/2022 7:33 PM by Tommy Mcbride MD.           I personally reviewed the imaging studies        Assessment & Plan     Josefina Rodriguez is a 47 y.o. year old female with a history of breast cancer, currently on Lupron and anastrozole, who underwent breast reconstruction surgery last week.  This has been complicated by bilateral pulmonary embolism.  Fortunately clot burden is fairly small and she is currently on room air.    In terms of her breast cancer, no changes with her treatment.  I will contact her plastic surgeon at  to make sure they are aware of this event and discuss timing of future surgeries.  In general I would recommend no further surgery for a minimum of 3 months.    Pulmonary embolism: Currently on heparin drip.  Transition to oral agent.  From my standpoint okay to discharge today.    Follow-up with me as scheduled.           Jolene Connell MD  11/17/2022

## 2022-11-17 NOTE — PLAN OF CARE
Goal Outcome Evaluation:  Plan of Care Reviewed With: patient        Progress: improving  Outcome Evaluation: OT eval complete. Pt is pleasent and cooperative. Limited by pain. Completes bed mobility with Mary and transfers with CGA and UE support. Pt ambulated from bed to chair with minAx1 and extra time/effort. OT issued reacher sock aid, reacher, shoe horn, LH sponge, and toilet tongs and educated pt on us for completion of ADLS. Pt required modA to dof/don socks with use of AE. Recommend d/c home with assist from family.

## 2022-11-17 NOTE — ED PROVIDER NOTES
[place]    EMERGENCY DEPARTMENT ENCOUNTER      Pt Name: Josefina Rodriguez  MRN: [id number]  YOB: 1975  Date of evaluation: 11/16/2022  Provider: ROBBIN Ott    CHIEF COMPLAINT       Chief Complaint   Patient presents with   • Shortness of Breath         HISTORY OF PRESENT ILLNESS  (Location/Symptom, Timing/Onset, Context/Setting, Quality, Duration, Modifying Factors, Severity.)   Josefina Rodriguez is a 47 y.o. female who presents to the emergency department with complaints of shortness of breath. Patient is s/p breast reconstructive surgery at the [facility] on 11/9/2022. Patient is currently followed by Dr. 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 She is receiving Anastrozole with Lupron for ovarian suppression per her medical records. She reports that since her surgery on 11/9/2022 she has been experiencing shortness of breath and low oxygen saturation. She reports checking her oxygen at home and that it has been between 80-92% on room air. She reports contacting her surgeon who instructed her to follow up with primary care. She was seen and evaluated at her [facility] today who performed labs and patient was found to have an elevated d-dimer and instructed to present to the ER for further evaluation. Patient reports that her symptoms increase with activity. She denies any specific alleviating factors. She reports no additional associated symptoms on exam.      HPI   Nursing notes were reviewed.    REVIEW OF SYSTEMS    (2-9 systems for level 4, 10 or more for level 5)   Review of Systems   Constitutional: Positive for activity change and fatigue. Negative for chills and fever.   HENT: Negative.    Eyes: Negative.    Respiratory: Positive for shortness of breath.    Cardiovascular: Negative.    Gastrointestinal: Negative.    Musculoskeletal: Positive for arthralgias and myalgias.    Skin: Negative.         All systems reviewed and negative except for those discussed in HPI.   PAST MEDICAL HISTORY     Past Medical History:   Diagnosis Date   • Anxiety    • Depression    • Diabetes mellitus (HCC)    • Drug therapy     still taking orally   • Hiatal hernia    • History of radiation therapy 04/19/2021    right 7th rib   • Hx of radiation therapy 2021    right breast   • Hypertension    • Malignant neoplasm of overlapping sites of right breast in female, estrogen receptor positive (HCC) 01/21/2020   • PCOS (polycystic ovarian syndrome)    • Wears glasses          SURGICAL HISTORY       Past Surgical History:   Procedure Laterality Date   • ABDOMINOPLASTY     • BONE BIOPSY     • BREAST BIOPSY Right 03/29/2019    right axillary LN FNA   • BREAST BIOPSY Right 01/15/2020    US bx   • BREAST BIOPSY Right 01/15/2020    rigth axillary LN FNA   • BREAST RECONSTRUCTION     • CHOLECYSTECTOMY  03/2010   • COLONOSCOPY  2016   • MASTECTOMY Right 06/23/2020    [facility]  [facility]   • MASTECTOMY W/ SENTINEL NODE BIOPSY Right 06/23/2020    Procedure: MASTECTOMY WITH SENTINEL NODE BIOPSY RIGHT, PORT PLACEMENT;  Surgeon: Rachel Baker MD;  Location: [facility];  Service: General;  Laterality: Right;   • US GUIDED FINE NEEDLE ASPIRATION  01/15/2020   • VENOUS ACCESS DEVICE (PORT) INSERTION Left 01/31/2020         CURRENT MEDICATIONS       Current Facility-Administered Medications:   •  acetaminophen (TYLENOL) tablet 650 mg, 650 mg, Oral, Q4H PRN, Kang Hull PA-C  •  anastrozole (ARIMIDEX) tablet 1 mg, 1 mg, Oral, Daily, Kang Hull PA-C  •  aspirin EC tablet 81 mg, 81 mg, Oral, Daily, Kang Hull PA-C  •  buPROPion XL (WELLBUTRIN XL) 24 hr tablet 150 mg, 150 mg, Oral, Daily, Kang Hull PA-C  •  dextrose (D50W) (25 g/50 mL) IV injection 25 g, 25 g, Intravenous, Q15 Min PRN, Kang Hull PA-C  •  dextrose (GLUTOSE) oral gel 15 g, 15 g, Oral, Q15 Min PRN,  Kang Hull PA-C  •  DULoxetine (CYMBALTA) DR capsule 60 mg, 60 mg, Oral, Daily, Kang Hull PA-C  •  escitalopram (LEXAPRO) tablet 20 mg, 20 mg, Oral, Daily, Kang Hull PA-C  •  glucagon (human recombinant) (GLUCAGEN DIAGNOSTIC) injection 1 mg, 1 mg, Intramuscular, Q15 Min PRN, Kang Hull PA-C  •  heparin 82426 units/250 mL (100 units/mL) in 0.45 % NaCl infusion, 13 Units/kg/hr, Intravenous, Titrated, Chris Chapa PA, Last Rate: 14.69 mL/hr at 11/16/22 2149, 13 Units/kg/hr at 11/16/22 2149  •  hydroCHLOROthiazide (HYDRODIURIL) tablet 25 mg, 25 mg, Oral, Daily, Kang Hull PA-C  •  insulin detemir (LEVEMIR) injection 20 Units, 20 Units, Subcutaneous, Daily, Kang Hull PA-C  •  Insulin Lispro (humaLOG) injection 0-14 Units, 0-14 Units, Subcutaneous, TID AC, Kang Hull PA-C  •  lisinopril (PRINIVIL,ZESTRIL) tablet 40 mg, 40 mg, Oral, Q24H, Kang Hull PA-C  •  melatonin tablet 5 mg, 5 mg, Oral, Nightly PRN, Kang Hull PA-C, 5 mg at 11/17/22 0131  •  montelukast (SINGULAIR) tablet 10 mg, 10 mg, Oral, Nightly, Kang Hull PA-C  •  ondansetron (ZOFRAN) tablet 4 mg, 4 mg, Oral, Q6H PRN **OR** ondansetron (ZOFRAN) injection 4 mg, 4 mg, Intravenous, Q6H PRN, Kang Hull PA-C  •  pantoprazole (PROTONIX) EC tablet 40 mg, 40 mg, Oral, Daily, Kang Hull PA-C  •  Pharmacy to Dose Heparin, , Does not apply, Continuous PRN, Chris Chapa PA  •  potassium chloride (MICRO-K) CR capsule 40 mEq, 40 mEq, Oral, PRN **OR** potassium chloride (KLOR-CON) packet 40 mEq, 40 mEq, Oral, PRN **OR** potassium chloride 10 mEq in 100 mL IVPB, 10 mEq, Intravenous, Q1H PRN, Kang Hull PA-C  •  pregabalin (LYRICA) capsule 150 mg, 150 mg, Oral, BID, Camille Cazares, DO  •  sodium chloride 0.9 % flush 10 mL, 10 mL, Intravenous, PRN, Joe Pereira MD  •  sodium chloride  0.9 % flush 10 mL, 10 mL, Intravenous, Q12H, Kang Hull PA-C  •  sodium chloride 0.9 % flush 10 mL, 10 mL, Intravenous, PRN, Kang Hull PA-C    ALLERGIES     Cashew nut, Cashew nut (anacardium occidentale) skin test, Naproxen, Penicillins, Pistachio nut, and Pistachio nut (diagnostic)    FAMILY HISTORY       Family History   Problem Relation Age of Onset   • Breast cancer Mother 64        recurrance at age 76   • Heart disease Mother    • Hypertension Father    • Lung cancer Father    • Ovarian cancer Neg Hx    • Colon cancer Neg Hx    • Colon polyps Neg Hx           SOCIAL HISTORY       Social History     Socioeconomic History   • Marital status:    Tobacco Use   • Smoking status: Former     Packs/day: 1.00     Years: 20.00     Pack years: 20.00     Types: Cigarettes   • Smokeless tobacco: Never   • Tobacco comments:     quit ~4/15/2021 (was smoking 1 PPD)   Vaping Use   • Vaping Use: Never used   Substance and Sexual Activity   • Alcohol use: Not Currently     Comment: occasionally a couple times a month   • Drug use: Never   • Sexual activity: Defer         PHYSICAL EXAM    (up to 7 for level 4, 8 or more for level 5)   Physical Exam  Vitals and nursing note reviewed.   Constitutional:       General: She is not in acute distress.     Appearance: Normal appearance. She is well-developed. She is not ill-appearing or toxic-appearing.   HENT:      Head: Normocephalic and atraumatic.      Nose: Nose normal.      Mouth/Throat:      Mouth: Mucous membranes are moist.   Eyes:      Extraocular Movements: Extraocular movements intact.   Cardiovascular:      Rate and Rhythm: Normal rate and regular rhythm.   Pulmonary:      Effort: Pulmonary effort is normal. No respiratory distress.      Breath sounds: Normal breath sounds.   Chest:      Chest wall: Tenderness present.   Abdominal:      General: There is no distension.      Palpations: Abdomen is soft.   Musculoskeletal:         General:  Normal range of motion.      Cervical back: Normal range of motion.   Skin:     General: Skin is warm and dry.   Neurological:      General: No focal deficit present.      Mental Status: She is alert.   Psychiatric:         Behavior: Behavior normal.         Thought Content: Thought content normal.         Judgment: Judgment normal.          DIAGNOSTIC RESULTS     EKG: All EKGs are interpreted by the Emergency Department Physician who either signs or Co-signs this chart in the absence of a cardiologist.    ECG 12 Lead ED Triage Standing Order; SOA   Preliminary Result   Test Reason : ED Triage Standing Order~   Blood Pressure :   */*   mmHG   Vent. Rate :  93 BPM     Atrial Rate :  93 BPM      P-R Int : 128 ms          QRS Dur :  92 ms       QT Int : 374 ms       P-R-T Axes :  29 -26  14 degrees      QTc Int : 465 ms      Normal sinus rhythm   Minimal voltage criteria for LVH, may be normal variant   Borderline ECG   When compared with ECG of 19-JAN-2021 16:30,   No significant change was found      Referred By: EDMD           Confirmed By:           RADIOLOGY:   Non-plain film images such as CT, Ultrasound and MRI are read by the radiologist. Plain radiographic images are visualized and preliminarily interpreted by the emergency physician with the below findings:      [x] Radiologist's Report Reviewed:  CT Angiogram Chest   Final Result   1.  Suspected small distal segmental and subsegmental emboli in the   bilateral lower lobes with new linear opacities in these locations which   could indicate atelectasis or infarcts.   2.  No other significant central pulmonary artery filling defect is seen   at this time.   3.  Status post right mastectomy and reconstruction with small amount of   soft tissue gas in the inferior right chest wall, consistent with recent   surgery.           Findings were called to the ordering provider by Dr. Mcbride 11/16/2022   8:44 PM       This report was finalized on 11/16/2022 8:45 PM by  Tommy Mcbride MD.          XR Chest 1 View   Final Result   1.  The heart appears mildly enlarged which could be related to   cardiomegaly or pericardial effusion.   2.  No radiographic findings of acute pulmonary abnormality.       This report was finalized on 11/16/2022 7:33 PM by Tommy Mcbride MD.                ED BEDSIDE ULTRASOUND:   Performed by ED Physician - none    LABS:    I have reviewed and interpreted all of the currently available lab results from this visit (if applicable):  Results for orders placed or performed during the hospital encounter of 11/16/22   COVID-19 and FLU A/B PCR - Swab, Nasopharynx    Specimen: Nasopharynx; Swab   Result Value Ref Range    COVID19 Not Detected Not Detected - Ref. Range    Influenza A PCR Not Detected Not Detected    Influenza B PCR Not Detected Not Detected   Comprehensive Metabolic Panel    Specimen: Blood   Result Value Ref Range    Glucose 218 (H) 65 - 99 mg/dL    BUN 13 6 - 20 mg/dL    Creatinine 0.58 0.57 - 1.00 mg/dL    Sodium 137 136 - 145 mmol/L    Potassium 3.1 (L) 3.5 - 5.2 mmol/L    Chloride 95 (L) 98 - 107 mmol/L    CO2 29.0 22.0 - 29.0 mmol/L    Calcium 9.0 8.6 - 10.5 mg/dL    Total Protein 7.3 6.0 - 8.5 g/dL    Albumin 3.30 (L) 3.50 - 5.20 g/dL    ALT (SGPT) 28 1 - 33 U/L    AST (SGOT) 27 1 - 32 U/L    Alkaline Phosphatase 164 (H) 39 - 117 U/L    Total Bilirubin 0.3 0.0 - 1.2 mg/dL    Globulin 4.0 gm/dL    A/G Ratio 0.8 g/dL    BUN/Creatinine Ratio 22.4 7.0 - 25.0    Anion Gap 13.0 5.0 - 15.0 mmol/L    eGFR 113.9 >60.0 mL/min/1.73   BNP    Specimen: Blood   Result Value Ref Range    proBNP 101.2 0.0 - 450.0 pg/mL   Troponin    Specimen: Blood   Result Value Ref Range    Troponin T <0.010 0.000 - 0.030 ng/mL   CBC Auto Differential    Specimen: Blood   Result Value Ref Range    WBC 12.13 (H) 3.40 - 10.80 10*3/mm3    RBC 3.80 3.77 - 5.28 10*6/mm3    Hemoglobin 10.9 (L) 12.0 - 15.9 g/dL    Hematocrit 33.7 (L) 34.0 - 46.6 %    MCV 88.7 79.0 - 97.0 fL     MCH 28.7 26.6 - 33.0 pg    MCHC 32.3 31.5 - 35.7 g/dL    RDW 13.2 12.3 - 15.4 %    RDW-SD 42.5 37.0 - 54.0 fl    MPV 9.2 6.0 - 12.0 fL    Platelets 338 140 - 450 10*3/mm3    Neutrophil % 76.7 (H) 42.7 - 76.0 %    Lymphocyte % 16.2 (L) 19.6 - 45.3 %    Monocyte % 4.5 (L) 5.0 - 12.0 %    Eosinophil % 1.4 0.3 - 6.2 %    Basophil % 0.2 0.0 - 1.5 %    Immature Grans % 1.0 (H) 0.0 - 0.5 %    Neutrophils, Absolute 9.31 (H) 1.70 - 7.00 10*3/mm3    Lymphocytes, Absolute 1.96 0.70 - 3.10 10*3/mm3    Monocytes, Absolute 0.55 0.10 - 0.90 10*3/mm3    Eosinophils, Absolute 0.17 0.00 - 0.40 10*3/mm3    Basophils, Absolute 0.02 0.00 - 0.20 10*3/mm3    Immature Grans, Absolute 0.12 (H) 0.00 - 0.05 10*3/mm3    nRBC 0.0 0.0 - 0.2 /100 WBC   Heparin Anti-Xa    Specimen: Blood   Result Value Ref Range    Heparin Anti-Xa (UFH) 0.10 (L) 0.30 - 0.70 IU/ml   Protime-INR    Specimen: Blood   Result Value Ref Range    Protime 14.2 11.4 - 14.4 Seconds    INR 1.10 0.84 - 1.13   aPTT    Specimen: Blood   Result Value Ref Range    PTT 30.8 (L) 60.0 - 90.0 seconds   Hemoglobin A1c    Specimen: Blood   Result Value Ref Range    Hemoglobin A1C 7.90 (H) 4.80 - 5.60 %   Magnesium    Specimen: Blood   Result Value Ref Range    Magnesium 1.6 1.6 - 2.6 mg/dL   ECG 12 Lead ED Triage Standing Order; SOA   Result Value Ref Range    QT Interval 374 ms    QTC Interval 465 ms   Green Top (Gel)   Result Value Ref Range    Extra Tube Hold for add-ons.    Lavender Top   Result Value Ref Range    Extra Tube hold for add-on    Gold Top - SST   Result Value Ref Range    Extra Tube Hold for add-ons.    Gray Top   Result Value Ref Range    Extra Tube Hold for add-ons.    Light Blue Top   Result Value Ref Range    Extra Tube Hold for add-ons.         All other labs were within normal range or not returned as of this dictation.      EMERGENCY DEPARTMENT COURSE and DIFFERENTIAL DIAGNOSIS/MDM:   Vitals:    Vitals:    11/16/22 2100 11/16/22 2356 11/17/22 0000 11/17/22 0047    BP: 134/77  116/77 112/63   BP Location:    Left arm   Patient Position:    Lying   Pulse: 89  93 89   Resp:    18   Temp:    99.2 °F (37.3 °C)   TempSrc:    Oral   SpO2: 92% 94% (!) 89% 94%   Weight:       Height:           ED Course as of 11/17/22 0218   Wed Nov 16, 2022 2041 Notified by radiology that patient has positive CTA for pulmonary embolism [JG]   2123 Case reviewed with Dr. Pereira who is agreeable on disposition for admission and initiation of heparin bolus and drip. [JG]   2128 Hospitalist consulted for admission [JG]   Thu Nov 17, 2022 0212 This patient presents with dyspnea, most likely secondary to recent surgery. Differential diagnosis includes acute cardiac etiologies such as PE, ACS, CHF, pericardial effusion / tamponade . Presentation not consistent with acute respiratory etiologies to include pneumothorax , asthma, COPD exacerbation, allergic etiologies, or infectious etiologies such as PNA. Outpatient labs demonstrated an elevated d-dimer. CTA chest demonstrates suspected small distal segmental and subsegmental emboli in the bilateral lower lobes with new linear opacities in these locations which could indicate atelectasis or infarcts. Remainder of labs and workup in ED without acute or emergent abnormalities. Heparin bolus and drip initiated in ED. Hospitalist consulted for admission and agreeable to accept patient. Patient agreeable to plan of care and hospital admission for further evaluation and treatment of her newly diagnosed PE. [JG]      ED Course User Index  [JG] Chris Chapa PA       MDM  Number of Diagnoses or Management Options  Acute pulmonary embolism without acute cor pulmonale, unspecified pulmonary embolism type (HCC): new, needed workup  Shortness of breath: established, worsening     Amount and/or Complexity of Data Reviewed  Clinical lab tests: reviewed  Tests in the radiology section of CPT®: reviewed  Discuss the patient with other providers: yes    Risk of  Complications, Morbidity, and/or Mortality  Presenting problems: high  Diagnostic procedures: moderate  Management options: moderate    Patient Progress  Patient progress: stable         MEDICATIONS ADMINISTERED IN ED:  Medications   sodium chloride 0.9 % flush 10 mL (has no administration in time range)   heparin 97325 units/250 mL (100 units/mL) in 0.45 % NaCl infusion (13 Units/kg/hr × 113 kg Intravenous New Bag 11/16/22 2149)   Pharmacy to Dose Heparin (has no administration in time range)   potassium chloride (MICRO-K) CR capsule 40 mEq (40 mEq Oral Incomplete 11/17/22 0027)     Or   potassium chloride (KLOR-CON) packet 40 mEq ( Oral Not Given:  See Alt 11/17/22 0027)     Or   potassium chloride 10 mEq in 100 mL IVPB ( Intravenous Not Given:  See Alt 11/17/22 0027)   dextrose (GLUTOSE) oral gel 15 g (has no administration in time range)   dextrose (D50W) (25 g/50 mL) IV injection 25 g (has no administration in time range)   glucagon (human recombinant) (GLUCAGEN DIAGNOSTIC) injection 1 mg (has no administration in time range)   Insulin Lispro (humaLOG) injection 0-14 Units (has no administration in time range)   pantoprazole (PROTONIX) EC tablet 40 mg (has no administration in time range)   montelukast (SINGULAIR) tablet 10 mg (10 mg Oral Not Given 11/17/22 0123)   lisinopril (PRINIVIL,ZESTRIL) tablet 40 mg (has no administration in time range)   buPROPion XL (WELLBUTRIN XL) 24 hr tablet 150 mg (has no administration in time range)   aspirin EC tablet 81 mg (has no administration in time range)   anastrozole (ARIMIDEX) tablet 1 mg (has no administration in time range)   sodium chloride 0.9 % flush 10 mL (10 mL Intravenous Not Given 11/17/22 0123)   sodium chloride 0.9 % flush 10 mL (has no administration in time range)   acetaminophen (TYLENOL) tablet 650 mg (has no administration in time range)   melatonin tablet 5 mg (5 mg Oral Given 11/17/22 0131)   ondansetron (ZOFRAN) tablet 4 mg (has no administration in  time range)     Or   ondansetron (ZOFRAN) injection 4 mg (has no administration in time range)   hydroCHLOROthiazide (HYDRODIURIL) tablet 25 mg (has no administration in time range)   insulin detemir (LEVEMIR) injection 20 Units (has no administration in time range)   escitalopram (LEXAPRO) tablet 20 mg (has no administration in time range)   DULoxetine (CYMBALTA) DR capsule 60 mg (has no administration in time range)   pregabalin (LYRICA) capsule 150 mg (has no administration in time range)   iopamidol (ISOVUE-370) 76 % injection 100 mL (73 mL Intravenous Given 11/16/22 2000)   heparin (porcine) injection 9,040 Units (9,040 Units Intravenous Given 11/16/22 2149)   acetaminophen (TYLENOL) tablet 1,000 mg (1,000 mg Oral Given 11/16/22 2326)       PROCEDURES:  Procedures          CRITICAL CARE TIME    Total Critical Care time was 0 minutes, excluding separately reportable procedures.   There was a high probability of clinically significant/life threatening deterioration in the patient's condition which required my urgent intervention.      FINAL IMPRESSION      1. Shortness of breath    2. Acute pulmonary embolism without acute cor pulmonale, unspecified pulmonary embolism type (HCC)          DISPOSITION/PLAN     ED Disposition     ED Disposition   Decision to Admit    Condition   --    Comment   Level of Care: Telemetry [5]   Diagnosis: Shortness of breath [786.05.ICD-9-CM]   Admitting Physician: ANABELL KENNY [[id number]]   Attending Physician: ANABELL KENNY [[id number]]   Bed Request Comments: tele               Comment: Please note this report has been produced using speech recognition software.      ROBBIN Ott Jason C, PA  11/17/22 0216

## 2022-11-17 NOTE — THERAPY EVALUATION
Patient Name: Josefina Rodriguez  : 1975    MRN: [id number]                              Today's Date: 2022       Admit Date: 2022    Visit Dx:     ICD-10-CM ICD-9-CM   1. Shortness of breath  R06.02 786.05   2. 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)   • Encounter for central line care   • 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)   • Shortness of breath   • Bilateral pulmonary embolism (HCC)   • Essential hypertension   • Type 2 diabetes mellitus (HCC)   • ANIA (generalized anxiety disorder)   • Hypokalemia   • Leukocytosis   • Hypoxia   • Obesity, Class III, BMI 40-49.9 (morbid obesity) (HCC)     Past Medical History:   Diagnosis Date   • Anxiety    • Depression    • Diabetes mellitus (HCC)    • Drug therapy     still taking orally   • Hiatal hernia    • History of radiation therapy 2021    right 7th rib   • Hx of radiation therapy     right breast   • Hypertension    • Malignant neoplasm of overlapping sites of right breast in female, estrogen receptor positive (HCC) 2020   • PCOS (polycystic ovarian syndrome)    • Wears glasses      Past Surgical History:   Procedure Laterality Date   • ABDOMINOPLASTY     • BONE BIOPSY     • BREAST BIOPSY Right 2019    right axillary LN FNA   • BREAST BIOPSY Right 01/15/2020    US bx   • BREAST BIOPSY Right 01/15/2020    [facility] axillary LN FNA   • BREAST RECONSTRUCTION     •  CHOLECYSTECTOMY  03/2010   • COLONOSCOPY  2016   • MASTECTOMY Right 06/23/2020    [facility]  [facility]   • MASTECTOMY W/ SENTINEL NODE BIOPSY Right 06/23/2020    Procedure: MASTECTOMY WITH SENTINEL NODE BIOPSY RIGHT, PORT PLACEMENT;  Surgeon: Rachel Baker MD;  Location: [facility];  Service: General;  Laterality: Right;   • US GUIDED FINE NEEDLE ASPIRATION  01/15/2020   • VENOUS ACCESS DEVICE (PORT) INSERTION Left 01/31/2020      General Information     Row Name 11/17/22 1018          OT Time and Intention    Document Type evaluation  -     Mode of Treatment occupational therapy  -     Row Name 11/17/22 1018          General Information    Patient Profile Reviewed yes  -     Prior Level of Function min assist:;all household mobility;gait;transfer;bed mobility;ADL's  since surgery on 11/9  -     Existing Precautions/Restrictions fall;other (see comments)   Per RN pt is to avoid engagement of abdominal muscles; [place] drain x3  -     Barriers to Rehab medically complex;previous functional deficit  -     Row Name 11/17/22 1018          Living Environment    People in Home significant other;child(alec), dependent;other (see comments)  12 YO daughter  -     Row Name 11/17/22 1018          Home Main Entrance    Number of Stairs, Main Entrance none  -HM     Stair Railings, Main Entrance none  -HM     Row Name 11/17/22 1018          Stairs Within Home, Primary    Number of Stairs, Within Home, Primary none  -HM     Stair Railings, Within Home, Primary none  -HM     Stairs Comment, Within Home, Primary Pt reports tub shower.  -     Row Name 11/17/22 1018          Cognition    Orientation Status (Cognition) oriented x 4  -HM     Row Name 11/17/22 1018          Safety Issues, Functional Mobility    Safety Issues Affecting Function (Mobility) safety precautions follow-through/compliance;safety precaution awareness  -     Impairments Affecting Function (Mobility) balance;pain;strength;range of motion  (ROM);endurance/activity tolerance;shortness of breath  -           User Key  (r) = Recorded By, (t) = Taken By, (c) = Cosigned By    Initials Name Provider Type     Melissa Avendano OT Occupational Therapist                 Mobility/ADL's     Row Name 11/17/22 1019          Bed Mobility    Bed Mobility scooting/bridging;supine-sit  -     Scooting/Bridging [place] (Bed Mobility) minimum assist (75% patient effort);1 person assist;verbal cues  -     Supine-Sit [place] (Bed Mobility) minimum assist (75% patient effort);1 person assist;verbal cues  -     Bed Mobility, Safety Issues decreased use of arms for pushing/pulling;decreased use of legs for bridging/pushing;impaired trunk control for bed mobility  -     Assistive Device (Bed Mobility) bed rails;head of bed elevated  -     Comment, (Bed Mobility) Educated pt on log roll technique. Pt attempted to complete however reports laying on side is very painful due to drains. Pt advanced to EOB with minAx1. Issued leg  and educated pt on use for bed mobility to decrease pain when advancing to EOB and returning to supine.  -     Row Name 11/17/22 1019          Transfers    Transfers sit-stand transfer  -     Row Name 11/17/22 1019          Sit-Stand Transfer    Sit-Stand [place] (Transfers) contact guard;verbal cues  -     Assistive Device (Sit-Stand Transfers) other (see comments)  L UE support  -     Row Name 11/17/22 1019          Functional Mobility    Functional Mobility- Ind. Level minimum assist (75% patient effort);verbal cues required  -     Functional Mobility- Device other (see comments)  L UE support  -     Functional Mobility-Distance (Feet) 10  -     Functional Mobility- Safety Issues step length decreased;weight-shifting ability decreased  -     Functional Mobility- Comment Would benefit from use of RW.  -     Row Name 11/17/22 1019          Activities of Daily Living    BADL Assessment/Intervention  lower body dressing;bathing;toileting  -     Row Name 11/17/22 1019          Lower Body Dressing Assessment/Training    [place] Level (Lower Body Dressing) don;doff;socks;moderate assist (50% patient effort);verbal cues  -     Position (Lower Body Dressing) supported sitting  -     Comment, (Lower Body Dressing) Pt reports difficulty completing ADLS since surgery due to pain. OT issued reacher, sock aid, and shoe horn and educated pt on use for completion of all LBD.  -     Row Name 11/17/22 1019          Bathing Assessment/Intervention    Comment, (Bathing) OT issued LH sponge and educated pt on use for completion of all LBB.  -     Row Name 11/17/22 1019          Toileting Assessment/Training    Comment, (Toileting) OT issued toilet tongs and educated pt on use for completion of all ron care.  -           User Key  (r) = Recorded By, (t) = Taken By, (c) = Cosigned By    Initials Name Provider Type     Melissa Avendano OT Occupational Therapist               Obj/Interventions     Row Name 11/17/22 1024          Sensory Assessment (Somatosensory)    Sensory Assessment (Somatosensory) sensation intact  -     Row Name 11/17/22 1024          Vision Assessment/Intervention    Visual Impairment/Limitations WFL  -     Row Name 11/17/22 1024          Range of Motion Comprehensive    General Range of Motion bilateral upper extremity ROM WFL  -     Comment, General Range of Motion Decreased ROM due to pain however functional.  -     Row Name 11/17/22 1024          Strength Comprehensive (MMT)    General Manual Muscle Testing (MMT) Assessment upper extremity strength deficits identified  -     Comment, General Manual Muscle Testing (MMT) Assessment Not formally assessed due to pain  and recent surgery.  -     Row Name 11/17/22 1024          Motor Skills    Motor Skills coordination  -     Coordination WFL  -     Row Name 11/17/22 1024          Balance    Balance Assessment sitting static  balance;sitting dynamic balance;standing static balance;standing dynamic balance  -     Static Sitting Balance independent  -     Dynamic Sitting Balance independent  -     Position, Sitting Balance unsupported;sitting edge of bed  -     Static Standing Balance contact guard;verbal cues  -     Dynamic Standing Balance contact guard;verbal cues  -     Position/Device Used, Standing Balance supported  L UE support  -     Balance Interventions sitting;occupation based/functional task  -           User Key  (r) = Recorded By, (t) = Taken By, (c) = Cosigned By    Initials Name Provider Type     Melissa Avendano, OT Occupational Therapist               Goals/Plan     Row Name 11/17/22 1036          Transfer Goal 1 (OT)    Activity/Assistive Device (Transfer Goal 1, OT) sit-to-stand/stand-to-sit;bed-to-chair/chair-to-bed;toilet  -     [place] Level/Cues Needed (Transfer Goal 1, OT) supervision required  -     Time Frame (Transfer Goal 1, OT) by discharge;long term goal (LTG)  -HM     Progress/Outcome (Transfer Goal 1, OT) goal ongoing  -     Row Name 11/17/22 1036          Dressing Goal 1 (OT)    Activity/Device (Dressing Goal 1, OT) lower body dressing;long-handled shoe horn;reacher;sock-aid  -     [place]/Cues Needed (Dressing Goal 1, OT) modified independence  -     Time Frame (Dressing Goal 1, OT) by discharge;long term goal (LTG)  -     Progress/Outcome (Dressing Goal 1, OT) goal ongoing  -     Row Name 11/17/22 1036          Toileting Goal 1 (OT)    Activity/Device (Toileting Goal 1, OT) adjust/manage clothing;perform perineal hygiene;toilet paper aid  -     [place] Level/Cues Needed (Toileting Goal 1, OT) minimum assist (75% or more patient effort);verbal cues required  -     Time Frame (Toileting Goal 1, OT) by discharge;long term goal (LTG)  -HM     Progress/Outcome (Toileting Goal 1, OT) goal ongoing  [facility]     Row Name 11/17/22 1036          Therapy  Assessment/Plan (OT)    Planned Therapy Interventions (OT) adaptive equipment training;BADL retraining;functional balance retraining;occupation/activity based interventions;ROM/therapeutic exercise;transfer/mobility retraining  -           User Key  (r) = Recorded By, (t) = Taken By, (c) = Cosigned By    Initials Name Provider Type     Juan Alberto Melissa K, OT Occupational Therapist               Clinical Impression     Row Name 11/17/22 1032          Pain Assessment    Pretreatment Pain Rating 0/10 - no pain  -     Posttreatment Pain Rating 0/10 - no pain  -     Pain Intervention(s) Ambulation/increased activity;Repositioned  -     Row Name 11/17/22 1032          Plan of Care Review    Plan of Care Reviewed With patient  -     Progress improving  -     Outcome Evaluation OT eval complete. Pt is pleasent and cooperative. Limited by pain. Completes bed mobility with Mary and transfers with CGA and UE support. Pt ambulated from bed to chair with minAx1 and extra time/effort. OT issued reacher sock aid, reacher, shoe horn, LH sponge, and toilet tongs and educated pt on us for completion of ADLS. Pt required modA to dof/don socks with use of AE. Recommend d/c home with assist from family.  -     Row Name 11/17/22 1032          Therapy Assessment/Plan (OT)    Patient/Family Therapy Goal Statement (OT) Pt would like to improve and return home.  -     Rehab Potential (OT) good, to achieve stated therapy goals  -     Criteria for Skilled Therapeutic Interventions Met (OT) yes;skilled treatment is necessary  -     Therapy Frequency (OT) daily  -     Row Name 11/17/22 1032          Therapy Plan Review/Discharge Plan (OT)    Anticipated Discharge Disposition (OT) home with assist  -     Row Name 11/17/22 1032          Vital Signs    Pre Systolic BP Rehab --  RN cleared for tx; VSS  -     O2 Delivery Pre Treatment room air  -HM     O2 Delivery Intra Treatment room air  -HM     O2 Delivery Post  Treatment room air  -HM     Pre Patient Position Supine  -HM     Intra Patient Position Standing  -HM     Post Patient Position Sitting  -HM     Row Name 11/17/22 1032          Positioning and Restraints    Pre-Treatment Position in bed  -HM     Post Treatment Position chair  -HM     In Chair notified nsg;reclined;call light within reach;encouraged to call for assist;exit alarm on  -HM           User Key  (r) = Recorded By, (t) = Taken By, (c) = Cosigned By    Initials Name Provider Type     Melissa Avendano OT Occupational Therapist               Outcome Measures     Row Name 11/17/22 1037          How much help from another is currently needed...    Putting on and taking off regular lower body clothing? 2  -HM     Bathing (including washing, rinsing, and drying) 3  -HM     Toileting (which includes using toilet bed pan or urinal) 3  -HM     Putting on and taking off regular upper body clothing 3  -HM     Taking care of personal grooming (such as brushing teeth) 3  -HM     Eating meals 3  -     AM-PAC 6 Clicks Score (OT) 17  -     Row Name 11/17/22 1037          Functional Assessment    Outcome Measure Options AM-PAC 6 Clicks Daily Activity (OT)  -HM           User Key  (r) = Recorded By, (t) = Taken By, (c) = Cosigned By    Initials Name Provider Type     Melissa Avendano OT Occupational Therapist                Occupational Therapy Education     Title: PT OT SLP Therapies (In Progress)     Topic: Occupational Therapy (In Progress)     Point: ADL training (Done)     Description:   Instruct learner(s) on proper safety adaptation and remediation techniques during self care or transfers.   Instruct in proper use of assistive devices.              Learning Progress Summary           Patient Acceptance, E,TB,D, VU,NR by  at 11/17/2022 1037                   Point: Home exercise program (Not Started)     Description:   Instruct learner(s) on appropriate technique for monitoring, assisting and/or  progressing therapeutic exercises/activities.              Learner Progress:  Not documented in this visit.          Point: Precautions (Done)     Description:   Instruct learner(s) on prescribed precautions during self-care and functional transfers.              Learning Progress Summary           Patient Acceptance, E,TB,D, VU,NR by  at 11/17/2022 1037                   Point: Body mechanics (Done)     Description:   Instruct learner(s) on proper positioning and spine alignment during self-care, functional mobility activities and/or exercises.              Learning Progress Summary           Patient Acceptance, E,TB,D, VU,NR by  at 11/17/2022 1037                               User Key     Initials Effective Dates Name Provider Type Discipline     10/25/22 -  Melissa Avendano, OT Occupational Therapist OT              OT Recommendation and Plan  Planned Therapy Interventions (OT): adaptive equipment training, BADL retraining, functional balance retraining, occupation/activity based interventions, ROM/therapeutic exercise, transfer/mobility retraining  Therapy Frequency (OT): daily  Plan of Care Review  Plan of Care Reviewed With: patient  Progress: improving  Outcome Evaluation: OT eval complete. Pt is pleasent and cooperative. Limited by pain. Completes bed mobility with Mary and transfers with CGA and UE support. Pt ambulated from bed to chair with minAx1 and extra time/effort. OT issued reacher sock aid, reacher, shoe horn, LH sponge, and toilet tongs and educated pt on us for completion of ADLS. Pt required modA to dof/don socks with use of AE. Recommend d/c home with assist from family.     Time Calculation:    Time Calculation- OT     Row Name 11/17/22 0830             Time Calculation- OT    OT Start Time 0830  -      OT Received On 11/17/22  -      OT Goal Re-Cert Due Date 11/27/22  -         Timed Charges    37324 - OT Self Care/Mgmt Minutes 30  -HM         Untimed Charges    OT Eval/Re-eval  Minutes 25  -HM         Total Minutes    Timed Charges Total Minutes 30  -HM      Untimed Charges Total Minutes 25  -HM       Total Minutes 55  -HM            User Key  (r) = Recorded By, (t) = Taken By, (c) = Cosigned By    Initials Name Provider Type     Melissa Avendano, OT Occupational Therapist              Therapy Charges for Today     Code Description Service Date Service Provider Modifiers Qty    96341228536 HC OT SELF CARE/MGMT/TRAIN EA 15 MIN 11/17/2022 Melissa Avendano, OT GO 2    [id number]  OT EVAL LOW COMPLEXITY 2 11/17/2022 Melissa Avendano, OT GO 1               Melissa Avendano OT  11/17/2022

## 2022-11-17 NOTE — CONSULTS
"Diabetes Education    Patient Name:  Josefina Rodriguez  YOB: 1975  MRN: [id number]  Admit Date:  11/16/2022      Consult for diabetes education received for blood glucose >200. Chart reviewed. Pt was seen at bedside today. Permission given for visit.     She shares that she rec'd diabetes education when she had gestational diabetes, and reports that she takes lantus insulin (26u once per day) and humalog insulin (12-16u per meal). She has a Audrey 2 CGM that she uses to monitor her glucose. She states she is not established with an endo and collaborates with her PCP to manage her blood sugars.    Target blood glucose readings and A1c goals per ADA were reviewed. Reviewed with patient current A1c 7.9 and discussed its significance. Signs, symptoms, and treatment of hyperglycemia and hypoglycemia were discussed. Pt denies need for further teaching at this time. Encouraged her to continue follow-up with her providers, using her CGM and taking her insulin as ordered, and contact her PCP if she has frequent or prolonged high or low blood sugars.     Provided patient with copy of [employer]'s \"What is A1c\" handout.     Will sign off at this time, but please re-consult if further educational needs arise during this hospitalization.    Thank you for this consult.     Electronically signed by:  Margarita Anthony RN  11/17/22 13:05 EST  "

## 2022-11-18 ENCOUNTER — DOCUMENTATION (OUTPATIENT)
Dept: SPEECH THERAPY | Facility: HOSPITAL | Age: 47
End: 2022-11-18

## 2022-11-18 DIAGNOSIS — F09 ACQUIRED COGNITIVE DYSFUNCTION: Primary | ICD-10-CM

## 2022-11-18 NOTE — THERAPY DISCHARGE NOTE
Speech Language Pathology Discharge Summary         Patient Name: Josefina Rodriguez  : 1975  MRN: [id number]    Today's Date: 2022       SLP OP Goals     Row Name [id number]          Goal Type Needed    Goal Type Needed Memory;Attention/Orientation;Other Adult Goals  -HG        Subjective Comments    Subjective Comments Pt seen for eval and ~13 tx sessions. Insurance exhausted.  -HG        Memory Goals    Patient will be able to remember information needed to return to work and function on work-related tasks 100%:;with cues  -HG     Status: Patient will be able to remember information needed to return to work and function on work-related tasks Progressing as expected  -HG     Comments: Patient will be able to remember information needed to return to work and function on work-related tasks 22: Pt states that she continues to require a nap ~2x/week. 4/15/22: Pt continues to use breaks including nap breaks in order to help focus and complete her work.   22: Pt continues to require cues and breaks.  -HG     Patient will demonstrate improved ability to recall information by immediately recalling a series of words 90%:;unrelated;after delay;without cues  -HG     Status: Patient will demonstrate improved ability to recall information by immediately recalling a series of words Revised  -HG     Comments: Patient will demonstrate improved ability to recall information by immediately recalling a series of words 22: Immediate recall of picture mod-complex scene: pt was 85% accurate.  22: Immediate recall of complex figure and pt was 100% accurate. 22: Immediate recall of names and faces: pt was 8/10. 22: RBANS Immediate recall score of 123 is up from 114. 22: Name/face association and pt was 4/5. 4/15/22: Immediate recall of ABC game and pt was 100% accurate. 3/21/22: Picture scene recall and pt was 80% accurate.   Paired words and pt was 100% accurate.  Pt stated,  "\"Immediate memory kills me sometimes sometimes.\" Pt reports chunking 10 digits, 6 and 4 and is able to recall complete addresses.  3/4/22: RBANS Immediate recall score remains in the High Average range. Immediate recall of a complex picture scene: pt was 85% accurate.  2/25/22: Immediate recall of picture scene and pt was 100% accurate. 2/11/22: Immediate recall for card game and pt was 90% accurate. 1/14/22: Immediate recall for new card game and pt was 90% accurate. 12/23/21: Recall of picture scene: pt was 100% accurate. Word association and pt was 100% accurat.e  -HG     Patient’s memory skills will be enhanced as reported by patient by utilizing internal memory strategies to recall up to 3 pieces of information after a 5- minute delay 90%:;without cues  -HG     Status: Patient’s memory skills will be enhanced as reported by patient by utilizing internal memory strategies to recall up to 3 pieces of information after a 5- minute delay Revised  -HG     Comments: Patient’s memory skills will be enhanced as reported by patient by utilizing internal memory strategies to recall up to 3 pieces of information after a 5- minute delay 7/7/22: Delayed recall of 50-65 word paragraphs and pt was 90% accurate.  6/27/22: Delayed recall of complex figure and pt was 100% accurate.  6/20/22: Delayed recall of 5 figures: pt was 5/5, names and faces delayed and pt was 5/5.  5/19/22:RBANS Delayed recall of 118 is down slightly from 122.  5/12/22: Delayed recall of 10 unrelated words, using a story for recall with 100% acc. 4/28/22: Delayed recall of 8 un-related words and pt was 8/8.  Fxnl messges and pt was 3/3 x 2.   4/21/22: Delayed recall of 7 un-related words and pt was 7/7 x 3, ABC game from previous session and pt was 95% accurate.  4/15/22: Delayed recall of 6 un-related words and pt was 4/6 and improved to 6/6.   3/21/22: Delayed recall of 5 un-related words and pt was 4/5.   3/4/22: RBANS Delayed recall score of 122 " "improved to a Superior range. 2/25/22: Delayed recall of 8 related words and pt was 8/8 x 3. 2/11/22:  Delayed recall of 7 related words and pt was 7/7x 2. 1/14/22: Delayed recall of 6 related words: pt was 4/6. After review, pt was 6/6 x 3.  12/23/21: Delayed recall of 4 un-related words: pt was 4/4. Progressed to 5/5 x 3.  -HG     Patient’s memory skills will be enhanced as reported by patient by using external memory aides 90%:;without cues  -HG     Status: Patient’s memory skills will be enhanced as reported by patient by using external memory aides Achieved  -HG     Comments: Patient’s memory skills will be enhanced as reported by patient by using external memory aides 5/19/22: Pt continues to use external strategies to assist with recall. 5/12/22: Patient reported improvement in recall with use of journaling 4/15/22: Pt continues to journal for memory and counseling notes. 3/21/22: Pt states that her daughter lost her journal so she took one from SLP. 2/11/22: Pt states that she has journaled but only one day. 1/14/22: Pt has not started journaling, plans to get journal this date. 12/23/21: Pt stated she plans to get a journal.  -HG        Attention/Orientation Goals    Patient will be able to use high level cognitive skills to allow patient to return to work Independently  -HG     Status: Patient will be able to use high level cognitive skills to allow patient to return to work Progressing as expected  -HG     Comments: Patient will be able to use high level cognitive skills to allow patient to return to work 5/19/22: RBANS Attention score of 88 is down from previous 94. 3/4/22: RBANS Attention score improved to a 94 from a 91 2/11/22: Pt continues to \"lose focus\" and SLP discussed giving breaks and pt does do that.  -HG     Patient will improve attention skills by sustaining focus and participation to conversation/task 30 minute task;with intermittent cues  -HG     Status: Patient will improve attention " skills by sustaining focus and participation to conversation/task Achieved  -HG     Comments: Patient will improve attention skills by sustaining focus and participation to conversation/task 5/12/22: Sustained focus for conversational task with 90% acc. 4/21/22: Sustained focus for Scattergories and pt was 100% accurate.  2/11/22: Pt able to sustain attention during 5 min conversation, goal adjusted.  -HG     Patient will improve attention skills by sustaining consistent behavioral response during continuous and repetitive activity (e.g., listening for target words, auditory/reading comprehension tasks) with cues;30 minute task  -HG     Status: Patient will improve attention skills by sustaining consistent behavioral response during continuous and repetitive activity (e.g., listening for target words, auditory/reading comprehension tasks) Achieved  -HG     Comments: Patient will improve attention skills by sustaining consistent behavioral response during continuous and repetitive activity (e.g., listening for target words, auditory/reading comprehension tasks) 3/21/22: Card sorting by deck and the letter N and pt was 95% accurate.  2/11/22: Card sorting into deck and the letter N and pt was 75% accurate. 1/14/22: APT Test for Sustained and Complex Sustained attention and pt was above average. 12/23/21: Card ID in a field of 10, 12, 16 and pt was able to recall up to 4 cards.  -HG     Patient will improve attention skills by sustaining focus in order to actively hold and manipulate information provided (e.g., sequencing auditorily presented number series in ascending or descending order) 100%:;without cues  -HG     Status: Patient will improve attention skills by sustaining focus in order to actively hold and manipulate information provided (e.g., sequencing auditorily presented number series in ascending or descending order) Progressing as expected  -HG     Comments: Patient will improve attention skills by  sustaining focus in order to actively hold and manipulate information provided (e.g., sequencing auditorily presented number series in ascending or descending order) 6/27/22: Pt reporting completing with daughter using 4 word manipulation. 4/15/22: Opposites with a delay: Pt was 100% accurate. 12/23/21: Digit span and pt was 80-90% accurate for up to 8 digits.  -HG     Patient will improve attention skills by focusing to selective target/task when presented with competing stimuli or in a distracting environment in order to complete task 90%:;without cues  -HG     Status: Patient will improve attention skills by focusing to selective target/task when presented with competing stimuli or in a distracting environment in order to complete task Progressing as expected  -HG     Comments: Patient will improve attention skills by focusing to selective target/task when presented with competing stimuli or in a distracting environment in order to complete task 1/14/22: APT Test for Selective Attention and pt was 28/30- Above average for age.  -HG     Patient will improve attention skills by alternating or shifting focus between two different tasks in order to complete both tasks 90%:;without cues  -HG     Status: Patient will improve attention skills by alternating or shifting focus between two different tasks in order to complete both tasks Achieved  -HG     Comments: Patient will improve attention skills by alternating or shifting focus between two different tasks in order to complete both tasks 7/7/22: Alternating attention for opposite word search and pt was 100% accurate with min cues. 6/20/22: Alternating between red and black and pt was accurate with consistent verbal cues.  4/21/22: Alternating between cards face up and face down and then switching task and pt was 100% accurate. 4/15/22: Alternating attention for visual scanning two different letter marking and pt was 100% accurate.  3/21/22: Alternating attention for  adding and subtracting two cards and pt was 90% accurate. 2/11/22: Alternating attention task by sorting cards by red and black and pt was 80% accurate. 1/14/22: The APT test for Alternating attention and pt was 20/24 which was above average. The card game of Speed  and pt was 90% accurate.  -HG     Patient will improve attention skills by dividing focus and responding simultaneously to multiple tasks or in order to complete task 90%:;without cues  -HG     Status: Patient will improve attention skills by dividing focus and responding simultaneously to multiple tasks or in order to complete task Progressing as expected  -HG     Comments: Patient will improve attention skills by dividing focus and responding simultaneously to multiple tasks or in order to complete task 6/27/22: [place] in the Corner and pt was 85% accurate this date.    4/21/22: Sorting cards by suit and in numerical order and pt was 90% accurate.  3/21/22: [place] in the Corner and pt was 85% accurate. 2/11/22: Divided attention for paragraph listening and sorting and pt was 90% accurate. 1/14/22: APT test for divided attention and pt was 27/30 and fell slightly below average.  -HG     Patient will improve attention skills by modifying surrounding environment 90%:;with cues  -HG     Status: Patient will improve attention skills by modifying surrounding environment Progressing as expected  -HG     Comments: Patient will improve attention skills by modifying surrounding environment 4/28/22: Exec fxn task for attention and pt was 80% accurate.  2/25/22: During an exec fxn task, pt used notetaking strategies and referencing strategies in order to increase her accuracy. 1/14/22: Pt states that she takes break as needed during work.  -HG        Other Goals    Other Adult Goal- 1 Pt will complete thought organization tasks with 90% Accuracy.  -HG     Status: Other Adult Goal- 1 Achieved  -HG     Comments: Other Adult Goal- 1 5/19/22: RBANS Language score of 106  is down from previous 110. 3/4/22: RBANS Language score of 110 is up from 100. 2/25/22: Answering general informations and sequencing sentences and pt was 100% accurate. 12/23/21: Similarities and Differences: pt was 100% accurate.  -HG     Other Adult Goal- 2 Pt will complete reasoning and problem solving assessment with recs to follow as indicated.  -HG     Status: Other Adult Goal- 2 Achieved  -HG     Comments: Other Adult Goal- 2 4/28/22: Pt reports completing WORDLE with prompts in place. 4/15/22: WORDLE and pt was able to solve without assistance.  2/25/22: WORDLE and pt required min cues and completed with accuracy. 12/23/21: [place]bel word scramble and pt was 90% accurate.  -HG     Other Adult Goal- 3 Pt will improve RBANS Total score to a 121 placing pt in the Superior range.  -HG     Status: Other Adult Goal- 3 Progressing as expected;Revised  -HG     Comments: Other Adult Goal- 3 5/19/22: RBANS Total score of 112 is down slightly from previous 118. 3/4/22: RBANS Total score of 118 is up from a previous 106.  -HG     Other Adult Goal- 4 Pt will complete high level reasoning tasks with 90% accuracy.  -HG     Status: Other Adult Goal- 4 Progressing as expected  -HG     Comments: Other Adult Goal- 4 6/20/22: Game of Heads up and pt was 85% accurate. Deduction reasoning and pt was 100% accurate with extra time provided.  -        SLP Time Calculation    SLP Goal Re-Cert Due Date 09/18/22  -           User Key  (r) = Recorded By, (t) = Taken By, (c) = Cosigned By    Initials Name Provider Type    Daisy Graves MS CCC-SLP Speech and Language Pathologist                       Time Calculation:                    Daisy Capps MS CCC-SLP  11/18/2022

## 2022-11-28 ENCOUNTER — APPOINTMENT (OUTPATIENT)
Dept: NUTRITION | Facility: HOSPITAL | Age: 47
End: 2022-11-28

## 2022-12-19 ENCOUNTER — HOSPITAL ENCOUNTER (OUTPATIENT)
Dept: NUTRITION | Facility: HOSPITAL | Age: 47
Setting detail: RECURRING SERIES
Discharge: HOME | End: 2022-12-19

## 2022-12-19 PROCEDURE — 97804 MEDICAL NUTRITION GROUP: CPT

## 2022-12-19 NOTE — CONSULTS
Adult Outpatient Nutrition  Assessment/PES    Patient Name:  Josefina Rodriguez  YOB: 1975  MRN: [id number]    Assessment Date:  12/19/2022    This medical referred consult was provided via ZOOM as patient was unable to attend an in-office appointment today due to the COVID-19 crisis. Consent for treatment was given verbally. Patient attended 90 minute virtual group weight loss class. RD explained that the session today would provide a good foundation of nutrition principles and we will schedule a free individual follow up for about 4-6 weeks out to expand upon these principles and further individualize a nutrition plan for each participant.     Discussion    Consistency of meals: We discussed the importance of eating consistent, balanced meals to meet nutrient needs, promote a stable blood glucose level, and to promote satiety and satisfaction when eating. RD recommended each participant to eat something within about 90 minutes of waking up and to avoid going longer than 5 hours without a meal or snack while awake after this. RD explained meal patterns should be individualized from person to person. We discussed how some people prefer to meat 3 meals per day with some snacks and others prefer to eat 6 smaller meals per day.     The components of a healthy meal and snack: We discussed how the three main nutrients our bodies need are protein, carbohydrates, and fat. RD recommended the participants aim to have a source of lean protein, fiber rich carbohydrates, and heart healthy fats with each of their meals and snacks. RD provided examples, such as having an apple with peanut butter rather than just having an apple by itself. We discussed the benefits this provides, such as meeting nutrient needs, promoting a stable blood glucose level, and promoting satiety and satisfaction when eating. We reviewed what foods are significant sources of protein, carbohydrates, and fat.     The amount we  should eat per meal and per snack: RD used the Plate Method to provide a basis for portion sizes with meals, but explained to participants that we would individualize this at follow up visit. RD received permission from each class participant individually to recommended nutrient goals to aim for per meal to promote weight loss and overall health.      Applicable nutrition skills: We spent a significant portion of class discussing practical strategies to implementing the principles above into individual lifestyles. We discussed strategies to grocery shop and dedicate time to prepping meals. We discussed restaurant nutrition and how to make healthy choices when eating out. We also discussed sugar sweetened beverages and RD provided participants with strategies on how to realistically make healthier swaps.    Pt was the only one who showed up for this class; we were able to talk about needs (1,800 with 1.2 activity factor, subtracting 400 for wt loss). She is also being treated for Type 2 DM and breast cancer. She has tried WW in the past without success, she is under financial stress and typically only eats 2 meals a day and 1 snack.     Goal: Balancing dinner, 2 nights a week.    Supplements:  B-complex  B12  Vit D3  Multi    Follow up visit is scheduled for 1/19 at 11:45 AM. Thank you for this referral.      Electronically signed by:  Tess Moore RD  12/19/22 14:21 EST

## 2022-12-30 ENCOUNTER — TELEMEDICINE (OUTPATIENT)
Dept: SLEEP MEDICINE | Facility: CLINIC | Age: 47
End: 2022-12-30

## 2022-12-30 VITALS — HEIGHT: 66 IN | WEIGHT: 235 LBS | BODY MASS INDEX: 37.77 KG/M2

## 2022-12-30 DIAGNOSIS — F51.04 PSYCHOPHYSIOLOGICAL INSOMNIA: ICD-10-CM

## 2022-12-30 DIAGNOSIS — E66.9 OBESITY (BMI 30-39.9): ICD-10-CM

## 2022-12-30 DIAGNOSIS — G47.19 EXCESSIVE DAYTIME SLEEPINESS: ICD-10-CM

## 2022-12-30 DIAGNOSIS — R29.818 SUSPECTED SLEEP APNEA: ICD-10-CM

## 2022-12-30 DIAGNOSIS — R06.83 SNORING: Primary | ICD-10-CM

## 2022-12-30 PROCEDURE — 99213 OFFICE O/P EST LOW 20 MIN: CPT | Performed by: NURSE PRACTITIONER

## 2022-12-30 NOTE — PROGRESS NOTES
Sleep Clinic Video Visit Consult Note    You have chosen to receive care through a telehealth visit.  Do you consent to use a video/audio connection for your medical care today? Yes     Chief Complaint  No chief complaint on file.    Subjective     History of Present Illness:  Josefina Rodriguez is a 47 y.o. female with a history of HTN, PCOS, breast CA, DM, depression, and anxiety.  Per questionnaire the patient reports snoring, daytime sleepiness and fatigue, frequent awakening, nonrestorative sleep, sore throat, nasal allergies, frequent nighttime urination, difficulty falling and staying asleep, pain at night, sleep talking, night sweats, and lack of concentration.  This is been ongoing for 1-2 years.  Patient goes to bed typically at 10:15 PM waking at 7 AM on weekdays and weekends.  She reports an average amount of sleep of 3 hours per night.  It takes her 2-3 hours to initially get to sleep.  She does not take naps.  Patient denies use of tobacco, alcohol, or illicit drugs.  She drinks 4 cups of regular coffee and 20 ounce decaf cola daily.  Patient's bed partner reports witnessed pauses in breathing, snoring, frequent kicks and jerks, and talking in her sleep    Further details are as follows:    [place] Scale is (out of 24): Total score: 12     Estimated average amount of sleep per night: 3  Number of times she wakes up at night: 5  Difficulty falling back asleep: yes  It usually takes 2-3 hours to go to sleep.  She feels sleepy upon waking up: yes  Rotating or night shift work: no    Drowsiness/Sleepiness:  She exhibits the following:  excessive daytime sleepiness  excessive daytime fatigue  falls asleep watching TV  takes scheduled naps during the day    Snoring/Breathing:  She exhibits the following:  loud snoring  snores in all sleep positions  awoken with dry mouth  quits breathing at night  awakens gasping for breath  sore throat when waking up in the morning  morning headaches    Head  "Injury:  She exhibits the following:  As a child    Reflux:  She describes the following:  Not that wakes her.    Narcolepsy:  She exhibits the following:  sudden episodes of sleep during the day  none    RLS/PLMs:  She describes the following:  moves or jerks during sleep    Insomnia:  She describes the following:  problems initiating sleep at night  frequent awakenings  restless sleep    Parasomnia:  She exhibits the following:  sleep talks    Weight:  Weight change in the last year:  gain: 70 lbs, lost 15 lbs in the past few weeks    The patient's relevant past medical, surgical, family, and social history reviewed and updated in Epic as appropriate.    Review of Systems   Constitutional: Positive for appetite change, fatigue and unexpected weight change.   HENT: Positive for congestion and sinus pressure.    Eyes: Positive for pain.   Respiratory: Negative.    Cardiovascular: Negative.    Gastrointestinal: Negative.    Endocrine: Negative.    Genitourinary: Negative.    Musculoskeletal: Negative.    Skin: Positive for wound.   Neurological: Positive for dizziness, light-headedness and headaches.   Hematological: Negative.    Psychiatric/Behavioral: Positive for confusion and decreased concentration. The patient is nervous/anxious.    All other systems reviewed and are negative.      Objective   Vital Signs:  Ht 167.6 cm (66\")   Wt 107 kg (235 lb)   BMI 37.93 kg/m²     Class 2 Severe Obesity (BMI >=35 and <=39.9). Obesity-related health conditions include the following: obstructive sleep apnea. Obesity is improving with lifestyle modifications. BMI is is above average; BMI management plan is completed. We discussed portion control and increasing exercise.      Physical Exam  Constitutional:       Appearance: Normal appearance.   Neurological:      General: No focal deficit present.      Mental Status: She is alert and oriented to person, place, and time.   Psychiatric:         Mood and Affect: Mood normal.    "      Behavior: Behavior normal.         Result Review :              Assessment and Plan  Josefina Rodriguez is a 47 y.o. female who presents for further evaluation of excessive daytime sleepiness and fatigue, nonrestorative sleep, insomnia, and concerns for sleep disordered breathing and obstructive sleep apnea.  Patient's symptoms are concerning for obstructive sleep apnea.  We will obtain a home sleep test for further evaluation.  The patient will return for follow-up and recommendations after test.  I have discussed weight loss as a pertains to obstructive sleep apnea. She has gained about 70 lbs this past year but has recently lost about 15 lbs since having surgery.     Diagnoses and all orders for this visit:    1. Snoring (Primary)  -     Home Sleep Study; Future    2. Suspected sleep apnea  -     Home Sleep Study; Future    3. Excessive daytime sleepiness  -     Home Sleep Study; Future    4. Psychophysiological insomnia    5. Obesity (BMI 30-39.9)                 I discussed the consequences of uncontrolled sleep apnea including hypertension, heart disease, diabetes, stroke, and dementia. I further discussed sleep apnea therapeutic options including CPAP, Weight loss, Oral dental appliance, and surgery.    Follow Up  Return for Follow up after study.  Patient was given instructions and counseling regarding her condition or for health maintenance advice. Please see specific information pulled into the AVS if appropriate.     LISA Ortiz, [facility] Center[facility]-BC  Pulmonary, Critical Care Medicine, and Sleep Medicine  Electronically signed by LISA Delgado, 12/30/22, 8:56 AM EST.

## 2023-01-19 ENCOUNTER — HOSPITAL ENCOUNTER (OUTPATIENT)
Dept: NUTRITION | Facility: HOSPITAL | Age: 48
Setting detail: RECURRING SERIES
Discharge: HOME | End: 2023-01-19

## 2023-01-19 NOTE — PROGRESS NOTES
Adult Outpatient Nutrition    Patient Name:  Josefina Rodriguez  YOB: 1975  MRN: [id number]    Assessment Date:  1/19/2023    This medical referred consult was provided via ZOOM as patient was unable to attend an in-office appointment today due to the COVID-19 crisis. Consent for treatment was given verbally.      Pt has COVID at the beginning of this year. Other than getting COVID she's felt well and seen a 15 lb weight loss. Pt hasn't been sleeping well over the last 2 weeks.    Pt used to eat 2 meals a day. Since our first appointment, she has incorporated 3 consistent meals and has incorporated a source of each macronutrient at each of her meals. Pt says she does get hungry around 11 PM.     RD focused on consistent carb intake at each meal to help with blood sugar regulation with her Diabetes. RD also encouraged a balanced bedtime snack about an hour before bed. Pt typically eats breakfast around 10, lunch around 2 and dinner around 7. Since there's a long period of time she's not consuming anything between dinner and breakfast, a balanced bedtime snack should help with blood sugar regulation. RD gave examples of servings of carbohydrates and advised to start with 45 grams of carbs per meal.     RD emailed pt resources after session:  Eating to feel your best  The 3 macronutrients  Macronutrient foundations  Quick meal ideas  Quick breakfast ideas     Supplements:  B-complex  B12  Vit D3  Multi     Follow up visit is scheduled for 3/2 at 11:45 AM. Thank you for this referral.    Electronically signed by:  Tess Moore RD  01/19/23 13:02 EST

## 2023-01-20 DIAGNOSIS — G62.9 NEUROPATHY: ICD-10-CM

## 2023-01-20 RX ORDER — PREGABALIN 150 MG/1
CAPSULE ORAL
Qty: 60 CAPSULE | Refills: 5 | Status: SHIPPED | OUTPATIENT
Start: 2023-01-20

## 2023-01-26 ENCOUNTER — HOSPITAL ENCOUNTER (OUTPATIENT)
Dept: CT IMAGING | Facility: HOSPITAL | Age: 48
Discharge: HOME | End: 2023-01-26
Admitting: NURSE PRACTITIONER
Payer: COMMERCIAL

## 2023-01-26 ENCOUNTER — HOSPITAL ENCOUNTER (OUTPATIENT)
Dept: ONCOLOGY | Facility: HOSPITAL | Age: 48
Setting detail: INFUSION SERIES
Discharge: HOME | End: 2023-01-26
Payer: COMMERCIAL

## 2023-01-26 ENCOUNTER — OFFICE VISIT (OUTPATIENT)
Dept: ONCOLOGY | Facility: CLINIC | Age: 48
End: 2023-01-26
Payer: COMMERCIAL

## 2023-01-26 VITALS
RESPIRATION RATE: 20 BRPM | SYSTOLIC BLOOD PRESSURE: 145 MMHG | BODY MASS INDEX: 39.53 KG/M2 | OXYGEN SATURATION: 98 % | HEIGHT: 66 IN | DIASTOLIC BLOOD PRESSURE: 91 MMHG | WEIGHT: 246 LBS | HEART RATE: 103 BPM | TEMPERATURE: 97.4 F

## 2023-01-26 DIAGNOSIS — I26.99 PULMONARY EMBOLISM, UNSPECIFIED CHRONICITY, UNSPECIFIED PULMONARY EMBOLISM TYPE, UNSPECIFIED WHETHER ACUTE COR PULMONALE PRESENT: ICD-10-CM

## 2023-01-26 DIAGNOSIS — R06.09 DYSPNEA ON EXERTION: ICD-10-CM

## 2023-01-26 DIAGNOSIS — Z17.0 MALIGNANT NEOPLASM OF OVERLAPPING SITES OF RIGHT BREAST IN FEMALE, ESTROGEN RECEPTOR POSITIVE: Primary | ICD-10-CM

## 2023-01-26 DIAGNOSIS — C50.811 MALIGNANT NEOPLASM OF OVERLAPPING SITES OF RIGHT BREAST IN FEMALE, ESTROGEN RECEPTOR POSITIVE: Primary | ICD-10-CM

## 2023-01-26 DIAGNOSIS — Z17.0 MALIGNANT NEOPLASM OF OVERLAPPING SITES OF RIGHT BREAST IN FEMALE, ESTROGEN RECEPTOR POSITIVE: ICD-10-CM

## 2023-01-26 DIAGNOSIS — C50.811 MALIGNANT NEOPLASM OF OVERLAPPING SITES OF RIGHT BREAST IN FEMALE, ESTROGEN RECEPTOR POSITIVE: ICD-10-CM

## 2023-01-26 LAB
ALBUMIN SERPL-MCNC: 3.7 G/DL (ref 3.5–5.2)
ALBUMIN/GLOB SERPL: 1 G/DL
ALP SERPL-CCNC: 182 U/L (ref 39–117)
ALT SERPL W P-5'-P-CCNC: 22 U/L (ref 1–33)
ANION GAP SERPL CALCULATED.3IONS-SCNC: 8 MMOL/L (ref 5–15)
AST SERPL-CCNC: 17 U/L (ref 1–32)
BASOPHILS # BLD AUTO: 0.02 10*3/MM3 (ref 0–0.2)
BASOPHILS NFR BLD AUTO: 0.2 % (ref 0–1.5)
BILIRUB SERPL-MCNC: 0.2 MG/DL (ref 0–1.2)
BUN SERPL-MCNC: 15 MG/DL (ref 6–20)
BUN/CREAT SERPL: 21.1 (ref 7–25)
CALCIUM SPEC-SCNC: 9.2 MG/DL (ref 8.6–10.5)
CHLORIDE SERPL-SCNC: 102 MMOL/L (ref 98–107)
CO2 SERPL-SCNC: 27 MMOL/L (ref 22–29)
CREAT SERPL-MCNC: 0.71 MG/DL (ref 0.57–1)
DEPRECATED RDW RBC AUTO: 49.2 FL (ref 37–54)
EGFRCR SERPLBLD CKD-EPI 2021: 107 ML/MIN/1.73
EOSINOPHIL # BLD AUTO: 0.23 10*3/MM3 (ref 0–0.4)
EOSINOPHIL NFR BLD AUTO: 2.4 % (ref 0.3–6.2)
ERYTHROCYTE [DISTWIDTH] IN BLOOD BY AUTOMATED COUNT: 15.8 % (ref 12.3–15.4)
GLOBULIN UR ELPH-MCNC: 3.7 GM/DL
GLUCOSE SERPL-MCNC: 324 MG/DL (ref 65–99)
HCT VFR BLD AUTO: 39.7 % (ref 34–46.6)
HGB BLD-MCNC: 12.4 G/DL (ref 12–15.9)
IMM GRANULOCYTES # BLD AUTO: 0.03 10*3/MM3 (ref 0–0.05)
IMM GRANULOCYTES NFR BLD AUTO: 0.3 % (ref 0–0.5)
LYMPHOCYTES # BLD AUTO: 3.1 10*3/MM3 (ref 0.7–3.1)
LYMPHOCYTES NFR BLD AUTO: 32.4 % (ref 19.6–45.3)
MCH RBC QN AUTO: 26.6 PG (ref 26.6–33)
MCHC RBC AUTO-ENTMCNC: 31.2 G/DL (ref 31.5–35.7)
MCV RBC AUTO: 85.2 FL (ref 79–97)
MONOCYTES # BLD AUTO: 0.54 10*3/MM3 (ref 0.1–0.9)
MONOCYTES NFR BLD AUTO: 5.6 % (ref 5–12)
NEUTROPHILS NFR BLD AUTO: 5.64 10*3/MM3 (ref 1.7–7)
NEUTROPHILS NFR BLD AUTO: 59.1 % (ref 42.7–76)
PLATELET # BLD AUTO: 279 10*3/MM3 (ref 140–450)
PMV BLD AUTO: 9.8 FL (ref 6–12)
POTASSIUM SERPL-SCNC: 4.2 MMOL/L (ref 3.5–5.2)
PROT SERPL-MCNC: 7.4 G/DL (ref 6–8.5)
RBC # BLD AUTO: 4.66 10*6/MM3 (ref 3.77–5.28)
SODIUM SERPL-SCNC: 137 MMOL/L (ref 136–145)
WBC NRBC COR # BLD: 9.56 10*3/MM3 (ref 3.4–10.8)

## 2023-01-26 PROCEDURE — 96402 CHEMO HORMON ANTINEOPL SQ/IM: CPT

## 2023-01-26 PROCEDURE — 36415 COLL VENOUS BLD VENIPUNCTURE: CPT

## 2023-01-26 PROCEDURE — 99214 OFFICE O/P EST MOD 30 MIN: CPT | Performed by: NURSE PRACTITIONER

## 2023-01-26 PROCEDURE — 80053 COMPREHEN METABOLIC PANEL: CPT | Performed by: INTERNAL MEDICINE

## 2023-01-26 PROCEDURE — 0 IOPAMIDOL PER 1 ML: Performed by: NURSE PRACTITIONER

## 2023-01-26 PROCEDURE — 96372 THER/PROPH/DIAG INJ SC/IM: CPT

## 2023-01-26 PROCEDURE — 85025 COMPLETE CBC W/AUTO DIFF WBC: CPT | Performed by: INTERNAL MEDICINE

## 2023-01-26 PROCEDURE — 25010000002 LEUPROLIDE 22.5 MG KIT: Performed by: INTERNAL MEDICINE

## 2023-01-26 PROCEDURE — 71275 CT ANGIOGRAPHY CHEST: CPT

## 2023-01-26 RX ORDER — MOLNUPIRAVIR 200 MG/1
CAPSULE ORAL
COMMUNITY
Start: 2023-01-02

## 2023-01-26 RX ORDER — BUPROPION HYDROCHLORIDE 300 MG/1
TABLET ORAL
COMMUNITY
Start: 2023-01-20

## 2023-01-26 RX ADMIN — LEUPROLIDE ACETATE 22.5 MG: KIT SUBCUTANEOUS at 12:36

## 2023-01-26 RX ADMIN — IOPAMIDOL 85 ML: 755 INJECTION, SOLUTION INTRAVENOUS at 14:00

## 2023-01-26 RX ADMIN — IOPAMIDOL 100 ML: 755 INJECTION, SOLUTION INTRAVENOUS at 14:00

## 2023-01-26 NOTE — PROGRESS NOTES
PROBLEM LIST:  1. [id number] ER+ OK+ Her2+ invasive ductal carcinoma of the right breast  A) presented with a mass on self-exam.  Mammogram 1/7/20 showed a 4.1 cm mass in the right breast, with 2 adjacent smaller masses.  1.9 cm right axillary lymph node.   FNA of lymph node negative.  Biopsy of right breast mass showed grade 2 IDC, Er 95%, OK 2%, Her2 3+.  B) neoadjuvant chemotherapy with TCHP started 2/5/2020  C) right mastectomy on 6/23/20.  Pathology showed a 2 x 1 x 0.6 cm intermediate grade IDC.  0/2 SLN involved.  [id number]  D) adjuvant Kadcyla started 7/22/2020.   Kadcyla stopped October 14, 2020 and switched back to Herceptin and Perjeta due to progressive severe neuropathy.  E) anastrozole started 08/12/2020.   2. PCOS  3. Anxiety/depression  4. DM2  5. GERD  6. Hyperlipidemia  7. Hypertension  8.  Hidradenitis  9.  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  CT chest 1/19/2021 showed a pathologic nondisplaced fracture of the right seventh rib.  There were also subtle small nodules in the lungs, none larger than 4 mm.  PET/CT on 1/28/2021 showed SUV of 4.8 in the right rib fracture.  Pulmonary micronodules too small to register hypermetabolic activity.  Biopsy of the rib lesion 2/24/2021 showed Langerhans' cell histiocytosis.  Smoking cessation strongly recommended and she did stop smoking in February 2021.  11. Bilateral pulmonary emboli 11/16/2022 3 days following breast reconstruction surgery.  Currently on Eliquis 5 mg twice daily.    Subjective     CHIEF COMPLAINT: breast cancer    HISTORY OF PRESENT ILLNESS:   Josefina Jondridge returns for follow-up.  She continues on anastrozole and Lupron injections.       She had breast reconstruction surgery 11/9/2022.  On 11/16/2022 she was found to have bilateral pulmonary emboli.  She is currently on Eliquis 5 mg twice daily.  She complains of increased  "shortness of air and chest tightness.  She denies any chest pain.  Her oxygen saturation level is 95% or higher when she checks it at home.  The shortness of air occurs with minimal exertion.    Since her breast reconstruction surgery she also feels like her legs are fatigued and she has left knee pain.  She also has occasional pain in her low back when she walks too much.          Objective      /91   Pulse 103   Temp 97.4 °F (36.3 °C)   Resp 20   Ht 167.6 cm (66\")   Wt 112 kg (246 lb)   SpO2 98%   BMI 39.71 kg/m²    Vitals:    01/26/23 1134   PainSc: 6  Comment: Bilateral leg pain since surgery 09Nov22               Performance Status: 0    General: well appearing female in no acute distress  Neuro: alert and oriented  HEENT: sclera anicteric  Pulmonary: Lungs are clear to auscultation bilaterally  Cardiovascular: Regular rate and rhythm no murmurs  Extremeties: no lower extremity edema  Skin: no rashes, lesions, bruising, or petechiae  Psych: mood and affect appropriate      RECENT LABS:  Lab Results   Component Value Date    WBC 13.64 (H) 11/17/2022    HGB 10.0 (L) 11/17/2022    HCT 30.4 (L) 11/17/2022    MCV 88.1 11/17/2022     11/17/2022     Lab Results   Component Value Date    GLUCOSE 294 (H) 11/17/2022    BUN 11 11/17/2022    CREATININE 0.43 (L) 11/17/2022    EGFRIFNONA 105 12/02/2021    BCR 25.6 (H) 11/17/2022    K 3.4 (L) 11/17/2022    CO2 26.0 11/17/2022    CALCIUM 8.6 11/17/2022    ALBUMIN 3.30 (L) 11/16/2022    AST 27 11/16/2022    ALT 28 11/16/2022              Assessment & Plan   Josefina Rodriguez is a 47 y.o. year old female with stage IB ER+ Her2+ IDC of the right breast, as well as a diagnosis of Langerhans histiocytosis involving the lung as well as a single rib lesion.    Breast cancer: She continues on anastrozole along with Lupron for ovarian suppression.  Her mammogram is scheduled for 1/27/2023.  We will continue Lupron for the duration of her endocrine " therapy treatment.    Langerhans histiocytosis: Imaging changes resolved with cessation of smoking.  She continues to avoid cigarettes.  We will hold off on further imaging unless she has a change in symptoms.    Peripheral neuropathy: Continue Lyrica.     Bone density January 21, 2021 was normal.  Bone density scan is scheduled for January 27, 2023.    Increasing shortness of air in the setting of bilateral pulmonary emboli.  She is currently on Eliquis.  I will obtain a stat CT angiogram today.    Follow-up in 3 months.  After that she can be seen every 6 months.            Aimee Johnson, LISA  [facility] Hematology [facility] On[facility]    1/26/2023          CC:

## 2023-01-27 ENCOUNTER — HOSPITAL ENCOUNTER (OUTPATIENT)
Dept: BONE DENSITY | Facility: HOSPITAL | Age: 48
Discharge: HOME | End: 2023-01-27
Payer: COMMERCIAL

## 2023-01-27 ENCOUNTER — HOSPITAL ENCOUNTER (OUTPATIENT)
Dept: MAMMOGRAPHY | Facility: HOSPITAL | Age: 48
Discharge: HOME | End: 2023-01-27
Payer: COMMERCIAL

## 2023-01-27 DIAGNOSIS — Z12.31 SCREENING MAMMOGRAM FOR BREAST CANCER: ICD-10-CM

## 2023-01-27 DIAGNOSIS — Z79.811 LONG TERM CURRENT USE OF AROMATASE INHIBITOR: ICD-10-CM

## 2023-01-27 PROCEDURE — 77080 DXA BONE DENSITY AXIAL: CPT

## 2023-01-27 PROCEDURE — 77063 BREAST TOMOSYNTHESIS BI: CPT | Performed by: RADIOLOGY

## 2023-01-27 PROCEDURE — 77067 SCR MAMMO BI INCL CAD: CPT

## 2023-01-27 PROCEDURE — 77063 BREAST TOMOSYNTHESIS BI: CPT

## 2023-01-27 PROCEDURE — 77067 SCR MAMMO BI INCL CAD: CPT | Performed by: RADIOLOGY

## 2023-02-16 ENCOUNTER — TRANSCRIBE ORDERS (OUTPATIENT)
Dept: ADMINISTRATIVE | Facility: HOSPITAL | Age: 48
End: 2023-02-16
Payer: COMMERCIAL

## 2023-02-16 DIAGNOSIS — R14.0 BLOATED ABDOMEN: Primary | ICD-10-CM

## 2023-02-28 ENCOUNTER — HOSPITAL ENCOUNTER (OUTPATIENT)
Dept: SLEEP MEDICINE | Facility: HOSPITAL | Age: 48
Discharge: HOME | End: 2023-02-28
Admitting: NURSE PRACTITIONER
Payer: COMMERCIAL

## 2023-02-28 VITALS — HEIGHT: 66 IN | WEIGHT: 235 LBS | BODY MASS INDEX: 37.77 KG/M2

## 2023-02-28 DIAGNOSIS — R29.818 SUSPECTED SLEEP APNEA: ICD-10-CM

## 2023-02-28 DIAGNOSIS — G47.19 EXCESSIVE DAYTIME SLEEPINESS: ICD-10-CM

## 2023-02-28 DIAGNOSIS — R06.83 SNORING: ICD-10-CM

## 2023-02-28 PROCEDURE — 95800 SLP STDY UNATTENDED: CPT

## 2023-03-02 ENCOUNTER — HOSPITAL ENCOUNTER (OUTPATIENT)
Dept: NUTRITION | Facility: HOSPITAL | Age: 48
Setting detail: RECURRING SERIES
Discharge: HOME | End: 2023-03-02

## 2023-03-02 DIAGNOSIS — G47.33 OSA (OBSTRUCTIVE SLEEP APNEA): Primary | ICD-10-CM

## 2023-03-02 PROCEDURE — 97803 MED NUTRITION INDIV SUBSEQ: CPT

## 2023-03-02 NOTE — PROGRESS NOTES
CALLED PATIENT AND ADVISED OF STUDY RESULTS. PATIENT VERBALIZED UNDERSTANDING AND WAS AGREEABLE TO PAP THERAPY. FAXED ORDER TO KATHERYN MONTERO 03/02/23 MARTINE

## 2023-03-02 NOTE — PROGRESS NOTES
Adult Outpatient Nutrition    Patient Name:  Josefina Rodriguez  YOB: 1975  MRN: [id number]    Assessment Date:  3/2/2023       This medical referred consult was provided via ZOOM. Consent for treatment was given verbally.       Pt has lost an additional 5 lbs since our last visit. Pt says she's been diagnosed with diverticulitis recently and RD touched on nutrition recommendations for diverticulitis. RD encouraged fiber which comes from her consistent carbs she's been incorporating at each meal.    Pt says lunch is her hardest meal and she's not as hungry for that meal. RD suggested a smoothie with protein incorporated somehow or something lighter like cottage cheese and fruit.     Pt is still struggling to sleep well. She did participate in a sleep study recently and is awaiting results from that.     RD asked about any changes in activity and pt has been able to get out of the house more and walk more. RD encouraged a 5-10 minute walk after a meal or meals on days she's able.      RD emailed pt resources after session:  High fiber snacks updated     Supplements:  B-complex  B12  Vit D3  Multi     Follow up visit is scheduled for 4/27 at 3 PM. Thank you for this referral.       Electronically signed by:  Tess Moore RD  03/02/23 13:09 EST

## 2023-03-09 PROCEDURE — 95800 SLP STDY UNATTENDED: CPT | Performed by: INTERNAL MEDICINE

## 2023-04-20 ENCOUNTER — HOSPITAL ENCOUNTER (OUTPATIENT)
Dept: ONCOLOGY | Facility: HOSPITAL | Age: 48
Discharge: HOME | End: 2023-04-20
Admitting: NURSE PRACTITIONER
Payer: MEDICAID

## 2023-04-20 ENCOUNTER — OFFICE VISIT (OUTPATIENT)
Dept: ONCOLOGY | Facility: CLINIC | Age: 48
End: 2023-04-20
Payer: MEDICAID

## 2023-04-20 VITALS
HEIGHT: 66 IN | RESPIRATION RATE: 20 BRPM | TEMPERATURE: 97.7 F | WEIGHT: 237.7 LBS | HEART RATE: 94 BPM | SYSTOLIC BLOOD PRESSURE: 124 MMHG | BODY MASS INDEX: 38.2 KG/M2 | DIASTOLIC BLOOD PRESSURE: 74 MMHG | OXYGEN SATURATION: 96 %

## 2023-04-20 DIAGNOSIS — Z17.0 MALIGNANT NEOPLASM OF OVERLAPPING SITES OF RIGHT BREAST IN FEMALE, ESTROGEN RECEPTOR POSITIVE: ICD-10-CM

## 2023-04-20 DIAGNOSIS — C50.811 MALIGNANT NEOPLASM OF OVERLAPPING SITES OF RIGHT BREAST IN FEMALE, ESTROGEN RECEPTOR POSITIVE: Primary | ICD-10-CM

## 2023-04-20 DIAGNOSIS — Z17.0 MALIGNANT NEOPLASM OF OVERLAPPING SITES OF RIGHT BREAST IN FEMALE, ESTROGEN RECEPTOR POSITIVE: Primary | ICD-10-CM

## 2023-04-20 DIAGNOSIS — C50.811 MALIGNANT NEOPLASM OF OVERLAPPING SITES OF RIGHT BREAST IN FEMALE, ESTROGEN RECEPTOR POSITIVE: ICD-10-CM

## 2023-04-20 LAB
ALBUMIN SERPL-MCNC: 3.9 G/DL (ref 3.5–5.2)
ALBUMIN/GLOB SERPL: 1.1 G/DL
ALP SERPL-CCNC: 165 U/L (ref 39–117)
ALT SERPL W P-5'-P-CCNC: 18 U/L (ref 1–33)
ANION GAP SERPL CALCULATED.3IONS-SCNC: 14 MMOL/L (ref 5–15)
AST SERPL-CCNC: 21 U/L (ref 1–32)
BASOPHILS # BLD AUTO: 0.02 10*3/MM3 (ref 0–0.2)
BASOPHILS NFR BLD AUTO: 0.2 % (ref 0–1.5)
BILIRUB SERPL-MCNC: 0.4 MG/DL (ref 0–1.2)
BUN SERPL-MCNC: 13 MG/DL (ref 6–20)
BUN/CREAT SERPL: 18.6 (ref 7–25)
CALCIUM SPEC-SCNC: 9.3 MG/DL (ref 8.6–10.5)
CHLORIDE SERPL-SCNC: 102 MMOL/L (ref 98–107)
CO2 SERPL-SCNC: 26 MMOL/L (ref 22–29)
CREAT SERPL-MCNC: 0.7 MG/DL (ref 0.57–1)
DEPRECATED RDW RBC AUTO: 46.4 FL (ref 37–54)
EGFRCR SERPLBLD CKD-EPI 2021: 108.2 ML/MIN/1.73
EOSINOPHIL # BLD AUTO: 0.22 10*3/MM3 (ref 0–0.4)
EOSINOPHIL NFR BLD AUTO: 2.2 % (ref 0.3–6.2)
ERYTHROCYTE [DISTWIDTH] IN BLOOD BY AUTOMATED COUNT: 14.3 % (ref 12.3–15.4)
GLOBULIN UR ELPH-MCNC: 3.7 GM/DL
GLUCOSE SERPL-MCNC: 251 MG/DL (ref 65–99)
HCT VFR BLD AUTO: 44.3 % (ref 34–46.6)
HGB BLD-MCNC: 13.6 G/DL (ref 12–15.9)
IMM GRANULOCYTES # BLD AUTO: 0.02 10*3/MM3 (ref 0–0.05)
IMM GRANULOCYTES NFR BLD AUTO: 0.2 % (ref 0–0.5)
LYMPHOCYTES # BLD AUTO: 2.51 10*3/MM3 (ref 0.7–3.1)
LYMPHOCYTES NFR BLD AUTO: 24.8 % (ref 19.6–45.3)
MCH RBC QN AUTO: 27.1 PG (ref 26.6–33)
MCHC RBC AUTO-ENTMCNC: 30.7 G/DL (ref 31.5–35.7)
MCV RBC AUTO: 88.2 FL (ref 79–97)
MONOCYTES # BLD AUTO: 0.58 10*3/MM3 (ref 0.1–0.9)
MONOCYTES NFR BLD AUTO: 5.7 % (ref 5–12)
NEUTROPHILS NFR BLD AUTO: 6.76 10*3/MM3 (ref 1.7–7)
NEUTROPHILS NFR BLD AUTO: 66.9 % (ref 42.7–76)
PLATELET # BLD AUTO: 289 10*3/MM3 (ref 140–450)
PMV BLD AUTO: 10.1 FL (ref 6–12)
POTASSIUM SERPL-SCNC: 4.4 MMOL/L (ref 3.5–5.2)
PROT SERPL-MCNC: 7.6 G/DL (ref 6–8.5)
RBC # BLD AUTO: 5.02 10*6/MM3 (ref 3.77–5.28)
SODIUM SERPL-SCNC: 142 MMOL/L (ref 136–145)
WBC NRBC COR # BLD: 10.11 10*3/MM3 (ref 3.4–10.8)

## 2023-04-20 PROCEDURE — 96372 THER/PROPH/DIAG INJ SC/IM: CPT

## 2023-04-20 PROCEDURE — 25010000002 LEUPROLIDE 22.5 MG KIT: Performed by: NURSE PRACTITIONER

## 2023-04-20 PROCEDURE — 80053 COMPREHEN METABOLIC PANEL: CPT | Performed by: NURSE PRACTITIONER

## 2023-04-20 PROCEDURE — 36415 COLL VENOUS BLD VENIPUNCTURE: CPT

## 2023-04-20 PROCEDURE — 96402 CHEMO HORMON ANTINEOPL SQ/IM: CPT

## 2023-04-20 PROCEDURE — 85025 COMPLETE CBC W/AUTO DIFF WBC: CPT | Performed by: NURSE PRACTITIONER

## 2023-04-20 RX ADMIN — LEUPROLIDE ACETATE 22.5 MG: KIT SUBCUTANEOUS at 11:32

## 2023-04-20 NOTE — PROGRESS NOTES
PROBLEM LIST:  1. [id number] ER+ OR+ Her2+ invasive ductal carcinoma of the right breast  A) presented with a mass on self-exam.  Mammogram 1/7/20 showed a 4.1 cm mass in the right breast, with 2 adjacent smaller masses.  1.9 cm right axillary lymph node.   FNA of lymph node negative.  Biopsy of right breast mass showed grade 2 IDC, Er 95%, OR 2%, Her2 3+.  B) neoadjuvant chemotherapy with TCHP started 2/5/2020  C) right mastectomy on 6/23/20.  Pathology showed a 2 x 1 x 0.6 cm intermediate grade IDC.  0/2 SLN involved.  [id number]  D) adjuvant Kadcyla started 7/22/2020.   Kadcyla stopped October 14, 2020 and switched back to Herceptin and Perjeta due to progressive severe neuropathy.  E) anastrozole started 08/12/2020.   2. PCOS  3. Anxiety/depression  4. DM2  5. GERD  6. Hyperlipidemia  7. Hypertension  8.  Hidradenitis  9.  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  CT chest 1/19/2021 showed a pathologic nondisplaced fracture of the right seventh rib.  There were also subtle small nodules in the lungs, none larger than 4 mm.  PET/CT on 1/28/2021 showed SUV of 4.8 in the right rib fracture.  Pulmonary micronodules too small to register hypermetabolic activity.  Biopsy of the rib lesion 2/24/2021 showed Langerhans' cell histiocytosis.  Smoking cessation strongly recommended and she did stop smoking in February 2021.  11. Bilateral pulmonary emboli 11/16/2022 3 days following breast reconstruction surgery.  Currently on Eliquis 5 mg twice daily.    Subjective     CHIEF COMPLAINT: breast cancer    HISTORY OF PRESENT ILLNESS:   Josefina Andino Michael returns for follow-up.  She continues on anastrozole and Lupron injections.       She has been feeling pretty frustrated with the complications from her reconstruction surgery.  Her abdomen is protruding much more on 1 side, and there is still a small open area in her  "incision.  She is doing physical therapy to try to rehabilitate her core muscles.  She has had to be off work because of all of this and while she was off work her company shutdown so now she is on Medicaid.  She is no longer able to follow with Dr. Higuera on Medicaid and she had to stop Eliquis as well.  She started taking aspirin.          Objective      /74   Pulse 94   Temp 97.7 °F (36.5 °C) (Infrared)   Resp 20   Ht 167.6 cm (65.98\")   Wt 108 kg (237 lb 11.2 oz)   SpO2 96%   BMI 38.38 kg/m²    Vitals:    04/20/23 1017   PainSc:   8   PainLoc: Abdomen               Performance Status: 0    General: well appearing female in no acute distress  Neuro: alert and oriented  HEENT: sclera anicteric  Pulmonary: Lungs are clear to auscultation bilaterally  Cardiovascular: Regular rate and rhythm no murmurs  Extremeties: no lower extremity edema  Skin: no rashes, lesions, bruising, or petechiae  Psych: mood and affect appropriate      RECENT LABS:  Lab Results   Component Value Date    WBC 9.56 01/26/2023    HGB 12.4 01/26/2023    HCT 39.7 01/26/2023    MCV 85.2 01/26/2023     01/26/2023     Lab Results   Component Value Date    GLUCOSE 324 (H) 01/26/2023    BUN 15 01/26/2023    CREATININE 0.71 01/26/2023    EGFRIFNONA 105 12/02/2021    BCR 21.1 01/26/2023    K 4.2 01/26/2023    CO2 27.0 01/26/2023    CALCIUM 9.2 01/26/2023    ALBUMIN 3.7 01/26/2023    AST 17 01/26/2023    ALT 22 01/26/2023              Assessment & Plan   Josefina Rodriguez is a 48 y.o. year old female with stage IB ER+ Her2+ IDC of the right breast, as well as a diagnosis of Langerhans histiocytosis involving the lung as well as a single rib lesion.    Breast cancer: She continues on anastrozole along with Lupron for ovarian suppression.  We will continue Lupron for the duration of her endocrine therapy treatment.  No evidence of recurrence at this time.  She is having a little bit of pain in the right axillary region for " the past 2 weeks.  I told her to let me know if this does not improve over the next 2 to 3 weeks and we will consider further imaging.    Langerhans histiocytosis: Imaging changes resolved with cessation of smoking.  She continues to avoid cigarettes.     Peripheral neuropathy: Continue Lyrica.     Bone density January 27th 2023 was normal.  Plan to repeat in 2 years.    Pulmonary embolism following surgery: She was on Eliquis for about 5 months.  I think it is reasonable to discontinue anticoagulation at this time.  She will continue aspirin.    Follow-up in 6 months.    Total time of patient care on day of service including time prior to, face to face with patient, and following visit spent in reviewing records, lab results, imaging studies, discussion with patient, and documentation/charting was > 40 minutes.          Jolene Connell MD  [facility] Hematology and Oncology    4/20/2023          CC:

## 2023-06-08 ENCOUNTER — OFFICE VISIT (OUTPATIENT)
Dept: SLEEP MEDICINE | Facility: HOSPITAL | Age: 48
End: 2023-06-08
Payer: MEDICAID

## 2023-06-08 VITALS
HEART RATE: 91 BPM | BODY MASS INDEX: 38.73 KG/M2 | DIASTOLIC BLOOD PRESSURE: 74 MMHG | OXYGEN SATURATION: 96 % | WEIGHT: 241 LBS | SYSTOLIC BLOOD PRESSURE: 132 MMHG | HEIGHT: 66 IN

## 2023-06-08 DIAGNOSIS — F51.04 PSYCHOPHYSIOLOGICAL INSOMNIA: ICD-10-CM

## 2023-06-08 DIAGNOSIS — G47.33 OSA (OBSTRUCTIVE SLEEP APNEA): Primary | ICD-10-CM

## 2023-06-08 RX ORDER — HYDROXYZINE PAMOATE 50 MG/1
CAPSULE ORAL
Qty: 30 CAPSULE | Refills: 2 | Status: CANCELLED | OUTPATIENT
Start: 2023-06-08

## 2023-06-08 RX ORDER — RAMELTEON 8 MG/1
8 TABLET ORAL NIGHTLY
Qty: 30 TABLET | Refills: 2 | Status: SHIPPED | OUTPATIENT
Start: 2023-06-08

## 2023-06-08 NOTE — PROGRESS NOTES
Chief Complaint:   Chief Complaint   Patient presents with    Follow-up       HPI:    Josefina Rodriguez is a 48 y.o. female here for follow-up of sleep apnea.  Patient was last seen 12/30/2022 for an consult for excessive daytime sleepiness, nonrestorative sleep, and insomnia.  She had a sleep study 2/28/2023 that showed mild obstructive sleep apnea she did initiate CPAP therapy.  Patient is doing well with CPAP however states she is only sleeping 3 hours a night.  We will take her 1 hour to go to sleep after taking her melatonin and is still tossing and turning throughout the night.  Patient states this started with her breast cancer diagnosis and chemotherapy approximately 4 years ago.  She has not tried any other medication but wishes to do so.  She is also having a complication from her TRAM flap in her abdomen and it makes her unable to sleep on stomach or her side and this is where she is most comfortable.  She does have an upcoming appointment with Dr. LONG for evaluation.        Current medications are:   Current Outpatient Medications:     anastrozole (ARIMIDEX) 1 MG tablet, Take 1 tablet by mouth Daily., Disp: 30 tablet, Rfl: 11    buPROPion XL (WELLBUTRIN XL) 300 MG 24 hr tablet, , Disp: , Rfl:     Continuous Blood Gluc Sensor (FreeStyle Audrey 2 Sensor) misc, , Disp: , Rfl:     Diclofenac Sodium (VOLTAREN) 1 % gel gel, 3 (Three) Times a Day As Needed., Disp: , Rfl:     DULoxetine (CYMBALTA) 60 MG capsule, TAKE ONE CAPSULE BY MOUTH DAILY, Disp: 90 capsule, Rfl: 3    escitalopram (LEXAPRO) 20 MG tablet, Take 1 tablet by mouth Daily., Disp: , Rfl:     fluticasone (FLONASE) 50 MCG/ACT nasal spray, 1 spray into the nostril(s) as directed by provider Every 12 (Twelve) Hours., Disp: , Rfl:     hydroCHLOROthiazide (HYDRODIURIL) 25 MG tablet, Take 1 tablet by mouth Daily., Disp: , Rfl:     Insulin Lispro, 1 Unit Dial, (HUMALOG) 100 UNIT/ML solution pen-injector, , Disp: , Rfl:     [employer] Pen Needles  32G X 4 MM misc, , Disp: , Rfl:     LANTUS SOLOSTAR 100 UNIT/ML injection pen, Inject 26 Units under the skin into the appropriate area as directed. In AM, Disp: , Rfl:     lisinopril (PRINIVIL,ZESTRIL) 40 MG tablet, Take 1 tablet by mouth Daily., Disp: , Rfl:     loratadine (CLARITIN) 10 MG tablet, Take 1 tablet by mouth Daily., Disp: , Rfl:     LORazepam (ATIVAN) 1 MG tablet, Take 1 tablet by mouth As Needed for Anxiety., Disp: , Rfl:     LORazepam (ATIVAN) 1 MG tablet, Take 1 tablet by mouth Every Night., Disp: , Rfl:     montelukast (SINGULAIR) 10 MG tablet, Take 1 tablet by mouth Every Night., Disp: , Rfl:     Multiple Vitamins-Minerals (MULTIVITAMIN GUMMIES WOMENS PO), Take 2 capsules by mouth Daily., Disp: , Rfl:     ondansetron (ZOFRAN) 8 MG tablet, TAKE ONE TABLET BY MOUTH THREE TIMES A DAY AS NEEDED FOR NAUSEA AND VOMITING, Disp: 30 tablet, Rfl: 3    oxyCODONE (OXY-IR) 5 MG capsule, Take 1 capsule by mouth Every 4 (Four) Hours As Needed for Moderate Pain., Disp: , Rfl:     pantoprazole (PROTONIX) 40 MG EC tablet, Take 1 tablet by mouth Daily., Disp: , Rfl:     pregabalin (LYRICA) 150 MG capsule, TAKE ONE CAPSULE BY MOUTH TWICE A DAY, Disp: 60 capsule, Rfl: 5    prochlorperazine (COMPAZINE) 10 MG tablet, TAKE ONE TABLET BY MOUTH EVERY 6 HOURS AS NEEDED FOR NAUSEA AND VOMITING, Disp: 60 tablet, Rfl: 1    vitamin D3 125 MCG (5000 UT) capsule capsule, Take  by mouth Daily., Disp: , Rfl:     ramelteon (Rozerem) 8 MG tablet, Take 1 tablet by mouth Every Night., Disp: 30 tablet, Rfl: 2.      The patient's relevant past medical, surgical, family and social history were reviewed and updated in Epic as appropriate.       Review of Systems   Constitutional:  Positive for fatigue.   HENT:  Positive for congestion.    Eyes:  Positive for visual disturbance.   Respiratory:  Positive for apnea and chest tightness.    Endocrine: Negative for cold intolerance.   Allergic/Immunologic: Positive for environmental allergies.    Neurological:  Positive for numbness.   Psychiatric/Behavioral:  Positive for dysphoric mood and sleep disturbance. The patient is nervous/anxious.    All other systems reviewed and are negative.      Objective:    Physical Exam  Constitutional:       Appearance: Normal appearance.   HENT:      Head: Normocephalic and atraumatic.      Mouth/Throat:      Mouth: Mucous membranes are moist.      Pharynx: Oropharynx is clear.      Comments: Mallampati 3 anatomy  Cardiovascular:      Rate and Rhythm: Normal rate and regular rhythm.   Pulmonary:      Effort: Pulmonary effort is normal.      Breath sounds: Normal breath sounds.   Skin:     General: Skin is warm and dry.   Neurological:      Mental Status: She is alert and oriented to person, place, and time.   Psychiatric:         Mood and Affect: Mood normal.         Behavior: Behavior normal.         Thought Content: Thought content normal.         Judgment: Judgment normal.       CPAP Report  24/36 days of use  Current Piriteze 50%  Treatment site 18  95th percentile pressure 10.4  AHI 0.6.      The patient continues to use and benefit from CPAP therapy.    ASSESSMENT/PLAN    Diagnoses and all orders for this visit:    1. WARD (obstructive sleep apnea) (Primary)  -     PAP Therapy    2. Psychophysiological insomnia  -     ramelteon (Rozerem) 8 MG tablet; Take 1 tablet by mouth Every Night.  Dispense: 30 tablet; Refill: 2        Counseled patient regarding multimodal approach with healthy nutrition, healthy sleep, regular physical activity, social activities, counseling, and medications. Encouraged to practice lateral sleep position. Avoid alcohol and sedatives close to bedtime.    It has been hard to find a medication patient cannot take due to chronic meds.  We will use Rozerem 8 mg at bedtime we will see her back in 8 weeks.    I have reviewed the results of my evaluation and impression and discussed my recommendations in detail with the patient.      Signed  by  Sarahy Peraza, APRN    June 8, 2023      CC: Nia Higuera MD         No ref. provider found

## 2023-06-13 DIAGNOSIS — F51.04 PSYCHOPHYSIOLOGICAL INSOMNIA: Primary | ICD-10-CM

## 2023-06-13 RX ORDER — ZALEPLON 10 MG/1
10 CAPSULE ORAL NIGHTLY
Qty: 30 CAPSULE | Refills: 1 | Status: SHIPPED | OUTPATIENT
Start: 2023-06-13 | End: 2023-06-16

## 2023-06-16 DIAGNOSIS — F51.04 PSYCHOPHYSIOLOGICAL INSOMNIA: Primary | ICD-10-CM

## 2023-06-16 RX ORDER — HYDROXYZINE PAMOATE 50 MG/1
CAPSULE ORAL
Qty: 30 CAPSULE | Refills: 2 | Status: SHIPPED | OUTPATIENT
Start: 2023-06-16

## 2023-08-02 DIAGNOSIS — G62.9 NEUROPATHY: ICD-10-CM

## 2023-08-02 RX ORDER — PREGABALIN 150 MG/1
CAPSULE ORAL
Qty: 60 CAPSULE | Refills: 5 | Status: SHIPPED | OUTPATIENT
Start: 2023-08-02

## 2023-08-08 ENCOUNTER — APPOINTMENT (OUTPATIENT)
Dept: CT IMAGING | Facility: HOSPITAL | Age: 48
End: 2023-08-08
Payer: MEDICAID

## 2023-08-08 ENCOUNTER — APPOINTMENT (OUTPATIENT)
Dept: GENERAL RADIOLOGY | Facility: HOSPITAL | Age: 48
End: 2023-08-08
Payer: MEDICAID

## 2023-08-08 ENCOUNTER — HOSPITAL ENCOUNTER (EMERGENCY)
Facility: HOSPITAL | Age: 48
Discharge: HOME | End: 2023-08-09
Attending: EMERGENCY MEDICINE
Payer: MEDICAID

## 2023-08-08 VITALS
SYSTOLIC BLOOD PRESSURE: 121 MMHG | TEMPERATURE: 98 F | HEART RATE: 75 BPM | RESPIRATION RATE: 16 BRPM | WEIGHT: 238 LBS | BODY MASS INDEX: 38.25 KG/M2 | OXYGEN SATURATION: 97 % | HEIGHT: 66 IN | DIASTOLIC BLOOD PRESSURE: 80 MMHG

## 2023-08-08 DIAGNOSIS — Z86.59 HISTORY OF ANXIETY: ICD-10-CM

## 2023-08-08 DIAGNOSIS — Z86.79 HISTORY OF HYPERTENSION: ICD-10-CM

## 2023-08-08 DIAGNOSIS — Z86.39 HISTORY OF DIABETES MELLITUS: ICD-10-CM

## 2023-08-08 DIAGNOSIS — Z86.69 HISTORY OF SLEEP APNEA: ICD-10-CM

## 2023-08-08 DIAGNOSIS — R55 SYNCOPE, UNSPECIFIED SYNCOPE TYPE: Primary | ICD-10-CM

## 2023-08-08 DIAGNOSIS — Z86.59 HISTORY OF DEPRESSION: ICD-10-CM

## 2023-08-08 LAB
ALBUMIN SERPL-MCNC: 3.9 G/DL (ref 3.5–5.2)
ALBUMIN/GLOB SERPL: 1.1 G/DL
ALP SERPL-CCNC: 148 U/L (ref 39–117)
ALT SERPL W P-5'-P-CCNC: 25 U/L (ref 1–33)
ANION GAP SERPL CALCULATED.3IONS-SCNC: 13 MMOL/L (ref 5–15)
AST SERPL-CCNC: 20 U/L (ref 1–32)
BASOPHILS # BLD AUTO: 0.04 10*3/MM3 (ref 0–0.2)
BASOPHILS NFR BLD AUTO: 0.5 % (ref 0–1.5)
BILIRUB SERPL-MCNC: 0.2 MG/DL (ref 0–1.2)
BILIRUB UR QL STRIP: NEGATIVE
BUN SERPL-MCNC: 7 MG/DL (ref 6–20)
BUN/CREAT SERPL: 10.6 (ref 7–25)
CALCIUM SPEC-SCNC: 9.3 MG/DL (ref 8.6–10.5)
CHLORIDE SERPL-SCNC: 102 MMOL/L (ref 98–107)
CLARITY UR: CLEAR
CO2 SERPL-SCNC: 27 MMOL/L (ref 22–29)
COLOR UR: YELLOW
CREAT SERPL-MCNC: 0.66 MG/DL (ref 0.57–1)
DEPRECATED RDW RBC AUTO: 46.9 FL (ref 37–54)
EGFRCR SERPLBLD CKD-EPI 2021: 109.7 ML/MIN/1.73
EOSINOPHIL # BLD AUTO: 0.22 10*3/MM3 (ref 0–0.4)
EOSINOPHIL NFR BLD AUTO: 2.8 % (ref 0.3–6.2)
ERYTHROCYTE [DISTWIDTH] IN BLOOD BY AUTOMATED COUNT: 15 % (ref 12.3–15.4)
GLOBULIN UR ELPH-MCNC: 3.7 GM/DL
GLUCOSE SERPL-MCNC: 151 MG/DL (ref 65–99)
GLUCOSE UR STRIP-MCNC: ABNORMAL MG/DL
HCT VFR BLD AUTO: 43.2 % (ref 34–46.6)
HGB BLD-MCNC: 13.7 G/DL (ref 12–15.9)
HGB UR QL STRIP.AUTO: NEGATIVE
HOLD SPECIMEN: NORMAL
IMM GRANULOCYTES # BLD AUTO: 0.05 10*3/MM3 (ref 0–0.05)
IMM GRANULOCYTES NFR BLD AUTO: 0.6 % (ref 0–0.5)
KETONES UR QL STRIP: NEGATIVE
LEUKOCYTE ESTERASE UR QL STRIP.AUTO: NEGATIVE
LYMPHOCYTES # BLD AUTO: 3.23 10*3/MM3 (ref 0.7–3.1)
LYMPHOCYTES NFR BLD AUTO: 41.1 % (ref 19.6–45.3)
MAGNESIUM SERPL-MCNC: 2 MG/DL (ref 1.6–2.6)
MCH RBC QN AUTO: 27.5 PG (ref 26.6–33)
MCHC RBC AUTO-ENTMCNC: 31.7 G/DL (ref 31.5–35.7)
MCV RBC AUTO: 86.6 FL (ref 79–97)
MONOCYTES # BLD AUTO: 0.56 10*3/MM3 (ref 0.1–0.9)
MONOCYTES NFR BLD AUTO: 7.1 % (ref 5–12)
NEUTROPHILS NFR BLD AUTO: 3.75 10*3/MM3 (ref 1.7–7)
NEUTROPHILS NFR BLD AUTO: 47.9 % (ref 42.7–76)
NITRITE UR QL STRIP: NEGATIVE
NRBC BLD AUTO-RTO: 0 /100 WBC (ref 0–0.2)
PH UR STRIP.AUTO: 6 [PH] (ref 5–8)
PLATELET # BLD AUTO: 319 10*3/MM3 (ref 140–450)
PMV BLD AUTO: 9.9 FL (ref 6–12)
POTASSIUM SERPL-SCNC: 4.6 MMOL/L (ref 3.5–5.2)
PROT SERPL-MCNC: 7.6 G/DL (ref 6–8.5)
PROT UR QL STRIP: NEGATIVE
RBC # BLD AUTO: 4.99 10*6/MM3 (ref 3.77–5.28)
SODIUM SERPL-SCNC: 142 MMOL/L (ref 136–145)
SP GR UR STRIP: 1.02 (ref 1–1.03)
TROPONIN T SERPL HS-MCNC: <6 NG/L
UROBILINOGEN UR QL STRIP: ABNORMAL
WBC NRBC COR # BLD: 7.85 10*3/MM3 (ref 3.4–10.8)
WHOLE BLOOD HOLD COAG: NORMAL
WHOLE BLOOD HOLD SPECIMEN: NORMAL

## 2023-08-08 PROCEDURE — 70486 CT MAXILLOFACIAL W/O DYE: CPT

## 2023-08-08 PROCEDURE — 83735 ASSAY OF MAGNESIUM: CPT | Performed by: EMERGENCY MEDICINE

## 2023-08-08 PROCEDURE — 36415 COLL VENOUS BLD VENIPUNCTURE: CPT

## 2023-08-08 PROCEDURE — 85025 COMPLETE CBC W/AUTO DIFF WBC: CPT

## 2023-08-08 PROCEDURE — 80053 COMPREHEN METABOLIC PANEL: CPT | Performed by: EMERGENCY MEDICINE

## 2023-08-08 PROCEDURE — 81003 URINALYSIS AUTO W/O SCOPE: CPT | Performed by: PHYSICIAN ASSISTANT

## 2023-08-08 PROCEDURE — 93005 ELECTROCARDIOGRAM TRACING: CPT | Performed by: EMERGENCY MEDICINE

## 2023-08-08 PROCEDURE — 99284 EMERGENCY DEPT VISIT MOD MDM: CPT

## 2023-08-08 PROCEDURE — 70450 CT HEAD/BRAIN W/O DYE: CPT

## 2023-08-08 PROCEDURE — 84484 ASSAY OF TROPONIN QUANT: CPT | Performed by: EMERGENCY MEDICINE

## 2023-08-08 PROCEDURE — 71045 X-RAY EXAM CHEST 1 VIEW: CPT

## 2023-08-08 PROCEDURE — 93005 ELECTROCARDIOGRAM TRACING: CPT

## 2023-08-08 RX ORDER — SODIUM CHLORIDE 0.9 % (FLUSH) 0.9 %
10 SYRINGE (ML) INJECTION AS NEEDED
Status: DISCONTINUED | OUTPATIENT
Start: 2023-08-08 | End: 2023-08-09 | Stop reason: HOSPADM

## 2023-08-08 RX ADMIN — SODIUM CHLORIDE, POTASSIUM CHLORIDE, SODIUM LACTATE AND CALCIUM CHLORIDE 1000 ML: 600; 310; 30; 20 INJECTION, SOLUTION INTRAVENOUS at 23:20

## 2023-08-08 NOTE — ED PROVIDER NOTES
" EMERGENCY DEPARTMENT ENCOUNTER    Pt Name: Josefina Rodriguez  MRN: [id number]  Pt :   1975  Room Number:  10/10  Date of encounter:  2023  PCP: Tommy Esteban MD  ED Provider: ROBBIN Ott    Historian: Patient    HPI:  Chief Complaint: Syncope    Context: Josefina Rodriguez is a 48 y.o. female who presents to the ED c/o syncopal episode.  Patient reports that she was using the bathroom earlier in the day today when she fell off her toilet.  Per family who is present at interview and exam patient \"passed out\" and fell face down onto the floor.  Patient reports pain in her left jaw that is new from the episode.  She reports that she is always nauseous.  She states that today she did have a little bit of diarrhea.  She denies any urinary complaints.  She has no past history of any syncopal episodes.  Family reports that her heart rate was low following the episode.  HPI     REVIEW OF SYSTEMS  A chief complaint appropriate review of systems was completed and is negative except as noted in the HPI.     PAST MEDICAL HISTORY  Past Medical History:   Diagnosis Date    Anxiety     Depression     Diabetes mellitus     Drug therapy     still taking orally    Hiatal hernia     History of radiation therapy 2021    right 7th rib    Hx of radiation therapy     right breast    Hypertension     Malignant neoplasm of overlapping sites of right breast in female, estrogen receptor positive 2020    PCOS (polycystic ovarian syndrome)     Wears glasses        PAST SURGICAL HISTORY  Past Surgical History:   Procedure Laterality Date    ABDOMINOPLASTY      BONE BIOPSY      BREAST BIOPSY Right 2019    right axillary LN FNA    BREAST BIOPSY Right 01/15/2020    US bx    BREAST BIOPSY Right 01/15/2020    [facility] axillary LN FNA    BREAST RECONSTRUCTION      CHOLECYSTECTOMY  2010    COLONOSCOPY  2016    MASTECTOMY Right 2020    [facility]  [facility]    MASTECTOMY W/ SENTINEL NODE " BIOPSY Right 06/23/2020    Procedure: MASTECTOMY WITH SENTINEL NODE BIOPSY RIGHT, PORT PLACEMENT;  Surgeon: Rachel Baker MD;  Location: [facility];  Service: General;  Laterality: Right;    US GUIDED FINE NEEDLE ASPIRATION  01/15/2020    VENOUS ACCESS DEVICE (PORT) INSERTION Left 01/31/2020       FAMILY HISTORY  Family History   Problem Relation Age of Onset    Breast cancer Mother 64        recurrance at age 76    Heart disease Mother     Diabetes Mother     Hypertension Father     Lung cancer Father     Diabetes Brother     Liver disease Brother     Heart disease Brother     Liver disease Brother     Cerebral aneurysm Sister     Diabetes Sister     Liver disease Sister     COPD Sister     COPD Sister     Ovarian cancer Sister     Heart attack Maternal Grandmother     Stroke Paternal Grandmother     Colon cancer Neg Hx     Colon polyps Neg Hx        SOCIAL HISTORY  Social History     Socioeconomic History    Marital status:    Tobacco Use    Smoking status: Former     Packs/day: 1.00     Years: 20.00     Pack years: 20.00     Types: Cigarettes    Smokeless tobacco: Never    Tobacco comments:     quit ~4/15/2021 (was smoking 1 PPD)   Vaping Use    Vaping Use: Never used   Substance and Sexual Activity    Alcohol use: Not Currently     Comment: occasionally a couple times a month    Drug use: Never    Sexual activity: Defer       ALLERGIES  Cashew nut, Cashew nut (anacardium occidentale) skin test, Naproxen, Penicillins, Pistachio nut, and Pistachio nut (diagnostic)    PHYSICAL EXAM  Physical Exam  Vitals and nursing note reviewed.   Constitutional:       General: She is not in acute distress.     Appearance: Normal appearance. She is not ill-appearing.   HENT:      Head: Normocephalic and atraumatic.      Nose: Nose normal.      Mouth/Throat:      Mouth: Mucous membranes are moist.   Eyes:      Extraocular Movements: Extraocular movements intact.   Cardiovascular:      Rate and Rhythm: Normal rate.    Pulmonary:      Effort: Pulmonary effort is normal.   Abdominal:      General: There is no distension.   Musculoskeletal:         General: Normal range of motion.      Cervical back: Normal range of motion and neck supple.   Skin:     General: Skin is warm and dry.   Neurological:      General: No focal deficit present.      Mental Status: She is alert.   Psychiatric:         Mood and Affect: Mood normal.         Behavior: Behavior normal.     LAB RESULTS  Results for orders placed or performed during the hospital encounter of 08/08/23   Comprehensive Metabolic Panel    Specimen: Blood   Result Value Ref Range    Glucose 151 (H) 65 - 99 mg/dL    BUN 7 6 - 20 mg/dL    Creatinine 0.66 0.57 - 1.00 mg/dL    Sodium 142 136 - 145 mmol/L    Potassium 4.6 3.5 - 5.2 mmol/L    Chloride 102 98 - 107 mmol/L    CO2 27.0 22.0 - 29.0 mmol/L    Calcium 9.3 8.6 - 10.5 mg/dL    Total Protein 7.6 6.0 - 8.5 g/dL    Albumin 3.9 3.5 - 5.2 g/dL    ALT (SGPT) 25 1 - 33 U/L    AST (SGOT) 20 1 - 32 U/L    Alkaline Phosphatase 148 (H) 39 - 117 U/L    Total Bilirubin 0.2 0.0 - 1.2 mg/dL    Globulin 3.7 gm/dL    A/G Ratio 1.1 g/dL    BUN/Creatinine Ratio 10.6 7.0 - 25.0    Anion Gap 13.0 5.0 - 15.0 mmol/L    eGFR 109.7 >60.0 mL/min/1.73   Magnesium    Specimen: Blood   Result Value Ref Range    Magnesium 2.0 1.6 - 2.6 mg/dL   Single High Sensitivity Troponin T    Specimen: Blood   Result Value Ref Range    HS Troponin T <6 <10 ng/L   CBC Auto Differential    Specimen: Blood   Result Value Ref Range    WBC 7.85 3.40 - 10.80 10*3/mm3    RBC 4.99 3.77 - 5.28 10*6/mm3    Hemoglobin 13.7 12.0 - 15.9 g/dL    Hematocrit 43.2 34.0 - 46.6 %    MCV 86.6 79.0 - 97.0 fL    MCH 27.5 26.6 - 33.0 pg    MCHC 31.7 31.5 - 35.7 g/dL    RDW 15.0 12.3 - 15.4 %    RDW-SD 46.9 37.0 - 54.0 fl    MPV 9.9 6.0 - 12.0 fL    Platelets 319 140 - 450 10*3/mm3    Neutrophil % 47.9 42.7 - 76.0 %    Lymphocyte % 41.1 19.6 - 45.3 %    Monocyte % 7.1 5.0 - 12.0 %    Eosinophil  % 2.8 0.3 - 6.2 %    Basophil % 0.5 0.0 - 1.5 %    Immature Grans % 0.6 (H) 0.0 - 0.5 %    Neutrophils, Absolute 3.75 1.70 - 7.00 10*3/mm3    Lymphocytes, Absolute 3.23 (H) 0.70 - 3.10 10*3/mm3    Monocytes, Absolute 0.56 0.10 - 0.90 10*3/mm3    Eosinophils, Absolute 0.22 0.00 - 0.40 10*3/mm3    Basophils, Absolute 0.04 0.00 - 0.20 10*3/mm3    Immature Grans, Absolute 0.05 0.00 - 0.05 10*3/mm3    nRBC 0.0 0.0 - 0.2 /100 WBC   Urinalysis With Microscopic If Indicated (No Culture) - Urine, Clean Catch    Specimen: Urine, Clean Catch   Result Value Ref Range    Color, UA Yellow Yellow, Straw    Appearance, UA Clear Clear    pH, UA 6.0 5.0 - 8.0    Specific Gravity, UA 1.025 1.001 - 1.030    Glucose, UA >=1000 mg/dL (3+) (A) Negative    Ketones, UA Negative Negative    Bilirubin, UA Negative Negative    Blood, UA Negative Negative    Protein, UA Negative Negative    Leuk Esterase, UA Negative Negative    Nitrite, UA Negative Negative    Urobilinogen, UA 0.2 E.U./dL 0.2 - 1.0 E.U./dL   ECG 12 Lead ED Triage Standing Order; Syncope   Result Value Ref Range    QT Interval 428 ms    QTC Interval 448 ms   Green Top (Gel)   Result Value Ref Range    Extra Tube Hold for add-ons.    Lavender Top   Result Value Ref Range    Extra Tube hold for add-on    Gold Top - SST   Result Value Ref Range    Extra Tube Hold for add-ons.    Gray Top   Result Value Ref Range    Extra Tube Hold for add-ons.    Light Blue Top   Result Value Ref Range    Extra Tube Hold for add-ons.        If labs were ordered, I independently reviewed the results and considered them in treating the patient.    RADIOLOGY  CT Head Without Contrast   Final Result   Impression:   No acute intracranial abnormality.      No acute facial bone fracture.            Electronically Signed: Zana Rader     8/8/2023 8:23 PM EDT     Workstation ID: [id number]      CT Maxillofacial Without Contrast   Final Result   Impression:   No acute intracranial abnormality.      No acute  facial bone fracture.            Electronically Signed: Zana Rader     8/8/2023 8:23 PM EDT     Workstation ID: [id number]      XR Chest 1 View   Final Result   No evidence of acute disease in the chest.      Electronically Signed: Zana Rader     8/8/2023 8:28 PM EDT     Workstation ID: [id number]        [x] Radiologist's Report Reviewed:  I ordered and independently reviewed the above noted radiographic studies.  See radiologist's dictation for official interpretation.      PROCEDURES    Procedures    ECG 12 Lead ED Triage Standing Order; Syncope   Final Result   Test Reason : ED Triage Standing Order~   Blood Pressure :   */*   mmHG   Vent. Rate :  66 BPM     Atrial Rate :  66 BPM      P-R Int : 132 ms          QRS Dur :  82 ms       QT Int : 428 ms       P-R-T Axes :  30 -28  10 degrees      QTc Int : 448 ms      Normal sinus rhythm   Minimal voltage criteria for LVH, may be normal variant ( R in aVL )      Abnormal ECG   When compared with ECG of 16-NOV-2022 18:48,   T wave inversion now evident in Anterior leads   Confirmed by ZION NICHOLS MD ([id number]) on 8/15/2023 10:06:12 AM      Referred By:            Confirmed By: ZION NICHOLS MD          MEDICATIONS GIVEN IN ER    Medications   lactated ringers bolus 1,000 mL (0 mL Intravenous Stopped 8/9/23 0056)       MEDICAL DECISION MAKING, PROGRESS, and CONSULTS   Medical Decision Making  Problems Addressed:  History of anxiety: chronic illness or injury  History of depression: chronic illness or injury  History of diabetes mellitus: chronic illness or injury  History of hypertension: chronic illness or injury  History of sleep apnea: chronic illness or injury  Syncope, unspecified syncope type: complicated acute illness or injury    Amount and/or Complexity of Data Reviewed  Labs: ordered.  Radiology: ordered.  ECG/medicine tests: ordered.    Risk  Prescription drug management.        All labs have been independently reviewed by me.  All radiology studies have been  reviewed by me and the radiologist dictating the report.  All EKG's have been independently viewed by me.    [] Discussed with radiology regarding test interpretation:    Discussion below represents my analysis of pertinent findings related to patient's condition, differential diagnosis, treatment plan and final disposition.    Differential diagnosis:  The differential diagnosis associated with the patient's presentation includes: Vasovagal syncope, orthostatic hypotension, hyperventilation, drug or alcohol abuse, panic attack, anemia, arrhythmias, hypoglycemia, seizure, pseudoseizure, PE, myocardial infarction, hypoxia, Brugada syndrome, CVA, TIA.     Additional sources  Discussed/ obtained information from independent historians:   [] Spouse  [] Parent  [x] Family member  [] Friend  [] EMS   [] Other:  External (non-ED) record review:   [] Inpatient record:   [x] Office record: Personally reviewed office visit on June 29, 2023 for patient's left lower quadrant abdominal pain, nonspecific.  Also reviewed oncology notes demonstrating patient's past medical history significant for breast cancer.   [] Outpatient record:   [] Prior Outpatient labs:   [] Prior Outpatient radiology:   [] Primary Care record:   [] Outside ED record:   [] Other:   Patient's care impacted by:   [x] Diabetes  [x] Hypertension  [] Hyperlipidemia  [] Hypothyroidism   [] Coronary Artery Disease   [] COPD   [x] Cancer: History of breast cancer   [] Obesity  [x] GERD   [] Tobacco Abuse   [] Substance Abuse    [x] Anxiety   [x] Depression   [x] Other: Sleep apnea   Care significantly affected by Social Determinants of Health (housing and economic circumstances, unemployment)    [] Yes     [x] No   If yes, Patient's care significantly limited by  Social Determinants of Health including:   [] Inadequate housing   [] Low income   [] Alcoholism and drug addiction in family   [] Problems related to primary support group   [] Unemployment   [] Problems  related to employment   [] Other Social Determinants of Health:     Shared decision making:  I had a discussion with the patient/family regarding diagnosis, diagnostic results, treatment plan, and medications.  The patient/family indicated understanding of these instructions.  I spent adequate time at the bedside preceding discharge necessary to personally discuss the aftercare instructions, giving patient education, providing explanations of the results of our evaluations/findings, and my decision making to assure that the patient/family understand the plan of care.  Time was allotted to answer questions at that time and throughout the ED course.  Emphasis was placed on timely follow-up after discharge.  I also discussed the potential for the development of an acute emergent condition requiring further evaluation, admission, or even surgical intervention. I discussed that we found nothing during the visit today indicating the need for further workup, admission, or the presence of an unstable medical condition.  I encouraged the patient to return to the emergency department immediately for ANY concerns, worsening, new complaints, or if symptoms persist and unable to seek follow-up in a timely fashion.  The patient/family expressed understanding and agreement with this plan.  The patient will follow-up with primary care and  Syncope Clinic for reevaluation.      Orders placed during this visit:  Orders Placed This Encounter   Procedures    XR Chest 1 View    CT Head Without Contrast    CT Maxillofacial Without Contrast    [place] Draw    Comprehensive Metabolic Panel    Magnesium    Single High Sensitivity Troponin T    CBC Auto Differential    Urinalysis With Microscopic If Indicated (No Culture) - Urine, Clean Catch    Ambulatory Referral to [facility] Syncope Clinic    ECG 12 Lead ED Triage Standing Order; Syncope    CBC & Differential    Green Top (Gel)    Lavender Top    Gold Top - SST    Gray Top    Light Blue  Top       ED Course:    ED Course as of 08/16/23 1334   Wed Aug 09, 2023   0016 On reassessment at bedside patient is resting in a position of comfort in no acute distress.  I reviewed ER work-up of both labs and imaging demonstrated no acute abnormalities.  I discussed that I will be referring patient to the syncopal clinic for additional follow-up.  Patient and family at bedside agreeable to plan of care. [JG]   0019 In summary this is a 48 year old patient who present to the ED for evaluation of a syncopal episode in the setting of using the bathroom. ECG without evidence of acute ischemic changes or emergent arrhythmia. Reassuring history/exam, low suspicion for other emergent precipitating causes Patient does not exhibit any significant injuries from the syncopal episode. Labs/UA without acute or emergent abnormalities. CT of head, Maxillofacial without acute or emergent abnormalities. Chest x-ray demonstrates no acute cardiopulmonary process. Responded well to supportive care in ED with IV fluids. Discussed continued fluid hydration at home, strict return precautions and close PCP follow up. Referral to Syncope clinic provided.  At time of discharge disposition patient is afebrile, nontoxic appearing, vital signs stable and able to maintain O2 sats of 97% on room air. Patient will be discharged home with symptomatic care and outpatient follow up.  [JG]      ED Course User Index  [JG] Chris Chapa PA            DIAGNOSIS  Final diagnoses:   Syncope, unspecified syncope type   History of anxiety   History of depression   History of diabetes mellitus   History of hypertension   History of sleep apnea       DISPOSITION    ED Disposition       ED Disposition   Discharge    Condition   Stable    Comment   --               Please note that portions of this document were completed with voice recognition software.        Chris Chapa PA  08/16/23 1335

## 2023-08-09 NOTE — DISCHARGE INSTRUCTIONS
Take all medications as prescribed and instructed. Follow up with your primary care as directed or return to Emergency Department with worsening of symptoms.  Follow-up as directed with the [facility] syncope clinic.

## 2023-08-14 NOTE — PROGRESS NOTES
Chief Complaint  Syncope    Subjective      History of Present Illness {  Problem List  Visit  Diagnosis   Encounters  Notes  Medications  Labs  Result Review Imaging  Media :23}     Josefina Rodriguez, 48 y.o. female with diabetes, HTN, WARD,  history of breast CA presents to [facility] for Syncope.    Patient was recently evaluated at [facility] emergency department for complaints of a syncopal episode.  Emergency department work-up revealed head CT with no acute intracranial abnormality, normal troponin, CXR with no acute cardiopulmonary findings, and ECGs with no evidence of acute coronary syndrome.  Previous echocardiogram November 2022 with normal LV function, normal cardiac valves. Patient was instructed to follow-up at [facility] heart and valve clinic for further evaluation.    Patient presents today with recurrence of syncope since emergency department evaluation.  Patient reports that syncopal episode occurred during using toilet/standing to wipe and also after emotional stress after an argument. Feels fatigue during those days prior to episodes. Prodromal symptoms included chest tightness, low heart rate in 50s, lightheadedness. Denies arrhythmia symptoms, chest pain, and dyspnea around the time of event. Normal nutritional intake preceding the syncopal episode and was hydrated around the time of event. Also reports intermittent chest pain that occurred while seated, but pain did radiate to shoulder and jaw. Reports recent GI reconstructive surgery. Reports one syncopal episodes in the past during chemotherapy. Previous cardiac testing includes: echocardiogram 11/2022, stress test greater than 10y ago.  Reports mother with CAD in her 50s, brother with palpitations.       Objective     Vital Signs:   Vitals:    08/15/23 0820 08/15/23 0821   BP: 112/65 122/82   BP Location: Left arm Left arm   Patient Position: Standing Sitting   Cuff  "Size: Large Adult Large Adult   Pulse: 98 90   Resp:  18   Temp: 97.5 øF (36.4 øC) 97.5 øF (36.4 øC)   TempSrc: Temporal Temporal   SpO2: 96% 98%   Weight:  110 kg (241 lb 9.6 oz)   Height:  167.6 cm (66\")     Body mass index is 39 kg/mý.  Physical Exam  Vitals and nursing note reviewed.   Constitutional:       Appearance: Normal appearance.   HENT:      Head: Normocephalic.   Eyes:      Extraocular Movements: Extraocular movements intact.   Neck:      Vascular: No carotid bruit.   Cardiovascular:      Rate and Rhythm: Normal rate and regular rhythm.      Pulses: Normal pulses.      Heart sounds: Normal heart sounds, S1 normal and S2 normal. No murmur heard.  Pulmonary:      Effort: Pulmonary effort is normal. No respiratory distress.      Breath sounds: Normal breath sounds.   Musculoskeletal:      Cervical back: Neck supple.      Right lower leg: No edema.      Left lower leg: No edema.   Skin:     General: Skin is warm and dry.   Neurological:      General: No focal deficit present.      Mental Status: She is alert.   Psychiatric:         Mood and Affect: Mood normal.         Behavior: Behavior normal.         Thought Content: Thought content normal.      Data Reviewed:{ Labs  Result Review  Imaging  Med Tab  Media :23}     Single High Sensitivity Troponin T (08/08/2023 20:14)  Magnesium (08/08/2023 20:14)  Comprehensive Metabolic Panel (08/08/2023 20:14)  CBC & Differential (08/08/2023 18:55)  Comprehensive Metabolic Panel (07/18/2023 11:35)  XR Chest 1 View (08/08/2023 19:46)  CT Head Without Contrast (08/08/2023 19:56)  CT Maxillofacial Without Contrast (08/08/2023 19:56)  ECG 12 Lead ED Triage Standing Order; Syncope (08/08/2023 18:56)     Adult Transthoracic Echo Complete w/ Color, Spectral and Contrast if Necessary Per Protocol (11/17/2022 14:23)       Assessment & Plan   Assessment and Plan {CC Problem List  Visit Diagnosis  ROS  Review (Popup)  Health Maintenance  Quality  BestPractice  " Medications  SmartSets  SnapShot Encounters  Media :23}       1. Syncope  -Recent syncope prompted emergency department evaluation.  ED work-up unremarkable for cardiac etiology.  Possibly vasovagal given symptom description.  -Denies arrhythmia symptoms around time of event  -Previous TTE November 2022 with normal LV function, no significant valvular abnormality  -We will complete 30-day MCOT to rule out arrhythmia  -Follow-up in approximately 6 weeks for recheck, monitor results    2. Chest pain  -Intermittent chest pain, generally nonexertional.  However, given her new onset of symptoms with no recent work-up and cardiac risk factors will complete PET myocardial perfusion study to rule out ischemia  -Recent ED evaluation with normal troponin, ECG without acute ischemia  - Stress Test With Pet Myocardial Perfusion; Future    3. Primary hypertension  -Well-controlled today  -Continue lisinopril, HCTZ as prescribed      Follow Up {Instructions Charge Capture  Follow-up Communications :23}     Return in about 5 weeks (around 9/19/2023) for Video visit, Monitor results, Results follow-up.    Time Spent: I spent 43 minutes caring for Josefina Rodriguez on this date of service. This time includes time spent by me in the following activities: preparing for the visit, reviewing tests, obtaining and/or reviewing a separately obtained history, performing a medically appropriate examination and/or evaluation, counseling and educating the patient/family/caregiver, documenting information in the medical record and independently interpreting results and communicating that information with the patient/family/caregiver. All time noted occurred on the date of service.    Patient was given instructions and counseling regarding her condition or for health maintenance advice. Please see specific information pulled into the AVS if appropriate.  Patient was instructed to call the [facility] with any  questions, concerns, or worsening symptoms.    Dictated Utilizing Dragon Dictation   Please note that portions of this note were completed with a voice recognition program.   Part of this note may be an electronic transcription/translation of spoken language to printed text using the Dragon Dictation System.

## 2023-08-15 ENCOUNTER — HOSPITAL ENCOUNTER (OUTPATIENT)
Dept: CARDIOLOGY | Facility: HOSPITAL | Age: 48
Discharge: HOME | End: 2023-08-15
Payer: MEDICAID

## 2023-08-15 ENCOUNTER — OFFICE VISIT (OUTPATIENT)
Dept: SLEEP MEDICINE | Facility: HOSPITAL | Age: 48
End: 2023-08-15
Payer: MEDICAID

## 2023-08-15 ENCOUNTER — OFFICE VISIT (OUTPATIENT)
Dept: CARDIOLOGY | Facility: HOSPITAL | Age: 48
End: 2023-08-15
Payer: MEDICAID

## 2023-08-15 VITALS
WEIGHT: 241.6 LBS | OXYGEN SATURATION: 98 % | DIASTOLIC BLOOD PRESSURE: 82 MMHG | TEMPERATURE: 97.5 F | HEART RATE: 90 BPM | HEIGHT: 66 IN | SYSTOLIC BLOOD PRESSURE: 122 MMHG | RESPIRATION RATE: 18 BRPM | BODY MASS INDEX: 38.83 KG/M2

## 2023-08-15 VITALS
OXYGEN SATURATION: 97 % | BODY MASS INDEX: 38.57 KG/M2 | DIASTOLIC BLOOD PRESSURE: 85 MMHG | WEIGHT: 240 LBS | HEART RATE: 91 BPM | HEIGHT: 66 IN | SYSTOLIC BLOOD PRESSURE: 135 MMHG

## 2023-08-15 DIAGNOSIS — F51.04 PSYCHOPHYSIOLOGICAL INSOMNIA: Primary | ICD-10-CM

## 2023-08-15 DIAGNOSIS — G47.33 OSA (OBSTRUCTIVE SLEEP APNEA): ICD-10-CM

## 2023-08-15 DIAGNOSIS — R55 SYNCOPE, UNSPECIFIED SYNCOPE TYPE: Primary | ICD-10-CM

## 2023-08-15 DIAGNOSIS — R07.9 CHEST PAIN, UNSPECIFIED TYPE: ICD-10-CM

## 2023-08-15 DIAGNOSIS — I10 PRIMARY HYPERTENSION: ICD-10-CM

## 2023-08-15 DIAGNOSIS — R07.89 OTHER CHEST PAIN: ICD-10-CM

## 2023-08-15 DIAGNOSIS — R55 SYNCOPE, UNSPECIFIED SYNCOPE TYPE: ICD-10-CM

## 2023-08-15 LAB
QT INTERVAL: 428 MS
QTC INTERVAL: 448 MS

## 2023-08-15 RX ORDER — EMPAGLIFLOZIN AND METFORMIN HYDROCHLORIDE 12.5; 5 MG/1; MG/1
1 TABLET ORAL 2 TIMES DAILY
COMMUNITY
Start: 2023-08-01

## 2023-08-15 RX ORDER — PROCHLORPERAZINE 25 MG/1
1 SUPPOSITORY RECTAL TAKE AS DIRECTED
COMMUNITY
Start: 2023-07-31

## 2023-08-15 RX ORDER — SAW/PYGEUM/BETA/HERB/D3/B6/ZN 30 MG-25MG
CAPSULE ORAL
COMMUNITY

## 2023-08-15 NOTE — PROGRESS NOTES
[facility] Heart Monitor Documentation    Josefina Jondridge  1975  [id number]  08/15/23      [] ZIO XT Patch  Model [id number] Prescribed for  Days    Serial Number: (N + 9 Digits) N   Apply-By Date on Box:   USPS Tracking Number:   USPS Tracking        [x] [employer] MINI PLUS Mobile Cardiac Telemetry  Model BGMINIPLUS Prescribed for 30 Days    Serial Number: (BGM + 7 Digits) [id number]  Shipped-By Date on Box: 07/26/2023  UPS Tracking Number: [id number]  UPS Tracking      [] [employer] MINI Holter Monitor  Model BGMINIEL Prescribed for  Days    Serial Number: (7 Digits)   Shipped-By Date on Box:   UPS Tracking Number: [id number]  UPS Tracking        This monitor was applied to the patient's chest and checked for proper functioning.  Ms. Josefina Rodriguez was instructed in the proper use of this monitor.  She was given the opportunity to ask questions and left the office with the device 's instruction manual.    Jessica Novak, [facility], 09:20 EDT, 08/15/23                  [facility] 8.8.2019

## 2023-08-15 NOTE — PATIENT INSTRUCTIONS
- Heart monitor     - Office will call to schedule testing    - Office will call or send message with testing results

## 2023-08-15 NOTE — PROGRESS NOTES
Chief Complaint:   Chief Complaint   Patient presents with    Follow-up       HPI:    Josefina Rodriguez is a 48 y.o. female here for follow-up of sleep apnea.  Patient was last seen 6/8/2023.  Patient was having difficulty going to sleep and staying asleep after breast cancer diagnosis and chemotherapy.  Several medications we tried were not covered on insurance and did move forward with extended release melatonin.  Patient states she does do well with this medication she is able to get 5 to 6 hours of sleep nightly and will go to sleep within 30 minutes after taking her medication.  She does not get up until 5 AM to use the restroom.  Patient has an [place] score of 18/24.  Patient is showing 10 out of the last 30 days of use stating her machine was messed up and that this is not correct we will see her back in 6 months if she is compliant and still sleepy we will be able to offer a low-dose stimulant at this point we will continue CPAP and extended release melatonin.        Current medications are:   Current Outpatient Medications:     anastrozole (ARIMIDEX) 1 MG tablet, Take 1 tablet by mouth Daily., Disp: 30 tablet, Rfl: 11    buPROPion XL (WELLBUTRIN XL) 300 MG 24 hr tablet, 1 tablet Every Morning., Disp: , Rfl:     Cholecalciferol 125 MCG (5000 UT) tablet dispersible, Place  on the tongue., Disp: , Rfl:     Continuous Blood Gluc Transmit (Dexcom G6 Transmitter) misc, Inject 1 Device under the skin into the appropriate area as directed Take As Directed., Disp: , Rfl:     Diclofenac Sodium (VOLTAREN) 1 % gel gel, 3 (Three) Times a Day As Needed., Disp: , Rfl:     DULoxetine (CYMBALTA) 60 MG capsule, TAKE ONE CAPSULE BY MOUTH DAILY, Disp: 90 capsule, Rfl: 3    fluticasone (FLONASE) 50 MCG/ACT nasal spray, 1 spray into the nostril(s) as directed by provider Every 12 (Twelve) Hours., Disp: , Rfl:     hydroCHLOROthiazide (HYDRODIURIL) 25 MG tablet, Take 1 tablet by mouth Daily., Disp: , Rfl:      Insulin Lispro, 1 Unit Dial, (HUMALOG) 100 UNIT/ML solution pen-injector, , Disp: , Rfl:     [employer] Pen Needles 32G X 4 MM misc, , Disp: , Rfl:     LANTUS SOLOSTAR 100 UNIT/ML injection pen, Inject 26 Units under the skin into the appropriate area as directed. In AM, Disp: , Rfl:     lisinopril (PRINIVIL,ZESTRIL) 40 MG tablet, Take 1 tablet by mouth Daily., Disp: , Rfl:     loratadine (CLARITIN) 10 MG tablet, Take 1 tablet by mouth Daily., Disp: , Rfl:     LORazepam (ATIVAN) 1 MG tablet, Take 1 tablet by mouth As Needed for Anxiety., Disp: , Rfl:     LORazepam (ATIVAN) 1 MG tablet, Take 1 tablet by mouth Every Night., Disp: , Rfl:     Melatonin ER 10 MG tablet controlled-release, Take  by mouth., Disp: , Rfl:     montelukast (SINGULAIR) 10 MG tablet, Take 1 tablet by mouth Every Night., Disp: , Rfl:     Multiple Vitamins-Minerals (MULTIVITAMIN GUMMIES WOMENS PO), Take 2 capsules by mouth Daily., Disp: , Rfl:     ondansetron (ZOFRAN) 8 MG tablet, TAKE ONE TABLET BY MOUTH THREE TIMES A DAY AS NEEDED FOR NAUSEA AND VOMITING, Disp: 30 tablet, Rfl: 3    pantoprazole (PROTONIX) 40 MG EC tablet, Take 1 tablet by mouth Daily., Disp: , Rfl:     pregabalin (LYRICA) 150 MG capsule, TAKE ONE CAPSULE BY MOUTH TWICE A DAY, Disp: 60 capsule, Rfl: 5    prochlorperazine (COMPAZINE) 10 MG tablet, TAKE ONE TABLET BY MOUTH EVERY 6 HOURS AS NEEDED FOR NAUSEA AND VOMITING, Disp: 60 tablet, Rfl: 1    Synjardy 12.5-500 MG tablet, Take 1 tablet by mouth 2 (Two) Times a Day., Disp: , Rfl:     vitamin D3 125 MCG (5000 UT) capsule capsule, Take  by mouth Daily., Disp: , Rfl: .      The patient's relevant past medical, surgical, family and social history were reviewed and updated in Epic as appropriate.       Review of Systems   Constitutional:  Positive for fatigue.   HENT:  Positive for congestion.    Eyes:  Positive for visual disturbance.   Respiratory:  Positive for apnea and chest tightness.    Allergic/Immunologic: Positive for  environmental allergies.   Neurological:  Positive for syncope and numbness.   Psychiatric/Behavioral:  Positive for dysphoric mood and sleep disturbance. The patient is nervous/anxious.        Objective:    Physical Exam  Constitutional:       Appearance: Normal appearance.   HENT:      Head: Normocephalic and atraumatic.      Mouth/Throat:      Comments: Mallampati 3 anatomy  Cardiovascular:      Rate and Rhythm: Normal rate.   Pulmonary:      Effort: Pulmonary effort is normal. No respiratory distress.   Skin:     General: Skin is warm and dry.   Neurological:      Mental Status: She is alert and oriented to person, place, and time.   Psychiatric:         Mood and Affect: Mood normal.         Behavior: Behavior normal.         Thought Content: Thought content normal.         Judgment: Judgment normal.       CPAP Report  10/30 days of use  Greater than 4-hour use 27%  Settings 8-18  AHI 0.3  95th percentile pressure 10.2    The patient continues to use and benefit from CPAP therapy.    ASSESSMENT/PLAN    Diagnoses and all orders for this visit:    1. Psychophysiological insomnia (Primary)    2. WADR (obstructive sleep apnea)        Counseled patient regarding multimodal approach with healthy nutrition, healthy sleep, regular physical activity, social activities, counseling, and medications. Encouraged to practice lateral sleep position. Avoid alcohol and sedatives close to bedtime.  Patient will continue with extended release melatonin and CPAP therapy I will see her back in 6 months to reassess.      I have reviewed the results of my evaluation and impression and discussed my recommendations in detail with the patient.      Signed by  LISA Simons    August 15, 2023      CC: Tommy Esteban MD         No ref. provider found

## 2023-08-15 NOTE — H&P
[facility] Medicine Services  HISTORY AND PHYSICAL    Patient Name: Josefina Rodriguez  : 1975  MRN: [id number]  Primary Care Physician: Nia Higuera MD  Date of admission: 2022    Subjective   Subjective     Chief Complaint:  SOB    HPI:  Josefina Rodriguez is a 47 y.o. female with a history of Right breast cancer s/p mastectomy and chemotherapy with recent reconstruction surgery (), GERD, HTN, T2DM and obesity who presents to [facility] ED for complaint of SOB. She recently underwent right breast reconstruction surgery with tummy tuck at  on 24. She was discharged 3 days ago. She reports feeling somewhat short of breath while admitted there, as well as noting episodes of hypoxia. She reports that after being discharged she continued to have some episodes of hypoxia (high 80s on pulse Ox), as well as feeling more short of breath. She denies chest pain, palpitations, vomiting, diarrhea, or pain of the lower extremities. The shortness of breath was somewhat worse today, so she came here to be seen. She still retains her drain tubes in her lower abdomen.         Review of Systems   Constitutional: Negative for chills, fatigue, fever and unexpected weight change.   HENT: Negative for nosebleeds, sore throat and trouble swallowing.    Eyes: Negative for photophobia and visual disturbance.   Respiratory: Positive for cough and shortness of breath. Negative for wheezing.    Cardiovascular: Negative for chest pain, palpitations and leg swelling.   Gastrointestinal: Positive for abdominal pain (post-op) and nausea. Negative for diarrhea and vomiting.   Genitourinary: Negative for dysuria and hematuria.   Musculoskeletal: Positive for myalgias (post-op pain/tenderness).   Skin: Positive for wound (surgical wound. ).   Neurological: Negative for tremors, syncope, speech difficulty and weakness.   Psychiatric/Behavioral: Negative for confusion.  Patient will continue with current heating pad and topicals She cannnot have advil or tylenol Patient  Refuses an opiods and does not want any injections at this time due to the cancer issues The patient is nervous/anxious.       All other systems reviewed and are negative.     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   (HCC) (03/18/2021; Status: Active)  Malignant neoplasm of overlapping sites of right female breast (HCC)   (06/23/2020; Status: Active)  Malignant neoplasm of overlapping sites of right breast in female,   estrogen receptor positive (HCC) (01/21/2020; Status: Active)    Past Surgical History:   Procedure Laterality Date   • BONE BIOPSY     • BREAST BIOPSY Right 03/29/2019    right axillary LN FNA   • BREAST BIOPSY Right 01/15/2020    US bx   • BREAST BIOPSY Right 01/15/2020    rigth axillary LN FNA   • CHOLECYSTECTOMY  03/2010   • COLONOSCOPY  2016   • MASTECTOMY Right 06/23/2020    [facility]  AJ   • MASTECTOMY W/ SENTINEL NODE BIOPSY Right 06/23/2020    Procedure: MASTECTOMY WITH SENTINEL NODE BIOPSY RIGHT, PORT PLACEMENT;  Surgeon: Rachel Baker MD;  Location: [facility];  Service: General;  Laterality: Right;   • US GUIDED FINE NEEDLE ASPIRATION  01/15/2020   • VENOUS ACCESS DEVICE (PORT) INSERTION Left 01/31/2020       Family History:  family history includes Breast cancer (age of onset: 64) in her mother; Heart disease in her mother; Hypertension in her father; Lung cancer in her father. Otherwise pertinent FHx was reviewed and unremarkable.     Social History:  reports that she has quit smoking. Her smoking use included cigarettes. She has a 20.00 pack-year  smoking history. She has never used smokeless tobacco. She reports current alcohol use. She reports that she does not use drugs.  Social History     Social History Narrative   • Not on file       Medications:  B Complex Vitamins, DULoxetine, Diclofenac Sodium, FreeStyle Audrey 2 Sensor, Insulin Glargine, Insulin Lispro (1 Unit Dial), Insulin Pen Needle, LORazepam, Multiple Vitamins-Minerals, Vitamin B12, acetaminophen, anastrozole, aspirin, buPROPion XL, escitalopram, fluticasone, hydroCHLOROthiazide, ibuprofen, lidocaine-prilocaine, lisinopril, loratadine, montelukast, ondansetron, oxyCODONE, pantoprazole, pregabalin, prochlorperazine, and vitamin D3    Allergies   Allergen Reactions   • Cashew Nut Hives and Itching   • Cashew Nut (Anacardium Occidentale) Skin Test Hives and Itching   • Naproxen Hives   • Penicillins Hives     PT STATES CAN TOLERATE KELFEX   • Pistachio Nut Hives and Itching   • Pistachio Nut (Diagnostic) Hives and Itching       Objective   Objective     Vital Signs:   Temp:  [98.3 °F (36.8 °C)] 98.3 °F (36.8 °C)  Heart Rate:  [89-93] 93  Resp:  [20] 20  BP: (116-134)/(77-94) 116/77    Physical Exam  Constitutional:       General: She is not in acute distress.     Appearance: Normal appearance. She is obese.   HENT:      Head: Atraumatic.      Right Ear: External ear normal.      Left Ear: External ear normal.      Nose: Nose normal.   Eyes:      Extraocular Movements: Extraocular movements intact.      Conjunctiva/sclera: Conjunctivae normal.      Pupils: Pupils are equal, round, and reactive to light.   Cardiovascular:      Rate and Rhythm: Normal rate and regular rhythm.      Pulses: Normal pulses.      Heart sounds: Normal heart sounds. No murmur heard.  Pulmonary:      Effort: Pulmonary effort is normal. No respiratory distress.      Breath sounds: Normal breath sounds. No wheezing, rhonchi or rales.   Abdominal:      General: Bowel sounds are normal. There is no distension.      Tenderness:  There is abdominal tenderness (at surgical sites. ). There is no guarding or rebound.      Comments: Drain tubes in place.    Musculoskeletal:         General: Normal range of motion.      Cervical back: No rigidity.      Right lower leg: No edema.      Left lower leg: No edema.   Skin:     General: Skin is warm and dry.      Coloration: Skin is not jaundiced.      Findings: No lesion or rash.   Neurological:      General: No focal deficit present.      Mental Status: She is alert and oriented to person, place, and time.   Psychiatric:         Attention and Perception: Attention normal.         Mood and Affect: Mood normal.         Behavior: Behavior normal.         Thought Content: Thought content normal.          Result Review:  I have personally reviewed the results from the time of this admission to 11/17/2022 00:40 EST and agree with these findings:  [x]  Laboratory list / accordion  [x]  Microbiology  [x]  Radiology  [x]  EKG/Telemetry   []  Cardiology/Vascular   []  Pathology  []  Old records  []  Other:    LAB RESULTS:      Lab 11/16/22  1932 11/16/22  1931 11/10/22  0745   WBC  --  12.13* 9.82   HEMOGLOBIN  --  10.9* 10.5*   HEMATOCRIT  --  33.7* 32.2*   PLATELETS  --  338 223   NEUTROS ABS  --  9.31*  --    IMMATURE GRANS (ABS)  --  0.12*  --    LYMPHS ABS  --  1.96  --    MONOS ABS  --  0.55  --    EOS ABS  --  0.17  --    MCV  --  88.7 91   PROTIME 14.2  --   --    APTT 30.8*  --   --    HEPARIN ANTI-XA 0.10*  --   --          Lab 11/16/22 1931   SODIUM 137   POTASSIUM 3.1*   CHLORIDE 95*   CO2 29.0   ANION GAP 13.0   BUN 13   CREATININE 0.58   EGFR 113.9   GLUCOSE 218*   CALCIUM 9.0   HEMOGLOBIN A1C 7.90*         Lab 11/16/22 1931   TOTAL PROTEIN 7.3   ALBUMIN 3.30*   GLOBULIN 4.0   ALT (SGPT) 28   AST (SGOT) 27   BILIRUBIN 0.3   ALK PHOS 164*         Lab 11/16/22 1932 11/16/22 1931   PROBNP  --  101.2   TROPONIN T  --  <0.010   PROTIME 14.2  --    INR 1.10  --                  Brief Urine Lab  Results     None        Microbiology Results (last 10 days)     Procedure Component Value - Date/Time    COVID PRE-OP / PRE-PROCEDURE SCREENING ORDER (NO ISOLATION) - Swab, Nasopharynx [[id number]]  (Normal) Collected: 11/16/22 2041    Lab Status: Final result Specimen: Swab from Nasopharynx Updated: 11/16/22 2111    Narrative:      The following orders were created for panel order COVID PRE-OP / PRE-PROCEDURE SCREENING ORDER (NO ISOLATION) - Swab, Nasopharynx.  Procedure                               Abnormality         Status                     ---------                               -----------         ------                     COVID-19 and FLU A/B PCR...[[id number]]  Normal              Final result                 Please view results for these tests on the individual orders.    COVID-19 and FLU A/B PCR - Swab, Nasopharynx [[id number]]  (Normal) Collected: 11/16/22 2041    Lab Status: Final result Specimen: Swab from Nasopharynx Updated: 11/16/22 2111     COVID19 Not Detected     Influenza A PCR Not Detected     Influenza B PCR Not Detected    Narrative:      Fact sheet for providers: https://www.fda.gov/media/548800/download    Fact sheet for patients: https://www.fda.gov/media/435105/download    Test performed by PCR.          XR Chest 1 View    Result Date: 11/16/2022  DATE OF EXAM: 11/16/2022 7:07 PM  PROCEDURE: XR CHEST 1 VW-  INDICATIONS: SOA triage protocol  COMPARISON: 08/30/2021  TECHNIQUE: Single radiographic view of the chest was obtained.  FINDINGS: No focal or diffuse pulmonary infiltrate is identified. No pleural effusion or pneumothorax is seen. There is mild elevation of the right diaphragm. The heart appears mildly enlarged. The mediastinal contour appears grossly normal.  Surgical clips are seen in the right axilla.      Impression: 1.  The heart appears mildly enlarged which could be related to cardiomegaly or pericardial effusion. 2.  No radiographic findings of acute pulmonary abnormality.   This report was finalized on 11/16/2022 7:33 PM by Tommy Mcbride MD.      CT Angiogram Chest    Result Date: 11/16/2022  DATE OF EXAM: 11/16/2022 7:46 PM  PROCEDURE: CT ANGIOGRAM CHEST-  INDICATIONS: reported elevated d-dimer  COMPARISON: 03/11/2022  TECHNIQUE: Contiguous axial imaging was obtained from the thoracic inlet through the upper abdomen following the intravenous administration of iodinated contrast. Reconstructed coronal and sagittal images were also obtained. Automated exposure control and iterative reconstruction methods were used.  The radiation dose reduction device was turned on for each scan per the ALARA (As Low as Reasonably Achievable) protocol.  FINDINGS: There are areas of linear opacity in the medial right lower lobe and in the posterior left lower lobe which were not present on the prior chest CT. In this location there does appear to be subtle central filling defect in a distal segmental/subsegmental right pulmonary artery on axial series 4 images 63-65, suspicious for pulmonary embolism. No other definite right lung pulmonary artery filling defect is identified. There is also suspicion for subtle filling defect in left lower lobe distal segmental/subsegmental pulmonary arteries in the area of linear opacity suggests on axial image 65. No other significant left pulmonary embolism is seen.  No pleural effusion or pneumothorax is seen. No other significant pulmonary infiltrate is seen.  Heart size appears within normal limits. No pericardial effusion. No suspicious lymphadenopathy is seen. Aorta appears normal in caliber. There are postsurgical changes involving the right breast with evidence of mastectomy and reconstruction. Small amount of soft tissue gas is seen at the inferior right chest wall.  Status post cholecystectomy. Calcified granulomas are noted in the spleen. No aggressive osseous lesion is identified.      Impression: 1.  Suspected small distal segmental and subsegmental emboli  in the bilateral lower lobes with new linear opacities in these locations which could indicate atelectasis or infarcts. 2.  No other significant central pulmonary artery filling defect is seen at this time. 3.  Status post right mastectomy and reconstruction with small amount of soft tissue gas in the inferior right chest wall, consistent with recent surgery.   Findings were called to the ordering provider by Dr. Mcbride 11/16/2022 8:44 PM  This report was finalized on 11/16/2022 8:45 PM by Tommy Mcbride MD.        Results for orders placed during the hospital encounter of 11/19/20    Adult Transthoracic Echo Limited W/ Cont if Necessary Per Protocol    Interpretation Summary  · Calculated left ventricular EF = 65%. Left ventricular systolic function is normal. Normal Global longitudinal LV strain (GLS) = -23.3%      Assessment & Plan   Assessment & Plan       Bilateral pulmonary embolism (HCC)    Malignant neoplasm of overlapping sites of right breast in female, estrogen receptor positive (HCC)    Shortness of breath    Essential hypertension    Type 2 diabetes mellitus (HCC)    ANIA (generalized anxiety disorder)    Hypokalemia    Leukocytosis    Hypoxia    Obesity, Class III, BMI 40-49.9 (morbid obesity) (HCC)      Josefina Rodriguez is a 47 y.o. female with a history of Right breast cancer s/p mastectomy and chemotherapy with recent reconstruction surgery (11/9), GERD, HTN, T2DM and obesity who presents to [facility] ED for complaint of SOB. Found to have bilateral PE.    Bilateral Pulmonary Emboli  Malignant Right breast cancer s/p Chemotherapy s/p mastectomy  Recent Breast reconstructive surgery  Recent Abdominoplasty   -Patient currently resting comfortably in bed and in no acute distress. VSS on room air.   -CXR shows mild cardiomegaly otherwise no acute findings  -CTA chest shows small distal segmental and subsegmental emboli in the bilateral lower lobes with new linear opacities in these  locations indicating infarcts. S/p right mastectomy and reconstruction with small amount of soft tissue gas in the inferior right chest wall consistent with recent surgery.  -Start Heparin ggt  -Monitor on tele  -Incentive spirometer  -Wound care to see in the am  -Consider Hem/Onc consult during admission - followed by Dr. Connell  -PT/OT  -Follow-up with postop care o/p with surgical providers at     Hypoxia  - Do not think the patient clot burden accounts for her hypoxia, she reports hypoxia with O2 sats in the 70s/80s while sleeping.  I suspect she has significant AWRD and obesity hypoventilation syndrome which is likely been made worse from splinting/atelectasis postoperatively.  Will benefit from a sleep study referral at discharge.  - IS    HTN  -Continue home Lisinopril, HCTZ    T2DM  -Blood glucose 218  -Check A1C  -Fingerstick achs. SSI  -Levemir  -DM educator to see in the am    GERD  -PPI    Hypokalemia  -K+ 3.1  -Repalce per protocol    Leukocytosis  -WBC 12.13. Likely reactive 2ry to recent surgery.   -CXR and CTA negative for infectious process.   -UA pending  -Lactate and procal pending  -Repeat bmp in the am    Anxiety/depression  -Continue Lexapro, Wellbutrin      DVT prophylaxis:  Heparin ggt    CODE STATUS:  Full code  Code Status (Patient has no pulse and is not breathing): CPR (Attempt to Resuscitate)  Medical Interventions (Patient has pulse or is breathing): Full Support      This note has been completed as part of a split-shared workflow.     Signature: Electronically signed by Kang Hull PA-C, 11/16/22, 9:55 PM EST      Attending   Admission Attestation       I have performed an independent face-to-face diagnostic evaluation including performing an independent physical examination as documented here.  The documented plan of care above was reviewed and developed with the advanced practice clinician (APC).      Brief Summary Statement:   Josefina Rodriguez is a 47  y.o. female With a PMH significant for right-sided breast cancer s/p mastectomy/chemotherapy with recent reconstruction on 11/9/2022 at , GERD, HTN, diabetes mellitus type 2, morbid obesity who comes to the ED due to shortness of breath.  While patient was in the hospital at  after her breast reconstruction and tummy tuck she reports low oxygen levels while she was sleeping in the 70s-80s.  She reports less severe hypoxia while awake, she was instructed to increase incentive spirometry.  She went home and continue to monitor her oxygen levels.  Her boyfriend came in while she was sleeping and checked her oxygen saturations and reports multiple O2 levels in the 80s.  She called her PCP and was told to come to the ED.    Remainder of detailed HPI is as noted by APC and has been reviewed and/or edited by me for completeness.    Attending Physical Exam:  Temp:  [98.3 °F (36.8 °C)] 98.3 °F (36.8 °C)  Heart Rate:  [89-93] 93  Resp:  [20] 20  BP: (116-134)/(77-94) 116/77    Constitutional: Awake, alert  Eyes: PERRLA, sclerae anicteric, no conjunctival injection  HENT: NCAT, mucous membranes moist  Neck: Supple, no thyromegaly, no lymphadenopathy, trachea midline  Respiratory: Clear to auscultation bilaterally, nonlabored respirations   Cardiovascular: RRR, no murmurs, rubs, or gallops, palpable pedal pulses bilaterally  Gastrointestinal: Positive bowel sounds, soft, nontender, nondistended  Musculoskeletal: No bilateral ankle edema, no clubbing or cyanosis to extremities  Psychiatric: Appropriate affect, cooperative  Neurologic: Oriented x 3, strength symmetric in all extremities, Cranial Nerves grossly intact to confrontation, speech clear  Skin: Right inferior breast incision and lower abdominal incision dry and intact, mild erythema surrounding borders, drainage tubes in place      Brief Assessment/Plan :  See detailed assessment and plan developed with APC which I have reviewed and/or edited for  completeness.    Camille Cazares, DO  11/17/22

## 2023-08-17 DIAGNOSIS — R07.9 CHEST PAIN, UNSPECIFIED TYPE: Primary | ICD-10-CM

## 2023-08-24 DIAGNOSIS — C50.811 MALIGNANT NEOPLASM OF OVERLAPPING SITES OF RIGHT BREAST IN FEMALE, ESTROGEN RECEPTOR POSITIVE: ICD-10-CM

## 2023-08-24 DIAGNOSIS — Z17.0 MALIGNANT NEOPLASM OF OVERLAPPING SITES OF RIGHT BREAST IN FEMALE, ESTROGEN RECEPTOR POSITIVE: ICD-10-CM

## 2023-08-24 RX ORDER — ANASTROZOLE 1 MG/1
TABLET ORAL
Qty: 30 TABLET | Refills: 11 | Status: SHIPPED | OUTPATIENT
Start: 2023-08-24

## 2023-09-11 ENCOUNTER — HOSPITAL ENCOUNTER (OUTPATIENT)
Dept: CARDIOLOGY | Facility: HOSPITAL | Age: 48
Discharge: HOME | End: 2023-09-11
Admitting: NURSE PRACTITIONER
Payer: MEDICAID

## 2023-09-11 DIAGNOSIS — R07.9 CHEST PAIN, UNSPECIFIED TYPE: ICD-10-CM

## 2023-09-11 LAB
BH CV NUCLEAR PRIOR STUDY: 2
BH CV REST NUCLEAR ISOTOPE DOSE: 9.7 MCI
BH CV STRESS BP STAGE 2: NORMAL
BH CV STRESS BP STAGE 4: NORMAL
BH CV STRESS COMMENTS STAGE 1: NORMAL
BH CV STRESS DOSE REGADENOSON STAGE 1: 0.4
BH CV STRESS DURATION MIN STAGE 1: 1
BH CV STRESS DURATION MIN STAGE 2: 1
BH CV STRESS DURATION MIN STAGE 3: 1
BH CV STRESS DURATION MIN STAGE 4: 1
BH CV STRESS DURATION SEC STAGE 1: 0
BH CV STRESS DURATION SEC STAGE 2: 0
BH CV STRESS DURATION SEC STAGE 3: 0
BH CV STRESS DURATION SEC STAGE 4: 0
BH CV STRESS HR STAGE 1: 80
BH CV STRESS HR STAGE 2: 84
BH CV STRESS HR STAGE 3: 80
BH CV STRESS HR STAGE 4: 76
BH CV STRESS NUCLEAR ISOTOPE DOSE: 32.1 MCI
BH CV STRESS O2 STAGE 1: 94
BH CV STRESS O2 STAGE 2: 97
BH CV STRESS O2 STAGE 3: 96
BH CV STRESS O2 STAGE 4: 94
BH CV STRESS PROTOCOL 1: NORMAL
BH CV STRESS RECOVERY BP: NORMAL MMHG
BH CV STRESS RECOVERY HR: 79 BPM
BH CV STRESS RECOVERY O2: 94 %
BH CV STRESS STAGE 1: 1
BH CV STRESS STAGE 2: 2
BH CV STRESS STAGE 3: 3
BH CV STRESS STAGE 4: 4
LV EF NUC BP: 84 %
MAXIMAL PREDICTED HEART RATE: 174 BPM
PERCENT MAX PREDICTED HR: 50 %
STRESS BASELINE BP: NORMAL MMHG
STRESS BASELINE HR: 78 BPM
STRESS O2 SAT REST: 93 %
STRESS PERCENT HR: 59 %
STRESS POST ESTIMATED WORKLOAD: 1 METS
STRESS POST EXERCISE DUR MIN: 4 MIN
STRESS POST EXERCISE DUR SEC: 0 SEC
STRESS POST O2 SAT PEAK: 94 %
STRESS POST PEAK BP: NORMAL MMHG
STRESS POST PEAK HR: 87 BPM
STRESS TARGET HR: 148 BPM

## 2023-09-11 PROCEDURE — 0 TECHNETIUM SESTAMIBI: Performed by: NURSE PRACTITIONER

## 2023-09-11 PROCEDURE — 93017 CV STRESS TEST TRACING ONLY: CPT

## 2023-09-11 PROCEDURE — A9500 TC99M SESTAMIBI: HCPCS | Performed by: NURSE PRACTITIONER

## 2023-09-11 PROCEDURE — 25010000002 REGADENOSON 0.4 MG/5ML SOLUTION: Performed by: NURSE PRACTITIONER

## 2023-09-11 PROCEDURE — 78452 HT MUSCLE IMAGE SPECT MULT: CPT

## 2023-09-11 PROCEDURE — 78452 HT MUSCLE IMAGE SPECT MULT: CPT | Performed by: INTERNAL MEDICINE

## 2023-09-11 PROCEDURE — 93018 CV STRESS TEST I&R ONLY: CPT | Performed by: INTERNAL MEDICINE

## 2023-09-11 RX ORDER — REGADENOSON 0.08 MG/ML
0.4 INJECTION, SOLUTION INTRAVENOUS ONCE
Status: COMPLETED | OUTPATIENT
Start: 2023-09-11 | End: 2023-09-11

## 2023-09-11 RX ADMIN — TECHNETIUM TC 99M SESTAMIBI 1 DOSE: 1 INJECTION INTRAVENOUS at 12:50

## 2023-09-11 RX ADMIN — TECHNETIUM TC 99M SESTAMIBI 1 DOSE: 1 INJECTION INTRAVENOUS at 11:15

## 2023-09-11 RX ADMIN — REGADENOSON 0.4 MG: 0.08 INJECTION, SOLUTION INTRAVENOUS at 12:50

## 2023-09-20 DIAGNOSIS — G62.0 NEUROPATHY DUE TO CHEMOTHERAPEUTIC DRUG: ICD-10-CM

## 2023-09-20 DIAGNOSIS — Z17.0 MALIGNANT NEOPLASM OF OVERLAPPING SITES OF RIGHT BREAST IN FEMALE, ESTROGEN RECEPTOR POSITIVE: ICD-10-CM

## 2023-09-20 DIAGNOSIS — T45.1X5A NEUROPATHY DUE TO CHEMOTHERAPEUTIC DRUG: ICD-10-CM

## 2023-09-20 DIAGNOSIS — C50.811 MALIGNANT NEOPLASM OF OVERLAPPING SITES OF RIGHT BREAST IN FEMALE, ESTROGEN RECEPTOR POSITIVE: ICD-10-CM

## 2023-09-20 RX ORDER — DULOXETIN HYDROCHLORIDE 60 MG/1
CAPSULE, DELAYED RELEASE ORAL
Qty: 90 CAPSULE | Refills: 3 | Status: SHIPPED | OUTPATIENT
Start: 2023-09-20

## 2023-09-21 NOTE — PROGRESS NOTES
Mode of visit: Video  Location of patient: Home   You have chosen to receive care through the use of telemedicine. Telemedicine enables health care providers at different locations to provide safe, effective, and convenient care through the use of technology. As with any health care service, there are risks associated with the use of telemedicine, including equipment failure, poor connections, and  issues.  Do you understand the risks and benefits of telemedicine as I have explained them to you? Yes  Have your questions regarding telemedicine been answered? Yes  Do you consent to the use of telemedicine in your medical care today? Yes      Chief Complaint  Syncope (Follow-up)    [facility]  [facility] Valve [facility]  Telemedicine note    This was a video enabled telemedicine encounter.    Subjective    History of Present Illness {CC  Problem List  Visit  Diagnosis   Encounters  Notes  Medications  Labs  Result Review Imaging  Media :23}     Josefina Rodriguez, 48 y.o. female with diabetes, HTN, WARD,  history of breast CA  presents to [facility] Val[facility] telemedicine visit for Syncope (Follow-up).    Since previous evaluation patient completed myocardial perfusion study that revealed a normal study with no evidence of ischemia.  No cardiac findings on associated CT, impressions consistent with low risk study. Preliminary MCOT with no significant arrhythmia, low burden PAC/PVC. Patient triggered events appear to correlate with normal sinus rhythm/sinus tachycardia.    Presents today reporting two recurrent syncopal episodes while cardiac monitor was on, as well one syncopal episode after monitor was removed. Episodes occurred while she was exhausted after over-doing it. Lightheadedness and nausea with episodes. Chest tightness and shortness of breath intermittently at rest as well as with activity. Still with occasional intermittent palpitation, no  syncope with palpitation episodes.  SBP generally in 120s on home monitoring. Drinking 2-3 cups of coffee per day, only 24oz of water.      Previous evaluation:  Patient was recently evaluated at [facility] emergency department for complaints of a syncopal episode.  Emergency department work-up revealed head CT with no acute intracranial abnormality, normal troponin, CXR with no acute cardiopulmonary findings, and ECGs with no evidence of acute coronary syndrome.  Previous echocardiogram November 2022 with normal LV function, normal cardiac valves. Patient was instructed to follow-up at [facility] heart and valve clinic for further evaluation.     Patient presents today with recurrence of syncope since emergency department evaluation.  Patient reports that syncopal episode occurred during using toilet/standing to wipe and also after emotional stress after an argument. Feels fatigue during those days prior to episodes. Prodromal symptoms included chest tightness, low heart rate in 50s, lightheadedness. Denies arrhythmia symptoms, chest pain, and dyspnea around the time of event. Normal nutritional intake preceding the syncopal episode and was hydrated around the time of event. Also reports intermittent chest pain that occurred while seated, but pain did radiate to shoulder and jaw. Reports recent GI reconstructive surgery. Reports one syncopal episodes in the past during chemotherapy. Previous cardiac testing includes: echocardiogram 11/2022, stress test greater than 10y ago.  Reports mother with CAD in her 50s, brother with palpitations.    Objective     Vital Signs:   Vitals:    09/22/23 0904   BP: 154/95     There is no height or weight on file to calculate BMI.  Physical Exam  Vitals reviewed.   Constitutional:       Appearance: Normal appearance.   HENT:      Head: Normocephalic.   Pulmonary:      Effort: Pulmonary effort is normal. No respiratory distress.   Neurological:      Mental Status:  She is alert and oriented to person, place, and time.   Psychiatric:         Mood and Affect: Mood normal.         Behavior: Behavior normal.         Thought Content: Thought content normal.        Data Reviewed:{ Labs  Result Review  Imaging  Med Tab  Media :23}     -Preliminary monitor results as listed in HPI.   Stress Test With Myocardial Perfusion (1 Day) (09/11/2023 12:59)   Adult Transthoracic Echo Complete w/ Color, Spectral and Contrast if Necessary Per Protocol (11/17/2022 14:23)     Single High Sensitivity Troponin T (08/08/2023 20:14)  Magnesium (08/08/2023 20:14)  Comprehensive Metabolic Panel (08/08/2023 20:14)  CBC & Differential (08/08/2023 18:55)  Comprehensive Metabolic Panel (07/18/2023 11:35)  XR Chest 1 View (08/08/2023 19:46)  CT Head Without Contrast (08/08/2023 19:56)  CT Maxillofacial Without Contrast (08/08/2023 19:56)  ECG 12 Lead ED Triage Standing Order; Syncope (08/08/2023 18:56)      Adult Transthoracic Echo Complete w/ Color, Spectral and Contrast if Necessary Per Protocol (11/17/2022 14:23)     Assessment and Plan {CC Problem List  Visit Diagnosis  ROS  Review (Popup)  Health Maintenance  Quality  BestPractice  Medications  SmartSets  SnapShot Encounters  Media :23}     This visit has been scheduled as a video visit to comply with patient safety concerns in accordance with CDC recommendations.     1. Syncope  -Recent syncope prompted emergency department evaluation. Continues with a few recurrent episodes since prior evaluation. Possibly vasovagal given symptom description.  -Preliminary MCOT without significant arrhythmia, no pauses.  -Denies arrhythmia symptoms around time of event  -Previous TTE November 2022 with normal LV function, no significant valvular abnormality  -Recent myocardial perfusion study with no evidence of ischemia  -Discussed maintaining adequate hydration, decreasing caffeine intake     2. Chest pain  -Intermittent chest tightness and assocated  dyspnea continue  -Previous TTE November 2022 with normal LV function, no significant valvular abnormality  -Recent myocardial perfusion study with no evidence of ischemia  -Given continued intermittent symptoms and recurrent syncope will refer to cardiology for further evaluation     3. Primary hypertension  -Elevated today, but generally well controlled on home monitoring  -Continue lisinopril, HCTZ as prescribed  -BP monitoring prior to cardiology establishing care    Follow Up {Instructions Charge Capture  Follow-up Communications :23}   Return if symptoms worsen or fail to improve.      Patient was given instructions and counseling regarding her condition or for health maintenance advice. Please see specific information pulled into the AVS if appropriate.  Patient was instructed to call the Heart and Valve Center with any questions, concerns, or worsening symptoms.    *Please note that portions of this note were completed with a voice recognition program. Efforts were made to edit the dictations, but occasionally words are mistranscribed.

## 2023-09-22 ENCOUNTER — TELEMEDICINE (OUTPATIENT)
Dept: CARDIOLOGY | Facility: HOSPITAL | Age: 48
End: 2023-09-22
Payer: MEDICAID

## 2023-09-22 VITALS — SYSTOLIC BLOOD PRESSURE: 154 MMHG | DIASTOLIC BLOOD PRESSURE: 95 MMHG

## 2023-09-22 DIAGNOSIS — R07.9 CHEST PAIN, UNSPECIFIED TYPE: ICD-10-CM

## 2023-09-22 DIAGNOSIS — R55 SYNCOPE, UNSPECIFIED SYNCOPE TYPE: Primary | ICD-10-CM

## 2023-09-22 DIAGNOSIS — I10 PRIMARY HYPERTENSION: ICD-10-CM

## 2023-10-05 ENCOUNTER — HOSPITAL ENCOUNTER (OUTPATIENT)
Dept: ONCOLOGY | Facility: HOSPITAL | Age: 48
Discharge: HOME | End: 2023-10-05
Admitting: INTERNAL MEDICINE
Payer: MEDICAID

## 2023-10-05 ENCOUNTER — OFFICE VISIT (OUTPATIENT)
Dept: ONCOLOGY | Facility: CLINIC | Age: 48
End: 2023-10-05
Payer: MEDICAID

## 2023-10-05 VITALS
TEMPERATURE: 97.3 F | HEART RATE: 87 BPM | OXYGEN SATURATION: 95 % | SYSTOLIC BLOOD PRESSURE: 135 MMHG | DIASTOLIC BLOOD PRESSURE: 73 MMHG | BODY MASS INDEX: 38.25 KG/M2 | HEIGHT: 66 IN | RESPIRATION RATE: 16 BRPM | WEIGHT: 238 LBS

## 2023-10-05 DIAGNOSIS — C50.811 MALIGNANT NEOPLASM OF OVERLAPPING SITES OF RIGHT BREAST IN FEMALE, ESTROGEN RECEPTOR POSITIVE: Primary | ICD-10-CM

## 2023-10-05 DIAGNOSIS — Z17.0 MALIGNANT NEOPLASM OF OVERLAPPING SITES OF RIGHT BREAST IN FEMALE, ESTROGEN RECEPTOR POSITIVE: ICD-10-CM

## 2023-10-05 DIAGNOSIS — Z17.0 MALIGNANT NEOPLASM OF OVERLAPPING SITES OF RIGHT BREAST IN FEMALE, ESTROGEN RECEPTOR POSITIVE: Primary | ICD-10-CM

## 2023-10-05 DIAGNOSIS — C50.811 MALIGNANT NEOPLASM OF OVERLAPPING SITES OF RIGHT BREAST IN FEMALE, ESTROGEN RECEPTOR POSITIVE: ICD-10-CM

## 2023-10-05 DIAGNOSIS — Z12.31 ENCOUNTER FOR SCREENING MAMMOGRAM FOR MALIGNANT NEOPLASM OF BREAST: ICD-10-CM

## 2023-10-05 DIAGNOSIS — C96.6: ICD-10-CM

## 2023-10-05 LAB
ALBUMIN SERPL-MCNC: 3.8 G/DL (ref 3.5–5.2)
ALBUMIN/GLOB SERPL: 1.1 G/DL
ALP SERPL-CCNC: 131 U/L (ref 39–117)
ALT SERPL W P-5'-P-CCNC: 19 U/L (ref 1–33)
ANION GAP SERPL CALCULATED.3IONS-SCNC: 12 MMOL/L (ref 5–15)
AST SERPL-CCNC: 15 U/L (ref 1–32)
BASOPHILS # BLD AUTO: 0.03 10*3/MM3 (ref 0–0.2)
BASOPHILS NFR BLD AUTO: 0.3 % (ref 0–1.5)
BILIRUB SERPL-MCNC: 0.3 MG/DL (ref 0–1.2)
BUN SERPL-MCNC: 12 MG/DL (ref 6–20)
BUN/CREAT SERPL: 17.4 (ref 7–25)
CALCIUM SPEC-SCNC: 9.5 MG/DL (ref 8.6–10.5)
CHLORIDE SERPL-SCNC: 102 MMOL/L (ref 98–107)
CO2 SERPL-SCNC: 24 MMOL/L (ref 22–29)
CREAT SERPL-MCNC: 0.69 MG/DL (ref 0.57–1)
DEPRECATED RDW RBC AUTO: 46.3 FL (ref 37–54)
EGFRCR SERPLBLD CKD-EPI 2021: 108.5 ML/MIN/1.73
EOSINOPHIL # BLD AUTO: 0.26 10*3/MM3 (ref 0–0.4)
EOSINOPHIL NFR BLD AUTO: 2.5 % (ref 0.3–6.2)
ERYTHROCYTE [DISTWIDTH] IN BLOOD BY AUTOMATED COUNT: 14.2 % (ref 12.3–15.4)
GLOBULIN UR ELPH-MCNC: 3.4 GM/DL
GLUCOSE SERPL-MCNC: 222 MG/DL (ref 65–99)
HCT VFR BLD AUTO: 40.9 % (ref 34–46.6)
HGB BLD-MCNC: 13.1 G/DL (ref 12–15.9)
IMM GRANULOCYTES # BLD AUTO: 0.05 10*3/MM3 (ref 0–0.05)
IMM GRANULOCYTES NFR BLD AUTO: 0.5 % (ref 0–0.5)
LYMPHOCYTES # BLD AUTO: 2.58 10*3/MM3 (ref 0.7–3.1)
LYMPHOCYTES NFR BLD AUTO: 25.2 % (ref 19.6–45.3)
MCH RBC QN AUTO: 28.4 PG (ref 26.6–33)
MCHC RBC AUTO-ENTMCNC: 32 G/DL (ref 31.5–35.7)
MCV RBC AUTO: 88.5 FL (ref 79–97)
MONOCYTES # BLD AUTO: 0.58 10*3/MM3 (ref 0.1–0.9)
MONOCYTES NFR BLD AUTO: 5.7 % (ref 5–12)
NEUTROPHILS NFR BLD AUTO: 6.72 10*3/MM3 (ref 1.7–7)
NEUTROPHILS NFR BLD AUTO: 65.8 % (ref 42.7–76)
PLATELET # BLD AUTO: 231 10*3/MM3 (ref 140–450)
PMV BLD AUTO: 9.6 FL (ref 6–12)
POTASSIUM SERPL-SCNC: 4.1 MMOL/L (ref 3.5–5.2)
PROT SERPL-MCNC: 7.2 G/DL (ref 6–8.5)
RBC # BLD AUTO: 4.62 10*6/MM3 (ref 3.77–5.28)
SODIUM SERPL-SCNC: 138 MMOL/L (ref 136–145)
WBC NRBC COR # BLD: 10.22 10*3/MM3 (ref 3.4–10.8)

## 2023-10-05 PROCEDURE — 99213 OFFICE O/P EST LOW 20 MIN: CPT | Performed by: NURSE PRACTITIONER

## 2023-10-05 PROCEDURE — 3075F SYST BP GE 130 - 139MM HG: CPT | Performed by: NURSE PRACTITIONER

## 2023-10-05 PROCEDURE — 3078F DIAST BP <80 MM HG: CPT | Performed by: NURSE PRACTITIONER

## 2023-10-05 PROCEDURE — 25010000002 LEUPROLIDE 22.5 MG KIT: Performed by: INTERNAL MEDICINE

## 2023-10-05 PROCEDURE — 96402 CHEMO HORMON ANTINEOPL SQ/IM: CPT

## 2023-10-05 PROCEDURE — 85025 COMPLETE CBC W/AUTO DIFF WBC: CPT | Performed by: INTERNAL MEDICINE

## 2023-10-05 PROCEDURE — 96372 THER/PROPH/DIAG INJ SC/IM: CPT

## 2023-10-05 PROCEDURE — 1159F MED LIST DOCD IN RCRD: CPT | Performed by: NURSE PRACTITIONER

## 2023-10-05 PROCEDURE — 1126F AMNT PAIN NOTED NONE PRSNT: CPT | Performed by: NURSE PRACTITIONER

## 2023-10-05 PROCEDURE — 80053 COMPREHEN METABOLIC PANEL: CPT | Performed by: INTERNAL MEDICINE

## 2023-10-05 PROCEDURE — 1160F RVW MEDS BY RX/DR IN RCRD: CPT | Performed by: NURSE PRACTITIONER

## 2023-10-05 PROCEDURE — 36415 COLL VENOUS BLD VENIPUNCTURE: CPT

## 2023-10-05 RX ORDER — PROCHLORPERAZINE 25 MG/1
SUPPOSITORY RECTAL
COMMUNITY
Start: 2023-09-05

## 2023-10-05 RX ADMIN — LEUPROLIDE ACETATE 22.5 MG: KIT SUBCUTANEOUS at 10:57

## 2023-10-05 NOTE — PROGRESS NOTES
PROBLEM LIST:  1. [id number] ER+ PA+ Her2+ invasive ductal carcinoma of the right breast  A) presented with a mass on self-exam.  Mammogram 1/7/20 showed a 4.1 cm mass in the right breast, with 2 adjacent smaller masses.  1.9 cm right axillary lymph node.   FNA of lymph node negative.  Biopsy of right breast mass showed grade 2 IDC, Er 95%, PA 2%, Her2 3+.  B) neoadjuvant chemotherapy with TCHP started 2/5/2020  C) right mastectomy on 6/23/20.  Pathology showed a 2 x 1 x 0.6 cm intermediate grade IDC.  0/2 SLN involved.  [id number]  D) adjuvant Kadcyla started 7/22/2020.   Kadcyla stopped October 14, 2020 and switched back to Herceptin and Perjeta due to progressive severe neuropathy.  E) anastrozole started 08/12/2020.   2. PCOS  3. Anxiety/depression  4. DM2  5. GERD  6. Hyperlipidemia  7. Hypertension  8.  Hidradenitis  9.  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  CT chest 1/19/2021 showed a pathologic nondisplaced fracture of the right seventh rib.  There were also subtle small nodules in the lungs, none larger than 4 mm.  PET/CT on 1/28/2021 showed SUV of 4.8 in the right rib fracture.  Pulmonary micronodules too small to register hypermetabolic activity.  Biopsy of the rib lesion 2/24/2021 showed Langerhans' cell histiocytosis.  Smoking cessation strongly recommended and she did stop smoking in February 2021.  11. Bilateral pulmonary emboli 11/16/2022 3 days following breast reconstruction surgery.  Currently on Eliquis 5 mg twice daily.    Subjective     CHIEF COMPLAINT: breast cancer    HISTORY OF PRESENT ILLNESS:   Josefina Andino Michael returns for follow-up.  She continues on anastrozole and Lupron injections.  She continues to have frequent hot flashes and night sweats. She denies any new medical concerns today.    She is frustrated waiting to hear from [facility] regarding plastic surgery on  "her abdomen.  When she is up and walking around she does have increased shortness of breath due to the protruding abdomen.    She continues on aspirin.          Objective      /73   Pulse 87   Temp 97.3 °F (36.3 °C) (Infrared)   Resp 16   Ht 167.6 cm (65.98\")   Wt 108 kg (238 lb)   SpO2 95%   BMI 38.43 kg/m²    Vitals:    10/05/23 0955   PainSc: 0-No pain               Performance Status: 0    General: well appearing female in no acute distress  Neuro: alert and oriented  HEENT: sclera anicteric  Pulmonary: Lungs are clear to auscultation bilaterally  Cardiovascular: Regular rate and rhythm no murmurs  Extremeties: no lower extremity edema  Skin: no rashes, lesions, bruising, or petechiae  Psych: mood and affect appropriate      RECENT LABS:  Lab Results   Component Value Date    WBC 7.85 08/08/2023    HGB 13.7 08/08/2023    HCT 43.2 08/08/2023    MCV 86.6 08/08/2023     08/08/2023     Lab Results   Component Value Date    GLUCOSE 151 (H) 08/08/2023    BUN 7 08/08/2023    CREATININE 0.66 08/08/2023    EGFRIFNONA 105 12/02/2021    EGFRIFAFRI 92 06/29/2023    BCR 10.6 08/08/2023    K 4.6 08/08/2023    CO2 27.0 08/08/2023    CALCIUM 9.3 08/08/2023    ALBUMIN 3.9 08/08/2023    AST 20 08/08/2023    ALT 25 08/08/2023              Assessment & Plan   Josefina Rodriguez is a 48 y.o. year old female with stage IB ER+ Her2+ IDC of the right breast, as well as a diagnosis of Langerhans histiocytosis involving the lung as well as a single rib lesion.    Breast cancer: She continues on anastrozole along with Lupron for ovarian suppression.  We will continue Lupron for the duration of her endocrine therapy treatment.  No evidence of recurrence at this time.      Langerhans histiocytosis: Imaging changes resolved with cessation of smoking.  She continues to avoid cigarettes.     Peripheral neuropathy: Continue Lyrica.     Bone density January 27th 2023 was normal.  Plan to repeat in 2 years.    She " will be due for a left breast screening mammogram January 2024.  Order placed today.    Pulmonary embolism following surgery:  She will continue aspirin.    Follow-up in 6 months.              Aimee Johnson APRN  [facility]    10/5/2023          CC:

## 2023-10-24 ENCOUNTER — OFFICE VISIT (OUTPATIENT)
Dept: CARDIOLOGY | Facility: CLINIC | Age: 48
End: 2023-10-24
Payer: MEDICAID

## 2023-10-24 VITALS
DIASTOLIC BLOOD PRESSURE: 88 MMHG | WEIGHT: 237 LBS | SYSTOLIC BLOOD PRESSURE: 132 MMHG | HEIGHT: 66 IN | BODY MASS INDEX: 38.09 KG/M2 | OXYGEN SATURATION: 95 % | HEART RATE: 79 BPM

## 2023-10-24 DIAGNOSIS — E11.65 TYPE 2 DIABETES MELLITUS WITH HYPERGLYCEMIA, WITHOUT LONG-TERM CURRENT USE OF INSULIN: ICD-10-CM

## 2023-10-24 DIAGNOSIS — I10 ESSENTIAL HYPERTENSION: Primary | ICD-10-CM

## 2023-10-24 DIAGNOSIS — R42 DIZZINESS: ICD-10-CM

## 2023-10-24 NOTE — PROGRESS NOTES
New Cardiology Patient Office Visit      Date: 10/24/2023  Patient Name: Josefina Rodriguez  : 1975   MRN: [id number]   PCP: Tommy Esteban MD   Referring Provider: Cedric Martin APRN     Chief Complaint:  No chief complaint on file.      History of Present Illness: Josefina Rodriguez is a 48 y.o. female who is here today for evaluation of dizziness and passing out.  She has noticed lately that it is working and exert she will start feeling dizzy.  She also feels like when she gets up from sitting position to standing she feels dizzy if she is exerting a lot and she become dizzy and almost passed out recent cardiac evaluation that has been normal.      Problem List   1.CARDIAC:    a.Coronary Artery Disease:   Stress test, 2023: Normal     b.Myocardium:   Echo, 2022: EF 60%, mild LVH     c.Valvular:   No valvular disease     d.Electrical:   Normal sinus rhythm     e.Pericardium:   Normal       3.CARDIAC RISK FACTORS:  Diabetes  Dyslipidemia  Obesity  Sleep apnea    4.NON-CARDIAC:  Bilateral pulmonary embolism  Ovarian cyst  Breast cancer s/p radiation therapy  Depression  Heart hernia  Polycystic ovarian syndrome    5.SURGERIES:  Mastectomy  Cholecystectomy  Abdominoplasty  Breast reconstruction      Subjective      Review of Systems:   Review of Systems   Neurological:  Positive for dizziness.       Medications:   Current Outpatient Medications   Medication Sig Dispense Refill    anastrozole (ARIMIDEX) 1 MG tablet TAKE ONE TABLET BY MOUTH DAILY 30 tablet 11    buPROPion XL (WELLBUTRIN XL) 300 MG 24 hr tablet 1 tablet Every Morning.      Continuous Blood Gluc Sensor (Dexcom G6 Sensor)       Continuous Blood Gluc Transmit (Dexcom G6 Transmitter) misc Inject 1 Device under the skin into the appropriate area as directed Take As Directed.      Diclofenac Sodium (VOLTAREN) 1 % gel gel 3 (Three) Times a Day As Needed.      DULoxetine (CYMBALTA) 60 MG capsule TAKE ONE  CAPSULE BY MOUTH DAILY 90 capsule 3    fluticasone (FLONASE) 50 MCG/ACT nasal spray 1 spray into the nostril(s) as directed by provider Every 12 (Twelve) Hours.      hydroCHLOROthiazide (HYDRODIURIL) 25 MG tablet Take 1 tablet by mouth Daily.      Insulin Lispro, 1 Unit Dial, (HUMALOG) 100 UNIT/ML solution pen-injector       [employer] Pen Needles 32G X 4 MM misc       LANTUS SOLOSTAR 100 UNIT/ML injection pen Inject 26 Units under the skin into the appropriate area as directed. In AM      lisinopril (PRINIVIL,ZESTRIL) 40 MG tablet Take 1 tablet by mouth Daily.      loratadine (CLARITIN) 10 MG tablet Take 1 tablet by mouth Daily.      LORazepam (ATIVAN) 1 MG tablet Take 1 tablet by mouth As Needed for Anxiety.      LORazepam (ATIVAN) 1 MG tablet Take 1 tablet by mouth Every Night.      Melatonin ER 10 MG tablet controlled-release Take  by mouth.      montelukast (SINGULAIR) 10 MG tablet Take 1 tablet by mouth Every Night.      Multiple Vitamins-Minerals (MULTIVITAMIN GUMMIES WOMENS PO) Take 2 capsules by mouth Daily.      ondansetron (ZOFRAN) 8 MG tablet TAKE ONE TABLET BY MOUTH THREE TIMES A DAY AS NEEDED FOR NAUSEA AND VOMITING 30 tablet 3    pantoprazole (PROTONIX) 40 MG EC tablet Take 1 tablet by mouth Daily.      pregabalin (LYRICA) 150 MG capsule TAKE ONE CAPSULE BY MOUTH TWICE A DAY 60 capsule 5    prochlorperazine (COMPAZINE) 10 MG tablet TAKE ONE TABLET BY MOUTH EVERY 6 HOURS AS NEEDED FOR NAUSEA AND VOMITING 60 tablet 1    Synjardy 12.5-500 MG tablet Take 1 tablet by mouth 2 (Two) Times a Day.      vitamin D3 125 MCG (5000 UT) capsule capsule Take  by mouth Daily.       No current facility-administered medications for this visit.           The following portions of the patient's history were reviewed and updated as appropriate: allergies, current medications, past family history, past medical history, past social history, past surgical history and problem list.    Objective     Physical Exam:  Vital Signs:  "  Vitals:    10/24/23 1104   BP: 132/88   BP Location: Left arm   Patient Position: Sitting   Cuff Size: Adult   Pulse: 79   SpO2: 95%   Weight: 108 kg (237 lb)   Height: 167.6 cm (66\")     Body mass index is 38.25 kg/m².     Constitutional:       General: Not in acute distress.     Appearance: Healthy appearance. Not in distress.     Neck:     JVP: Not elevated     Carotid artery: No carotid bruit    Pulmonary:      Effort: Pulmonary effort is normal.      Breath sounds: Normal breath sounds. No wheezing. No rhonchi. No rales.     Cardiovascular:      Normal rate. Regular rhythm. Normal S1. Normal S2.      Murmurs: There is no murmur.      No gallop. No click. No rub.     Abdominal:      General: Bowel sounds are normal.      Palpations: Abdomen is soft.      Tenderness: There is no abdominal tenderness.    Extremities:     Pulses: Good distal pulses     Edema: No edema    Labs:  Lab Results   Component Value Date    GLUCOSE 222 (H) 10/05/2023    BUN 12 10/05/2023    CREATININE 0.69 10/05/2023    EGFRIFNONA 105 12/02/2021    EGFRIFAFRI 92 06/29/2023    BCR 17.4 10/05/2023    K 4.1 10/05/2023    CO2 24.0 10/05/2023    CALCIUM 9.5 10/05/2023    ALBUMIN 3.8 10/05/2023    AST 15 10/05/2023    ALT 19 10/05/2023     Lab Results   Component Value Date    WBC 10.22 10/05/2023    HGB 13.1 10/05/2023    HCT 40.9 10/05/2023    MCV 88.5 10/05/2023     10/05/2023       Lab Results   Component Value Date    HGBA1C 7.90 (H) 11/16/2022       Smoking Cessation:   She quit smoking in 2021.    Assessment / Plan      Assessment & Plan    Assessment:   Diagnosis Plan   1. Essential hypertension        2. Type 2 diabetes mellitus with hyperglycemia, without long-term current use of insulin        3. Dizziness             Plan:  Patient has recent evaluation performed for her coronary artery disease for evaluation of dizziness.  Her echo did show mild left ventricle hypertrophy.  She did not show any abnormalities on stress test.  " I believe most of her symptoms are related to hypertensive heart disease.  I will start her on Cardizem 120 and see if she responds to that.  I have explained to her reason most likely why she is having those symptoms the symptoms do not improve she is supposed to call us back.  She has been taking care of her diabetes.  Her cholesterol has been fine.  Her dizziness is related to hypertrophic left ventricle and if we are able to slow down her heart rate she will be fine..            Follow Up:   Return in about 6 months (around 4/24/2024).    Addis Lu MD

## 2023-10-25 ENCOUNTER — TELEPHONE (OUTPATIENT)
Dept: CARDIOLOGY | Facility: CLINIC | Age: 48
End: 2023-10-25
Payer: MEDICAID

## 2023-10-25 RX ORDER — DILTIAZEM HYDROCHLORIDE 120 MG/1
120 CAPSULE, COATED, EXTENDED RELEASE ORAL DAILY
Qty: 30 CAPSULE | Refills: 11 | Status: SHIPPED | OUTPATIENT
Start: 2023-10-25

## 2023-10-25 NOTE — TELEPHONE ENCOUNTER
Caller: Josefina Rodriguez    Relationship: Self    Best call back number: [phone]    What is the best time to reach you: ANYTIME     What was the call regarding: PATIENT WAS IN TO SEE DR. JAMES ON 10/24/23 AND HE DISCUSSED PATIENT STARTING A NEW MEDICATION. PATIENT DOESN'T REMEMBER THE NAME OF THE MEDICATION AND NOTHING HAS BEEN SENT TO THE PHARMACY YET. PATIENT WOULD LIKE THIS MEDICATION SENT TO THE [facility] [place], [place]     Is it okay if the provider responds through MyChart: PREFERS A CALL.

## 2023-12-28 ENCOUNTER — HOSPITAL ENCOUNTER (OUTPATIENT)
Dept: ONCOLOGY | Facility: HOSPITAL | Age: 48
Discharge: HOME | End: 2023-12-28
Admitting: NURSE PRACTITIONER
Payer: MEDICAID

## 2023-12-28 VITALS
RESPIRATION RATE: 16 BRPM | HEIGHT: 66 IN | TEMPERATURE: 97.7 F | WEIGHT: 229 LBS | DIASTOLIC BLOOD PRESSURE: 79 MMHG | HEART RATE: 96 BPM | BODY MASS INDEX: 36.8 KG/M2 | SYSTOLIC BLOOD PRESSURE: 138 MMHG

## 2023-12-28 DIAGNOSIS — Z17.0 MALIGNANT NEOPLASM OF OVERLAPPING SITES OF RIGHT BREAST IN FEMALE, ESTROGEN RECEPTOR POSITIVE: Primary | ICD-10-CM

## 2023-12-28 DIAGNOSIS — C50.811 MALIGNANT NEOPLASM OF OVERLAPPING SITES OF RIGHT BREAST IN FEMALE, ESTROGEN RECEPTOR POSITIVE: Primary | ICD-10-CM

## 2023-12-28 PROCEDURE — 96372 THER/PROPH/DIAG INJ SC/IM: CPT

## 2023-12-28 PROCEDURE — 25010000002 LEUPROLIDE 22.5 MG KIT: Performed by: NURSE PRACTITIONER

## 2023-12-28 PROCEDURE — 96402 CHEMO HORMON ANTINEOPL SQ/IM: CPT

## 2023-12-28 RX ORDER — SEMAGLUTIDE 0.68 MG/ML
INJECTION, SOLUTION SUBCUTANEOUS
COMMUNITY
Start: 2023-11-04

## 2023-12-28 RX ADMIN — LEUPROLIDE ACETATE 22.5 MG: KIT SUBCUTANEOUS at 11:01

## 2024-01-29 ENCOUNTER — HOSPITAL ENCOUNTER (OUTPATIENT)
Dept: MAMMOGRAPHY | Facility: HOSPITAL | Age: 49
Discharge: HOME | End: 2024-01-29
Admitting: NURSE PRACTITIONER
Payer: MEDICAID

## 2024-01-29 DIAGNOSIS — Z12.31 ENCOUNTER FOR SCREENING MAMMOGRAM FOR MALIGNANT NEOPLASM OF BREAST: ICD-10-CM

## 2024-01-29 PROCEDURE — 77063 BREAST TOMOSYNTHESIS BI: CPT

## 2024-01-29 PROCEDURE — 77067 SCR MAMMO BI INCL CAD: CPT

## 2024-01-30 PROCEDURE — 77063 BREAST TOMOSYNTHESIS BI: CPT | Performed by: RADIOLOGY

## 2024-01-30 PROCEDURE — 77067 SCR MAMMO BI INCL CAD: CPT | Performed by: RADIOLOGY

## 2024-02-01 DIAGNOSIS — G62.9 NEUROPATHY: ICD-10-CM

## 2024-02-01 RX ORDER — PREGABALIN 150 MG/1
150 CAPSULE ORAL 2 TIMES DAILY
Qty: 60 CAPSULE | Refills: 5 | Status: SHIPPED | OUTPATIENT
Start: 2024-02-01

## 2024-02-15 ENCOUNTER — OFFICE VISIT (OUTPATIENT)
Dept: SLEEP MEDICINE | Facility: CLINIC | Age: 49
End: 2024-02-15
Payer: MEDICAID

## 2024-02-15 VITALS
HEART RATE: 88 BPM | HEIGHT: 66 IN | OXYGEN SATURATION: 93 % | TEMPERATURE: 97.8 F | BODY MASS INDEX: 36 KG/M2 | SYSTOLIC BLOOD PRESSURE: 120 MMHG | DIASTOLIC BLOOD PRESSURE: 74 MMHG | WEIGHT: 224 LBS

## 2024-02-15 DIAGNOSIS — G47.33 OSA (OBSTRUCTIVE SLEEP APNEA): Primary | ICD-10-CM

## 2024-02-15 DIAGNOSIS — F51.04 PSYCHOPHYSIOLOGICAL INSOMNIA: ICD-10-CM

## 2024-02-15 PROCEDURE — 99213 OFFICE O/P EST LOW 20 MIN: CPT | Performed by: NURSE PRACTITIONER

## 2024-02-15 PROCEDURE — 3074F SYST BP LT 130 MM HG: CPT | Performed by: NURSE PRACTITIONER

## 2024-02-15 PROCEDURE — 3078F DIAST BP <80 MM HG: CPT | Performed by: NURSE PRACTITIONER

## 2024-03-21 ENCOUNTER — HOSPITAL ENCOUNTER (OUTPATIENT)
Dept: ONCOLOGY | Facility: HOSPITAL | Age: 49
Discharge: HOME | End: 2024-03-21
Payer: MEDICAID

## 2024-03-21 ENCOUNTER — OFFICE VISIT (OUTPATIENT)
Dept: ONCOLOGY | Facility: CLINIC | Age: 49
End: 2024-03-21
Payer: MEDICAID

## 2024-03-21 VITALS
SYSTOLIC BLOOD PRESSURE: 121 MMHG | TEMPERATURE: 97 F | HEIGHT: 66 IN | DIASTOLIC BLOOD PRESSURE: 78 MMHG | BODY MASS INDEX: 36.64 KG/M2 | WEIGHT: 228 LBS | HEART RATE: 98 BPM | OXYGEN SATURATION: 97 % | RESPIRATION RATE: 16 BRPM

## 2024-03-21 DIAGNOSIS — C50.811 MALIGNANT NEOPLASM OF OVERLAPPING SITES OF RIGHT BREAST IN FEMALE, ESTROGEN RECEPTOR POSITIVE: Primary | ICD-10-CM

## 2024-03-21 DIAGNOSIS — Z17.0 MALIGNANT NEOPLASM OF OVERLAPPING SITES OF RIGHT BREAST IN FEMALE, ESTROGEN RECEPTOR POSITIVE: Primary | ICD-10-CM

## 2024-03-21 DIAGNOSIS — C96.6: ICD-10-CM

## 2024-03-21 LAB
ALBUMIN SERPL-MCNC: 3.6 G/DL (ref 3.5–5.2)
ALBUMIN/GLOB SERPL: 1 G/DL
ALP SERPL-CCNC: 151 U/L (ref 39–117)
ALT SERPL W P-5'-P-CCNC: 22 U/L (ref 1–33)
ANION GAP SERPL CALCULATED.3IONS-SCNC: 9 MMOL/L (ref 5–15)
AST SERPL-CCNC: 21 U/L (ref 1–32)
BASOPHILS # BLD AUTO: 0.02 10*3/MM3 (ref 0–0.2)
BASOPHILS NFR BLD AUTO: 0.3 % (ref 0–1.5)
BILIRUB SERPL-MCNC: <0.2 MG/DL (ref 0–1.2)
BUN SERPL-MCNC: 21 MG/DL (ref 6–20)
BUN/CREAT SERPL: 38.2 (ref 7–25)
CALCIUM SPEC-SCNC: 9.3 MG/DL (ref 8.6–10.5)
CHLORIDE SERPL-SCNC: 98 MMOL/L (ref 98–107)
CO2 SERPL-SCNC: 25 MMOL/L (ref 22–29)
CREAT SERPL-MCNC: 0.55 MG/DL (ref 0.57–1)
DEPRECATED RDW RBC AUTO: 43 FL (ref 37–54)
EGFRCR SERPLBLD CKD-EPI 2021: 113.9 ML/MIN/1.73
EOSINOPHIL # BLD AUTO: 0.25 10*3/MM3 (ref 0–0.4)
EOSINOPHIL NFR BLD AUTO: 3.1 % (ref 0.3–6.2)
ERYTHROCYTE [DISTWIDTH] IN BLOOD BY AUTOMATED COUNT: 13.9 % (ref 12.3–15.4)
GLOBULIN UR ELPH-MCNC: 3.7 GM/DL
GLUCOSE SERPL-MCNC: 209 MG/DL (ref 65–99)
HCT VFR BLD AUTO: 38.4 % (ref 34–46.6)
HGB BLD-MCNC: 12.5 G/DL (ref 12–15.9)
IMM GRANULOCYTES # BLD AUTO: 0.02 10*3/MM3 (ref 0–0.05)
IMM GRANULOCYTES NFR BLD AUTO: 0.3 % (ref 0–0.5)
LYMPHOCYTES # BLD AUTO: 2.47 10*3/MM3 (ref 0.7–3.1)
LYMPHOCYTES NFR BLD AUTO: 31 % (ref 19.6–45.3)
MCH RBC QN AUTO: 27.4 PG (ref 26.6–33)
MCHC RBC AUTO-ENTMCNC: 32.6 G/DL (ref 31.5–35.7)
MCV RBC AUTO: 84 FL (ref 79–97)
MONOCYTES # BLD AUTO: 0.51 10*3/MM3 (ref 0.1–0.9)
MONOCYTES NFR BLD AUTO: 6.4 % (ref 5–12)
NEUTROPHILS NFR BLD AUTO: 4.7 10*3/MM3 (ref 1.7–7)
NEUTROPHILS NFR BLD AUTO: 58.9 % (ref 42.7–76)
PLATELET # BLD AUTO: 252 10*3/MM3 (ref 140–450)
PMV BLD AUTO: 9.4 FL (ref 6–12)
POTASSIUM SERPL-SCNC: 3.9 MMOL/L (ref 3.5–5.2)
PROT SERPL-MCNC: 7.3 G/DL (ref 6–8.5)
RBC # BLD AUTO: 4.57 10*6/MM3 (ref 3.77–5.28)
SODIUM SERPL-SCNC: 132 MMOL/L (ref 136–145)
WBC NRBC COR # BLD AUTO: 7.97 10*3/MM3 (ref 3.4–10.8)

## 2024-03-21 PROCEDURE — 3074F SYST BP LT 130 MM HG: CPT | Performed by: INTERNAL MEDICINE

## 2024-03-21 PROCEDURE — 99214 OFFICE O/P EST MOD 30 MIN: CPT | Performed by: INTERNAL MEDICINE

## 2024-03-21 PROCEDURE — 80053 COMPREHEN METABOLIC PANEL: CPT | Performed by: NURSE PRACTITIONER

## 2024-03-21 PROCEDURE — 3078F DIAST BP <80 MM HG: CPT | Performed by: INTERNAL MEDICINE

## 2024-03-21 PROCEDURE — 85025 COMPLETE CBC W/AUTO DIFF WBC: CPT | Performed by: NURSE PRACTITIONER

## 2024-03-21 PROCEDURE — 96372 THER/PROPH/DIAG INJ SC/IM: CPT

## 2024-03-21 PROCEDURE — 96402 CHEMO HORMON ANTINEOPL SQ/IM: CPT

## 2024-03-21 PROCEDURE — 1126F AMNT PAIN NOTED NONE PRSNT: CPT | Performed by: INTERNAL MEDICINE

## 2024-03-21 PROCEDURE — 25010000002 LEUPROLIDE ACETATE (3 MONTH) PER 7.5 MG: Performed by: NURSE PRACTITIONER

## 2024-03-21 RX ORDER — IBUPROFEN 600 MG/1
600 TABLET ORAL EVERY 6 HOURS PRN
COMMUNITY
Start: 2024-02-21

## 2024-03-21 RX ORDER — LEVOFLOXACIN 750 MG/1
TABLET, FILM COATED ORAL
COMMUNITY
Start: 2024-03-01

## 2024-03-21 RX ORDER — BLOOD SUGAR DIAGNOSTIC
STRIP MISCELLANEOUS
COMMUNITY
Start: 2024-03-11

## 2024-03-21 RX ORDER — METHOCARBAMOL 500 MG/1
500 TABLET, FILM COATED ORAL
COMMUNITY
Start: 2024-02-21

## 2024-03-21 RX ADMIN — LEUPROLIDE ACETATE 22.5 MG: KIT at 10:56

## 2024-04-10 ENCOUNTER — HOSPITAL ENCOUNTER (OUTPATIENT)
Dept: CT IMAGING | Facility: HOSPITAL | Age: 49
Discharge: HOME | End: 2024-04-10
Admitting: INTERNAL MEDICINE
Payer: MEDICAID

## 2024-04-10 DIAGNOSIS — C96.6: ICD-10-CM

## 2024-04-10 PROCEDURE — 71250 CT THORAX DX C-: CPT

## 2024-04-12 ENCOUNTER — TELEPHONE (OUTPATIENT)
Dept: ONCOLOGY | Facility: CLINIC | Age: 49
End: 2024-04-12
Payer: MEDICAID

## 2024-04-12 NOTE — TELEPHONE ENCOUNTER
Per Dr connell, I called patient to let her know the CT shows no evidence of rib fractures or abnormalities, lungs also are clear.  No signs of recurrent Langerhans.   I asked patient if she was still having rib pain and she said that she was and I told her I would notify Dr. Connell as Dr. Connell may want to order some additional testing.  Patient verbalized understanding.  .

## 2024-04-15 DIAGNOSIS — C96.6: ICD-10-CM

## 2024-04-15 DIAGNOSIS — Z17.0 MALIGNANT NEOPLASM OF OVERLAPPING SITES OF RIGHT BREAST IN FEMALE, ESTROGEN RECEPTOR POSITIVE: Primary | ICD-10-CM

## 2024-04-15 DIAGNOSIS — C50.811 MALIGNANT NEOPLASM OF OVERLAPPING SITES OF RIGHT BREAST IN FEMALE, ESTROGEN RECEPTOR POSITIVE: Primary | ICD-10-CM

## 2024-04-15 DIAGNOSIS — R07.81 RIB PAIN: ICD-10-CM

## 2024-04-15 NOTE — TELEPHONE ENCOUNTER
Dr. Connell notified me this morning that she will order a bone scan for patient to evaluate rib pain.  I called patient to let her know that scheduling would be contacting her.  Patient verbalized understanding.

## 2024-04-30 ENCOUNTER — HOSPITAL ENCOUNTER (OUTPATIENT)
Dept: NUCLEAR MEDICINE | Facility: HOSPITAL | Age: 49
Discharge: HOME | End: 2024-04-30
Payer: MEDICAID

## 2024-04-30 DIAGNOSIS — R07.81 RIB PAIN: ICD-10-CM

## 2024-04-30 DIAGNOSIS — C96.6: ICD-10-CM

## 2024-04-30 DIAGNOSIS — C50.811 MALIGNANT NEOPLASM OF OVERLAPPING SITES OF RIGHT BREAST IN FEMALE, ESTROGEN RECEPTOR POSITIVE: ICD-10-CM

## 2024-04-30 DIAGNOSIS — Z17.0 MALIGNANT NEOPLASM OF OVERLAPPING SITES OF RIGHT BREAST IN FEMALE, ESTROGEN RECEPTOR POSITIVE: ICD-10-CM

## 2024-04-30 PROCEDURE — A9503 TC99M MEDRONATE: HCPCS | Performed by: INTERNAL MEDICINE

## 2024-04-30 PROCEDURE — 0 TECHNETIUM MEDRONATE KIT: Performed by: INTERNAL MEDICINE

## 2024-04-30 PROCEDURE — 78306 BONE IMAGING WHOLE BODY: CPT

## 2024-04-30 RX ORDER — TC 99M MEDRONATE 20 MG/10ML
26.5 INJECTION, POWDER, LYOPHILIZED, FOR SOLUTION INTRAVENOUS
Status: COMPLETED | OUTPATIENT
Start: 2024-04-30 | End: 2024-04-30

## 2024-04-30 RX ADMIN — TC 99M MEDRONATE 26.5 MILLICURIE: 20 INJECTION, POWDER, LYOPHILIZED, FOR SOLUTION INTRAVENOUS at 10:18

## 2024-05-01 ENCOUNTER — TELEPHONE (OUTPATIENT)
Dept: ONCOLOGY | Facility: CLINIC | Age: 49
End: 2024-05-01
Payer: MEDICAID

## 2024-05-01 NOTE — TELEPHONE ENCOUNTER
----- Message from Lissette LIPSCOMB sent at 5/1/2024  3:27 PM EDT -----  Please let her know bone scan shows no abnormalities.  No suggestion of cancerous or any other lesions in the bone.

## 2024-05-13 NOTE — PROGRESS NOTES
Follow-up Visit      Date: 2024  Patient Name: Josefina Rodriguez  : 1975   MRN: [id number]     Chief Complaint:    Chief Complaint   Patient presents with    Essential hypertension       History of Present Illness: Josefina Rodriguez is a 49 y.o. female who is here today for for follow-up on her cardiac risk her main problem is shortness of breath working for some time and she has to rest.  She has been doing like this for long period of time.  She denies any lower extremity edema.  She denies any paroxysmal nocturnal dyspnea.  She denies any pain in the legs while walking.      Problem List     CARDIAC  Coronary Artery Disease:   Stress test, 2023: Normal     Myocardium:   Echo, 2022: EF 60%, mild LVH     Valvular:   No valvular disease     Electrical:   Normal sinus rhythm     Pericardium:   Normal     CARDIAC RISK FACTORS  Diabetes  Dyslipidemia  Obesity  Sleep apnea    NON-CARDIAC  Bilateral pulmonary embolism  Ovarian cyst  Breast cancer s/p radiation therapy  Depression  Heart hernia  Polycystic ovarian syndrome    SURGERIES  Mastectomy  Cholecystectomy  Abdominoplasty  Breast reconstruction        Subjective      Review of Systems:   Review of Systems   All other systems reviewed and are negative.      Medications:     Current Outpatient Medications:     anastrozole (ARIMIDEX) 1 MG tablet, TAKE ONE TABLET BY MOUTH DAILY, Disp: 30 tablet, Rfl: 11    buPROPion XL (WELLBUTRIN XL) 300 MG 24 hr tablet, 1 tablet Every Morning., Disp: , Rfl:     Continuous Blood Gluc Sensor (Dexcom G6 Sensor), , Disp: , Rfl:     Continuous Blood Gluc Transmit (Dexcom G6 Transmitter) misc, Inject 1 Device under the skin into the appropriate area as directed Take As Directed., Disp: , Rfl:     Diclofenac Sodium (VOLTAREN) 1 % gel gel, 3 (Three) Times a Day As Needed., Disp: , Rfl:     dilTIAZem CD (CARDIZEM CD) 120 MG 24 hr capsule, Take 1 capsule by mouth Daily., Disp: 30 capsule,  Rfl: 11    DULoxetine (CYMBALTA) 60 MG capsule, TAKE ONE CAPSULE BY MOUTH DAILY, Disp: 90 capsule, Rfl: 3    fluticasone (FLONASE) 50 MCG/ACT nasal spray, 1 spray into the nostril(s) as directed by provider Every 12 (Twelve) Hours., Disp: , Rfl:     hydroCHLOROthiazide (HYDRODIURIL) 25 MG tablet, Take 1 tablet by mouth Daily., Disp: , Rfl:     ibuprofen (ADVIL,MOTRIN) 600 MG tablet, Take 1 tablet by mouth Every 6 (Six) Hours As Needed., Disp: , Rfl:     Insulin Lispro, 1 Unit Dial, (HUMALOG) 100 UNIT/ML solution pen-injector, , Disp: , Rfl:     [employer] Pen Needles 32G X 4 MM misc, , Disp: , Rfl:     LANTUS SOLOSTAR 100 UNIT/ML injection pen, Inject 26 Units under the skin into the appropriate area as directed. In AM, Disp: , Rfl:     levoFLOXacin (LEVAQUIN) 750 MG tablet, , Disp: , Rfl:     lisinopril (PRINIVIL,ZESTRIL) 40 MG tablet, Take 1 tablet by mouth Daily., Disp: , Rfl:     loratadine (CLARITIN) 10 MG tablet, Take 1 tablet by mouth Daily., Disp: , Rfl:     LORazepam (ATIVAN) 1 MG tablet, Take 1 tablet by mouth As Needed for Anxiety., Disp: , Rfl:     LORazepam (ATIVAN) 1 MG tablet, Take 1 tablet by mouth Every Night., Disp: , Rfl:     Melatonin ER 10 MG tablet controlled-release, Take  by mouth., Disp: , Rfl:     methocarbamol (ROBAXIN) 500 MG tablet, Take 1 tablet by mouth., Disp: , Rfl:     montelukast (SINGULAIR) 10 MG tablet, Take 1 tablet by mouth Every Night., Disp: , Rfl:     Multiple Vitamins-Minerals (MULTIVITAMIN GUMMIES WOMENS PO), Take 2 capsules by mouth Daily., Disp: , Rfl:     ondansetron (ZOFRAN) 8 MG tablet, TAKE ONE TABLET BY MOUTH THREE TIMES A DAY AS NEEDED FOR NAUSEA AND VOMITING, Disp: 30 tablet, Rfl: 3    OneTouch Ultra test strip, , Disp: , Rfl:     Ozempic, 0.25 or 0.5 MG/DOSE, 2 MG/3ML solution pen-injector, , Disp: , Rfl:     pantoprazole (PROTONIX) 40 MG EC tablet, Take 1 tablet by mouth Daily., Disp: , Rfl:     pregabalin (LYRICA) 150 MG capsule, TAKE 1 CAPSULE BY MOUTH TWICE A  "DAY, Disp: 60 capsule, Rfl: 5    prochlorperazine (COMPAZINE) 10 MG tablet, TAKE ONE TABLET BY MOUTH EVERY 6 HOURS AS NEEDED FOR NAUSEA AND VOMITING, Disp: 60 tablet, Rfl: 1    vitamin D3 125 MCG (5000 UT) capsule capsule, Take  by mouth Daily., Disp: , Rfl:     Allergies:   Allergies   Allergen Reactions    Cashew Nut Hives and Itching    Cashew Nut (Anacardium Occidentale) Skin Test Hives and Itching    Naproxen Hives    Penicillins Hives     PT STATES CAN TOLERATE KELFEX    Pistachio Nut Hives and Itching    Pistachio Nut (Diagnostic) Hives and Itching       Objective     Physical Exam:  Vitals:    05/14/24 1150   BP: 130/70   BP Location: Left arm   Patient Position: Sitting   Cuff Size: Adult   Pulse: 91   SpO2: 96%   Weight: 103 kg (227 lb 9.6 oz)   Height: 165.1 cm (65\")     Body mass index is 37.87 kg/m².    Constitutional:       General: Not in acute distress.     Appearance: Healthy appearance. Not in distress.     Neck:     JVP:Not elevated     Carotid artery: Normal    Pulmonary:      Effort: Pulmonary effort is normal.      Breath sounds: Normal breath sounds. No wheezing. No rhonchi. No rales.     Cardiovascular:      Normal rate. Regular rhythm. Normal S1. Normal S2.      Murmurs: There is no significant murmur.      No gallop. No click. No rub.     Abdominal:      General: Bowel sounds are normal.      Palpations: Abdomen is soft.      Tenderness: There is no abdominal tenderness.    Extremities:     Pulses:Normal radial and pedal pulses     Edema:no edema    Smoking Cessation:   Tobacco Product History : Patient quit smoking in 2021 after 20 pack years     Lab Review:   Lab Results   Component Value Date    GLUCOSE 209 (H) 03/21/2024    BUN 21 (H) 03/21/2024    CREATININE 0.55 (L) 03/21/2024    EGFRIFNONA 105 12/02/2021    EGFRIFAFRI 108 02/29/2024    BCR 38.2 (H) 03/21/2024    K 3.9 03/21/2024    CO2 25.0 03/21/2024    CALCIUM 9.3 03/21/2024    ALBUMIN 3.6 03/21/2024    AST 21 03/21/2024    ALT 22 " 03/21/2024     Lab Results   Component Value Date    WBC 7.97 03/21/2024    HGB 12.5 03/21/2024    HCT 38.4 03/21/2024    MCV 84.0 03/21/2024     03/21/2024         Assessment / Plan      Assessment:   Diagnosis Plan   1. Essential hypertension        2. Mixed hyperlipidemia             Plan:  Patient blood pressure has been running good.  Patient will continue current medications and follow-up on yearly basis.  We do not have latest lipid available to us.  Will try to get her last lab work.  If we do not have those available we will get a new lipid profile.      Follow Up:       Return in about 1 year (around 5/14/2025).    Addis Lu MD

## 2024-05-14 ENCOUNTER — OFFICE VISIT (OUTPATIENT)
Dept: CARDIOLOGY | Facility: CLINIC | Age: 49
End: 2024-05-14
Payer: MEDICAID

## 2024-05-14 VITALS
HEIGHT: 65 IN | SYSTOLIC BLOOD PRESSURE: 130 MMHG | OXYGEN SATURATION: 96 % | WEIGHT: 227.6 LBS | HEART RATE: 91 BPM | BODY MASS INDEX: 37.92 KG/M2 | DIASTOLIC BLOOD PRESSURE: 70 MMHG

## 2024-05-14 DIAGNOSIS — E78.2 MIXED HYPERLIPIDEMIA: ICD-10-CM

## 2024-05-14 DIAGNOSIS — I10 ESSENTIAL HYPERTENSION: Primary | ICD-10-CM

## 2024-05-14 PROCEDURE — 99213 OFFICE O/P EST LOW 20 MIN: CPT | Performed by: INTERNAL MEDICINE

## 2024-05-14 PROCEDURE — 3075F SYST BP GE 130 - 139MM HG: CPT | Performed by: INTERNAL MEDICINE

## 2024-05-14 PROCEDURE — 1160F RVW MEDS BY RX/DR IN RCRD: CPT | Performed by: INTERNAL MEDICINE

## 2024-05-14 PROCEDURE — 1159F MED LIST DOCD IN RCRD: CPT | Performed by: INTERNAL MEDICINE

## 2024-05-14 PROCEDURE — 3078F DIAST BP <80 MM HG: CPT | Performed by: INTERNAL MEDICINE

## 2024-06-13 ENCOUNTER — HOSPITAL ENCOUNTER (OUTPATIENT)
Dept: ONCOLOGY | Facility: HOSPITAL | Age: 49
Discharge: HOME | End: 2024-06-13
Payer: MEDICAID

## 2024-06-13 VITALS
TEMPERATURE: 98 F | BODY MASS INDEX: 36.82 KG/M2 | WEIGHT: 221 LBS | RESPIRATION RATE: 18 BRPM | HEIGHT: 65 IN | HEART RATE: 95 BPM | SYSTOLIC BLOOD PRESSURE: 120 MMHG | DIASTOLIC BLOOD PRESSURE: 72 MMHG

## 2024-06-13 DIAGNOSIS — Z17.0 MALIGNANT NEOPLASM OF OVERLAPPING SITES OF RIGHT BREAST IN FEMALE, ESTROGEN RECEPTOR POSITIVE: Primary | ICD-10-CM

## 2024-06-13 DIAGNOSIS — C50.811 MALIGNANT NEOPLASM OF OVERLAPPING SITES OF RIGHT BREAST IN FEMALE, ESTROGEN RECEPTOR POSITIVE: Primary | ICD-10-CM

## 2024-06-13 LAB
ALBUMIN SERPL-MCNC: 3.8 G/DL (ref 3.5–5.2)
ALBUMIN/GLOB SERPL: 1.2 G/DL
ALP SERPL-CCNC: 170 U/L (ref 39–117)
ALT SERPL W P-5'-P-CCNC: 25 U/L (ref 1–33)
ANION GAP SERPL CALCULATED.3IONS-SCNC: 10 MMOL/L (ref 5–15)
AST SERPL-CCNC: 26 U/L (ref 1–32)
BASOPHILS # BLD AUTO: 0.02 10*3/MM3 (ref 0–0.2)
BASOPHILS NFR BLD AUTO: 0.2 % (ref 0–1.5)
BILIRUB SERPL-MCNC: 0.2 MG/DL (ref 0–1.2)
BUN SERPL-MCNC: 18 MG/DL (ref 6–20)
BUN/CREAT SERPL: 23.4 (ref 7–25)
CALCIUM SPEC-SCNC: 9.9 MG/DL (ref 8.6–10.5)
CHLORIDE SERPL-SCNC: 102 MMOL/L (ref 98–107)
CO2 SERPL-SCNC: 27 MMOL/L (ref 22–29)
CREAT SERPL-MCNC: 0.77 MG/DL (ref 0.57–1)
DEPRECATED RDW RBC AUTO: 42.9 FL (ref 37–54)
EGFRCR SERPLBLD CKD-EPI 2021: 95.9 ML/MIN/1.73
EOSINOPHIL # BLD AUTO: 0.15 10*3/MM3 (ref 0–0.4)
EOSINOPHIL NFR BLD AUTO: 1.8 % (ref 0.3–6.2)
ERYTHROCYTE [DISTWIDTH] IN BLOOD BY AUTOMATED COUNT: 13.6 % (ref 12.3–15.4)
GLOBULIN UR ELPH-MCNC: 3.3 GM/DL
GLUCOSE SERPL-MCNC: 226 MG/DL (ref 65–99)
HCT VFR BLD AUTO: 41.3 % (ref 34–46.6)
HGB BLD-MCNC: 13.4 G/DL (ref 12–15.9)
IMM GRANULOCYTES # BLD AUTO: 0.02 10*3/MM3 (ref 0–0.05)
IMM GRANULOCYTES NFR BLD AUTO: 0.2 % (ref 0–0.5)
LYMPHOCYTES # BLD AUTO: 2.3 10*3/MM3 (ref 0.7–3.1)
LYMPHOCYTES NFR BLD AUTO: 27 % (ref 19.6–45.3)
MCH RBC QN AUTO: 27.6 PG (ref 26.6–33)
MCHC RBC AUTO-ENTMCNC: 32.4 G/DL (ref 31.5–35.7)
MCV RBC AUTO: 85.2 FL (ref 79–97)
MONOCYTES # BLD AUTO: 0.54 10*3/MM3 (ref 0.1–0.9)
MONOCYTES NFR BLD AUTO: 6.3 % (ref 5–12)
NEUTROPHILS NFR BLD AUTO: 5.5 10*3/MM3 (ref 1.7–7)
NEUTROPHILS NFR BLD AUTO: 64.5 % (ref 42.7–76)
PLATELET # BLD AUTO: 266 10*3/MM3 (ref 140–450)
PMV BLD AUTO: 9.7 FL (ref 6–12)
POTASSIUM SERPL-SCNC: 4.4 MMOL/L (ref 3.5–5.2)
PROT SERPL-MCNC: 7.1 G/DL (ref 6–8.5)
RBC # BLD AUTO: 4.85 10*6/MM3 (ref 3.77–5.28)
SODIUM SERPL-SCNC: 139 MMOL/L (ref 136–145)
WBC NRBC COR # BLD AUTO: 8.53 10*3/MM3 (ref 3.4–10.8)

## 2024-06-13 PROCEDURE — 96372 THER/PROPH/DIAG INJ SC/IM: CPT

## 2024-06-13 PROCEDURE — 85025 COMPLETE CBC W/AUTO DIFF WBC: CPT | Performed by: INTERNAL MEDICINE

## 2024-06-13 PROCEDURE — 25010000002 LEUPROLIDE 22.5 MG KIT: Performed by: INTERNAL MEDICINE

## 2024-06-13 PROCEDURE — 80053 COMPREHEN METABOLIC PANEL: CPT | Performed by: INTERNAL MEDICINE

## 2024-06-13 PROCEDURE — 36415 COLL VENOUS BLD VENIPUNCTURE: CPT

## 2024-06-13 RX ADMIN — LEUPROLIDE ACETATE 22.5 MG: 22.5 INJECTION, SUSPENSION, EXTENDED RELEASE SUBCUTANEOUS at 10:57

## 2024-07-28 DIAGNOSIS — Z17.0 MALIGNANT NEOPLASM OF OVERLAPPING SITES OF RIGHT BREAST IN FEMALE, ESTROGEN RECEPTOR POSITIVE: ICD-10-CM

## 2024-07-28 DIAGNOSIS — C50.811 MALIGNANT NEOPLASM OF OVERLAPPING SITES OF RIGHT BREAST IN FEMALE, ESTROGEN RECEPTOR POSITIVE: ICD-10-CM

## 2024-07-29 RX ORDER — ANASTROZOLE 1 MG/1
1 TABLET ORAL DAILY
Qty: 90 TABLET | Refills: 3 | Status: SHIPPED | OUTPATIENT
Start: 2024-07-29

## 2024-09-05 ENCOUNTER — OFFICE VISIT (OUTPATIENT)
Dept: ONCOLOGY | Facility: CLINIC | Age: 49
End: 2024-09-05
Payer: MEDICAID

## 2024-09-05 ENCOUNTER — HOSPITAL ENCOUNTER (OUTPATIENT)
Dept: ONCOLOGY | Facility: HOSPITAL | Age: 49
Discharge: HOME | End: 2024-09-05
Payer: MEDICAID

## 2024-09-05 VITALS
OXYGEN SATURATION: 98 % | TEMPERATURE: 97.8 F | BODY MASS INDEX: 36.32 KG/M2 | SYSTOLIC BLOOD PRESSURE: 125 MMHG | RESPIRATION RATE: 16 BRPM | HEIGHT: 65 IN | WEIGHT: 218 LBS | DIASTOLIC BLOOD PRESSURE: 95 MMHG | HEART RATE: 76 BPM

## 2024-09-05 DIAGNOSIS — C96.6: ICD-10-CM

## 2024-09-05 DIAGNOSIS — Z17.0 MALIGNANT NEOPLASM OF OVERLAPPING SITES OF RIGHT BREAST IN FEMALE, ESTROGEN RECEPTOR POSITIVE: ICD-10-CM

## 2024-09-05 DIAGNOSIS — Z17.0 MALIGNANT NEOPLASM OF OVERLAPPING SITES OF RIGHT BREAST IN FEMALE, ESTROGEN RECEPTOR POSITIVE: Primary | ICD-10-CM

## 2024-09-05 DIAGNOSIS — C50.811 MALIGNANT NEOPLASM OF OVERLAPPING SITES OF RIGHT BREAST IN FEMALE, ESTROGEN RECEPTOR POSITIVE: Primary | ICD-10-CM

## 2024-09-05 DIAGNOSIS — Z12.31 ENCOUNTER FOR SCREENING MAMMOGRAM FOR MALIGNANT NEOPLASM OF BREAST: Primary | ICD-10-CM

## 2024-09-05 DIAGNOSIS — Z79.811 LONG TERM CURRENT USE OF AROMATASE INHIBITOR: ICD-10-CM

## 2024-09-05 DIAGNOSIS — C50.811 MALIGNANT NEOPLASM OF OVERLAPPING SITES OF RIGHT BREAST IN FEMALE, ESTROGEN RECEPTOR POSITIVE: ICD-10-CM

## 2024-09-05 LAB
ALBUMIN SERPL-MCNC: 3.7 G/DL (ref 3.5–5.2)
ALBUMIN/GLOB SERPL: 1.2 G/DL
ALP SERPL-CCNC: 196 U/L (ref 39–117)
ALT SERPL W P-5'-P-CCNC: 24 U/L (ref 1–33)
ANION GAP SERPL CALCULATED.3IONS-SCNC: 11 MMOL/L (ref 5–15)
AST SERPL-CCNC: 16 U/L (ref 1–32)
BASOPHILS # BLD AUTO: 0.01 10*3/MM3 (ref 0–0.2)
BASOPHILS NFR BLD AUTO: 0.1 % (ref 0–1.5)
BILIRUB SERPL-MCNC: 0.2 MG/DL (ref 0–1.2)
BUN SERPL-MCNC: 13 MG/DL (ref 6–20)
BUN/CREAT SERPL: 21.3 (ref 7–25)
CALCIUM SPEC-SCNC: 9.3 MG/DL (ref 8.6–10.5)
CHLORIDE SERPL-SCNC: 103 MMOL/L (ref 98–107)
CO2 SERPL-SCNC: 26 MMOL/L (ref 22–29)
CREAT SERPL-MCNC: 0.61 MG/DL (ref 0.57–1)
DEPRECATED RDW RBC AUTO: 41.8 FL (ref 37–54)
EGFRCR SERPLBLD CKD-EPI 2021: 111.1 ML/MIN/1.73
EOSINOPHIL # BLD AUTO: 0.07 10*3/MM3 (ref 0–0.4)
EOSINOPHIL NFR BLD AUTO: 0.8 % (ref 0.3–6.2)
ERYTHROCYTE [DISTWIDTH] IN BLOOD BY AUTOMATED COUNT: 13.1 % (ref 12.3–15.4)
GLOBULIN UR ELPH-MCNC: 3.1 GM/DL
GLUCOSE SERPL-MCNC: 177 MG/DL (ref 65–99)
HCT VFR BLD AUTO: 40.1 % (ref 34–46.6)
HGB BLD-MCNC: 13.3 G/DL (ref 12–15.9)
IMM GRANULOCYTES # BLD AUTO: 0.02 10*3/MM3 (ref 0–0.05)
IMM GRANULOCYTES NFR BLD AUTO: 0.2 % (ref 0–0.5)
LYMPHOCYTES # BLD AUTO: 2.48 10*3/MM3 (ref 0.7–3.1)
LYMPHOCYTES NFR BLD AUTO: 29.7 % (ref 19.6–45.3)
MCH RBC QN AUTO: 28.4 PG (ref 26.6–33)
MCHC RBC AUTO-ENTMCNC: 33.2 G/DL (ref 31.5–35.7)
MCV RBC AUTO: 85.7 FL (ref 79–97)
MONOCYTES # BLD AUTO: 0.44 10*3/MM3 (ref 0.1–0.9)
MONOCYTES NFR BLD AUTO: 5.3 % (ref 5–12)
NEUTROPHILS NFR BLD AUTO: 5.34 10*3/MM3 (ref 1.7–7)
NEUTROPHILS NFR BLD AUTO: 63.9 % (ref 42.7–76)
PLATELET # BLD AUTO: 238 10*3/MM3 (ref 140–450)
PMV BLD AUTO: 10 FL (ref 6–12)
POTASSIUM SERPL-SCNC: 4.2 MMOL/L (ref 3.5–5.2)
PROT SERPL-MCNC: 6.8 G/DL (ref 6–8.5)
RBC # BLD AUTO: 4.68 10*6/MM3 (ref 3.77–5.28)
SODIUM SERPL-SCNC: 140 MMOL/L (ref 136–145)
WBC NRBC COR # BLD AUTO: 8.36 10*3/MM3 (ref 3.4–10.8)

## 2024-09-05 PROCEDURE — 1126F AMNT PAIN NOTED NONE PRSNT: CPT | Performed by: NURSE PRACTITIONER

## 2024-09-05 PROCEDURE — 96402 CHEMO HORMON ANTINEOPL SQ/IM: CPT

## 2024-09-05 PROCEDURE — 3074F SYST BP LT 130 MM HG: CPT | Performed by: NURSE PRACTITIONER

## 2024-09-05 PROCEDURE — 85025 COMPLETE CBC W/AUTO DIFF WBC: CPT | Performed by: INTERNAL MEDICINE

## 2024-09-05 PROCEDURE — 3080F DIAST BP >= 90 MM HG: CPT | Performed by: NURSE PRACTITIONER

## 2024-09-05 PROCEDURE — 99215 OFFICE O/P EST HI 40 MIN: CPT | Performed by: NURSE PRACTITIONER

## 2024-09-05 PROCEDURE — 25010000002 LEUPROLIDE 22.5 MG KIT: Performed by: INTERNAL MEDICINE

## 2024-09-05 PROCEDURE — 80053 COMPREHEN METABOLIC PANEL: CPT | Performed by: INTERNAL MEDICINE

## 2024-09-05 RX ORDER — LIRAGLUTIDE 6 MG/ML
INJECTION SUBCUTANEOUS DAILY
COMMUNITY

## 2024-09-05 RX ADMIN — LEUPROLIDE ACETATE 22.5 MG: 22.5 INJECTION, SUSPENSION, EXTENDED RELEASE SUBCUTANEOUS at 11:25

## 2024-09-05 NOTE — PROGRESS NOTES
PROBLEM LIST:  1. [id number] ER+ AZ+ Her2+ invasive ductal carcinoma of the right breast  A) presented with a mass on self-exam.  Mammogram 1/7/20 showed a 4.1 cm mass in the right breast, with 2 adjacent smaller masses.  1.9 cm right axillary lymph node.   FNA of lymph node negative.  Biopsy of right breast mass showed grade 2 IDC, Er 95%, AZ 2%, Her2 3+.  B) neoadjuvant chemotherapy with TCHP started 2/5/2020  C) right mastectomy on 6/23/20.  Pathology showed a 2 x 1 x 0.6 cm intermediate grade IDC.  0/2 SLN involved.  [id number]  D) adjuvant Kadcyla started 7/22/2020.   Kadcyla stopped October 14, 2020 and switched back to Herceptin and Perjeta due to progressive severe neuropathy.  E) anastrozole with Lupron started 08/12/2020.   2. PCOS  3. Anxiety/depression  4. DM2  5. GERD  6. Hyperlipidemia  7. Hypertension  8.  Hidradenitis  9.  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  CT chest 1/19/2021 showed a pathologic nondisplaced fracture of the right seventh rib.  There were also subtle small nodules in the lungs, none larger than 4 mm.  PET/CT on 1/28/2021 showed SUV of 4.8 in the right rib fracture.  Pulmonary micronodules too small to register hypermetabolic activity.  Biopsy of the rib lesion 2/24/2021 showed Langerhans' cell histiocytosis.  Smoking cessation strongly recommended and she did stop smoking in February 2021.  11. Bilateral pulmonary emboli 11/16/2022 3 days following breast reconstruction surgery.  Currently on Eliquis 5 mg twice daily.    Subjective     CHIEF COMPLAINT: breast cancer    HISTORY OF PRESENT ILLNESS:   Josefina Rodriguez returns for follow-up.  She continues on anastrozole and Lupron injections.     She is trying to lose weight and get her A1c down so that she is able to have the abdominal hernia repair.    She continues to have constant right sided rib pain, similar to what she  "had when she was diagnosed with Langerhans.  She had a CT of the chest 4/10/2024 and a bone scan 4/30/2024 that showed no evidence of disease recurrence.  Patient reports the pain is constant and is a dull pain usually a 1-2 out of 10 on the pain scale.  Stretching and certain movements will make the pain worse usually an 8 out of 10.  If she can rest and apply ice to the affected rib then the pain resolves within 30 minutes.  She does not feel like the pain is increasing in frequency although it has been ongoing for the past 7 to 8 months.  She has tried ibuprofen 600 mg which does not help the pain.    She continues on Cymbalta and Lyrica for neuropathy.  She has noticed recently intermittent muscle spasms in different parts of her body.  She has no tremors in her hands.      Objective      /95 Comment: LUE  Pulse 76   Temp 97.8 °F (36.6 °C) (Infrared)   Resp 16   Ht 165.1 cm (65\")   Wt 98.9 kg (218 lb)   SpO2 98% Comment: RA  BMI 36.28 kg/m²    Vitals:    09/05/24 1036   PainSc: 0-No pain                 Performance Status: 0    General: well appearing female in no acute distress  Neuro: alert and oriented  HEENT: sclera anicteric  Pulmonary: Lungs are clear to auscultation bilaterally  Cardiovascular: Regular rate and rhythm no murmurs  Chest: status post right mastectomy with flap reconstruction.  Left breast is unremarkable.  Abdomen: Large ventral hernia  Extremeties: no lower extremity edema  Skin: no rashes, lesions, bruising, or petechiae  Psych: mood and affect appropriate      RECENT LABS:  Lab Results   Component Value Date    WBC 8.53 06/13/2024    HGB 13.4 06/13/2024    HCT 41.3 06/13/2024    MCV 85.2 06/13/2024     06/13/2024     Lab Results   Component Value Date    GLUCOSE 226 (H) 06/13/2024    BUN 18 06/13/2024    CREATININE 0.77 06/13/2024    EGFRIFNONA 105 12/02/2021    EGFRIFAFRI 108 02/29/2024    BCR 23.4 06/13/2024    K 4.4 06/13/2024    CO2 27.0 06/13/2024    CALCIUM 9.9 " 06/13/2024    ALBUMIN 3.8 06/13/2024    AST 26 06/13/2024    ALT 25 06/13/2024              Assessment & Plan   Josefina Rodriguez is a 49 y.o. year old female with stage IB ER+ Her2+ IDC of the right breast, as well as a diagnosis of Langerhans histiocytosis involving the lung as well as a single rib lesion.    Breast cancer: She continues on anastrozole along with Lupron for ovarian suppression. We will continue Lupron for the duration of her endocrine therapy treatment.  No evidence of recurrence at this time.      Langerhans histiocytosis: Imaging changes resolved with cessation of smoking.  She has ongoing rib pain which is similar to her prior presentation.  The rib pain has been present for 7 to 8 months.  She had had a high-resolution CT of the chest and a bone scan that showed no recurrence of disease in April 2024.  After discussion with Dr. Jolene Connell, we will repeat high-resolution CT of the chest again for further evaluation.    Peripheral neuropathy: Continue Lyrica and Cymbalta.    Bone density January 27th 2023 was normal.  Plan to repeat in 2 years.  Order placed today.    She will be due for a left breast screening mammogram January 2025.  Order placed today.    Pulmonary embolism following surgery: She is no longer on aspirin.    Follow-up in 6 months.        I spent 46 minutes caring for Josefina on this date of service. This time includes time spent by me in the following activities: preparing for the visit, performing a medically appropriate examination and/or evaluation, counseling and educating the patient/family/caregiver, referring and communicating with other health care professionals, documenting information in the medical record, ordering test(s), and reviewing a separately obtained history        Aimee Johnson APRN  [facility]    9/5/2024          CC:

## 2024-09-09 NOTE — THERAPY PROGRESS REPORT/RE-CERT
Called [facility] and provided relevant information with Patti  In summary, potential TAVR candidate + hx CAD; recommendations for right and left heart     Authorization # [id number]    Thanks!  Doc Alfredo MD, MS, FACC     Outpatient Speech Language Pathology   Adult Speech Language Cognitive Progress Note  [facility]     Patient Name: Josefina Rodriguez  : 1975  MRN: [id number]  Today's Date: 2022         Visit Date: 2022   Patient Active Problem List   Diagnosis   • Malignant neoplasm of overlapping sites of right breast in female, estrogen receptor positive (HCC)   • Chemotherapy induced diarrhea   • Chemotherapy-induced nausea   • Vaginal bleeding   • Cysts of both ovaries   • Family history of breast cancer   • Malignant neoplasm of overlapping sites of right female breast (HCC)   • Port-A-Cath in place   • Epigastric pain   • Thrush   • 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 Acquired cognitive dysfunction  F09 294.9        OP SLP Assessment/Plan - 22 1400        SLP Assessment    Functional Problems Speech Language- Adult/Cognition  -HG    Impact on Function: Adult Speech Language/Cognition Trouble learning or remembering new information  -HG    Clinical Impression: Speech Language-Adult/Congnition Minimal:;Cognitive Communication Impairment  -HG    Functional Problems Comment Noted improvement in attention and 4 STGs met.  -HG    SLP Diagnosis Minimal Cog/Comm impairment  -HG    Prognosis Excellent (comment)  -HG    Patient/caregiver participated in establishment of treatment plan and goals Yes  -HG    Patient would benefit from skilled therapy intervention Yes  -HG       SLP Plan    Frequency 1x/week every other week  -HG    Duration 4-6 weeks  -HG    Planned CPT's? SLP INDIVIDUAL SPEECH THERAPY: 32503  -    Expected Duration of Therapy Session (SLP Eval) 60  -HG    Plan Comments Cont with high level Cog tx.  -HG          User Key  (r) = Recorded By, (t) = Taken By, (c) = Cosigned By    Initials Name Provider Type     Daisy Capps  "MS CCC-SLP Speech and Language Pathologist                                   SLP OP Goals     Row Name 07/07/22 1400          Goal Type Needed    Goal Type Needed Memory;Attention/Orientation;Other Adult Goals  -HG            Subjective Comments    Subjective Comments Pt alert, cooperative, returned with homework. Four STGs met.  -HG            Memory Goals    Patient will be able to remember information needed to return to work and function on work-related tasks 100%:;with cues  -HG     Status: Patient will be able to remember information needed to return to work and function on work-related tasks Progressing as expected  -HG     Comments: Patient will be able to remember information needed to return to work and function on work-related tasks 7/7/22: Pt states that she continues to require a nap ~2x/week. 4/15/22: Pt continues to use breaks including nap breaks in order to help focus and complete her work.   2/11/22: Pt continues to require cues and breaks.  -HG     Patient will demonstrate improved ability to recall information by immediately recalling a series of words 90%:;unrelated;after delay;without cues  -HG     Status: Patient will demonstrate improved ability to recall information by immediately recalling a series of words Revised  -HG     Comments: Patient will demonstrate improved ability to recall information by immediately recalling a series of words 7/7/22: Immediate recall of picture mod-complex scene: pt was 85% accurate.  6/27/22: Immediate recall of complex figure and pt was 100% accurate. 6/20/22: Immediate recall of names and faces: pt was 8/10. 5/19/22: RBANS Immediate recall score of 123 is up from 114. 4/28/22: Name/face association and pt was 4/5. 4/15/22: Immediate recall of ABC game and pt was 100% accurate. 3/21/22: Picture scene recall and pt was 80% accurate.   Paired words and pt was 100% accurate.  Pt stated, \"Immediate memory kills me sometimes sometimes.\" Pt reports chunking 10 " digits, 6 and 4 and is able to recall complete addresses.  3/4/22: RBANS Immediate recall score remains in the High Average range. Immediate recall of a complex picture scene: pt was 85% accurate.  2/25/22: Immediate recall of picture scene and pt was 100% accurate. 2/11/22: Immediate recall for card game and pt was 90% accurate. 1/14/22: Immediate recall for new card game and pt was 90% accurate. 12/23/21: Recall of picture scene: pt was 100% accurate. Word association and pt was 100% accurat.e  -HG     Patient’s memory skills will be enhanced as reported by patient by utilizing internal memory strategies to recall up to 3 pieces of information after a 5- minute delay 90%:;without cues  -HG     Status: Patient’s memory skills will be enhanced as reported by patient by utilizing internal memory strategies to recall up to 3 pieces of information after a 5- minute delay Revised  -HG     Comments: Patient’s memory skills will be enhanced as reported by patient by utilizing internal memory strategies to recall up to 3 pieces of information after a 5- minute delay 7/7/22: Delayed recall of 50-65 word paragraphs and pt was 90% accurate.  6/27/22: Delayed recall of complex figure and pt was 100% accurate.  6/20/22: Delayed recall of 5 figures: pt was 5/5, names and faces delayed and pt was 5/5.  5/19/22:RBANS Delayed recall of 118 is down slightly from 122.  5/12/22: Delayed recall of 10 unrelated words, using a story for recall with 100% acc. 4/28/22: Delayed recall of 8 un-related words and pt was 8/8.  Fxnl messges and pt was 3/3 x 2.   4/21/22: Delayed recall of 7 un-related words and pt was 7/7 x 3, ABC game from previous session and pt was 95% accurate.  4/15/22: Delayed recall of 6 un-related words and pt was 4/6 and improved to 6/6.   3/21/22: Delayed recall of 5 un-related words and pt was 4/5.   3/4/22: RBANS Delayed recall score of 122 improved to a Superior range. 2/25/22: Delayed recall of 8 related words and  "pt was 8/8 x 3. 2/11/22:  Delayed recall of 7 related words and pt was 7/7x 2. 1/14/22: Delayed recall of 6 related words: pt was 4/6. After review, pt was 6/6 x 3.  12/23/21: Delayed recall of 4 un-related words: pt was 4/4. Progressed to 5/5 x 3.  -HG     Patient’s memory skills will be enhanced as reported by patient by using external memory aides 90%:;without cues  -HG     Status: Patient’s memory skills will be enhanced as reported by patient by using external memory aides Achieved  -HG     Comments: Patient’s memory skills will be enhanced as reported by patient by using external memory aides 5/19/22: Pt continues to use external strategies to assist with recall. 5/12/22: Patient reported improvement in recall with use of journaling 4/15/22: Pt continues to journal for memory and counseling notes. 3/21/22: Pt states that her daughter lost her journal so she took one from SLP. 2/11/22: Pt states that she has journaled but only one day. 1/14/22: Pt has not started journaling, plans to get journal this date. 12/23/21: Pt stated she plans to get a journal.  -HG            Attention/Orientation Goals    Patient will be able to use high level cognitive skills to allow patient to return to work Independently  -HG     Status: Patient will be able to use high level cognitive skills to allow patient to return to work Progressing as expected  -HG     Comments: Patient will be able to use high level cognitive skills to allow patient to return to work 5/19/22: RBANS Attention score of 88 is down from previous 94. 3/4/22: RBANS Attention score improved to a 94 from a 91 2/11/22: Pt continues to \"lose focus\" and SLP discussed giving breaks and pt does do that.  -HG     Patient will improve attention skills by sustaining focus and participation to conversation/task 30 minute task;with intermittent cues  -HG     Status: Patient will improve attention skills by sustaining focus and participation to conversation/task Achieved  " -HG     Comments: Patient will improve attention skills by sustaining focus and participation to conversation/task 5/12/22: Sustained focus for conversational task with 90% acc. 4/21/22: Sustained focus for Scattergories and pt was 100% accurate.  2/11/22: Pt able to sustain attention during 5 min conversation, goal adjusted.  -HG     Patient will improve attention skills by sustaining consistent behavioral response during continuous and repetitive activity (e.g., listening for target words, auditory/reading comprehension tasks) with cues;30 minute task  -HG     Status: Patient will improve attention skills by sustaining consistent behavioral response during continuous and repetitive activity (e.g., listening for target words, auditory/reading comprehension tasks) Achieved  -HG     Comments: Patient will improve attention skills by sustaining consistent behavioral response during continuous and repetitive activity (e.g., listening for target words, auditory/reading comprehension tasks) 3/21/22: Card sorting by deck and the letter N and pt was 95% accurate.  2/11/22: Card sorting into deck and the letter N and pt was 75% accurate. 1/14/22: APT Test for Sustained and Complex Sustained attention and pt was above average. 12/23/21: Card ID in a field of 10, 12, 16 and pt was able to recall up to 4 cards.  -HG     Patient will improve attention skills by sustaining focus in order to actively hold and manipulate information provided (e.g., sequencing auditorily presented number series in ascending or descending order) 100%:;without cues  -HG     Status: Patient will improve attention skills by sustaining focus in order to actively hold and manipulate information provided (e.g., sequencing auditorily presented number series in ascending or descending order) Progressing as expected  -HG     Comments: Patient will improve attention skills by sustaining focus in order to actively hold and manipulate information provided  (e.g., sequencing auditorily presented number series in ascending or descending order) 6/27/22: Pt reporting completing with daughter using 4 word manipulation. 4/15/22: Opposites with a delay: Pt was 100% accurate. 12/23/21: Digit span and pt was 80-90% accurate for up to 8 digits.  -HG     Patient will improve attention skills by focusing to selective target/task when presented with competing stimuli or in a distracting environment in order to complete task 90%:;without cues  -HG     Status: Patient will improve attention skills by focusing to selective target/task when presented with competing stimuli or in a distracting environment in order to complete task Progressing as expected  -HG     Comments: Patient will improve attention skills by focusing to selective target/task when presented with competing stimuli or in a distracting environment in order to complete task 1/14/22: APT Test for Selective Attention and pt was 28/30- Above average for age.  -HG     Patient will improve attention skills by alternating or shifting focus between two different tasks in order to complete both tasks 90%:;without cues  -HG     Status: Patient will improve attention skills by alternating or shifting focus between two different tasks in order to complete both tasks Achieved  -HG     Comments: Patient will improve attention skills by alternating or shifting focus between two different tasks in order to complete both tasks 7/7/22: Alternating attention for opposite word search and pt was 100% accurate with min cues. 6/20/22: Alternating between red and black and pt was accurate with consistent verbal cues.  4/21/22: Alternating between cards face up and face down and then switching task and pt was 100% accurate. 4/15/22: Alternating attention for visual scanning two different letter marking and pt was 100% accurate.  3/21/22: Alternating attention for adding and subtracting two cards and pt was 90% accurate. 2/11/22: Alternating  attention task by sorting cards by red and black and pt was 80% accurate. 1/14/22: The APT test for Alternating attention and pt was 20/24 which was above average. The card game of Speed  and pt was 90% accurate.  -HG     Patient will improve attention skills by dividing focus and responding simultaneously to multiple tasks or in order to complete task 90%:;without cues  -HG     Status: Patient will improve attention skills by dividing focus and responding simultaneously to multiple tasks or in order to complete task Progressing as expected  -HG     Comments: Patient will improve attention skills by dividing focus and responding simultaneously to multiple tasks or in order to complete task 6/27/22: [place] in the [place] and pt was 85% accurate this date.    4/21/22: Sorting cards by suit and in numerical order and pt was 90% accurate.  3/21/22: [place] in the [place] and pt was 85% accurate. 2/11/22: Divided attention for paragraph listening and sorting and pt was 90% accurate. 1/14/22: APT test for divided attention and pt was 27/30 and fell slightly below average.  -HG     Patient will improve attention skills by modifying surrounding environment 90%:;with cues  -HG     Status: Patient will improve attention skills by modifying surrounding environment Progressing as expected  -HG     Comments: Patient will improve attention skills by modifying surrounding environment 4/28/22: Exec fxn task for attention and pt was 80% accurate.  2/25/22: During an exec fxn task, pt used notetaking strategies and referencing strategies in order to increase her accuracy. 1/14/22: Pt states that she takes break as needed during work.  -HG            Other Goals    Other Adult Goal- 1 Pt will complete thought organization tasks with 90% Accuracy.  -HG     Status: Other Adult Goal- 1 Achieved  -HG     Comments: Other Adult Goal- 1 5/19/22: RBANS Language score of 106 is down from previous 110. 3/4/22: RBANS Language score of 110 is up from  100. 2/25/22: Answering general informations and sequencing sentences and pt was 100% accurate. 12/23/21: Similarities and Differences: pt was 100% accurate.  -HG     Other Adult Goal- 2 Pt will complete reasoning and problem solving assessment with recs to follow as indicated.  -HG     Status: Other Adult Goal- 2 Achieved  -HG     Comments: Other Adult Goal- 2 4/28/22: Pt reports completing WORDLE with prompts in place. 4/15/22: WORDLE and pt was able to solve without assistance.  2/25/22: WORDLE and pt required min cues and completed with accuracy. 12/23/21: [place] word scramble and pt was 90% accurate.  -HG     Other Adult Goal- 3 Pt will improve RBANS Total score to a 121 placing pt in the Superior range.  -HG     Status: Other Adult Goal- 3 Progressing as expected;Revised  -HG     Comments: Other Adult Goal- 3 5/19/22: RBANS Total score of 112 is down slightly from previous 118. 3/4/22: RBANS Total score of 118 is up from a previous 106.  -HG     Other Adult Goal- 4 Pt will complete high level reasoning tasks with 90% accuracy.  -HG     Status: Other Adult Goal- 4 Progressing as expected  -HG     Comments: Other Adult Goal- 4 6/20/22: Game of Heads up and pt was 85% accurate. Deduction reasoning and pt was 100% accurate with extra time provided.  -HG            SLP Time Calculation    SLP Goal Re-Cert Due Date 09/18/22  -HG           User Key  (r) = Recorded By, (t) = Taken By, (c) = Cosigned By    Initials Name Provider Type    Daisy Graves MS CCC-SLP Speech and Language Pathologist               OP SLP Education     Row Name 07/07/22 1400       Education    Education Comments Education regarding POC.  -HG          User Key  (r) = Recorded By, (t) = Taken By, (c) = Cosigned By    Initials Name Effective Dates    Daisy Graves MS CCC-SLP 06/16/21 -                      Time Calculation:   SLP Start Time: 1400  Untimed Charges  27760-KB Treatment/ST Modification Prosth Aug Alter : 75  Total  Minutes  Untimed Charges Total Minutes: 75   Total Minutes: 75    Therapy Charges for Today     Code Description Service Date Service Provider Modifiers Qty    [id number]  ST TREATMENT SPEECH 5 7/7/2022 Daisy Capps, MS CCC-SLP GN 1                   Daisy Capps, MS HERNANDEZ-SLP  7/7/2022

## 2024-10-14 RX ORDER — DILTIAZEM HYDROCHLORIDE 120 MG/1
120 CAPSULE, COATED, EXTENDED RELEASE ORAL DAILY
Qty: 90 CAPSULE | Refills: 3 | Status: SHIPPED | OUTPATIENT
Start: 2024-10-14

## 2024-10-23 ENCOUNTER — HOSPITAL ENCOUNTER (OUTPATIENT)
Dept: CT IMAGING | Facility: HOSPITAL | Age: 49
Discharge: HOME | End: 2024-10-23
Admitting: NURSE PRACTITIONER
Payer: MEDICAID

## 2024-10-23 DIAGNOSIS — C96.6: ICD-10-CM

## 2024-10-23 PROCEDURE — 71250 CT THORAX DX C-: CPT

## 2024-10-25 ENCOUNTER — TELEPHONE (OUTPATIENT)
Dept: ONCOLOGY | Facility: CLINIC | Age: 49
End: 2024-10-25
Payer: MEDICAID

## 2024-10-25 NOTE — TELEPHONE ENCOUNTER
Called patient back per LISA Irving to tell her that the CT results from 10/23 are stable.  Patient verbalized understanding.

## 2024-10-25 NOTE — TELEPHONE ENCOUNTER
Caller: Josefina Rodriguez    Relationship: Self    Best call back number: [phone]    What was the call regarding: PATIENT HAD A VOICEMAIL TO CALL BACK.   SHE DID HAVE A CT SCAN DONE ON 10/23/2024.    CALL TO ADVISE

## 2024-11-24 DIAGNOSIS — Z17.0 MALIGNANT NEOPLASM OF OVERLAPPING SITES OF RIGHT BREAST IN FEMALE, ESTROGEN RECEPTOR POSITIVE: ICD-10-CM

## 2024-11-24 DIAGNOSIS — T45.1X5A NEUROPATHY DUE TO CHEMOTHERAPEUTIC DRUG: ICD-10-CM

## 2024-11-24 DIAGNOSIS — C50.811 MALIGNANT NEOPLASM OF OVERLAPPING SITES OF RIGHT BREAST IN FEMALE, ESTROGEN RECEPTOR POSITIVE: ICD-10-CM

## 2024-11-24 DIAGNOSIS — G62.0 NEUROPATHY DUE TO CHEMOTHERAPEUTIC DRUG: ICD-10-CM

## 2024-11-25 RX ORDER — DULOXETIN HYDROCHLORIDE 60 MG/1
60 CAPSULE, DELAYED RELEASE ORAL DAILY
Qty: 90 CAPSULE | Refills: 3 | Status: SHIPPED | OUTPATIENT
Start: 2024-11-25

## 2025-01-14 ENCOUNTER — PATIENT MESSAGE (OUTPATIENT)
Dept: ONCOLOGY | Facility: CLINIC | Age: 50
End: 2025-01-14
Payer: MEDICAID

## 2025-01-16 ENCOUNTER — HOSPITAL ENCOUNTER (OUTPATIENT)
Dept: ONCOLOGY | Facility: HOSPITAL | Age: 50
Discharge: HOME | End: 2025-01-16
Admitting: NURSE PRACTITIONER
Payer: MEDICAID

## 2025-01-16 VITALS
BODY MASS INDEX: 37.49 KG/M2 | SYSTOLIC BLOOD PRESSURE: 114 MMHG | OXYGEN SATURATION: 97 % | RESPIRATION RATE: 16 BRPM | TEMPERATURE: 97.4 F | HEART RATE: 95 BPM | HEIGHT: 65 IN | DIASTOLIC BLOOD PRESSURE: 82 MMHG | WEIGHT: 225 LBS

## 2025-01-16 DIAGNOSIS — Z17.0 MALIGNANT NEOPLASM OF OVERLAPPING SITES OF RIGHT BREAST IN FEMALE, ESTROGEN RECEPTOR POSITIVE: Primary | ICD-10-CM

## 2025-01-16 DIAGNOSIS — C50.811 MALIGNANT NEOPLASM OF OVERLAPPING SITES OF RIGHT BREAST IN FEMALE, ESTROGEN RECEPTOR POSITIVE: Primary | ICD-10-CM

## 2025-01-16 LAB
ALBUMIN SERPL-MCNC: 3.7 G/DL (ref 3.5–5.2)
ALBUMIN/GLOB SERPL: 1.1 G/DL
ALP SERPL-CCNC: 186 U/L (ref 39–117)
ALT SERPL W P-5'-P-CCNC: 25 U/L (ref 1–33)
ANION GAP SERPL CALCULATED.3IONS-SCNC: 13 MMOL/L (ref 5–15)
AST SERPL-CCNC: 22 U/L (ref 1–32)
BASOPHILS # BLD AUTO: 0.02 10*3/MM3 (ref 0–0.2)
BASOPHILS NFR BLD AUTO: 0.2 % (ref 0–1.5)
BILIRUB SERPL-MCNC: 0.3 MG/DL (ref 0–1.2)
BUN SERPL-MCNC: 18 MG/DL (ref 6–20)
BUN/CREAT SERPL: 22.8 (ref 7–25)
CALCIUM SPEC-SCNC: 9.6 MG/DL (ref 8.6–10.5)
CHLORIDE SERPL-SCNC: 101 MMOL/L (ref 98–107)
CO2 SERPL-SCNC: 23 MMOL/L (ref 22–29)
CREAT SERPL-MCNC: 0.79 MG/DL (ref 0.57–1)
DEPRECATED RDW RBC AUTO: 40.3 FL (ref 37–54)
EGFRCR SERPLBLD CKD-EPI 2021: 93 ML/MIN/1.73
EOSINOPHIL # BLD AUTO: 0.12 10*3/MM3 (ref 0–0.4)
EOSINOPHIL NFR BLD AUTO: 1.2 % (ref 0.3–6.2)
ERYTHROCYTE [DISTWIDTH] IN BLOOD BY AUTOMATED COUNT: 12.4 % (ref 12.3–15.4)
GLOBULIN UR ELPH-MCNC: 3.4 GM/DL
GLUCOSE SERPL-MCNC: 261 MG/DL (ref 65–99)
HCT VFR BLD AUTO: 43 % (ref 34–46.6)
HGB BLD-MCNC: 14.2 G/DL (ref 12–15.9)
IMM GRANULOCYTES # BLD AUTO: 0.04 10*3/MM3 (ref 0–0.05)
IMM GRANULOCYTES NFR BLD AUTO: 0.4 % (ref 0–0.5)
LYMPHOCYTES # BLD AUTO: 2.62 10*3/MM3 (ref 0.7–3.1)
LYMPHOCYTES NFR BLD AUTO: 27 % (ref 19.6–45.3)
MCH RBC QN AUTO: 28.9 PG (ref 26.6–33)
MCHC RBC AUTO-ENTMCNC: 33 G/DL (ref 31.5–35.7)
MCV RBC AUTO: 87.6 FL (ref 79–97)
MONOCYTES # BLD AUTO: 0.61 10*3/MM3 (ref 0.1–0.9)
MONOCYTES NFR BLD AUTO: 6.3 % (ref 5–12)
NEUTROPHILS NFR BLD AUTO: 6.29 10*3/MM3 (ref 1.7–7)
NEUTROPHILS NFR BLD AUTO: 64.9 % (ref 42.7–76)
PLATELET # BLD AUTO: 276 10*3/MM3 (ref 140–450)
PMV BLD AUTO: 9.7 FL (ref 6–12)
POTASSIUM SERPL-SCNC: 4.8 MMOL/L (ref 3.5–5.2)
PROT SERPL-MCNC: 7.1 G/DL (ref 6–8.5)
RBC # BLD AUTO: 4.91 10*6/MM3 (ref 3.77–5.28)
SODIUM SERPL-SCNC: 137 MMOL/L (ref 136–145)
WBC NRBC COR # BLD AUTO: 9.7 10*3/MM3 (ref 3.4–10.8)

## 2025-01-16 PROCEDURE — 25010000002 LEUPROLIDE 22.5 MG KIT: Performed by: NURSE PRACTITIONER

## 2025-01-16 PROCEDURE — 80053 COMPREHEN METABOLIC PANEL: CPT | Performed by: NURSE PRACTITIONER

## 2025-01-16 PROCEDURE — 96402 CHEMO HORMON ANTINEOPL SQ/IM: CPT

## 2025-01-16 PROCEDURE — 36415 COLL VENOUS BLD VENIPUNCTURE: CPT

## 2025-01-16 PROCEDURE — 96372 THER/PROPH/DIAG INJ SC/IM: CPT

## 2025-01-16 PROCEDURE — 85025 COMPLETE CBC W/AUTO DIFF WBC: CPT | Performed by: NURSE PRACTITIONER

## 2025-01-16 RX ORDER — TIRZEPATIDE 10 MG/.5ML
INJECTION, SOLUTION SUBCUTANEOUS WEEKLY
COMMUNITY

## 2025-01-16 RX ADMIN — LEUPROLIDE ACETATE 22.5 MG: 22.5 INJECTION, SUSPENSION, EXTENDED RELEASE SUBCUTANEOUS at 14:48

## 2025-01-30 ENCOUNTER — HOSPITAL ENCOUNTER (OUTPATIENT)
Dept: MAMMOGRAPHY | Facility: HOSPITAL | Age: 50
Discharge: HOME | End: 2025-01-30
Admitting: NURSE PRACTITIONER
Payer: MEDICAID

## 2025-01-30 ENCOUNTER — APPOINTMENT (OUTPATIENT)
Dept: BONE DENSITY | Facility: HOSPITAL | Age: 50
End: 2025-01-30
Payer: MEDICAID

## 2025-01-30 DIAGNOSIS — Z17.0 MALIGNANT NEOPLASM OF OVERLAPPING SITES OF RIGHT BREAST IN FEMALE, ESTROGEN RECEPTOR POSITIVE: ICD-10-CM

## 2025-01-30 DIAGNOSIS — C50.811 MALIGNANT NEOPLASM OF OVERLAPPING SITES OF RIGHT BREAST IN FEMALE, ESTROGEN RECEPTOR POSITIVE: ICD-10-CM

## 2025-01-30 DIAGNOSIS — Z12.31 ENCOUNTER FOR SCREENING MAMMOGRAM FOR MALIGNANT NEOPLASM OF BREAST: ICD-10-CM

## 2025-01-30 PROCEDURE — 77063 BREAST TOMOSYNTHESIS BI: CPT

## 2025-01-30 PROCEDURE — 77067 SCR MAMMO BI INCL CAD: CPT

## 2025-02-04 ENCOUNTER — HOSPITAL ENCOUNTER (OUTPATIENT)
Dept: BONE DENSITY | Facility: HOSPITAL | Age: 50
Discharge: HOME | End: 2025-02-04
Admitting: NURSE PRACTITIONER
Payer: MEDICAID

## 2025-02-04 DIAGNOSIS — C50.811 MALIGNANT NEOPLASM OF OVERLAPPING SITES OF RIGHT BREAST IN FEMALE, ESTROGEN RECEPTOR POSITIVE: ICD-10-CM

## 2025-02-04 DIAGNOSIS — Z17.0 MALIGNANT NEOPLASM OF OVERLAPPING SITES OF RIGHT BREAST IN FEMALE, ESTROGEN RECEPTOR POSITIVE: ICD-10-CM

## 2025-02-04 DIAGNOSIS — Z79.811 LONG TERM CURRENT USE OF AROMATASE INHIBITOR: ICD-10-CM

## 2025-02-04 PROCEDURE — 77080 DXA BONE DENSITY AXIAL: CPT

## 2025-02-07 ENCOUNTER — TELEPHONE (OUTPATIENT)
Dept: ONCOLOGY | Facility: CLINIC | Age: 50
End: 2025-02-07
Payer: MEDICAID

## 2025-02-07 NOTE — TELEPHONE ENCOUNTER
Called patient to review bone density results from 2/4/2025.  Bone density shows mild osteopenia.  Patient is already taking vitamin D3 daily.  Reinforced the importance of dietary calcium and weightbearing exercise such as walking 30 minutes 5 days a week.  Will plan to repeat bone density scan in 2 years.

## 2025-02-25 ENCOUNTER — OFFICE VISIT (OUTPATIENT)
Dept: CARDIOLOGY | Facility: CLINIC | Age: 50
End: 2025-02-25
Payer: MEDICAID

## 2025-02-25 VITALS
OXYGEN SATURATION: 96 % | SYSTOLIC BLOOD PRESSURE: 100 MMHG | WEIGHT: 220 LBS | HEART RATE: 66 BPM | HEIGHT: 65 IN | BODY MASS INDEX: 36.65 KG/M2 | DIASTOLIC BLOOD PRESSURE: 78 MMHG

## 2025-02-25 DIAGNOSIS — E78.2 MIXED HYPERLIPIDEMIA: ICD-10-CM

## 2025-02-25 DIAGNOSIS — I10 ESSENTIAL HYPERTENSION: Primary | ICD-10-CM

## 2025-02-25 PROCEDURE — 99214 OFFICE O/P EST MOD 30 MIN: CPT | Performed by: INTERNAL MEDICINE

## 2025-02-25 PROCEDURE — 3074F SYST BP LT 130 MM HG: CPT | Performed by: INTERNAL MEDICINE

## 2025-02-25 PROCEDURE — 3078F DIAST BP <80 MM HG: CPT | Performed by: INTERNAL MEDICINE

## 2025-02-25 NOTE — PROGRESS NOTES
Follow-up Visit      Date: 2025  Patient Name: Josefina Rodriguez  : 1975   MRN: [id number]     Chief Complaint:    Chief Complaint   Patient presents with    Essential hypertension       History of Present Illness: Josefina Rodriguez is a 49 y.o. female who is here today for follow-up on her coronary artery disease.    Patient her medications have been adjusted lately and she has been feeling dizzy and blood pressure has been running low.  She denies any chest pain.  Patient denies any lower extremity edema.  She denies any passing out.    She denies any weight loss or any weight gain.  She denies any bleeding.  She denies any symptoms of claudication.      Problem List     CARDIAC  Coronary Artery Disease:   Stress test, 2023: Normal     Myocardium:   Echo, 2022: EF 60%, mild LVH     Valvular:   No valvular disease     Electrical:   Normal sinus rhythm     Pericardium:   Normal     CARDIAC RISK FACTORS  Diabetes  Dyslipidemia  Hypertension  Obesity  Sleep apnea    NON-CARDIAC  Bilateral pulmonary embolism  Ovarian cyst  Breast cancer s/p radiation therapy  Depression  Heart hernia  Polycystic ovarian syndrome    SURGERIES  Mastectomy  Cholecystectomy  Abdominoplasty  Breast reconstruction      Subjective      Review of Systems:   Review of Systems   Neurological:  Positive for dizziness.       Medications:     Current Outpatient Medications:     anastrozole (ARIMIDEX) 1 MG tablet, TAKE 1 TABLET BY MOUTH DAILY, Disp: 90 tablet, Rfl: 3    Continuous Blood Gluc Sensor (Dexcom G6 Sensor), , Disp: , Rfl:     Continuous Blood Gluc Transmit (Dexcom G6 Transmitter) misc, Inject 1 Device under the skin into the appropriate area as directed Take As Directed., Disp: , Rfl:     dilTIAZem CD (CARDIZEM CD) 120 MG 24 hr capsule, TAKE 1 CAPSULE BY MOUTH DAILY, Disp: 90 capsule, Rfl: 3    DULoxetine (CYMBALTA) 60 MG capsule, TAKE 1 CAPSULE BY MOUTH DAILY, Disp: 90 capsule, Rfl:  3    FLUoxetine (PROzac) 20 MG capsule, Take 1 capsule by mouth Daily., Disp: , Rfl:     fluticasone (FLONASE) 50 MCG/ACT nasal spray, Administer 1 spray into the nostril(s) as directed by provider Every 12 (Twelve) Hours., Disp: , Rfl:     ibuprofen (ADVIL,MOTRIN) 600 MG tablet, Take 1 tablet by mouth Every 6 (Six) Hours As Needed., Disp: , Rfl:     Insulin Lispro, 1 Unit Dial, (HUMALOG) 100 UNIT/ML solution pen-injector, , Disp: , Rfl:     [employer] Pen Needles 32G X 4 MM misc, , Disp: , Rfl:     LANTUS SOLOSTAR 100 UNIT/ML injection pen, Inject 26 Units under the skin into the appropriate area as directed. In AM, Disp: , Rfl:     linaclotide (LINZESS) 290 MCG capsule capsule, Take 1 capsule by mouth Every Morning Before Breakfast., Disp: , Rfl:     lisinopril (PRINIVIL,ZESTRIL) 40 MG tablet, Take 1 tablet by mouth Daily., Disp: , Rfl:     loratadine (CLARITIN) 10 MG tablet, Take 1 tablet by mouth Daily., Disp: , Rfl:     LORazepam (ATIVAN) 1 MG tablet, Take 1 tablet by mouth As Needed for Anxiety. (Patient taking differently: Take 1 tablet by mouth 2 (Two) Times a Day.), Disp: , Rfl:     montelukast (SINGULAIR) 10 MG tablet, Take 1 tablet by mouth Every Night., Disp: , Rfl:     Multiple Vitamins-Minerals (MULTIVITAMIN GUMMIES WOMENS PO), Take 2 capsules by mouth Daily., Disp: , Rfl:     ondansetron (ZOFRAN) 8 MG tablet, TAKE ONE TABLET BY MOUTH THREE TIMES A DAY AS NEEDED FOR NAUSEA AND VOMITING, Disp: 30 tablet, Rfl: 3    OneTouch Ultra test strip, , Disp: , Rfl:     pantoprazole (PROTONIX) 40 MG EC tablet, Take 1 tablet by mouth Daily., Disp: , Rfl:     pregabalin (LYRICA) 150 MG capsule, TAKE 1 CAPSULE BY MOUTH TWICE A DAY (Patient taking differently: Take 2 capsules by mouth 2 (Two) Times a Day.), Disp: 60 capsule, Rfl: 5    prochlorperazine (COMPAZINE) 10 MG tablet, TAKE ONE TABLET BY MOUTH EVERY 6 HOURS AS NEEDED FOR NAUSEA AND VOMITING, Disp: 60 tablet, Rfl: 1    Tirzepatide (Mounjaro) 10 MG/0.5ML solution  "auto-injector, Inject  under the skin into the appropriate area as directed 1 (One) Time Per Week., Disp: , Rfl:     vitamin D3 125 MCG (5000 UT) capsule capsule, Take  by mouth Daily., Disp: , Rfl:     Diclofenac Sodium (VOLTAREN) 1 % gel gel, Apply  topically to the appropriate area as directed 3 (Three) Times a Day As Needed (pain)., Disp: 1 g, Rfl: 0    Allergies:   Allergies   Allergen Reactions    Cashew Nut Hives and Itching    Cashew Nut (Anacardium Occidentale) Skin Test Hives and Itching    Naproxen Hives     Naprosyn    Penicillins Hives and Other (See Comments)     PT STATES CAN TOLERATE KELFEX    Product containing penicillin (product)    Pistachio Nut Hives and Itching    Pistachio Nut (Diagnostic) Hives and Itching       Objective     Physical Exam:  Vitals:    02/25/25 1501   BP: 100/78   BP Location: Left arm   Patient Position: Sitting   Cuff Size: Adult   Pulse: 66   SpO2: 96%   Weight: 99.8 kg (220 lb)   Height: 165.1 cm (65\")     Body mass index is 36.61 kg/m².    Constitutional:       General: Not in acute distress.     Appearance: Healthy appearance. Not in distress.     Neck:     JVP:Not elevated     Carotid artery: Normal    Pulmonary:      Effort: Pulmonary effort is normal.      Breath sounds: Normal breath sounds. No wheezing. No rhonchi. No rales.     Cardiovascular:      Normal rate. Regular rhythm. Normal S1. Normal S2.      Murmurs: There is no significant murmur.      No gallop. No click. No rub.     Abdominal:      General: Bowel sounds are normal.      Palpations: Abdomen is soft.      Tenderness: There is no abdominal tenderness.    Extremities:     Pulses:Normal radial and pedal pulses     Edema:no edema    Smoking Cessation:   Tobacco Product History : Patient quit smoking in 2021 after 20 pack years     Lab Review:   Lab Results   Component Value Date    GLUCOSE 261 (H) 01/16/2025    BUN 18 01/16/2025    CREATININE 0.79 01/16/2025    EGFRIFNONA 105 12/02/2021    EGFRIFAFRI 108 " 02/29/2024    BCR 22.8 01/16/2025    K 4.8 01/16/2025    CO2 23.0 01/16/2025    CALCIUM 9.6 01/16/2025    ALBUMIN 3.7 01/16/2025    AST 22 01/16/2025    ALT 25 01/16/2025     Lab Results   Component Value Date    WBC 9.70 01/16/2025    HGB 14.2 01/16/2025    HCT 43.0 01/16/2025    MCV 87.6 01/16/2025     01/16/2025             Assessment / Plan      Assessment:   Diagnosis Plan   1. Essential hypertension        2. Mixed hyperlipidemia             Plan:  Patient blood pressure has been running low.  I believe it is related to her other medication from her psychiatrist.  I have made changes to her  blood pressure medication and see how she does.  I have cut down her diltiazem 120 mg daily and advised her to keep checking her blood pressure.  I have advised her that if her symptoms does not resolve she can cut down her lisinopril to 20 mg daily.      Follow Up:       Return in about 1 year (around 2/25/2026).    Addis Lu MD

## 2025-02-27 ENCOUNTER — OFFICE VISIT (OUTPATIENT)
Dept: ONCOLOGY | Facility: CLINIC | Age: 50
End: 2025-02-27
Payer: MEDICAID

## 2025-02-27 VITALS
TEMPERATURE: 97.3 F | HEART RATE: 83 BPM | SYSTOLIC BLOOD PRESSURE: 123 MMHG | WEIGHT: 224.9 LBS | DIASTOLIC BLOOD PRESSURE: 87 MMHG | BODY MASS INDEX: 37.47 KG/M2 | RESPIRATION RATE: 18 BRPM | OXYGEN SATURATION: 97 % | HEIGHT: 65 IN

## 2025-02-27 DIAGNOSIS — Z17.0 MALIGNANT NEOPLASM OF OVERLAPPING SITES OF RIGHT BREAST IN FEMALE, ESTROGEN RECEPTOR POSITIVE: Primary | ICD-10-CM

## 2025-02-27 DIAGNOSIS — C50.811 MALIGNANT NEOPLASM OF OVERLAPPING SITES OF RIGHT BREAST IN FEMALE, ESTROGEN RECEPTOR POSITIVE: Primary | ICD-10-CM

## 2025-02-27 NOTE — PROGRESS NOTES
PROBLEM LIST:  1. lL2L1W3 ER+ OH+ Her2+ invasive ductal carcinoma of the right breast  A) presented with a mass on self-exam.  Mammogram 1/7/20 showed a 4.1 cm mass in the right breast, with 2 adjacent smaller masses.  1.9 cm right axillary lymph node.   FNA of lymph node negative.  Biopsy of right breast mass showed grade 2 IDC, Er 95%, OH 2%, Her2 3+.  B) neoadjuvant chemotherapy with TCHP started 2/5/2020  C) right mastectomy on 6/23/20.  Pathology showed a 2 x 1 x 0.6 cm intermediate grade IDC.  0/2 SLN involved.  [id number]  D) adjuvant Kadcyla started 7/22/2020.   Kadcyla stopped October 14, 2020 and switched back to Herceptin and Perjeta due to progressive severe neuropathy.  E) anastrozole with Lupron started 08/12/2020.   2. PCOS  3. Anxiety/depression  4. DM2  5. GERD  6. Hyperlipidemia  7. Hypertension  8.  Hidradenitis  9.  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  CT chest 1/19/2021 showed a pathologic nondisplaced fracture of the right seventh rib.  There were also subtle small nodules in the lungs, none larger than 4 mm.  PET/CT on 1/28/2021 showed SUV of 4.8 in the right rib fracture.  Pulmonary micronodules too small to register hypermetabolic activity.  Biopsy of the rib lesion 2/24/2021 showed Langerhans' cell histiocytosis.  Smoking cessation strongly recommended and she did stop smoking in February 2021.  11. Bilateral pulmonary emboli 11/16/2022 3 days following breast reconstruction surgery.  Currently on Eliquis 5 mg twice daily.    Subjective     CHIEF COMPLAINT: breast cancer    HISTORY OF PRESENT ILLNESS:   Josefina Rodriguez returns for follow-up.  She continues on anastrozole and Lupron injections.       She still has some right sided rib pain which is unchanged.    She is working with a psychiatrist and they are changing a lot of her medications.  She is tapering off Cymbalta.  She  "has noticed that her neuropathy symptoms are worse.    She mentions that she never followed up with Dr. Cortes regarding her pelvic ultrasound findings.      Objective      /87   Pulse 83   Temp 97.3 °F (36.3 °C) (Temporal)   Resp 18   Ht 165.1 cm (65\")   Wt 102 kg (224 lb 14.4 oz)   SpO2 97%   BMI 37.43 kg/m²    Vitals:    02/27/25 1040   PainSc: 4    PainLoc: Comment: feet                 Performance Status: 0    General: well appearing female in no acute distress  Neuro: alert and oriented  HEENT: sclera anicteric  Pulmonary: Lungs are clear to auscultation bilaterally  Cardiovascular: Regular rate and rhythm no murmurs  Abdomen: Large ventral hernia  Extremeties: no lower extremity edema  Skin: no rashes, lesions, bruising, or petechiae  Psych: mood and affect appropriate      RECENT LABS:  Lab Results   Component Value Date    WBC 9.70 01/16/2025    HGB 14.2 01/16/2025    HCT 43.0 01/16/2025    MCV 87.6 01/16/2025     01/16/2025     Lab Results   Component Value Date    GLUCOSE 261 (H) 01/16/2025    BUN 18 01/16/2025    CREATININE 0.79 01/16/2025    EGFRIFNONA 105 12/02/2021    EGFRIFAFRI 108 02/29/2024    BCR 22.8 01/16/2025    K 4.8 01/16/2025    CO2 23.0 01/16/2025    CALCIUM 9.6 01/16/2025    ALBUMIN 3.7 01/16/2025    AST 22 01/16/2025    ALT 25 01/16/2025              Assessment & Plan   Josefina Rodriguez is a 49 y.o. year old female with stage IB ER+ Her2+ IDC of the right breast, as well as a diagnosis of Langerhans histiocytosis involving the lung as well as a single rib lesion.    Breast cancer: She continues on anastrozole along with Lupron for ovarian suppression.  In August she will have been on endocrine therapy for 5 years.  We discussed the option of extended endocrine therapy.  She is going to think about this.  She is interested in trying to get off the Lupron injections.  We will hold her next injection and plan to check her estrogen and FSH levels in mid " April to see if they are consistent with a menopausal state.    Langerhans histiocytosis: Imaging changes resolved with cessation of smoking.  She still has some rib pain which is stable and did not have any abnormalities associated on imaging.  Continue to monitor, repeat imaging if symptoms worsen.    Peripheral neuropathy: Her worsening symptoms may be related to coming off of the Cymbalta.  Continue Lyrica at the current dose.  This is currently maximum dose.    DEXA scan 2/4/2025 shows osteopenia.  Continue to monitor.    Left screening mammogram 1/30/2025 was category 1.  Repeat in 1 year.    Pulmonary embolism following surgery: She is no longer on aspirin.    Ovarian cysts: Overdue for follow-up ultrasound which was recommended in February 2021.  I will order an ultrasound today.    Follow-up in 6 months.        I spent 48 minutes caring for Josefina on this date of service. This time includes time spent by me in the following activities: preparing for the visit, performing a medically appropriate examination and/or evaluation, counseling and educating the patient/family/caregiver, referring and communicating with other health care professionals, documenting information in the medical record, care coordination, ordering test(s), and reviewing a separately obtained history        Jolene Connell MD  [facility]    2/27/2025          CC:

## 2025-03-25 ENCOUNTER — HOSPITAL ENCOUNTER (OUTPATIENT)
Dept: ULTRASOUND IMAGING | Facility: HOSPITAL | Age: 50
Discharge: HOME | End: 2025-03-25
Admitting: INTERNAL MEDICINE
Payer: MEDICAID

## 2025-03-25 DIAGNOSIS — Z17.0 MALIGNANT NEOPLASM OF OVERLAPPING SITES OF RIGHT BREAST IN FEMALE, ESTROGEN RECEPTOR POSITIVE: ICD-10-CM

## 2025-03-25 DIAGNOSIS — C50.811 MALIGNANT NEOPLASM OF OVERLAPPING SITES OF RIGHT BREAST IN FEMALE, ESTROGEN RECEPTOR POSITIVE: ICD-10-CM

## 2025-03-25 PROCEDURE — 76830 TRANSVAGINAL US NON-OB: CPT

## 2025-03-31 ENCOUNTER — TELEPHONE (OUTPATIENT)
Dept: ONCOLOGY | Facility: CLINIC | Age: 50
End: 2025-03-31
Payer: MEDICAID

## 2025-03-31 NOTE — TELEPHONE ENCOUNTER
Called patient per  to tell her that the US shows probable fibroids as well as ovarian cysts.  Dr. Connell recommends that she schedule f/u with GYN for monitoring/evaluation for this, but nothing that suggests malignancy. Patient verbalized understanding.

## 2025-04-01 ENCOUNTER — HOSPITAL ENCOUNTER (EMERGENCY)
Facility: HOSPITAL | Age: 50
Discharge: HOME | End: 2025-04-02
Attending: EMERGENCY MEDICINE
Payer: MEDICAID

## 2025-04-01 ENCOUNTER — APPOINTMENT (OUTPATIENT)
Dept: GENERAL RADIOLOGY | Facility: HOSPITAL | Age: 50
End: 2025-04-01
Payer: MEDICAID

## 2025-04-01 DIAGNOSIS — R42 DIZZINESS: ICD-10-CM

## 2025-04-01 DIAGNOSIS — Z87.898 HISTORY OF SYNCOPE: ICD-10-CM

## 2025-04-01 DIAGNOSIS — R07.9 CHEST PAIN, UNSPECIFIED TYPE: Primary | ICD-10-CM

## 2025-04-01 LAB
ALBUMIN SERPL-MCNC: 3.8 G/DL (ref 3.5–5.2)
ALBUMIN/GLOB SERPL: 1.2 G/DL
ALP SERPL-CCNC: 179 U/L (ref 39–117)
ALT SERPL W P-5'-P-CCNC: 25 U/L (ref 1–33)
ANION GAP SERPL CALCULATED.3IONS-SCNC: 13 MMOL/L (ref 5–15)
AST SERPL-CCNC: 24 U/L (ref 1–32)
BASOPHILS # BLD AUTO: 0.04 10*3/MM3 (ref 0–0.2)
BASOPHILS NFR BLD AUTO: 0.3 % (ref 0–1.5)
BILIRUB SERPL-MCNC: 0.4 MG/DL (ref 0–1.2)
BUN SERPL-MCNC: 10 MG/DL (ref 6–20)
BUN/CREAT SERPL: 14.9 (ref 7–25)
CALCIUM SPEC-SCNC: 9.3 MG/DL (ref 8.6–10.5)
CHLORIDE SERPL-SCNC: 100 MMOL/L (ref 98–107)
CO2 SERPL-SCNC: 27 MMOL/L (ref 22–29)
CREAT SERPL-MCNC: 0.67 MG/DL (ref 0.57–1)
DEPRECATED RDW RBC AUTO: 38.7 FL (ref 37–54)
EGFRCR SERPLBLD CKD-EPI 2021: 108 ML/MIN/1.73
EOSINOPHIL # BLD AUTO: 0.05 10*3/MM3 (ref 0–0.4)
EOSINOPHIL NFR BLD AUTO: 0.3 % (ref 0.3–6.2)
ERYTHROCYTE [DISTWIDTH] IN BLOOD BY AUTOMATED COUNT: 12.6 % (ref 12.3–15.4)
GEN 5 1HR TROPONIN T REFLEX: <6 NG/L
GLOBULIN UR ELPH-MCNC: 3.3 GM/DL
GLUCOSE SERPL-MCNC: 146 MG/DL (ref 65–99)
HCT VFR BLD AUTO: 39.3 % (ref 34–46.6)
HGB BLD-MCNC: 13.2 G/DL (ref 12–15.9)
HOLD SPECIMEN: NORMAL
IMM GRANULOCYTES # BLD AUTO: 0.08 10*3/MM3 (ref 0–0.05)
IMM GRANULOCYTES NFR BLD AUTO: 0.5 % (ref 0–0.5)
LIPASE SERPL-CCNC: 40 U/L (ref 13–60)
LYMPHOCYTES # BLD AUTO: 2.38 10*3/MM3 (ref 0.7–3.1)
LYMPHOCYTES NFR BLD AUTO: 15.1 % (ref 19.6–45.3)
MCH RBC QN AUTO: 28.7 PG (ref 26.6–33)
MCHC RBC AUTO-ENTMCNC: 33.6 G/DL (ref 31.5–35.7)
MCV RBC AUTO: 85.4 FL (ref 79–97)
MONOCYTES # BLD AUTO: 0.93 10*3/MM3 (ref 0.1–0.9)
MONOCYTES NFR BLD AUTO: 5.9 % (ref 5–12)
NEUTROPHILS NFR BLD AUTO: 12.33 10*3/MM3 (ref 1.7–7)
NEUTROPHILS NFR BLD AUTO: 77.9 % (ref 42.7–76)
NRBC BLD AUTO-RTO: 0 /100 WBC (ref 0–0.2)
NT-PROBNP SERPL-MCNC: 60 PG/ML (ref 0–450)
PLATELET # BLD AUTO: 253 10*3/MM3 (ref 140–450)
PMV BLD AUTO: 9.9 FL (ref 6–12)
POTASSIUM SERPL-SCNC: 4.1 MMOL/L (ref 3.5–5.2)
PROT SERPL-MCNC: 7.1 G/DL (ref 6–8.5)
RBC # BLD AUTO: 4.6 10*6/MM3 (ref 3.77–5.28)
SODIUM SERPL-SCNC: 140 MMOL/L (ref 136–145)
TROPONIN T NUMERIC DELTA: NORMAL
TROPONIN T SERPL HS-MCNC: <6 NG/L
WBC NRBC COR # BLD AUTO: 15.81 10*3/MM3 (ref 3.4–10.8)
WHOLE BLOOD HOLD COAG: NORMAL
WHOLE BLOOD HOLD SPECIMEN: NORMAL

## 2025-04-01 PROCEDURE — 84484 ASSAY OF TROPONIN QUANT: CPT | Performed by: EMERGENCY MEDICINE

## 2025-04-01 PROCEDURE — 85025 COMPLETE CBC W/AUTO DIFF WBC: CPT | Performed by: EMERGENCY MEDICINE

## 2025-04-01 PROCEDURE — 25010000002 DIAZEPAM PER 5 MG: Performed by: EMERGENCY MEDICINE

## 2025-04-01 PROCEDURE — 36415 COLL VENOUS BLD VENIPUNCTURE: CPT

## 2025-04-01 PROCEDURE — 96374 THER/PROPH/DIAG INJ IV PUSH: CPT

## 2025-04-01 PROCEDURE — 71045 X-RAY EXAM CHEST 1 VIEW: CPT

## 2025-04-01 PROCEDURE — 25810000003 SODIUM CHLORIDE 0.9 % SOLUTION: Performed by: EMERGENCY MEDICINE

## 2025-04-01 PROCEDURE — 83690 ASSAY OF LIPASE: CPT | Performed by: EMERGENCY MEDICINE

## 2025-04-01 PROCEDURE — 93005 ELECTROCARDIOGRAM TRACING: CPT | Performed by: EMERGENCY MEDICINE

## 2025-04-01 PROCEDURE — 99284 EMERGENCY DEPT VISIT MOD MDM: CPT

## 2025-04-01 PROCEDURE — 83880 ASSAY OF NATRIURETIC PEPTIDE: CPT | Performed by: EMERGENCY MEDICINE

## 2025-04-01 PROCEDURE — 80053 COMPREHEN METABOLIC PANEL: CPT | Performed by: EMERGENCY MEDICINE

## 2025-04-01 RX ORDER — DIAZEPAM 10 MG/2ML
5 INJECTION, SOLUTION INTRAMUSCULAR; INTRAVENOUS ONCE
Status: COMPLETED | OUTPATIENT
Start: 2025-04-01 | End: 2025-04-01

## 2025-04-01 RX ORDER — GUAIFENESIN 600 MG/1
1200 TABLET, EXTENDED RELEASE ORAL 2 TIMES DAILY
COMMUNITY

## 2025-04-01 RX ORDER — ACETAMINOPHEN 325 MG/1
650 TABLET ORAL ONCE
Status: COMPLETED | OUTPATIENT
Start: 2025-04-01 | End: 2025-04-01

## 2025-04-01 RX ORDER — SODIUM CHLORIDE 0.9 % (FLUSH) 0.9 %
10 SYRINGE (ML) INJECTION AS NEEDED
Status: DISCONTINUED | OUTPATIENT
Start: 2025-04-01 | End: 2025-04-02 | Stop reason: HOSPADM

## 2025-04-01 RX ADMIN — SODIUM CHLORIDE 1000 ML: 9 INJECTION, SOLUTION INTRAVENOUS at 23:44

## 2025-04-01 RX ADMIN — ACETAMINOPHEN 650 MG: 325 TABLET, FILM COATED ORAL at 23:44

## 2025-04-01 RX ADMIN — DIAZEPAM 5 MG: 10 INJECTION, SOLUTION INTRAMUSCULAR; INTRAVENOUS at 23:44

## 2025-04-02 ENCOUNTER — TELEPHONE (OUTPATIENT)
Dept: CARDIOLOGY | Facility: HOSPITAL | Age: 50
End: 2025-04-02

## 2025-04-02 VITALS
RESPIRATION RATE: 16 BRPM | WEIGHT: 220 LBS | DIASTOLIC BLOOD PRESSURE: 82 MMHG | HEART RATE: 86 BPM | SYSTOLIC BLOOD PRESSURE: 133 MMHG | OXYGEN SATURATION: 92 % | BODY MASS INDEX: 36.65 KG/M2 | TEMPERATURE: 99.7 F | HEIGHT: 65 IN

## 2025-04-02 LAB
QT INTERVAL: 370 MS
QT INTERVAL: 378 MS
QTC INTERVAL: 457 MS
QTC INTERVAL: 464 MS

## 2025-04-02 PROCEDURE — 25010000002 KETOROLAC TROMETHAMINE PER 15 MG: Performed by: EMERGENCY MEDICINE

## 2025-04-02 PROCEDURE — 96375 TX/PRO/DX INJ NEW DRUG ADDON: CPT

## 2025-04-02 PROCEDURE — 25010000002 METOCLOPRAMIDE PER 10 MG: Performed by: EMERGENCY MEDICINE

## 2025-04-02 PROCEDURE — 25010000002 DIPHENHYDRAMINE PER 50 MG: Performed by: EMERGENCY MEDICINE

## 2025-04-02 RX ORDER — KETOROLAC TROMETHAMINE 15 MG/ML
15 INJECTION, SOLUTION INTRAMUSCULAR; INTRAVENOUS ONCE
Status: COMPLETED | OUTPATIENT
Start: 2025-04-02 | End: 2025-04-02

## 2025-04-02 RX ORDER — DIPHENHYDRAMINE HYDROCHLORIDE 50 MG/ML
25 INJECTION, SOLUTION INTRAMUSCULAR; INTRAVENOUS ONCE
Status: COMPLETED | OUTPATIENT
Start: 2025-04-02 | End: 2025-04-02

## 2025-04-02 RX ORDER — METOCLOPRAMIDE HYDROCHLORIDE 5 MG/ML
10 INJECTION INTRAMUSCULAR; INTRAVENOUS ONCE
Status: COMPLETED | OUTPATIENT
Start: 2025-04-02 | End: 2025-04-02

## 2025-04-02 RX ADMIN — METOCLOPRAMIDE 10 MG: 5 INJECTION, SOLUTION INTRAMUSCULAR; INTRAVENOUS at 02:09

## 2025-04-02 RX ADMIN — KETOROLAC TROMETHAMINE 15 MG: 15 INJECTION, SOLUTION INTRAMUSCULAR; INTRAVENOUS at 02:09

## 2025-04-02 RX ADMIN — DIPHENHYDRAMINE HYDROCHLORIDE 25 MG: 50 INJECTION INTRAMUSCULAR; INTRAVENOUS at 02:09

## 2025-04-02 NOTE — TELEPHONE ENCOUNTER
Caller: Josefina Rodriguez    Relationship to patient: Self    Best call back number:[phone]         Type of visit: NEW PT APPT    Requested date: WEEK OF 4-14-25     If rescheduling, when is the original appointment: 04-02-25     Additional notes:PLEASE CONTACT PATIENT WITH  A SUITABLE DATE.

## 2025-04-02 NOTE — ED PROVIDER NOTES
Subjective   History of Present Illness  Patient is a pleasant 50-year-old female presents to the emergency department with chest pain and dizziness.  She has a long history of dizziness, near-syncope, and syncope.  She is followed by Dr. Rose, cardiology.  Also has a history of diabetes, hypertension, breast cancer s/p mastectomy approximately 3 years ago.  Presents tonight due to chest pain.  She states that for the past several months has been having frequent episodes of dizziness, having approximately 2 episodes of syncope per month.  This is unchanged.  She is followed by cardiology.  States that she is monitoring and treating this but again this part is unchanged.  Approximately 430 this evening she felt dizzy.  She says it was more of a movement sensation as opposed to lightheadedness.  This lasted approximately 2 hours and approximately 2 hours ago developed left-sided chest pain radiating to her left shoulder.  Fortunately, the chest pain has not resolved but the dizziness persist.  Denies vomiting, diaphoresis, breathing difficulty, or other acute complaints.        Review of Systems   All other systems reviewed and are negative.      Past Medical History:   Diagnosis Date    Anxiety     BRCA1 negative     BRCA2 negative     Depression     Diabetes mellitus     Drug therapy     still taking orally    Hiatal hernia     History of radiation therapy 04/19/2021    right 7th rib    Hx of radiation therapy 2021    right breast    Hypertension     Malignant neoplasm of overlapping sites of right breast in female, estrogen receptor positive 01/21/2020    PCOS (polycystic ovarian syndrome)     Wears glasses        Allergies   Allergen Reactions    Cashew Nut Hives and Itching    Cashew Nut (Anacardium Occidentale) Skin Test Hives and Itching    Naproxen Hives     Naprosyn    Penicillins Hives and Other (See Comments)     PT STATES CAN TOLERATE KELFEX    Product containing penicillin (product)    Pistachio Nut  Hives and Itching    Pistachio Nut (Diagnostic) Hives and Itching       Past Surgical History:   Procedure Laterality Date    ABDOMINOPLASTY      BONE BIOPSY      BREAST BIOPSY Right 03/29/2019    right axillary LN FNA    BREAST BIOPSY Right 01/15/2020    US bx    BREAST BIOPSY Right 01/15/2020    rigth axillary LN FNA    BREAST RECONSTRUCTION      CHOLECYSTECTOMY  03/2010    COLONOSCOPY  2016    MASTECTOMY Right 06/23/2020    [facility]  AJ    MASTECTOMY W/ SENTINEL NODE BIOPSY Right 06/23/2020    Procedure: MASTECTOMY WITH SENTINEL NODE BIOPSY RIGHT, PORT PLACEMENT;  Surgeon: Rachel Baker MD;  Location: [facility];  Service: General;  Laterality: Right;    US GUIDED FINE NEEDLE ASPIRATION  01/15/2020    VENOUS ACCESS DEVICE (PORT) INSERTION Left 01/31/2020       Family History   Problem Relation Age of Onset    Breast cancer Mother 64        recurrance at age 76    Heart disease Mother     Diabetes Mother     Hypertension Father     Lung cancer Father     Diabetes Brother     Liver disease Brother     Heart disease Brother     Liver disease Brother     Cerebral aneurysm Sister     Diabetes Sister     Liver disease Sister     COPD Sister     COPD Sister     Ovarian cancer Sister     Heart attack Maternal Grandmother     Stroke Paternal Grandmother     Colon cancer Neg Hx     Colon polyps Neg Hx        Social History     Socioeconomic History    Marital status:    Tobacco Use    Smoking status: Former     Current packs/day: 1.00     Average packs/day: 1 pack/day for 20.0 years (20.0 ttl pk-yrs)     Types: Cigarettes    Smokeless tobacco: Never    Tobacco comments:     quit ~4/15/2021 (was smoking 1 PPD)   Vaping Use    Vaping status: Never Used   Substance and Sexual Activity    Alcohol use: Not Currently     Comment: occasionally a couple times a year    Drug use: Never    Sexual activity: Defer           Objective   Physical Exam  Vitals and nursing note reviewed.   Constitutional:       General: She is  not in acute distress.     Appearance: She is ill-appearing.   HENT:      Head: Normocephalic and atraumatic.   Eyes:      Conjunctiva/sclera: Conjunctivae normal.      Pupils: Pupils are equal, round, and reactive to light.   Neck:      Thyroid: No thyromegaly.   Cardiovascular:      Rate and Rhythm: Normal rate and regular rhythm.      Heart sounds: Normal heart sounds. No murmur heard.     No friction rub. No gallop.   Pulmonary:      Effort: Pulmonary effort is normal. No respiratory distress.      Breath sounds: Normal breath sounds.   Chest:      Chest wall: No tenderness.   Abdominal:      Palpations: Abdomen is soft.      Tenderness: There is no abdominal tenderness.   Musculoskeletal:         General: Normal range of motion.      Cervical back: Normal range of motion and neck supple.   Lymphadenopathy:      Cervical: No cervical adenopathy.   Skin:     General: Skin is warm and dry.   Neurological:      Mental Status: She is alert and oriented to person, place, and time.         Procedures           ED Course       Latest Reference Range & Units 04/01/25 21:34   HS Troponin T <14 ng/L <6   proBNP 0.0 - 450.0 pg/mL 60.0   Sodium 136 - 145 mmol/L 140   Potassium 3.5 - 5.2 mmol/L 4.1   Chloride 98 - 107 mmol/L 100   CO2 22.0 - 29.0 mmol/L 27.0   Anion Gap 5.0 - 15.0 mmol/L 13.0   BUN 6 - 20 mg/dL 10   Creatinine 0.57 - 1.00 mg/dL 0.67   BUN/Creatinine Ratio 7.0 - 25.0  14.9   eGFR >60.0 mL/min/1.73 108.0   Glucose 65 - 99 mg/dL 146 (H)   Calcium 8.6 - 10.5 mg/dL 9.3   Alkaline Phosphatase 39 - 117 U/L 179 (H)   Total Protein 6.0 - 8.5 g/dL 7.1   Albumin 3.5 - 5.2 g/dL 3.8   Globulin gm/dL 3.3   A/G Ratio g/dL 1.2   AST (SGOT) 1 - 32 U/L 24   ALT (SGPT) 1 - 33 U/L 25   Total Bilirubin 0.0 - 1.2 mg/dL 0.4   Lipase 13 - 60 U/L 40   WBC 3.40 - 10.80 10*3/mm3 15.81 (H)   RBC 3.77 - 5.28 10*6/mm3 4.60   Hemoglobin 12.0 - 15.9 g/dL 13.2   Hematocrit 34.0 - 46.6 % 39.3   Platelets 140 - 450 10*3/mm3 253   RDW 12.3 -  15.4 % 12.6   MCV 79.0 - 97.0 fL 85.4   MCH 26.6 - 33.0 pg 28.7   MCHC 31.5 - 35.7 g/dL 33.6   MPV 6.0 - 12.0 fL 9.9   RDW-SD 37.0 - 54.0 fl 38.7   Neutrophil Rel % 42.7 - 76.0 % 77.9 (H)   Lymphocyte Rel % 19.6 - 45.3 % 15.1 (L)   Monocyte Rel % 5.0 - 12.0 % 5.9   Eosinophil Rel % 0.3 - 6.2 % 0.3   Basophil Rel % 0.0 - 1.5 % 0.3   Immature Granulocyte Rel % 0.0 - 0.5 % 0.5   Neutrophils Absolute 1.70 - 7.00 10*3/mm3 12.33 (H)   Lymphocytes Absolute 0.70 - 3.10 10*3/mm3 2.38   Monocytes Absolute 0.10 - 0.90 10*3/mm3 0.93 (H)   Eosinophils Absolute 0.00 - 0.40 10*3/mm3 0.05   Basophils Absolute 0.00 - 0.20 10*3/mm3 0.04   Immature Grans, Absolute 0.00 - 0.05 10*3/mm3 0.08 (H)   nRBC 0.0 - 0.2 /100 WBC 0.0   (H): Data is abnormally high  (L): Data is abnormally low      XR Chest 1 View   Final Result   No radiographic findings of acute cardiopulmonary abnormality.            Electronically Signed: Tommy Mcbride     4/1/2025 10:14 PM EDT     Workstation ID: [id number]        Vitals:    04/02/25 0300 04/02/25 0315 04/02/25 0330 04/02/25 0345   BP:       Pulse: 85 89 86 86   Resp:       Temp:       SpO2: 94% 92% 93% 92%   Weight:       Height:         Medications   diazePAM (VALIUM) injection 5 mg (5 mg Intravenous Given 4/1/25 8794)   sodium chloride 0.9 % bolus 1,000 mL (0 mL Intravenous Stopped 4/2/25 1536)   acetaminophen (TYLENOL) tablet 650 mg (650 mg Oral Given 4/1/25 6164)   diphenhydrAMINE (BENADRYL) injection 25 mg (25 mg Intravenous Given 4/2/25 0209)   metoclopramide (REGLAN) injection 10 mg (10 mg Intravenous Given 4/2/25 0209)   ketorolac (TORADOL) injection 15 mg (15 mg Intravenous Given 4/2/25 0209)     ECG/EMG Results (last 24 hours)       ** No results found for the last 24 hours. **          ECG 12 Lead Chest Pain   Preliminary Result   Test Reason : Chest Pain   Blood Pressure :   */*   mmHG   Vent. Rate :  92 BPM     Atrial Rate :  92 BPM      P-R Int : 142 ms          QRS Dur :  88 ms       QT Int :  370 ms       P-R-T Axes :  39 -34  27 degrees     QTcB Int : 457 ms      Normal sinus rhythm   Left axis deviation   Minimal voltage criteria for LVH, may be normal variant   Abnormal ECG   When compared with ECG of 01-Apr-2025 20:50, (Unconfirmed)   No significant change was found      Referred By: ED MD           Confirmed By:       ECG 12 Lead Chest Pain   Preliminary Result   Test Reason : Chest Pain   Blood Pressure :   */*   mmHG   Vent. Rate :  91 BPM     Atrial Rate :  91 BPM      P-R Int : 144 ms          QRS Dur :  90 ms       QT Int : 378 ms       P-R-T Axes :  45 -26  40 degrees     QTcB Int : 464 ms      Normal sinus rhythm   Normal ECG   When compared with ECG of 08-Aug-2023 18:56,   No significant change was found      Referred By: ED MD           Confirmed By:                                                            Medical Decision Making  Complex presentation with complex decision making. Differential including ACS, aortic dissection, cardiac tamponade, coronary artery dissection, esophageal perforation, pulmonary embolism, tension pneumothorax, cholecystitis, mediastinitis, myocardial rupture, myocarditis, pancreatitis, pericarditis, pneumothorax, along with non emergent etiologies fully considered.  Combination of a thorough history, examination, and complex medical decision making excluded multiple etiologies.   Extensive workup including imaging and labs, as discussed separately, performed and emergent condition managed as noted.    Sympotms currently improved. Workup is reassuring   .  Pt is comfortable with Dc and that time and understands to have a low threshold to return to the ED if symptoms persist, worsen, or other concerns arise.    Problems Addressed:  Chest pain, unspecified type: complicated acute illness or injury  Dizziness: complicated acute illness or injury  History of syncope: complicated acute illness or injury    Amount and/or Complexity of Data Reviewed  Labs: ordered.  Decision-making details documented in ED Course.  Radiology: ordered and independent interpretation performed. Decision-making details documented in ED Course.  ECG/medicine tests: ordered and independent interpretation performed. Decision-making details documented in ED Course.    Risk  OTC drugs.  Prescription drug management.        Final diagnoses:   Chest pain, unspecified type   Dizziness   History of syncope       ED Disposition  ED Disposition       ED Disposition   Discharge    Condition   Stable    Comment   --             DISCHARGE    Patient discharged in stable condition.    Reviewed implications of results, diagnosis, meds, responsibility to follow up, warning signs and symptoms of possible worsening, potential complications and reasons to return to ER.    Patient/Family voiced understanding of above instructions.    Discussed plan for discharge, as there is no emergent indication for admission.  Pt/family is agreeable and understands need for follow up and possible repeat testing.  Pt/family is aware that discharge does not mean that nothing is wrong but that it indicates no emergency is currently present that requires admission and they must continue care with follow-up as given below or with a physician of their choice.     FOLLOW-UP  [facility] CARDIOLOGY  [address]  [address]  [place] [facility] [place] [address] [phone]  Schedule an appointment as soon as possible for a visit       Alivia Arita, APRN  [address] [id number] [phone]    Schedule an appointment as soon as possible for a visit       [facility] EMERGENCY DEPARTMENT  [address] [id number] [phone]    If symptoms worsen    [facility] NEUROLOGY  [address]  [address]  [place] [facility] [place] [id number] [phone]  Schedule an appointment as soon as possible for a visit            Medication List        Changed       DULoxetine 60 MG capsule  Commonly known as: CYMBALTA  TAKE 1 CAPSULE BY MOUTH DAILY  What changed: how much to take     LORazepam 1 MG tablet  Commonly known as: ATIVAN  What changed: when to take this     pregabalin 150 MG capsule  Commonly known as: LYRICA  TAKE 1 CAPSULE BY MOUTH TWICE A DAY  What changed: how much to take                 Filemon Romero DO  04/04/25 6633

## 2025-04-16 ENCOUNTER — OFFICE VISIT (OUTPATIENT)
Dept: CARDIOLOGY | Facility: HOSPITAL | Age: 50
End: 2025-04-16
Payer: MEDICAID

## 2025-04-16 ENCOUNTER — LAB (OUTPATIENT)
Dept: LAB | Facility: HOSPITAL | Age: 50
End: 2025-04-16
Payer: MEDICAID

## 2025-04-16 VITALS
DIASTOLIC BLOOD PRESSURE: 85 MMHG | BODY MASS INDEX: 37.99 KG/M2 | SYSTOLIC BLOOD PRESSURE: 133 MMHG | RESPIRATION RATE: 18 BRPM | HEIGHT: 65 IN | HEART RATE: 74 BPM | OXYGEN SATURATION: 97 % | WEIGHT: 228 LBS

## 2025-04-16 DIAGNOSIS — I10 ESSENTIAL HYPERTENSION: ICD-10-CM

## 2025-04-16 DIAGNOSIS — R07.89 OTHER CHEST PAIN: Primary | ICD-10-CM

## 2025-04-16 DIAGNOSIS — Z17.0 MALIGNANT NEOPLASM OF OVERLAPPING SITES OF RIGHT BREAST IN FEMALE, ESTROGEN RECEPTOR POSITIVE: ICD-10-CM

## 2025-04-16 DIAGNOSIS — R55 NEAR SYNCOPE: ICD-10-CM

## 2025-04-16 DIAGNOSIS — C50.811 MALIGNANT NEOPLASM OF OVERLAPPING SITES OF RIGHT BREAST IN FEMALE, ESTROGEN RECEPTOR POSITIVE: ICD-10-CM

## 2025-04-16 LAB
ESTRADIOL SERPL HS-MCNC: <5 PG/ML
FSH SERPL-ACNC: 3.47 MIU/ML
QT INTERVAL: 370 MS
QT INTERVAL: 378 MS
QTC INTERVAL: 457 MS
QTC INTERVAL: 464 MS

## 2025-04-16 PROCEDURE — 82670 ASSAY OF TOTAL ESTRADIOL: CPT

## 2025-04-16 PROCEDURE — 36415 COLL VENOUS BLD VENIPUNCTURE: CPT

## 2025-04-16 PROCEDURE — 83001 ASSAY OF GONADOTROPIN (FSH): CPT

## 2025-04-16 RX ORDER — FLUCONAZOLE 150 MG/1
TABLET ORAL
COMMUNITY
Start: 2025-04-02

## 2025-04-16 RX ORDER — DOXYCYCLINE 100 MG/1
CAPSULE ORAL
COMMUNITY
Start: 2025-04-02

## 2025-04-16 NOTE — PROGRESS NOTES
Chief Complaint  Chest Pain    Subjective      History of Present Illness {  Problem List  Visit  Diagnosis   Encounters  Notes  Medications  Labs  Result Review Imaging  Media :23}     Josefina Rodriguez, 50 y.o. female with past medical history significant for breast cancer, pulmonary embolism, hypertension, type 2 diabetes, anxiety, and WARD, presents to [facility] for Chest Pain  Patient presented to [facility] ED on 4/1/2025 with a chief complaint of chest pain.  Patient endorsed having chest discomfort, and frequent episodes of dizziness, and near syncope.  She has been having approximately 2 episodes of syncope per month.  During recent episode of dizziness, patient also developed left-sided chest pain with radiation to her left shoulder.  Twelve-lead EKG showed normal sinus rhythm, rate 91, normal EKG.  Chest x-ray showed no acute cardiopulmonary abnormality.  High-sensitivity troponin x 2, and BNP, noted to be unremarkable.  Patient was treated while in the ED with diazepam, normal saline, acetaminophen, Benadryl, Reglan, and Toradol.  Patient was discharged with instructions to follow-up with neurology, primary care, and cardiology.    Of note, patient is established with cardiology and was seen last by Dr. Lu on 2/25/2025. At that time, patient also endorsed feeling dizzy with low blood pressures.  Patient's dose of diltiazem was reduced to 120 mg daily.      Since time of evaluation in the ED on 4/1, patient denies any additional syncopal episodes.  She states she has dizziness/near syncope occurring approximately twice per month.  These episodes are not related to position changes.  Patient also denies any additional episodes of chest pain or pressure, worsening dyspnea, or palpitations.  She has mild lower extremity edema which has not been worse recently.  Her edema is resolved after elevating her lower extremities.  Patient does  "monitor her blood pressure at home which is around 120/80.  She has not recently experienced any significant episodes of hypotension.  Patient does admit to an adequate water intake at approximately 24 ounces daily.    Objective     Vital Signs:   Vitals:    04/16/25 1036 04/16/25 1037   BP: 131/87 133/85   BP Location: Left arm Left arm   Patient Position: Standing Sitting   Cuff Size: Adult Adult   Pulse: 77 74   Resp:  18   SpO2: 98% 97%   Weight:  103 kg (228 lb)   Height:  165.1 cm (65\")     Body mass index is 37.94 kg/m².  Physical Exam  Vitals and nursing note reviewed.   Constitutional:       Appearance: Normal appearance. She is obese.   HENT:      Head: Normocephalic.   Eyes:      Pupils: Pupils are equal, round, and reactive to light.   Cardiovascular:      Rate and Rhythm: Normal rate and regular rhythm.      Pulses: Normal pulses.      Heart sounds: Normal heart sounds. No murmur heard.  Pulmonary:      Effort: Pulmonary effort is normal.      Breath sounds: Normal breath sounds.   Abdominal:      General: Bowel sounds are normal.      Palpations: Abdomen is soft.   Musculoskeletal:         General: Normal range of motion.      Cervical back: Normal range of motion.      Right lower leg: No edema.      Left lower leg: No edema.   Skin:     General: Skin is warm and dry.      Capillary Refill: Capillary refill takes less than 2 seconds.   Neurological:      Mental Status: She is alert and oriented to person, place, and time.   Psychiatric:         Mood and Affect: Mood normal.         Thought Content: Thought content normal.                Data Reviewed:{ Labs  Result Review  Imaging  Med Tab  Media :23}     Lab Results   Component Value Date    WBC 15.81 (H) 04/01/2025    HGB 13.2 04/01/2025    HCT 39.3 04/01/2025    MCV 85.4 04/01/2025     04/01/2025      Lab Results   Component Value Date    GLUCOSE 146 (H) 04/01/2025    BUN 10 04/01/2025    CREATININE 0.67 04/01/2025     04/01/2025 "    K 4.1 04/01/2025     04/01/2025    CALCIUM 9.3 04/01/2025    PROTEINTOT 7.1 04/01/2025    ALBUMIN 3.8 04/01/2025    ALT 25 04/01/2025    AST 24 04/01/2025    ALKPHOS 179 (H) 04/01/2025    BILITOT 0.4 04/01/2025    GLOB 3.3 04/01/2025    AGRATIO 1.2 04/01/2025    BCR 14.9 04/01/2025    ANIONGAP 13.0 04/01/2025    EGFR 108.0 04/01/2025      Lab Results   Component Value Date    TROPONINT <6 04/01/2025        ECG 12 Lead Chest Pain (04/01/2025 21:40)  ECG 12 Lead Chest Pain (04/01/2025 20:50)  Stress Test With Myocardial Perfusion (1 Day) (09/11/2023 12:59)  Cardiac Event Monitor (08/15/2023 10:05)  Adult Transthoracic Echo Complete w/ Color, Spectral and Contrast if Necessary Per Protocol (11/17/2022 14:23)    Assessment & Plan   Assessment and Plan {CC Problem List  Visit Diagnosis  ROS  Review (Popup)  Health Maintenance  Quality  BestPractice  Medications  SmartSets  SnapShot Encounters  Media :23}     1. Other chest pain  -Patient denies additional episodes of chest discomfort since time of ED evaluation  - We have reviewed and discussed most recent ED evaluation including EKG, chest x-ray, and laboratory results  - We have also reviewed prior cardiac testing including stress test, cardiac monitor, and echocardiogram  - At this time, there is no indication to repeat cardiac testing  - Patient strongly encouraged to continue to monitor for any exertional symptoms    2. Essential hypertension  -Blood pressure well-controlled during today's evaluation  - Most recently, patient has been experiencing hypotension.  Her diltiazem had been decreased in February to 120 mg per cardiology physician.  - Recommend patient to continue to monitor blood pressure at home  - For now recommend to continue current medication regimen including lisinopril and diltiazem.    3. Near syncope  -Patient endorses concern for episodes of near syncope, and prior syncope.  As mentioned, we have reviewed most recent EKG, and  prior cardiac monitor without clear cause of syncopal episodes.  - Discussed with patient importance of maintaining adequate hydration as she is currently drinking approximately 24 ounces of water daily  - Would recommend patient continue to monitor for worsening of symptoms, and notify cardiology if her symptoms persist      At this time, patient may be seen by heart and valve as needed, or if symptoms change or worsen.  Otherwise recommend close follow-up with cardiology, and primary care.      Follow Up {Instructions Charge Capture  Follow-up Communications :23}     Return if symptoms worsen or fail to improve.      Patient was given instructions and counseling regarding her condition or for health maintenance advice. Please see specific information pulled into the AVS if appropriate. Patient was instructed to call the [facility] with any questions, concerns, or worsening symptoms.    Dictated Utilizing Dragon Dictation   Please note that portions of this note were completed with a voice recognition program. Part of this note may be an electronic transcription/translation of spoken language to printed text using the Dragon Dictation System.

## 2025-04-17 ENCOUNTER — RESULTS FOLLOW-UP (OUTPATIENT)
Dept: ONCOLOGY | Facility: CLINIC | Age: 50
End: 2025-04-17
Payer: MEDICAID

## 2025-04-17 DIAGNOSIS — C50.811 MALIGNANT NEOPLASM OF OVERLAPPING SITES OF RIGHT BREAST IN FEMALE, ESTROGEN RECEPTOR POSITIVE: Primary | ICD-10-CM

## 2025-04-17 DIAGNOSIS — Z17.0 MALIGNANT NEOPLASM OF OVERLAPPING SITES OF RIGHT BREAST IN FEMALE, ESTROGEN RECEPTOR POSITIVE: Primary | ICD-10-CM

## 2025-04-17 NOTE — TELEPHONE ENCOUNTER
----- Message from Jolene Connell sent at 4/16/2025  4:24 PM EDT -----  Please let her know her estradiol levels are low, consistent with menopause.  FSH level are indeterminate.  Recommend repeating FSH and estradiol with her next f/u appt.

## 2025-04-17 NOTE — TELEPHONE ENCOUNTER
Called patient per Dr. Connell to tell her that her estradiol level shows menopause but FSH is indeterminate.  Dr. Connell will repeat those labs at her followup visit.  Patient verbalized understanding.

## 2025-04-28 ENCOUNTER — OFFICE VISIT (OUTPATIENT)
Dept: SLEEP MEDICINE | Facility: CLINIC | Age: 50
End: 2025-04-28
Payer: MEDICAID

## 2025-04-28 VITALS
WEIGHT: 227 LBS | OXYGEN SATURATION: 97 % | HEART RATE: 90 BPM | HEIGHT: 65 IN | SYSTOLIC BLOOD PRESSURE: 130 MMHG | DIASTOLIC BLOOD PRESSURE: 82 MMHG | TEMPERATURE: 98 F | BODY MASS INDEX: 37.82 KG/M2

## 2025-04-28 DIAGNOSIS — G47.33 OSA (OBSTRUCTIVE SLEEP APNEA): Primary | ICD-10-CM

## 2025-04-28 PROCEDURE — 3079F DIAST BP 80-89 MM HG: CPT | Performed by: NURSE PRACTITIONER

## 2025-04-28 PROCEDURE — 99213 OFFICE O/P EST LOW 20 MIN: CPT | Performed by: NURSE PRACTITIONER

## 2025-04-28 PROCEDURE — 3075F SYST BP GE 130 - 139MM HG: CPT | Performed by: NURSE PRACTITIONER

## 2025-04-28 NOTE — PROGRESS NOTES
Chief Complaint:   Chief Complaint   Patient presents with    Follow-up    Sleep Apnea       HPI:    Josefina Rodriguez is a 50 y.o. female here for follow-up of sleep apnea.  Patient was last seen 2/15/2024.  Patient continues to feel rested when on CPAP but days of use showed 9 out of the last 30.  Patient states she has had visitors in and they have been staying at her house and she has been sleeping in different rooms every night so that they can have the best room.  Patient does understand the importance of CPAP compliance and will increase.  She is currently getting 5 to 6 hours of sleep nightly.  She will go to sleep within 1 hour and does not get up during the night.  She has an [place] score of 14/24.  Patient will continue CPAP.        Current medications are:   Current Outpatient Medications:     anastrozole (ARIMIDEX) 1 MG tablet, TAKE 1 TABLET BY MOUTH DAILY, Disp: 90 tablet, Rfl: 3    Continuous Blood Gluc Sensor (Dexcom G6 Sensor), , Disp: , Rfl:     Continuous Blood Gluc Transmit (Dexcom G6 Transmitter) misc, Inject 1 Device under the skin into the appropriate area as directed Take As Directed., Disp: , Rfl:     Diclofenac Sodium (VOLTAREN) 1 % gel gel, Apply  topically to the appropriate area as directed 3 (Three) Times a Day As Needed (pain)., Disp: 1 g, Rfl: 0    dilTIAZem CD (CARDIZEM CD) 120 MG 24 hr capsule, TAKE 1 CAPSULE BY MOUTH DAILY, Disp: 90 capsule, Rfl: 3    doxycycline (VIBRAMYCIN) 100 MG capsule, , Disp: , Rfl:     DULoxetine (CYMBALTA) 60 MG capsule, TAKE 1 CAPSULE BY MOUTH DAILY (Patient taking differently: Take 30 mg by mouth Daily.), Disp: 90 capsule, Rfl: 3    fluconazole (DIFLUCAN) 150 MG tablet, , Disp: , Rfl:     FLUoxetine (PROzac) 20 MG capsule, Take 2 capsules by mouth Daily., Disp: , Rfl:     fluticasone (FLONASE) 50 MCG/ACT nasal spray, Administer 1 spray into the nostril(s) as directed by provider Every 12 (Twelve) Hours., Disp: , Rfl:     guaiFENesin  (MUCINEX) 600 MG 12 hr tablet, Take 2 tablets by mouth 2 (Two) Times a Day., Disp: , Rfl:     ibuprofen (ADVIL,MOTRIN) 600 MG tablet, Take 1 tablet by mouth Every 6 (Six) Hours As Needed., Disp: , Rfl:     Insulin Lispro, 1 Unit Dial, (HUMALOG) 100 UNIT/ML solution pen-injector, , Disp: , Rfl:     [employer] Pen Needles 32G X 4 MM misc, , Disp: , Rfl:     LANTUS SOLOSTAR 100 UNIT/ML injection pen, Inject 26 Units under the skin into the appropriate area as directed. In AM, Disp: , Rfl:     linaclotide (LINZESS) 290 MCG capsule capsule, Take 1 capsule by mouth Every Morning Before Breakfast., Disp: , Rfl:     lisinopril (PRINIVIL,ZESTRIL) 40 MG tablet, Take 1 tablet by mouth Daily., Disp: , Rfl:     loratadine (CLARITIN) 10 MG tablet, Take 1 tablet by mouth Daily., Disp: , Rfl:     LORazepam (ATIVAN) 1 MG tablet, Take 1 tablet by mouth As Needed for Anxiety. (Patient taking differently: Take 1 tablet by mouth 2 (Two) Times a Day.), Disp: , Rfl:     montelukast (SINGULAIR) 10 MG tablet, Take 1 tablet by mouth Every Night., Disp: , Rfl:     Multiple Vitamins-Minerals (MULTIVITAMIN GUMMIES WOMENS PO), Take 2 capsules by mouth Daily., Disp: , Rfl:     ondansetron (ZOFRAN) 8 MG tablet, TAKE ONE TABLET BY MOUTH THREE TIMES A DAY AS NEEDED FOR NAUSEA AND VOMITING, Disp: 30 tablet, Rfl: 3    OneTouch Ultra test strip, , Disp: , Rfl:     pantoprazole (PROTONIX) 40 MG EC tablet, Take 1 tablet by mouth Daily., Disp: , Rfl:     pregabalin (LYRICA) 150 MG capsule, TAKE 1 CAPSULE BY MOUTH TWICE A DAY (Patient taking differently: Take 2 capsules by mouth 2 (Two) Times a Day.), Disp: 60 capsule, Rfl: 5    prochlorperazine (COMPAZINE) 10 MG tablet, TAKE ONE TABLET BY MOUTH EVERY 6 HOURS AS NEEDED FOR NAUSEA AND VOMITING, Disp: 60 tablet, Rfl: 1    Tirzepatide (Mounjaro) 10 MG/0.5ML solution auto-injector, Inject  under the skin into the appropriate area as directed 1 (One) Time Per Week., Disp: , Rfl:     vitamin D3 125 MCG (5000 UT)  "capsule capsule, Take  by mouth Daily., Disp: , Rfl: .      The patient's relevant past medical, surgical, family and social history were reviewed and updated in Epic as appropriate.       Review of Systems   HENT:  Positive for congestion.    Eyes:  Positive for visual disturbance.   Respiratory:  Positive for apnea and chest tightness.    Allergic/Immunologic: Positive for environmental allergies.   Neurological:  Positive for syncope and numbness.   Psychiatric/Behavioral:  Positive for dysphoric mood and sleep disturbance. The patient is nervous/anxious.    All other systems reviewed and are negative.        Objective:    Physical Exam  Constitutional:       Appearance: Normal appearance.   HENT:      Head: Normocephalic and atraumatic.      Mouth/Throat:      Comments: Class 3 airway  Cardiovascular:      Rate and Rhythm: Normal rate and regular rhythm.   Pulmonary:      Effort: Pulmonary effort is normal.      Breath sounds: Normal breath sounds.   Skin:     General: Skin is warm and dry.   Neurological:      Mental Status: She is alert and oriented to person, place, and time.   Psychiatric:         Mood and Affect: Mood normal.         Behavior: Behavior normal.         Thought Content: Thought content normal.         Judgment: Judgment normal.     /82   Pulse 90   Temp 98 °F (36.7 °C)   Ht 165.1 cm (65\")   Wt 103 kg (227 lb)   SpO2 97%   BMI 37.77 kg/m²       CPAP Report    9/30 days of use  Greater than 4-hour use 27%  Setting 8-18  95th percentile pressure 10.0  AHI 0.4  The patient continues to use and benefit from CPAP therapy.    ASSESSMENT/PLAN    Diagnoses and all orders for this visit:    1. WARD (obstructive sleep apnea) (Primary)  -     PAP Therapy        Counseled patient regarding multimodal approach with healthy nutrition, healthy sleep, regular physical activity, social activities, counseling, and medications. Encouraged to practice lateral sleep position. Avoid alcohol and sedatives " close to bedtime.    Refill supplies x 1 year.  Return to clinic 1 year or sooner as symptoms warrant.    Signed by  LISA Simons    April 28, 2025      CC: Alivia Arita, APRN         No ref. provider found

## 2025-07-03 ENCOUNTER — TELEPHONE (OUTPATIENT)
Dept: NEUROLOGY | Facility: CLINIC | Age: 50
End: 2025-07-03

## 2025-07-03 DIAGNOSIS — C50.811 MALIGNANT NEOPLASM OF OVERLAPPING SITES OF RIGHT BREAST IN FEMALE, ESTROGEN RECEPTOR POSITIVE: ICD-10-CM

## 2025-07-03 DIAGNOSIS — Z17.0 MALIGNANT NEOPLASM OF OVERLAPPING SITES OF RIGHT BREAST IN FEMALE, ESTROGEN RECEPTOR POSITIVE: ICD-10-CM

## 2025-07-03 NOTE — TELEPHONE ENCOUNTER
Caller: Josefina Rodriguez    Relationship:  Self    Best call back number:   Telephone Information:   Mobile [phone]     PATIENT CALLED REQUESTING TO CANCEL SAME DAY APPT.    Did the patient call AFTER the start time of their scheduled appointment?  [x]YES  []NO    Was the patient's appointment rescheduled? [x]YES  []NO    Any additional information:   I AM RESCHEDULING THE PT'S APPT.

## 2025-07-07 RX ORDER — ANASTROZOLE 1 MG/1
1 TABLET ORAL DAILY
Qty: 90 TABLET | Refills: 3 | Status: SHIPPED | OUTPATIENT
Start: 2025-07-07

## 2025-08-28 ENCOUNTER — OFFICE VISIT (OUTPATIENT)
Dept: ONCOLOGY | Facility: CLINIC | Age: 50
End: 2025-08-28
Payer: MEDICAID

## 2025-08-28 ENCOUNTER — LAB (OUTPATIENT)
Dept: LAB | Facility: HOSPITAL | Age: 50
End: 2025-08-28
Payer: MEDICAID

## 2025-08-28 VITALS
RESPIRATION RATE: 20 BRPM | HEART RATE: 96 BPM | OXYGEN SATURATION: 96 % | WEIGHT: 226 LBS | DIASTOLIC BLOOD PRESSURE: 70 MMHG | BODY MASS INDEX: 37.65 KG/M2 | SYSTOLIC BLOOD PRESSURE: 110 MMHG | HEIGHT: 65 IN | TEMPERATURE: 97.4 F

## 2025-08-28 DIAGNOSIS — Z12.31 ENCOUNTER FOR SCREENING MAMMOGRAM FOR MALIGNANT NEOPLASM OF BREAST: Primary | ICD-10-CM

## 2025-08-28 DIAGNOSIS — C50.811 MALIGNANT NEOPLASM OF OVERLAPPING SITES OF RIGHT BREAST IN FEMALE, ESTROGEN RECEPTOR POSITIVE: ICD-10-CM

## 2025-08-28 DIAGNOSIS — Z17.0 MALIGNANT NEOPLASM OF OVERLAPPING SITES OF RIGHT BREAST IN FEMALE, ESTROGEN RECEPTOR POSITIVE: ICD-10-CM

## 2025-08-28 DIAGNOSIS — G62.0 POLYNEUROPATHY DUE TO DRUG: ICD-10-CM

## 2025-08-28 PROCEDURE — 36415 COLL VENOUS BLD VENIPUNCTURE: CPT

## 2025-08-28 PROCEDURE — 82670 ASSAY OF TOTAL ESTRADIOL: CPT

## 2025-08-28 PROCEDURE — 83001 ASSAY OF GONADOTROPIN (FSH): CPT

## 2025-08-28 RX ORDER — TIRZEPATIDE 7.5 MG/.5ML
INJECTION, SOLUTION SUBCUTANEOUS
COMMUNITY
Start: 2025-08-26

## 2025-08-28 RX ORDER — PREGABALIN 300 MG/1
CAPSULE ORAL
COMMUNITY
Start: 2025-08-26

## 2025-08-28 RX ORDER — TRAMADOL HYDROCHLORIDE 50 MG/1
100 TABLET ORAL EVERY 12 HOURS PRN
Qty: 120 TABLET | Refills: 5 | Status: SHIPPED | OUTPATIENT
Start: 2025-08-28

## 2025-08-29 ENCOUNTER — RESULTS FOLLOW-UP (OUTPATIENT)
Dept: ONCOLOGY | Facility: CLINIC | Age: 50
End: 2025-08-29
Payer: MEDICAID

## 2025-08-29 LAB
ESTRADIOL SERPL HS-MCNC: <5 PG/ML
FSH SERPL-ACNC: 7.19 MIU/ML

## (undated) DEVICE — LEX GENERAL BREAST: Brand: MEDLINE INDUSTRIES, INC.

## (undated) DEVICE — SUT SILK 2/0 PS 18IN 1588H

## (undated) DEVICE — JACKSON-PRATT 100CC BULB RESERVOIR: Brand: CARDINAL HEALTH

## (undated) DEVICE — SUT SILK 3/0 TIES 18IN A184H

## (undated) DEVICE — DECANT BG O JET

## (undated) DEVICE — SYR CONTRL LUERLOK 10CC

## (undated) DEVICE — PREVENA PEEL & PLACE SYSTEM KIT- 13 CM: Brand: PREVENA™ PEEL & PLACE™

## (undated) DEVICE — COVER,LIGHT HANDLE,FLX,1/PK: Brand: MEDLINE INDUSTRIES, INC.

## (undated) DEVICE — PROXIMATE RH ROTATING HEAD SKIN STAPLERS (35 WIDE) CONTAINS 35 STAINLESS STEEL STAPLES: Brand: PROXIMATE

## (undated) DEVICE — ANTIBACTERIAL UNDYED BRAIDED (POLYGLACTIN 910), SYNTHETIC ABSORBABLE SUTURE: Brand: COATED VICRYL

## (undated) DEVICE — INTRAOPERATIVE COVER KIT, 10 PACK: Brand: SITE-RITE

## (undated) DEVICE — DISH PETRI 3.5IN MD STRL LF

## (undated) DEVICE — SUT MNCRYL PLS ANTIB UD 4/0 PS2 18IN

## (undated) DEVICE — SUT ETHLN 3/0 PC5 18IN 1893G

## (undated) DEVICE — SYR LL 3CC

## (undated) DEVICE — SNAP KOVER: Brand: UNBRANDED

## (undated) DEVICE — GLV SURG SENSICARE PI ORTHO SZ7.5 LF STRL

## (undated) DEVICE — GOWN,NON-REINFORCED,SIRUS,SET IN SLV,XL: Brand: MEDLINE

## (undated) DEVICE — DRAIN JACKSON PRATT ROUND 15FR: Brand: CARDINAL HEALTH